# Patient Record
Sex: FEMALE | Race: WHITE | NOT HISPANIC OR LATINO | Employment: OTHER | URBAN - METROPOLITAN AREA
[De-identification: names, ages, dates, MRNs, and addresses within clinical notes are randomized per-mention and may not be internally consistent; named-entity substitution may affect disease eponyms.]

---

## 2017-05-22 ENCOUNTER — TRANSCRIBE ORDERS (OUTPATIENT)
Dept: CARDIOLOGY | Facility: HOSPITAL | Age: 57
End: 2017-05-22

## 2017-05-22 DIAGNOSIS — I70.222 ATHEROSCLEROSIS OF NATIVE ARTERY OF LEFT LOWER EXTREMITY WITH REST PAIN (HCC): Primary | ICD-10-CM

## 2017-05-27 ENCOUNTER — TRANSCRIBE ORDERS (OUTPATIENT)
Dept: LAB | Facility: HOSPITAL | Age: 57
End: 2017-05-27

## 2017-05-27 ENCOUNTER — LAB (OUTPATIENT)
Dept: LAB | Facility: HOSPITAL | Age: 57
End: 2017-05-27

## 2017-05-27 DIAGNOSIS — I70.222 ATHEROSCLEROSIS OF NATIVE ARTERY OF LEFT LOWER EXTREMITY WITH REST PAIN (HCC): ICD-10-CM

## 2017-05-27 DIAGNOSIS — I70.222 ATHEROSCLEROSIS OF NATIVE ARTERY OF LEFT LOWER EXTREMITY WITH REST PAIN (HCC): Primary | ICD-10-CM

## 2017-05-27 LAB
ANION GAP SERPL CALCULATED.3IONS-SCNC: 3 MMOL/L (ref 3–11)
BASOPHILS # BLD AUTO: 0.1 10*3/MM3 (ref 0–0.2)
BASOPHILS NFR BLD AUTO: 1.1 % (ref 0–1)
BUN BLD-MCNC: 8 MG/DL (ref 9–23)
BUN/CREAT SERPL: 13.3 (ref 7–25)
CALCIUM SPEC-SCNC: 9.1 MG/DL (ref 8.7–10.4)
CHLORIDE SERPL-SCNC: 105 MMOL/L (ref 99–109)
CO2 SERPL-SCNC: 22 MMOL/L (ref 20–31)
CREAT BLD-MCNC: 0.6 MG/DL (ref 0.6–1.3)
DEPRECATED RDW RBC AUTO: 51.3 FL (ref 37–54)
EOSINOPHIL # BLD AUTO: 0.5 10*3/MM3 (ref 0.1–0.3)
EOSINOPHIL NFR BLD AUTO: 5.3 % (ref 0–3)
ERYTHROCYTE [DISTWIDTH] IN BLOOD BY AUTOMATED COUNT: 17.9 % (ref 11.3–14.5)
GFR SERPL CREATININE-BSD FRML MDRD: 104 ML/MIN/1.73
GLUCOSE BLD-MCNC: 104 MG/DL (ref 70–100)
HCT VFR BLD AUTO: 29.6 % (ref 34.5–44)
HGB BLD-MCNC: 9.4 G/DL (ref 11.5–15.5)
IMM GRANULOCYTES # BLD: 0.03 10*3/MM3 (ref 0–0.03)
IMM GRANULOCYTES NFR BLD: 0.3 % (ref 0–0.6)
LYMPHOCYTES # BLD AUTO: 3.82 10*3/MM3 (ref 0.6–4.8)
LYMPHOCYTES NFR BLD AUTO: 40.1 % (ref 24–44)
MCH RBC QN AUTO: 24.7 PG (ref 27–31)
MCHC RBC AUTO-ENTMCNC: 31.8 G/DL (ref 32–36)
MCV RBC AUTO: 77.9 FL (ref 80–99)
MONOCYTES # BLD AUTO: 0.73 10*3/MM3 (ref 0–1)
MONOCYTES NFR BLD AUTO: 7.7 % (ref 0–12)
NEUTROPHILS # BLD AUTO: 4.34 10*3/MM3 (ref 1.5–8.3)
NEUTROPHILS NFR BLD AUTO: 45.5 % (ref 41–71)
PLATELET # BLD AUTO: 561 10*3/MM3 (ref 150–450)
PMV BLD AUTO: 9.6 FL (ref 6–12)
POTASSIUM BLD-SCNC: 3.8 MMOL/L (ref 3.5–5.5)
RBC # BLD AUTO: 3.8 10*6/MM3 (ref 3.89–5.14)
SODIUM BLD-SCNC: 130 MMOL/L (ref 132–146)
WBC NRBC COR # BLD: 9.52 10*3/MM3 (ref 3.5–10.8)

## 2017-05-27 PROCEDURE — 80048 BASIC METABOLIC PNL TOTAL CA: CPT

## 2017-05-27 PROCEDURE — 36415 COLL VENOUS BLD VENIPUNCTURE: CPT

## 2017-05-27 PROCEDURE — 85025 COMPLETE CBC W/AUTO DIFF WBC: CPT

## 2017-05-30 ENCOUNTER — HOSPITAL ENCOUNTER (OUTPATIENT)
Facility: HOSPITAL | Age: 57
Setting detail: HOSPITAL OUTPATIENT SURGERY
Discharge: HOME | End: 2017-05-30
Attending: SURGERY | Admitting: SURGERY

## 2017-05-30 VITALS
DIASTOLIC BLOOD PRESSURE: 80 MMHG | TEMPERATURE: 98 F | HEART RATE: 104 BPM | BODY MASS INDEX: 18.37 KG/M2 | SYSTOLIC BLOOD PRESSURE: 135 MMHG | OXYGEN SATURATION: 97 % | RESPIRATION RATE: 18 BRPM | HEIGHT: 65 IN | WEIGHT: 110.23 LBS

## 2017-05-30 DIAGNOSIS — I70.222 ATHEROSCLEROSIS OF NATIVE ARTERY OF LEFT LOWER EXTREMITY WITH REST PAIN (HCC): ICD-10-CM

## 2017-05-30 LAB
GLUCOSE BLDC GLUCOMTR-MCNC: 136 MG/DL (ref 70–130)
GLUCOSE BLDC GLUCOMTR-MCNC: 144 MG/DL (ref 70–130)
GLUCOSE BLDC GLUCOMTR-MCNC: 144 MG/DL (ref 70–130)

## 2017-05-30 PROCEDURE — 82962 GLUCOSE BLOOD TEST: CPT

## 2017-05-30 PROCEDURE — C1894 INTRO/SHEATH, NON-LASER: HCPCS | Performed by: SURGERY

## 2017-05-30 PROCEDURE — C1887 CATHETER, GUIDING: HCPCS | Performed by: SURGERY

## 2017-05-30 PROCEDURE — 25010000002 FENTANYL CITRATE (PF) 100 MCG/2ML SOLUTION: Performed by: SURGERY

## 2017-05-30 PROCEDURE — 75716 ARTERY X-RAYS ARMS/LEGS: CPT | Performed by: SURGERY

## 2017-05-30 PROCEDURE — C1769 GUIDE WIRE: HCPCS | Performed by: SURGERY

## 2017-05-30 PROCEDURE — 75625 CONTRAST EXAM ABDOMINL AORTA: CPT | Performed by: SURGERY

## 2017-05-30 PROCEDURE — 0 IOPAMIDOL PER 1 ML: Performed by: SURGERY

## 2017-05-30 PROCEDURE — 25010000002 MIDAZOLAM PER 1 MG: Performed by: SURGERY

## 2017-05-30 RX ORDER — FENTANYL CITRATE 50 UG/ML
INJECTION, SOLUTION INTRAMUSCULAR; INTRAVENOUS AS NEEDED
Status: DISCONTINUED | OUTPATIENT
Start: 2017-05-30 | End: 2017-05-30 | Stop reason: HOSPADM

## 2017-05-30 RX ORDER — ASPIRIN 81 MG/1
81 TABLET ORAL EVERY MORNING
COMMUNITY
End: 2022-09-08

## 2017-05-30 RX ORDER — SODIUM CHLORIDE 9 MG/ML
250 INJECTION, SOLUTION INTRAVENOUS CONTINUOUS
Status: ACTIVE | OUTPATIENT
Start: 2017-05-30 | End: 2017-05-30

## 2017-05-30 RX ORDER — HYDROCODONE BITARTRATE AND ACETAMINOPHEN 7.5; 325 MG/1; MG/1
1 TABLET ORAL EVERY 4 HOURS PRN
Status: DISCONTINUED | OUTPATIENT
Start: 2017-05-30 | End: 2017-05-30 | Stop reason: HOSPADM

## 2017-05-30 RX ORDER — NALOXONE HCL 0.4 MG/ML
0.4 VIAL (ML) INJECTION
Status: DISCONTINUED | OUTPATIENT
Start: 2017-05-30 | End: 2017-05-30 | Stop reason: HOSPADM

## 2017-05-30 RX ORDER — LISINOPRIL AND HYDROCHLOROTHIAZIDE 12.5; 1 MG/1; MG/1
1 TABLET ORAL EVERY MORNING
COMMUNITY
End: 2022-09-08

## 2017-05-30 RX ORDER — MIDAZOLAM HYDROCHLORIDE 1 MG/ML
INJECTION INTRAMUSCULAR; INTRAVENOUS AS NEEDED
Status: DISCONTINUED | OUTPATIENT
Start: 2017-05-30 | End: 2017-05-30 | Stop reason: HOSPADM

## 2017-05-30 RX ORDER — SIMVASTATIN 20 MG
20 TABLET ORAL EVERY MORNING
COMMUNITY
End: 2020-03-13

## 2017-05-30 RX ORDER — OXYCODONE AND ACETAMINOPHEN 7.5; 325 MG/1; MG/1
2 TABLET ORAL EVERY 4 HOURS PRN
Status: DISCONTINUED | OUTPATIENT
Start: 2017-05-30 | End: 2017-05-30 | Stop reason: HOSPADM

## 2017-05-30 RX ORDER — CLOPIDOGREL BISULFATE 75 MG/1
75 TABLET ORAL EVERY MORNING
COMMUNITY

## 2017-05-30 RX ORDER — MORPHINE SULFATE 2 MG/ML
2 INJECTION, SOLUTION INTRAMUSCULAR; INTRAVENOUS EVERY 4 HOURS PRN
Status: DISCONTINUED | OUTPATIENT
Start: 2017-05-30 | End: 2017-05-30 | Stop reason: HOSPADM

## 2017-05-30 RX ORDER — LIDOCAINE HYDROCHLORIDE 10 MG/ML
INJECTION, SOLUTION INFILTRATION; PERINEURAL AS NEEDED
Status: DISCONTINUED | OUTPATIENT
Start: 2017-05-30 | End: 2017-05-30 | Stop reason: HOSPADM

## 2017-05-30 RX ORDER — ACETAMINOPHEN 325 MG/1
650 TABLET ORAL EVERY 4 HOURS PRN
Status: DISCONTINUED | OUTPATIENT
Start: 2017-05-30 | End: 2017-05-30 | Stop reason: HOSPADM

## 2017-05-30 RX ADMIN — SODIUM CHLORIDE 250 ML/HR: 9 INJECTION, SOLUTION INTRAVENOUS at 12:07

## 2017-06-01 PROBLEM — I10 HYPERTENSION: Status: ACTIVE | Noted: 2017-06-01

## 2017-06-01 PROBLEM — E11.9 DIABETES MELLITUS (HCC): Status: ACTIVE | Noted: 2017-06-01

## 2017-06-01 PROBLEM — M79.7 FIBROMYALGIA: Status: ACTIVE | Noted: 2017-06-01

## 2017-06-01 PROBLEM — Z72.0 TOBACCO ABUSE: Status: ACTIVE | Noted: 2017-06-01

## 2017-06-01 PROBLEM — E78.5 HYPERLIPIDEMIA: Status: ACTIVE | Noted: 2017-06-01

## 2017-06-01 PROBLEM — I73.9 PAD (PERIPHERAL ARTERY DISEASE) (HCC): Status: ACTIVE | Noted: 2017-06-01

## 2017-06-01 PROBLEM — J44.9 COPD (CHRONIC OBSTRUCTIVE PULMONARY DISEASE) (HCC): Status: ACTIVE | Noted: 2017-06-01

## 2017-06-04 ENCOUNTER — APPOINTMENT (OUTPATIENT)
Dept: PREADMISSION TESTING | Facility: HOSPITAL | Age: 57
End: 2017-06-04

## 2017-06-28 DIAGNOSIS — Z01.810 PREOPERATIVE CARDIOVASCULAR EXAMINATION: Primary | ICD-10-CM

## 2017-06-30 ENCOUNTER — CONSULT (OUTPATIENT)
Dept: CARDIOLOGY | Facility: CLINIC | Age: 57
End: 2017-06-30

## 2017-06-30 ENCOUNTER — HOSPITAL ENCOUNTER (OUTPATIENT)
Dept: GENERAL RADIOLOGY | Facility: HOSPITAL | Age: 57
Discharge: HOME | End: 2017-06-30
Admitting: PHYSICIAN ASSISTANT

## 2017-06-30 VITALS
BODY MASS INDEX: 17.68 KG/M2 | DIASTOLIC BLOOD PRESSURE: 44 MMHG | HEIGHT: 66 IN | HEART RATE: 73 BPM | SYSTOLIC BLOOD PRESSURE: 106 MMHG | WEIGHT: 110 LBS

## 2017-06-30 DIAGNOSIS — I10 ESSENTIAL HYPERTENSION: ICD-10-CM

## 2017-06-30 DIAGNOSIS — R94.31 ABNORMAL EKG: ICD-10-CM

## 2017-06-30 DIAGNOSIS — Z01.810 PREOPERATIVE CARDIOVASCULAR EXAMINATION: Primary | ICD-10-CM

## 2017-06-30 DIAGNOSIS — E11.59 TYPE 2 DIABETES MELLITUS WITH OTHER CIRCULATORY COMPLICATION, WITHOUT LONG-TERM CURRENT USE OF INSULIN (HCC): ICD-10-CM

## 2017-06-30 DIAGNOSIS — J44.9 CHRONIC OBSTRUCTIVE PULMONARY DISEASE, UNSPECIFIED COPD TYPE (HCC): ICD-10-CM

## 2017-06-30 DIAGNOSIS — Z01.810 PREOPERATIVE CARDIOVASCULAR EXAMINATION: ICD-10-CM

## 2017-06-30 PROCEDURE — 71020 HC CHEST PA AND LATERAL: CPT

## 2017-06-30 PROCEDURE — 93000 ELECTROCARDIOGRAM COMPLETE: CPT | Performed by: INTERNAL MEDICINE

## 2017-06-30 PROCEDURE — 99202 OFFICE O/P NEW SF 15 MIN: CPT | Performed by: INTERNAL MEDICINE

## 2017-06-30 RX ORDER — CEPHALEXIN 500 MG/1
500 CAPSULE ORAL 3 TIMES DAILY
COMMUNITY
Start: 2017-06-20 | End: 2019-12-18 | Stop reason: HOSPADM

## 2017-07-06 ENCOUNTER — APPOINTMENT (OUTPATIENT)
Dept: CARDIOLOGY | Facility: HOSPITAL | Age: 57
End: 2017-07-06
Attending: INTERNAL MEDICINE

## 2017-07-07 ENCOUNTER — HOSPITAL ENCOUNTER (OUTPATIENT)
Dept: CARDIOLOGY | Facility: HOSPITAL | Age: 57
Discharge: HOME | End: 2017-07-07
Attending: INTERNAL MEDICINE

## 2017-07-07 ENCOUNTER — HOSPITAL ENCOUNTER (OUTPATIENT)
Dept: CARDIOLOGY | Facility: HOSPITAL | Age: 57
Discharge: HOME | End: 2017-07-07
Attending: INTERNAL MEDICINE | Admitting: INTERNAL MEDICINE

## 2017-07-07 VITALS — HEART RATE: 67 BPM | DIASTOLIC BLOOD PRESSURE: 77 MMHG | SYSTOLIC BLOOD PRESSURE: 122 MMHG

## 2017-07-07 VITALS — HEIGHT: 66 IN | BODY MASS INDEX: 17.68 KG/M2 | WEIGHT: 110 LBS

## 2017-07-07 DIAGNOSIS — Z01.810 PREOPERATIVE CARDIOVASCULAR EXAMINATION: ICD-10-CM

## 2017-07-07 DIAGNOSIS — R94.31 ABNORMAL EKG: ICD-10-CM

## 2017-07-07 DIAGNOSIS — E11.59 TYPE 2 DIABETES MELLITUS WITH OTHER CIRCULATORY COMPLICATION, WITHOUT LONG-TERM CURRENT USE OF INSULIN (HCC): ICD-10-CM

## 2017-07-07 DIAGNOSIS — J44.9 CHRONIC OBSTRUCTIVE PULMONARY DISEASE, UNSPECIFIED COPD TYPE (HCC): ICD-10-CM

## 2017-07-07 DIAGNOSIS — I10 ESSENTIAL HYPERTENSION: ICD-10-CM

## 2017-07-07 LAB
BH CV ECHO MEAS - BSA(HAYCOCK): 1.5 M^2
BH CV ECHO MEAS - BSA: 1.5 M^2
BH CV ECHO MEAS - BZI_BMI: 18.3 KILOGRAMS/M^2
BH CV ECHO MEAS - BZI_METRIC_HEIGHT: 165.1 CM
BH CV ECHO MEAS - BZI_METRIC_WEIGHT: 49.9 KG
BH CV STRESS BP STAGE 1: NORMAL
BH CV STRESS BP STAGE 2: NORMAL
BH CV STRESS BP STAGE 3: NORMAL
BH CV STRESS BP STAGE 4: NORMAL
BH CV STRESS BP STAGE 5: NORMAL
BH CV STRESS DOSE DOBUTAMINE STAGE 1: 10
BH CV STRESS DOSE DOBUTAMINE STAGE 2: 20
BH CV STRESS DOSE DOBUTAMINE STAGE 3: 30
BH CV STRESS DOSE DOBUTAMINE STAGE 4: 40
BH CV STRESS DURATION MIN STAGE 1: 2
BH CV STRESS DURATION MIN STAGE 2: 2
BH CV STRESS DURATION MIN STAGE 3: 2
BH CV STRESS DURATION MIN STAGE 4: 2
BH CV STRESS DURATION MIN STAGE 5: 2
BH CV STRESS DURATION SEC STAGE 1: 0
BH CV STRESS DURATION SEC STAGE 2: 0
BH CV STRESS DURATION SEC STAGE 3: 0
BH CV STRESS DURATION SEC STAGE 4: 0
BH CV STRESS DURATION SEC STAGE 5: 41
BH CV STRESS HR STAGE 1: 69
BH CV STRESS HR STAGE 2: 93
BH CV STRESS HR STAGE 3: 114
BH CV STRESS HR STAGE 4: 134
BH CV STRESS HR STAGE 5: 141
BH CV STRESS PROTOCOL 1: NORMAL
BH CV STRESS RATE STAGE 1: 30
BH CV STRESS RATE STAGE 2: 60
BH CV STRESS RATE STAGE 3: 90
BH CV STRESS RATE STAGE 4: 120
BH CV STRESS RECOVERY BP: NORMAL MMHG
BH CV STRESS RECOVERY HR: 99 BPM
BH CV STRESS STAGE 1: 1
BH CV STRESS STAGE 2: 2
BH CV STRESS STAGE 3: 3
BH CV STRESS STAGE 4: 4
BH CV STRESS STAGE 5: 5
MAXIMAL PREDICTED HEART RATE: 165 BPM
PERCENT MAX PREDICTED HR: 95.15 %
STRESS BASELINE BP: NORMAL MMHG
STRESS BASELINE HR: 67 BPM
STRESS PERCENT HR: 112 %
STRESS POST EXERCISE DUR MIN: 10 MIN
STRESS POST EXERCISE DUR SEC: 41 SEC
STRESS POST PEAK BP: NORMAL MMHG
STRESS POST PEAK HR: 157 BPM
STRESS TARGET HR: 140 BPM

## 2017-07-07 PROCEDURE — 93352 ADMIN ECG CONTRAST AGENT: CPT | Performed by: INTERNAL MEDICINE

## 2017-07-07 PROCEDURE — 93306 TTE W/DOPPLER COMPLETE: CPT | Performed by: INTERNAL MEDICINE

## 2017-07-07 PROCEDURE — 93350 STRESS TTE ONLY: CPT | Performed by: INTERNAL MEDICINE

## 2017-07-07 PROCEDURE — 93325 DOPPLER ECHO COLOR FLOW MAPG: CPT | Performed by: INTERNAL MEDICINE

## 2017-07-07 PROCEDURE — 93018 CV STRESS TEST I&R ONLY: CPT | Performed by: INTERNAL MEDICINE

## 2017-07-07 RX ORDER — DOBUTAMINE HYDROCHLORIDE 100 MG/100ML
10 INJECTION INTRAVENOUS
Status: DISCONTINUED | OUTPATIENT
Start: 2017-07-07 | End: 2017-07-08 | Stop reason: HOSPADM

## 2017-07-07 RX ADMIN — Medication 1 DOSE: at 10:50

## 2017-07-07 RX ADMIN — DOBUTAMINE HYDROCHLORIDE 10 MCG/KG/MIN: 100 INJECTION INTRAVENOUS at 14:12

## 2017-07-07 RX ADMIN — SULFUR HEXAFLUORIDE 5 ML: KIT at 14:00

## 2017-07-07 NOTE — NURSING NOTE
Pt. Has arrived today for a Lexiscan Cardiolite stress test.  Patient placed under the camera for her first set of pictures, she was unable to tolerate laying flat for more than 5 minutes, due to severe left foot pain.  Patient taken off the bed, we spoke with her and she is willing to attempt the camera again, this time with repositioning she lasted 7 minutes.

## 2017-07-08 LAB
BH CV ECHO MEAS - AO ROOT AREA: 6.1 CM^2
BH CV ECHO MEAS - AO ROOT DIAM: 2.8 CM
BH CV ECHO MEAS - CONTRAST EF (2CH): 65.5 ML/M^2
BH CV ECHO MEAS - CONTRAST EF 4CH: 67.6 ML/M^2
BH CV ECHO MEAS - EDV(CUBED): 65.8 ML
BH CV ECHO MEAS - EDV(MOD-SP2): 55 ML
BH CV ECHO MEAS - EDV(MOD-SP4): 68 ML
BH CV ECHO MEAS - EDV(TEICH): 71.6 ML
BH CV ECHO MEAS - EF(CUBED): 68.3 %
BH CV ECHO MEAS - EF(MOD-SP2): 65.5 %
BH CV ECHO MEAS - EF(MOD-SP4): 67.6 %
BH CV ECHO MEAS - EF(TEICH): 60.4 %
BH CV ECHO MEAS - EPSS: 0.36 CM
BH CV ECHO MEAS - ESV(CUBED): 20.9 ML
BH CV ECHO MEAS - ESV(MOD-SP2): 19 ML
BH CV ECHO MEAS - ESV(MOD-SP4): 22 ML
BH CV ECHO MEAS - ESV(TEICH): 28.4 ML
BH CV ECHO MEAS - FS: 31.8 %
BH CV ECHO MEAS - IVS/LVPW: 0.81
BH CV ECHO MEAS - IVSD: 0.59 CM
BH CV ECHO MEAS - LA DIMENSION: 3 CM
BH CV ECHO MEAS - LA/AO: 1.1
BH CV ECHO MEAS - LAT PEAK E' VEL: 11.5 CM/SEC
BH CV ECHO MEAS - LV MASS(C)D: 73.6 GRAMS
BH CV ECHO MEAS - LVIDD: 4 CM
BH CV ECHO MEAS - LVIDS: 2.8 CM
BH CV ECHO MEAS - LVLD AP2: 6.9 CM
BH CV ECHO MEAS - LVLD AP4: 6.6 CM
BH CV ECHO MEAS - LVLS AP2: 5.4 CM
BH CV ECHO MEAS - LVLS AP4: 4.9 CM
BH CV ECHO MEAS - LVPWD: 0.73 CM
BH CV ECHO MEAS - MED PEAK E' VEL: 11.2 CM/SEC
BH CV ECHO MEAS - MV A MAX VEL: 81.4 CM/SEC
BH CV ECHO MEAS - MV DEC TIME: 0.24 SEC
BH CV ECHO MEAS - MV E MAX VEL: 112.9 CM/SEC
BH CV ECHO MEAS - MV E/A: 1.4
BH CV ECHO MEAS - PA ACC SLOPE: 1202 CM/SEC^2
BH CV ECHO MEAS - PA ACC TIME: 0.07 SEC
BH CV ECHO MEAS - PA PR(ACCEL): 45.7 MMHG
BH CV ECHO MEAS - PI END-D VEL: 40.7 CM/SEC
BH CV ECHO MEAS - RAP SYSTOLE: 3 MMHG
BH CV ECHO MEAS - RVDD: 2.5 CM
BH CV ECHO MEAS - RVSP: 28 MMHG
BH CV ECHO MEAS - SV(CUBED): 45 ML
BH CV ECHO MEAS - SV(MOD-SP2): 36 ML
BH CV ECHO MEAS - SV(MOD-SP4): 46 ML
BH CV ECHO MEAS - SV(TEICH): 43.2 ML
BH CV ECHO MEAS - TAPSE (>1.6): 2.4 CM2
BH CV ECHO MEAS - TR MAX VEL: 231.6 CM/SEC
BH CV VAS BP LEFT ARM: NORMAL MMHG
BH CV XLRA - RV BASE: 3.6 CM
BH CV XLRA - RV LENGTH: 5.8 CM
BH CV XLRA - RV MID: 2.5 CM
BH CV XLRA - TDI S': 12.8 CM/SEC
E/E' RATIO: 9.9
LEFT ATRIUM VOLUME: 27 CM3
MAXIMAL PREDICTED HEART RATE: 165 BPM
STRESS TARGET HR: 140 BPM

## 2017-07-10 ENCOUNTER — ANESTHESIA (OUTPATIENT)
Dept: PERIOP | Facility: HOSPITAL | Age: 57
End: 2017-07-10

## 2017-07-10 ENCOUNTER — ANESTHESIA EVENT (OUTPATIENT)
Dept: PERIOP | Facility: HOSPITAL | Age: 57
End: 2017-07-10

## 2017-07-10 ENCOUNTER — APPOINTMENT (OUTPATIENT)
Dept: GENERAL RADIOLOGY | Facility: HOSPITAL | Age: 57
End: 2017-07-10

## 2017-07-10 ENCOUNTER — HOSPITAL ENCOUNTER (INPATIENT)
Facility: HOSPITAL | Age: 57
LOS: 2 days | Discharge: HOME | End: 2017-07-12
Attending: SURGERY | Admitting: SURGERY

## 2017-07-10 DIAGNOSIS — I96 GANGRENE (HCC): ICD-10-CM

## 2017-07-10 PROBLEM — E78.5 HYPERLIPIDEMIA: Status: ACTIVE | Noted: 2017-07-10

## 2017-07-10 PROBLEM — J44.9 COPD (CHRONIC OBSTRUCTIVE PULMONARY DISEASE): Status: ACTIVE | Noted: 2017-07-10

## 2017-07-10 PROBLEM — Z95.828 S/P FEMOROPOPLITEAL BYPASS SURGERY: Status: ACTIVE | Noted: 2017-07-10

## 2017-07-10 PROBLEM — I10 HYPERTENSION: Status: ACTIVE | Noted: 2017-07-10

## 2017-07-10 PROBLEM — I73.9 PAD (PERIPHERAL ARTERY DISEASE): Status: ACTIVE | Noted: 2017-07-10

## 2017-07-10 PROBLEM — E11.9 DIABETES MELLITUS (HCC): Status: ACTIVE | Noted: 2017-07-10

## 2017-07-10 PROBLEM — D64.9 ANEMIA: Status: ACTIVE | Noted: 2017-07-10

## 2017-07-10 PROBLEM — Z72.0 TOBACCO ABUSE: Status: ACTIVE | Noted: 2017-07-10

## 2017-07-10 LAB
ABO GROUP BLD: NORMAL
ANION GAP SERPL CALCULATED.3IONS-SCNC: 8 MMOL/L (ref 3–11)
BILIRUB UR QL STRIP: NEGATIVE
BLD GP AB SCN SERPL QL: NEGATIVE
BUN BLD-MCNC: 8 MG/DL (ref 9–23)
BUN/CREAT SERPL: 16 (ref 7–25)
CALCIUM SPEC-SCNC: 9.9 MG/DL (ref 8.7–10.4)
CHLORIDE SERPL-SCNC: 109 MMOL/L (ref 99–109)
CLARITY UR: CLEAR
CO2 SERPL-SCNC: 24 MMOL/L (ref 20–31)
COLOR UR: YELLOW
CREAT BLD-MCNC: 0.5 MG/DL (ref 0.6–1.3)
DEPRECATED RDW RBC AUTO: 50.6 FL (ref 37–54)
ERYTHROCYTE [DISTWIDTH] IN BLOOD BY AUTOMATED COUNT: 17.6 % (ref 11.3–14.5)
GFR SERPL CREATININE-BSD FRML MDRD: 128 ML/MIN/1.73
GLUCOSE BLD-MCNC: 121 MG/DL (ref 70–100)
GLUCOSE BLDC GLUCOMTR-MCNC: 138 MG/DL (ref 70–130)
GLUCOSE BLDC GLUCOMTR-MCNC: 178 MG/DL (ref 70–130)
GLUCOSE BLDC GLUCOMTR-MCNC: 212 MG/DL (ref 70–130)
GLUCOSE UR STRIP-MCNC: NEGATIVE MG/DL
HBA1C MFR BLD: 6.5 % (ref 4.8–5.6)
HCG INTACT+B SERPL-ACNC: <5 MIU/ML
HCT VFR BLD AUTO: 29.7 % (ref 34.5–44)
HGB BLD-MCNC: 9.5 G/DL (ref 11.5–15.5)
HGB UR QL STRIP.AUTO: NEGATIVE
KETONES UR QL STRIP: NEGATIVE
LEUKOCYTE ESTERASE UR QL STRIP.AUTO: ABNORMAL
MCH RBC QN AUTO: 25.5 PG (ref 27–31)
MCHC RBC AUTO-ENTMCNC: 32 G/DL (ref 32–36)
MCV RBC AUTO: 79.6 FL (ref 80–99)
NITRITE UR QL STRIP: NEGATIVE
PH UR STRIP.AUTO: 7 [PH] (ref 5–8)
PLATELET # BLD AUTO: 788 10*3/MM3 (ref 150–450)
PMV BLD AUTO: 10.1 FL (ref 6–12)
POTASSIUM BLD-SCNC: 2.9 MMOL/L (ref 3.5–5.5)
PROT UR QL STRIP: NEGATIVE
RBC # BLD AUTO: 3.73 10*6/MM3 (ref 3.89–5.14)
RH BLD: NEGATIVE
SODIUM BLD-SCNC: 141 MMOL/L (ref 132–146)
SP GR UR STRIP: 1.01 (ref 1–1.03)
UROBILINOGEN UR QL STRIP: ABNORMAL
WBC NRBC COR # BLD: 18.64 10*3/MM3 (ref 3.5–10.8)

## 2017-07-10 PROCEDURE — 25010000002 HYDROMORPHONE PER 4 MG: Performed by: NURSE ANESTHETIST, CERTIFIED REGISTERED

## 2017-07-10 PROCEDURE — 0 IOPAMIDOL 61 % SOLUTION: Performed by: SURGERY

## 2017-07-10 PROCEDURE — 63710000001 INSULIN LISPRO (HUMAN) PER 5 UNITS: Performed by: NURSE PRACTITIONER

## 2017-07-10 PROCEDURE — 81003 URINALYSIS AUTO W/O SCOPE: CPT | Performed by: SURGERY

## 2017-07-10 PROCEDURE — 25010000002 MORPHINE SULFATE (PF) 2 MG/ML SOLUTION: Performed by: SURGERY

## 2017-07-10 PROCEDURE — P9041 ALBUMIN (HUMAN),5%, 50ML: HCPCS | Performed by: NURSE ANESTHETIST, CERTIFIED REGISTERED

## 2017-07-10 PROCEDURE — 25010000002 HEPARIN (PORCINE) PER 1000 UNITS: Performed by: SURGERY

## 2017-07-10 PROCEDURE — 25010000002 PHENYLEPHRINE PER 1 ML: Performed by: NURSE ANESTHETIST, CERTIFIED REGISTERED

## 2017-07-10 PROCEDURE — C1769 GUIDE WIRE: HCPCS | Performed by: SURGERY

## 2017-07-10 PROCEDURE — 85027 COMPLETE CBC AUTOMATED: CPT | Performed by: SURGERY

## 2017-07-10 PROCEDURE — 83036 HEMOGLOBIN GLYCOSYLATED A1C: CPT | Performed by: SURGERY

## 2017-07-10 PROCEDURE — C1894 INTRO/SHEATH, NON-LASER: HCPCS | Performed by: SURGERY

## 2017-07-10 PROCEDURE — 25010000002 FENTANYL CITRATE (PF) 100 MCG/2ML SOLUTION: Performed by: NURSE ANESTHETIST, CERTIFIED REGISTERED

## 2017-07-10 PROCEDURE — 76000 FLUOROSCOPY <1 HR PHYS/QHP: CPT

## 2017-07-10 PROCEDURE — 80048 BASIC METABOLIC PNL TOTAL CA: CPT | Performed by: SURGERY

## 2017-07-10 PROCEDURE — 041D0ZJ BYPASS LEFT COMMON ILIAC ARTERY TO LEFT FEMORAL ARTERY, OPEN APPROACH: ICD-10-PCS | Performed by: SURGERY

## 2017-07-10 PROCEDURE — 25010000002 DEXAMETHASONE PER 1 MG: Performed by: NURSE ANESTHETIST, CERTIFIED REGISTERED

## 2017-07-10 PROCEDURE — 0Y6N0ZF DETACHMENT AT LEFT FOOT, PARTIAL 5TH RAY, OPEN APPROACH: ICD-10-PCS | Performed by: SURGERY

## 2017-07-10 PROCEDURE — 99233 SBSQ HOSP IP/OBS HIGH 50: CPT | Performed by: INTERNAL MEDICINE

## 2017-07-10 PROCEDURE — 87086 URINE CULTURE/COLONY COUNT: CPT | Performed by: SURGERY

## 2017-07-10 PROCEDURE — 88305 TISSUE EXAM BY PATHOLOGIST: CPT | Performed by: SURGERY

## 2017-07-10 PROCEDURE — 86900 BLOOD TYPING SEROLOGIC ABO: CPT | Performed by: SURGERY

## 2017-07-10 PROCEDURE — 041L0ZL BYPASS LEFT FEMORAL ARTERY TO POPLITEAL ARTERY, OPEN APPROACH: ICD-10-PCS | Performed by: SURGERY

## 2017-07-10 PROCEDURE — 25010000002 ALBUMIN HUMAN 5% PER 50 ML: Performed by: NURSE ANESTHETIST, CERTIFIED REGISTERED

## 2017-07-10 PROCEDURE — 25010000002 MIDAZOLAM PER 1 MG: Performed by: NURSE ANESTHETIST, CERTIFIED REGISTERED

## 2017-07-10 PROCEDURE — 84702 CHORIONIC GONADOTROPIN TEST: CPT | Performed by: ANESTHESIOLOGY

## 2017-07-10 PROCEDURE — 86901 BLOOD TYPING SEROLOGIC RH(D): CPT | Performed by: SURGERY

## 2017-07-10 PROCEDURE — 82962 GLUCOSE BLOOD TEST: CPT

## 2017-07-10 PROCEDURE — 25010000003 CEFAZOLIN IN DEXTROSE 2-4 GM/100ML-% SOLUTION: Performed by: SURGERY

## 2017-07-10 PROCEDURE — 25010000002 PROPOFOL 1000 MG/ML EMULSION: Performed by: NURSE ANESTHETIST, CERTIFIED REGISTERED

## 2017-07-10 PROCEDURE — 76001 HC FLUORO GREATER THAN 1 HOUR: CPT

## 2017-07-10 PROCEDURE — 86850 RBC ANTIBODY SCREEN: CPT | Performed by: SURGERY

## 2017-07-10 PROCEDURE — C1768 GRAFT, VASCULAR: HCPCS | Performed by: SURGERY

## 2017-07-10 PROCEDURE — C1757 CATH, THROMBECTOMY/EMBOLECT: HCPCS | Performed by: SURGERY

## 2017-07-10 PROCEDURE — 25010000002 PROPOFOL 10 MG/ML EMULSION: Performed by: NURSE ANESTHETIST, CERTIFIED REGISTERED

## 2017-07-10 PROCEDURE — 85014 HEMATOCRIT: CPT | Performed by: SURGERY

## 2017-07-10 PROCEDURE — 88311 DECALCIFY TISSUE: CPT | Performed by: SURGERY

## 2017-07-10 PROCEDURE — 25010000002 HEPARIN (PORCINE) PER 1000 UNITS: Performed by: NURSE ANESTHETIST, CERTIFIED REGISTERED

## 2017-07-10 PROCEDURE — 85018 HEMOGLOBIN: CPT | Performed by: SURGERY

## 2017-07-10 DEVICE — IMPLANTABLE DEVICE: Type: IMPLANTABLE DEVICE | Status: FUNCTIONAL

## 2017-07-10 RX ORDER — PROPOFOL 10 MG/ML
VIAL (ML) INTRAVENOUS AS NEEDED
Status: DISCONTINUED | OUTPATIENT
Start: 2017-07-10 | End: 2017-07-10 | Stop reason: SURG

## 2017-07-10 RX ORDER — CLOPIDOGREL BISULFATE 75 MG/1
75 TABLET ORAL EVERY MORNING
Status: DISCONTINUED | OUTPATIENT
Start: 2017-07-11 | End: 2017-07-12 | Stop reason: HOSPADM

## 2017-07-10 RX ORDER — DEXAMETHASONE SODIUM PHOSPHATE 10 MG/ML
INJECTION INTRAMUSCULAR; INTRAVENOUS AS NEEDED
Status: DISCONTINUED | OUTPATIENT
Start: 2017-07-10 | End: 2017-07-10 | Stop reason: SURG

## 2017-07-10 RX ORDER — HEPARIN SODIUM 1000 [USP'U]/ML
INJECTION, SOLUTION INTRAVENOUS; SUBCUTANEOUS AS NEEDED
Status: DISCONTINUED | OUTPATIENT
Start: 2017-07-10 | End: 2017-07-10 | Stop reason: HOSPADM

## 2017-07-10 RX ORDER — ATRACURIUM BESYLATE 10 MG/ML
INJECTION, SOLUTION INTRAVENOUS AS NEEDED
Status: DISCONTINUED | OUTPATIENT
Start: 2017-07-10 | End: 2017-07-10 | Stop reason: SURG

## 2017-07-10 RX ORDER — DEXTROSE MONOHYDRATE 25 G/50ML
25 INJECTION, SOLUTION INTRAVENOUS
Status: DISCONTINUED | OUTPATIENT
Start: 2017-07-10 | End: 2017-07-11

## 2017-07-10 RX ORDER — CEFAZOLIN SODIUM 2 G/100ML
2 INJECTION, SOLUTION INTRAVENOUS ONCE
Status: COMPLETED | OUTPATIENT
Start: 2017-07-10 | End: 2017-07-10

## 2017-07-10 RX ORDER — HYDROMORPHONE HYDROCHLORIDE 1 MG/ML
0.5 INJECTION, SOLUTION INTRAMUSCULAR; INTRAVENOUS; SUBCUTANEOUS
Status: DISCONTINUED | OUTPATIENT
Start: 2017-07-10 | End: 2017-07-10 | Stop reason: HOSPADM

## 2017-07-10 RX ORDER — NALOXONE HCL 0.4 MG/ML
0.4 VIAL (ML) INJECTION
Status: DISCONTINUED | OUTPATIENT
Start: 2017-07-10 | End: 2017-07-12 | Stop reason: HOSPADM

## 2017-07-10 RX ORDER — ONDANSETRON 2 MG/ML
4 INJECTION INTRAMUSCULAR; INTRAVENOUS ONCE AS NEEDED
Status: DISCONTINUED | OUTPATIENT
Start: 2017-07-10 | End: 2017-07-10 | Stop reason: HOSPADM

## 2017-07-10 RX ORDER — FAMOTIDINE 20 MG/1
20 TABLET, FILM COATED ORAL ONCE
Status: DISCONTINUED | OUTPATIENT
Start: 2017-07-10 | End: 2017-07-10

## 2017-07-10 RX ORDER — LIDOCAINE HYDROCHLORIDE 10 MG/ML
0.5 INJECTION, SOLUTION EPIDURAL; INFILTRATION; INTRACAUDAL; PERINEURAL ONCE AS NEEDED
Status: COMPLETED | OUTPATIENT
Start: 2017-07-10 | End: 2017-07-10

## 2017-07-10 RX ORDER — PAPAVERINE HYDROCHLORIDE 30 MG/ML
INJECTION INTRAMUSCULAR; INTRAVENOUS AS NEEDED
Status: DISCONTINUED | OUTPATIENT
Start: 2017-07-10 | End: 2017-07-10 | Stop reason: HOSPADM

## 2017-07-10 RX ORDER — CEFAZOLIN SODIUM 2 G/100ML
2 INJECTION, SOLUTION INTRAVENOUS EVERY 8 HOURS
Status: COMPLETED | OUTPATIENT
Start: 2017-07-10 | End: 2017-07-11

## 2017-07-10 RX ORDER — MIDAZOLAM HYDROCHLORIDE 1 MG/ML
INJECTION INTRAMUSCULAR; INTRAVENOUS AS NEEDED
Status: DISCONTINUED | OUTPATIENT
Start: 2017-07-10 | End: 2017-07-10 | Stop reason: SURG

## 2017-07-10 RX ORDER — FENTANYL CITRATE 50 UG/ML
50 INJECTION, SOLUTION INTRAMUSCULAR; INTRAVENOUS
Status: DISCONTINUED | OUTPATIENT
Start: 2017-07-10 | End: 2017-07-10 | Stop reason: HOSPADM

## 2017-07-10 RX ORDER — MAGNESIUM HYDROXIDE 1200 MG/15ML
LIQUID ORAL AS NEEDED
Status: DISCONTINUED | OUTPATIENT
Start: 2017-07-10 | End: 2017-07-10 | Stop reason: HOSPADM

## 2017-07-10 RX ORDER — LIDOCAINE HYDROCHLORIDE 20 MG/ML
INJECTION, SOLUTION INFILTRATION; PERINEURAL AS NEEDED
Status: DISCONTINUED | OUTPATIENT
Start: 2017-07-10 | End: 2017-07-10 | Stop reason: SURG

## 2017-07-10 RX ORDER — MORPHINE SULFATE 2 MG/ML
1 INJECTION, SOLUTION INTRAMUSCULAR; INTRAVENOUS EVERY 4 HOURS PRN
Status: DISCONTINUED | OUTPATIENT
Start: 2017-07-10 | End: 2017-07-12 | Stop reason: HOSPADM

## 2017-07-10 RX ORDER — SODIUM CHLORIDE 9 MG/ML
INJECTION, SOLUTION INTRAVENOUS AS NEEDED
Status: DISCONTINUED | OUTPATIENT
Start: 2017-07-10 | End: 2017-07-10 | Stop reason: HOSPADM

## 2017-07-10 RX ORDER — LIDOCAINE HYDROCHLORIDE 10 MG/ML
0.5 INJECTION, SOLUTION EPIDURAL; INFILTRATION; INTRACAUDAL; PERINEURAL ONCE AS NEEDED
Status: DISCONTINUED | OUTPATIENT
Start: 2017-07-10 | End: 2017-07-10

## 2017-07-10 RX ORDER — NICOTINE 21 MG/24HR
1 PATCH, TRANSDERMAL 24 HOURS TRANSDERMAL EVERY 24 HOURS
Status: DISCONTINUED | OUTPATIENT
Start: 2017-07-11 | End: 2017-07-12 | Stop reason: HOSPADM

## 2017-07-10 RX ORDER — SODIUM CHLORIDE, SODIUM LACTATE, POTASSIUM CHLORIDE, CALCIUM CHLORIDE 600; 310; 30; 20 MG/100ML; MG/100ML; MG/100ML; MG/100ML
9 INJECTION, SOLUTION INTRAVENOUS CONTINUOUS
Status: DISCONTINUED | OUTPATIENT
Start: 2017-07-10 | End: 2017-07-10 | Stop reason: SDUPTHER

## 2017-07-10 RX ORDER — MORPHINE SULFATE 2 MG/ML
2 INJECTION, SOLUTION INTRAMUSCULAR; INTRAVENOUS EVERY 4 HOURS PRN
Status: DISCONTINUED | OUTPATIENT
Start: 2017-07-10 | End: 2017-07-12 | Stop reason: HOSPADM

## 2017-07-10 RX ORDER — SODIUM CHLORIDE, SODIUM LACTATE, POTASSIUM CHLORIDE, CALCIUM CHLORIDE 600; 310; 30; 20 MG/100ML; MG/100ML; MG/100ML; MG/100ML
75 INJECTION, SOLUTION INTRAVENOUS CONTINUOUS
Status: DISCONTINUED | OUTPATIENT
Start: 2017-07-10 | End: 2017-07-11

## 2017-07-10 RX ORDER — NICOTINE POLACRILEX 4 MG
15 LOZENGE BUCCAL
Status: DISCONTINUED | OUTPATIENT
Start: 2017-07-10 | End: 2017-07-11

## 2017-07-10 RX ORDER — ALBUMIN, HUMAN INJ 5% 5 %
SOLUTION INTRAVENOUS CONTINUOUS PRN
Status: DISCONTINUED | OUTPATIENT
Start: 2017-07-10 | End: 2017-07-10 | Stop reason: SURG

## 2017-07-10 RX ORDER — OXYCODONE HYDROCHLORIDE AND ACETAMINOPHEN 5; 325 MG/1; MG/1
2 TABLET ORAL EVERY 4 HOURS PRN
Status: DISCONTINUED | OUTPATIENT
Start: 2017-07-10 | End: 2017-07-12 | Stop reason: HOSPADM

## 2017-07-10 RX ORDER — SODIUM CHLORIDE, SODIUM LACTATE, POTASSIUM CHLORIDE, CALCIUM CHLORIDE 600; 310; 30; 20 MG/100ML; MG/100ML; MG/100ML; MG/100ML
9 INJECTION, SOLUTION INTRAVENOUS CONTINUOUS PRN
Status: DISCONTINUED | OUTPATIENT
Start: 2017-07-10 | End: 2017-07-10 | Stop reason: HOSPADM

## 2017-07-10 RX ORDER — ROCURONIUM BROMIDE 10 MG/ML
INJECTION, SOLUTION INTRAVENOUS AS NEEDED
Status: DISCONTINUED | OUTPATIENT
Start: 2017-07-10 | End: 2017-07-10 | Stop reason: SURG

## 2017-07-10 RX ORDER — ACETAMINOPHEN 325 MG/1
650 TABLET ORAL EVERY 4 HOURS PRN
Status: DISCONTINUED | OUTPATIENT
Start: 2017-07-10 | End: 2017-07-12 | Stop reason: HOSPADM

## 2017-07-10 RX ORDER — HEPARIN SODIUM 1000 [USP'U]/ML
INJECTION, SOLUTION INTRAVENOUS; SUBCUTANEOUS AS NEEDED
Status: DISCONTINUED | OUTPATIENT
Start: 2017-07-10 | End: 2017-07-10 | Stop reason: SURG

## 2017-07-10 RX ORDER — SODIUM CHLORIDE 0.9 % (FLUSH) 0.9 %
1-10 SYRINGE (ML) INJECTION AS NEEDED
Status: DISCONTINUED | OUTPATIENT
Start: 2017-07-10 | End: 2017-07-10 | Stop reason: HOSPADM

## 2017-07-10 RX ORDER — FENTANYL CITRATE 50 UG/ML
INJECTION, SOLUTION INTRAMUSCULAR; INTRAVENOUS AS NEEDED
Status: DISCONTINUED | OUTPATIENT
Start: 2017-07-10 | End: 2017-07-10 | Stop reason: SURG

## 2017-07-10 RX ORDER — FAMOTIDINE 10 MG/ML
20 INJECTION, SOLUTION INTRAVENOUS ONCE
Status: DISCONTINUED | OUTPATIENT
Start: 2017-07-10 | End: 2017-07-10

## 2017-07-10 RX ORDER — FAMOTIDINE 20 MG/1
20 TABLET, FILM COATED ORAL
Status: DISCONTINUED | OUTPATIENT
Start: 2017-07-10 | End: 2017-07-10 | Stop reason: HOSPADM

## 2017-07-10 RX ADMIN — FENTANYL CITRATE 25 MCG: 50 INJECTION, SOLUTION INTRAMUSCULAR; INTRAVENOUS at 13:32

## 2017-07-10 RX ADMIN — EPHEDRINE SULFATE 12.5 MG: 50 INJECTION INTRAMUSCULAR; INTRAVENOUS; SUBCUTANEOUS at 09:04

## 2017-07-10 RX ADMIN — CEFAZOLIN SODIUM 2 G: 2 INJECTION, SOLUTION INTRAVENOUS at 14:00

## 2017-07-10 RX ADMIN — ROCURONIUM BROMIDE 25 MG: 10 INJECTION INTRAVENOUS at 08:13

## 2017-07-10 RX ADMIN — ATRACURIUM BESYLATE 10 MG: 10 INJECTION, SOLUTION INTRAVENOUS at 13:36

## 2017-07-10 RX ADMIN — SODIUM CHLORIDE, POTASSIUM CHLORIDE, SODIUM LACTATE AND CALCIUM CHLORIDE 9 ML/HR: 600; 310; 30; 20 INJECTION, SOLUTION INTRAVENOUS at 06:49

## 2017-07-10 RX ADMIN — ATRACURIUM BESYLATE 10 MG: 10 INJECTION, SOLUTION INTRAVENOUS at 14:45

## 2017-07-10 RX ADMIN — ATRACURIUM BESYLATE 10 MG: 10 INJECTION, SOLUTION INTRAVENOUS at 12:59

## 2017-07-10 RX ADMIN — ATRACURIUM BESYLATE 10 MG: 10 INJECTION, SOLUTION INTRAVENOUS at 10:42

## 2017-07-10 RX ADMIN — CEFAZOLIN SODIUM 2 G: 2 INJECTION, SOLUTION INTRAVENOUS at 08:08

## 2017-07-10 RX ADMIN — CEFAZOLIN SODIUM 2 G: 2 INJECTION, SOLUTION INTRAVENOUS at 11:08

## 2017-07-10 RX ADMIN — ROCURONIUM BROMIDE 10 MG: 10 INJECTION INTRAVENOUS at 08:34

## 2017-07-10 RX ADMIN — MORPHINE SULFATE 2 MG: 2 INJECTION, SOLUTION INTRAMUSCULAR; INTRAVENOUS at 20:47

## 2017-07-10 RX ADMIN — PROPOFOL 25 MCG/KG/MIN: 10 INJECTION, EMULSION INTRAVENOUS at 08:20

## 2017-07-10 RX ADMIN — PROPOFOL 25 MG: 10 INJECTION, EMULSION INTRAVENOUS at 12:38

## 2017-07-10 RX ADMIN — ROCURONIUM BROMIDE 10 MG: 10 INJECTION INTRAVENOUS at 09:23

## 2017-07-10 RX ADMIN — DEXAMETHASONE SODIUM PHOSPHATE 4 MG: 10 INJECTION INTRAMUSCULAR; INTRAVENOUS at 11:32

## 2017-07-10 RX ADMIN — PHENYLEPHRINE HYDROCHLORIDE 100 MCG: 10 INJECTION INTRAVENOUS at 12:52

## 2017-07-10 RX ADMIN — MIDAZOLAM HYDROCHLORIDE 1 MG: 1 INJECTION, SOLUTION INTRAMUSCULAR; INTRAVENOUS at 11:15

## 2017-07-10 RX ADMIN — SODIUM CHLORIDE, POTASSIUM CHLORIDE, SODIUM LACTATE AND CALCIUM CHLORIDE: 600; 310; 30; 20 INJECTION, SOLUTION INTRAVENOUS at 12:01

## 2017-07-10 RX ADMIN — HYDROMORPHONE HYDROCHLORIDE 0.5 MG: 1 INJECTION, SOLUTION INTRAMUSCULAR; INTRAVENOUS; SUBCUTANEOUS at 16:05

## 2017-07-10 RX ADMIN — HEPARIN SODIUM 3000 UNITS: 1000 INJECTION, SOLUTION INTRAVENOUS; SUBCUTANEOUS at 12:27

## 2017-07-10 RX ADMIN — LIDOCAINE HYDROCHLORIDE 30 MG: 20 INJECTION, SOLUTION INFILTRATION; PERINEURAL at 08:12

## 2017-07-10 RX ADMIN — FENTANYL CITRATE 75 MCG: 50 INJECTION, SOLUTION INTRAMUSCULAR; INTRAVENOUS at 13:44

## 2017-07-10 RX ADMIN — PROPOFOL 100 MG: 10 INJECTION, EMULSION INTRAVENOUS at 08:13

## 2017-07-10 RX ADMIN — ATRACURIUM BESYLATE 10 MG: 10 INJECTION, SOLUTION INTRAVENOUS at 11:41

## 2017-07-10 RX ADMIN — MIDAZOLAM HYDROCHLORIDE 1 MG: 1 INJECTION, SOLUTION INTRAMUSCULAR; INTRAVENOUS at 08:08

## 2017-07-10 RX ADMIN — CEFAZOLIN SODIUM 2 G: 2 INJECTION, SOLUTION INTRAVENOUS at 18:02

## 2017-07-10 RX ADMIN — LIDOCAINE HYDROCHLORIDE 0.5 ML: 10 INJECTION, SOLUTION EPIDURAL; INFILTRATION; INTRACAUDAL; PERINEURAL at 06:48

## 2017-07-10 RX ADMIN — HEPARIN SODIUM 5000 UNITS: 1000 INJECTION, SOLUTION INTRAVENOUS; SUBCUTANEOUS at 09:00

## 2017-07-10 RX ADMIN — HYDROMORPHONE HYDROCHLORIDE 0.5 MG: 1 INJECTION, SOLUTION INTRAMUSCULAR; INTRAVENOUS; SUBCUTANEOUS at 16:10

## 2017-07-10 RX ADMIN — ROCURONIUM BROMIDE 5 MG: 10 INJECTION INTRAVENOUS at 10:00

## 2017-07-10 RX ADMIN — FAMOTIDINE 20 MG: 20 TABLET ORAL at 06:48

## 2017-07-10 RX ADMIN — ATRACURIUM BESYLATE 10 MG: 10 INJECTION, SOLUTION INTRAVENOUS at 12:22

## 2017-07-10 RX ADMIN — HEPARIN SODIUM 1000 UNITS: 1000 INJECTION, SOLUTION INTRAVENOUS; SUBCUTANEOUS at 10:38

## 2017-07-10 RX ADMIN — SODIUM CHLORIDE, POTASSIUM CHLORIDE, SODIUM LACTATE AND CALCIUM CHLORIDE 75 ML/HR: 600; 310; 30; 20 INJECTION, SOLUTION INTRAVENOUS at 17:58

## 2017-07-10 RX ADMIN — INSULIN LISPRO 4 UNITS: 100 INJECTION, SOLUTION INTRAVENOUS; SUBCUTANEOUS at 20:47

## 2017-07-10 RX ADMIN — ALBUMIN HUMAN: 0.05 INJECTION, SOLUTION INTRAVENOUS at 09:55

## 2017-07-10 RX ADMIN — OXYCODONE AND ACETAMINOPHEN 1 TABLET: 5; 325 TABLET ORAL at 17:53

## 2017-07-10 NOTE — PROGRESS NOTES
"Intensivist Note     7/10/2017  Hospital Day: 0      Ms. Bobbi Du, 56 y.o. female is followed for:  Principal Problem:    PAD w/gangrene left fifth toe  Active Problems:    S/P left iliofemoral and left femoropopliteal bypass surgery 7/10/17    Diabetes mellitus with neuropathy    Active Tobacco abuse    COPD (chronic obstructive pulmonary disease)    Hyperlipidemia    Hypertension    Microcytic Anemia     SUBJECTIVE   Mrs. Du is a 55 y/o WF who was admitted today by Dr. Toro for PAD and gangrene of left 5th toe.  She underwent preoperative evaluation by Dr. Romero Ontiveros and underwent a dobutamine stress echocardiogram 7/7/17 that was normal and was subsequently cleared for surgery.  She has continued to smoke up to the day of surgery, despite being advised that this could lead to failure of revascularization.  She has diabetes and associated neuropathy and Charcot debbie,t  but her Hgb A1c was only 6.5 on metformin alone. She denies a great deal of dyspnea or wheezing but does take bronchodilator therapy. She is unsure of most of her home medications. She does have what sounds like an albuterol metered-dose inhaler but has not required oxygen or nebulizer treatments.    Subjective  Patient underwent a left ilio-femoral bypass, left fem-pop ISSV bypass and left 5th toe ray amputation by Dr. Toro today and has been transferred to the ICU post operatively. Was sleeping quietly upon my arrival. Easily awakens and other than some complaints of pain in her right foot (not the operative foot) she seems to have adequate pain control. Is on room air with adequate saturations and hemodynamics are good. Denies chest pain, cough, hemoptysis, pleurisy, dyspnea or wheezing.    The patient's relevant past medical, surgical and social history were reviewed and updated in Epic as appropriate.    OBJECTIVE     /80  Pulse 111  Temp 98.2 °F (36.8 °C) (Axillary)   Resp 16  Ht 65.5\" (166.4 cm)  Wt 110 lb (49.9 " "kg)  LMP Comment: YEARS AGO  SpO2 94%  Breastfeeding? No  BMI 18.03 kg/m2     Flow (L/min): 1    Flowsheet Rows         First Filed Value    Admission Height  65.5\" (166.4 cm) Documented at 07/10/2017 0620    Admission Weight  110 lb (49.9 kg) Documented at 07/10/2017 0620        Intake & Output (last day)       07/10 0701 - 07/11 0700    I.V. (mL/kg) 1059 (21.2)    IV Piggyback 317.8    Total Intake(mL/kg) 1376.8 (27.6)    Urine (mL/kg/hr) 1565 (2.3)    Blood 200 (0.3)    Total Output 1765    Net -388.2             Objective    Exam:  General Exam:  Elderly white female in NAD. Appears far older than stated age  HEENT: Pupils equal and reactive. Nose and throat clear.  Neck:                          Supple, no JVD, thyromegaly, or adenopathy  Lungs: Clear to auscultation and percussion anteriorly and posteriorly. No wheezes or rales  Cardiovascular: Regular rate and rhythm without murmurs or gallops.  Abdomen: Soft nontender without organomegaly or masses.   and rectal: Rueda catheter in place  Extremities: Right foot warm but diminished pulses. Left foot wrapped. The exposed left great toe has an ulcer at the tip and there are a few blisters at the tip of the third toe. Fifth toe has been amputated  Neurologic:                 Symmetric strength. No focal deficits.    Chest X-Ray 6/30/17: Hyperinflated, hyperlucent lung fields with flattened diaphragms. No pulmonary parenchymal infiltrates or masses.    EKG 6/30/17: Septal scar otherwise unremarkable    INFUSIONS    lactated ringers 75 mL/hr Last Rate: 75 mL/hr (07/10/17 1758)   niCARdipine 5-15 mg/hr Last Rate: Stopped (07/10/17 1758)       Results from last 7 days  Lab Units 07/10/17  1240 07/10/17  0635   WBC 10*3/mm3  --  18.64*   HEMOGLOBIN g/dL 7.8* 9.5*   HEMATOCRIT % 25.1* 29.7*   PLATELETS 10*3/mm3  --  788*       Results from last 7 days  Lab Units 07/10/17  0635   SODIUM mmol/L 141   POTASSIUM mmol/L 2.9*   CHLORIDE mmol/L 109   CO2 mmol/L 24.0 "   BUN mg/dL 8*   CREATININE mg/dL 0.50*   GLUCOSE mg/dL 121*   CALCIUM mg/dL 9.9     I reviewed the patient's results, images and medication.    Assessment/Plan   ASSESSMENT      Principal Problem:    PAD w/gangrene left fifth toe  Active Problems:    S/P left iliofemoral and left femoropopliteal bypass surgery 7/10/17    Diabetes mellitus with neuropathy    Active Tobacco abuse    COPD (chronic obstructive pulmonary disease)    Hyperlipidemia    Hypertension    Microcytic Anemia      DISCUSSION: Appears to be doing well    PLAN     Monitor in ICU  Pain control  Diabetic control  Smoking cessation  Evaluate microcytic anemia while here (stool for occult blood, serum iron and iron binding capacity as well as ferritin in a.m.)  Has never received flu vaccine, Pneumovax, Prevnar and should  Standard bronchodilator therapy prn    ZOE Robles, AGACNP-BC, FNP-BC  Pulmonary and Critical Care Service    Plan of care and goals reviewed with mulitdisciplinary team at daily rounds.    I discussed the patient's findings and my recommendations with patient and nursing staff    Time spent Critical care 25 min (It does not include procedure time).    Rudi Medel MD  Intensive Care Medicine

## 2017-07-10 NOTE — ANESTHESIA POSTPROCEDURE EVALUATION
Patient: Bobbi Du    Procedure Summary     Date Anesthesia Start Anesthesia Stop Room / Location    07/10/17 0808   [facility] / [facility]       Procedure Diagnosis Surgeon Provider    LEFT ILIAC STENT, FEMORAL ENDARECTOMY, LEFT FEMORAL POPLITEAL BYPASS, POSS ILIAC FEMORAL BYPASS (Left Thigh) No diagnosis on file. MD Paddy Martini MD          Anesthesia Type: general  Last vitals  BP      Temp      Pulse     Resp      SpO2        Post Anesthesia Care and Evaluation    Patient location during evaluation: PACU  Patient participation: complete - patient participated  Level of consciousness: awake and alert  Pain score: 0  Pain management: adequate  Airway patency: patent  Anesthetic complications: No anesthetic complications  PONV Status: none  Cardiovascular status: hemodynamically stable and acceptable  Respiratory status: nonlabored ventilation, acceptable and nasal cannula  Hydration status: acceptable

## 2017-07-10 NOTE — ANESTHESIA PREPROCEDURE EVALUATION
Anesthesia Evaluation     Patient summary reviewed and Nursing notes reviewed   NPO Solid Status: > 8 hours  NPO Liquid Status: > 8 hours     Airway   Mallampati: I  TM distance: >3 FB  Neck ROM: full  no difficulty expected  Dental    (+) edentulous    Pulmonary    (+) COPD (No Rx Smoker) mild,   Cardiovascular     ECG reviewed    (+) hypertension, PVD, hyperlipidemia    ROS comment: EKG NSR  Poor R wave progression   NORMAL Dobutamine stress echocardiogram.  ECHO EF 67%  There are myxomatous MV changes with mild mitral regurgitation.  The study is otherwise normal.    Neuro/Psych  GI/Hepatic/Renal/Endo    (+)  diabetes mellitus, hypothyroidism (Rx),     Musculoskeletal     (+) myalgias,   Abdominal    Substance History      OB/GYN          Other   (+) arthritis       Other Comment: Rheumatoid no specific Rx   ROS/Med Hx Other: K2.9  Hct 29  Albuterol palpitations   Plavix      Phys Exam Other: No neck tenderness                                Anesthesia Plan    ASA 3     general   (IN line stabilisation (Rheumatoid ))  intravenous induction   Anesthetic plan and risks discussed with patient.    Plan discussed with CRNA.

## 2017-07-10 NOTE — OP NOTE
VASCULAR SURGERY OPERATIVE NOTE     Bobbi Du  7/10/2017    Preop Diagnosis  Left PVD with 5th toe gangrene    Postop Diagnosis  Same    Procedure  1.  Left ilio-femoral bypass  2.  Left fem-pop ISSV bypass  3.  Left 5th toe ray amputation    Surgeon  Mayur Artis M.D.    Assistant  Adrian Hayward M.D.    Anesthesia  General      INDICATIONS:  This 56-year-old female presents with a recent history of increasingly painful left foot rest pain with fifth toe gangrene secondary to severe peripheral vascular occlusive disease.  Previous angiography demonstrated left external iliac occlusion, common femoral artery disease, and left superficial femoral artery occlusion.  Patient was admitted at this time for revascularization for limb salvage.    FINDINGS/INTERPRETATION:  1.  Completion angiography demonstrated a patent femoral-popliteal bypass graft with no fistulas with runoff to the foot primarily via the peroneal artery  2.  The common femoral and external iliac arteries had considerable atherosclerotic plaque.  Thrombus which appeared a relatively fresh was also extracted from the external iliac artery.  The popliteal artery exhibited thickened walls but without gross plaque below the knee.    PROCEDURE:  After satisfactory induction of general anesthesia and infusion of IV antibiotics, the patient's abdomen and left lower extremity were prepped and draped in sterile fashion.  A vertical incision was made over the common femoral artery and the common femoral artery was dissected from the ligament to beyond its bifurcation distally.  The patient was then given intravenous heparin.  Vessels were clamped and a longitudinal arteriotomy was made with a scalpel and extended with Estrada scissors.  An endarterectomy plane was then established and the distal plaque was transected proximal to the common femoral bifurcation.  An elevator was then used to extend the end artery plane proximally into the external iliac  artery.  A Vollmar ring was then used to dissect the plaque into the pelvis.  The plaque was then removed.  Angiography was performed with hand injection.  Using an 035 angled Glidewire and a glide catheter, an attempt was made to reenter the common iliac artery and/or the aorta.  Despite attempts with the Berenstein catheter and other wires, this was not successful.  Therefore an oblique incision was made in the left lower quadrant and a retroperitoneal approach was used to expose the common iliac and internal iliac and external iliac arteries.  A longitudinal arteriotomy was made in the common iliac artery after clamps were placed.  Additional plaque was removed from the common iliac artery.  An end-to-side anastomosis was then created using an 8 mm knitted Dacron graft and 4-0 polypropylene suture.  This was brought through a tunnel to the left groin after clamps were removed.  The proximal common femoral artery was then oversewn.  The distal common femoral artery was then spatulated into the profunda femoris artery.  A distal end-to-end anastomosis was then created after trimming the graft to the appropriate length.  This was performed with 5-0 polypropylene suture.  After release of all clamps, attention was directed to the right saphenous vein which was dissected from the groin to the proximal leg with an incision immediately overlying the vein.  The above-knee popliteal artery was then dissected to determine if it was appropriate for bypass.  A longitudinal arteriotomy was made in the artery and extended with Estrada scissors.  Angiography was then performed with direct injection.  This demonstrated some evidence of stenosis in the above-knee popliteal artery distal to the arteriotomy.  Therefore the below-knee popliteal artery was dissected as was the more distal saphenous vein.  The saphenofemoral junction was then transected and oversewn with 6-0 polypropylene suture.  After removal of thrombus from the  proximal stump of the superficial femoral artery, and end-to-end anastomosis was created between the SFA and the proximal great saphenous vein.   A longitudinal enterotomy was made in the below-knee popliteal artery.  The saphenous vein was then transected distally and ligated.  The vein was infused with heparin-papaverine-saline solution.  A LeMaitre valvulotome was then used to lyse all valves in the vein.  The vein was tunneled through the popliteal fossa and an end-to-side anastomosis created with running 6-0 polypropylene suture after trimming the vein to the appropriate length.  Prior to completion anastomosis, appropriate flushing maneuvers were performed.  After release of all clamps, a good pulse was felt the distal vein.  All vein branches were then ligated with silk ties.  Completion angiography was then performed using a butterfly needle in the proximal vein with findings as described above.  Hemostasis was then obtained and all operative fields.  The leg was closed in layers of Vicryl.  The abdominal incision was closed in layers of #1 PDS suture.  Staples were used to close the skin.  Covaderm dressings were then applied.  Attention was then directed to the foot where an oblique incision was made around the base of the fifth toe.  Using a scalpel, the metatarsal phalangeal joint was transected and the toe removed.  The metatarsal head was then removed with a rongeur.  The wound was then packed with antibiotic-soaked gauze and a gauze and Kerlix dressing was placed.  . Adrian Hayward was asked to assist with the operation and was present for the femoral anastomosis of the iliofemoral bypass as well as the entire femoral to popliteal bypass graft components of the procedure.  Estimated blood loss was 200 mL's.  The patient received 1400 mL's of crystalloid replacement and 500 mL's of albumin solution.  She made 1000 mL's of urine.  She tolerated the procedure well and was returned to recovery room in  satisfactory condition.    CC:   Alka Artis MD  07/10/17  4:00 PM

## 2017-07-10 NOTE — INTERVAL H&P NOTE
"Pre-Op H&P (See Recent Office Note Attached)    Chief complaint: Left foot pain    HPI:      Patient is a 56 y.o. female who presents with left foot pain/PVD and developed gangrene 5th toe following an injury as well as ulcers on 1st and 3rd toe.  She is here today to undergo possible ileo-femoral bypass, left iliac stent, left femoral endarterectomy, left fem-pop bypass    Review of Systems:  General ROS:  no fever, chills, rashes, No change since last office visit  Cardiovascular ROS: no chest pain or dyspnea on exertion.  Pt has been on Keflex for 5th toe gangrene; 2017 TTE with NL ef and mild mr.  2017  stress echo with NL findings  Respiratory ROS: no cough, +baseline shortness of breath, or wheezing    Immunization History:   Influenza:  no  Pneumococcal:  no  Tetanus:  unknown    Vital Signs:  /63  Pulse 88  Temp 98.5 °F (36.9 °C) (Temporal Artery )   Resp 20  Ht 65.5\" (166.4 cm)  Wt 110 lb (49.9 kg)  LMP Comment: YEARS AGO  SpO2 97%  Breastfeeding? No  BMI 18.03 kg/m2    Physical Exam:    CV:  S1S2 regular rate and rhythm, no murmur               Resp:  Clear to auscultation; respirations regular, even and unlabored    Results Review:    I reviewed the patient's new clinical results.    Cancer Staging (if applicable)  Cancer Patient: __ yes _x_no __unknown; If yes, clinical stage T:__ N:__M:__, stage group or __N/A    Assessment/Plan:  Left PVD/gangrene:  Possible ileo-femoral bypass, left iliac stent, left femoral endarterectomy, left fem-pop bypass    Jennifer Diaz, APRN  7/10/2017 6:41 AM      "

## 2017-07-10 NOTE — ANESTHESIA PROCEDURE NOTES
Airway  Urgency: elective    Airway not difficult    General Information and Staff    Patient location during procedure: OR    Indications and Patient Condition  Indications for airway management: airway protection    Preoxygenated: yes  Mask difficulty assessment: 1 - vent by mask    Final Airway Details  Final airway type: endotracheal airway      Successful airway: ETT  Cuffed: yes   Successful intubation technique: direct laryngoscopy  Facilitating devices/methods: intubating stylet  Endotracheal tube insertion site: oral  Blade: Humphrey  Blade size: #3  ETT size: 7.0 mm  Cormack-Lehane Classification: grade I - full view of glottis  Placement verified by: chest auscultation and capnometry   Measured from: lips  ETT to lips (cm): 20  Number of attempts at approach: 1

## 2017-07-10 NOTE — PLAN OF CARE
Problem: Patient Care Overview (Adult)  Goal: Plan of Care Review  Outcome: Ongoing (interventions implemented as appropriate)  Goal: Adult Individualization and Mutuality  Outcome: Ongoing (interventions implemented as appropriate)  Goal: Discharge Needs Assessment  Outcome: Ongoing (interventions implemented as appropriate)    Problem: Skin Integrity Impairment, Risk/Actual (Adult)  Goal: Identify Related Risk Factors and Signs and Symptoms  Outcome: Ongoing (interventions implemented as appropriate)  Goal: Skin Integrity/Wound Healing  Outcome: Ongoing (interventions implemented as appropriate)    Problem: Fall Risk (Adult)  Goal: Identify Related Risk Factors and Signs and Symptoms  Outcome: Ongoing (interventions implemented as appropriate)  Goal: Absence of Falls  Outcome: Ongoing (interventions implemented as appropriate)

## 2017-07-10 NOTE — BRIEF OP NOTE
BRIEF OPERATIVE NOTE     Bobbi Du  7/10/2017    Pre-op Diagnosis:   Left PVD with toe gangrene    Post-op Diagnosis:     same    Procedure:  1.  Left ilio-femoral bypass  2.  Left fem-popliteal ISSV bypass  3.  Left 5th toe ray amputation    Surgeon(s):  Mayur Artis MD    Assistant:  Mitch Hayward MD    Anesthesia: General    Staff:   Circulator: Ekaterina Stroud RN; Sherri L Haase, RN; Mariely De La Fuente RN  Radiology Technologist: Shannon Gray RT  Scrub Person: John Brambila; Kip Jackson; Sven Murcia  Nursing Assistant: Barbi Beach CNA; Prema Davila    Estimated Blood Loss: 200 mL    Fluids:  1400 crystalloid    500 ml albumin    Contrast:   60 ml    Findings:  Extensive atherosclerotic plaque     Patent bypass grafts    Complications:  none      Mayur Artis MD   7/10/2017  3:57 PM

## 2017-07-11 LAB
ANION GAP SERPL CALCULATED.3IONS-SCNC: 10 MMOL/L (ref 3–11)
BASOPHILS # BLD AUTO: 0.05 10*3/MM3 (ref 0–0.2)
BASOPHILS NFR BLD AUTO: 0.3 % (ref 0–1)
BUN BLD-MCNC: <5 MG/DL (ref 9–23)
BUN/CREAT SERPL: ABNORMAL (ref 7–25)
CALCIUM SPEC-SCNC: 8.9 MG/DL (ref 8.7–10.4)
CHLORIDE SERPL-SCNC: 108 MMOL/L (ref 99–109)
CO2 SERPL-SCNC: 22 MMOL/L (ref 20–31)
CREAT BLD-MCNC: 0.4 MG/DL (ref 0.6–1.3)
CRP SERPL-MCNC: 17.73 MG/DL (ref 0–1)
DEPRECATED RDW RBC AUTO: 50.7 FL (ref 37–54)
EOSINOPHIL # BLD AUTO: 0 10*3/MM3 (ref 0–0.3)
EOSINOPHIL NFR BLD AUTO: 0 % (ref 0–3)
ERYTHROCYTE [DISTWIDTH] IN BLOOD BY AUTOMATED COUNT: 17.6 % (ref 11.3–14.5)
FERRITIN SERPL-MCNC: 30 NG/ML (ref 10–291)
GFR SERPL CREATININE-BSD FRML MDRD: >150 ML/MIN/1.73
GLUCOSE BLD-MCNC: 141 MG/DL (ref 70–100)
GLUCOSE BLDC GLUCOMTR-MCNC: 122 MG/DL (ref 70–130)
GLUCOSE BLDC GLUCOMTR-MCNC: 128 MG/DL (ref 70–130)
GLUCOSE BLDC GLUCOMTR-MCNC: 149 MG/DL (ref 70–130)
GLUCOSE BLDC GLUCOMTR-MCNC: 158 MG/DL (ref 70–130)
HCT VFR BLD AUTO: 24.6 % (ref 34.5–44)
HGB BLD-MCNC: 7.7 G/DL (ref 11.5–15.5)
IMM GRANULOCYTES # BLD: 0.14 10*3/MM3 (ref 0–0.03)
IMM GRANULOCYTES NFR BLD: 0.7 % (ref 0–0.6)
IRON 24H UR-MRATE: 6 MCG/DL (ref 50–175)
IRON SATN MFR SERPL: 2 % (ref 15–50)
LYMPHOCYTES # BLD AUTO: 2.16 10*3/MM3 (ref 0.6–4.8)
LYMPHOCYTES NFR BLD AUTO: 11.1 % (ref 24–44)
MAGNESIUM SERPL-MCNC: 1.8 MG/DL (ref 1.3–2.7)
MCH RBC QN AUTO: 24.8 PG (ref 27–31)
MCHC RBC AUTO-ENTMCNC: 31.3 G/DL (ref 32–36)
MCV RBC AUTO: 79.4 FL (ref 80–99)
MONOCYTES # BLD AUTO: 1.38 10*3/MM3 (ref 0–1)
MONOCYTES NFR BLD AUTO: 7.1 % (ref 0–12)
NEUTROPHILS # BLD AUTO: 15.65 10*3/MM3 (ref 1.5–8.3)
NEUTROPHILS NFR BLD AUTO: 80.8 % (ref 41–71)
PLATELET # BLD AUTO: 594 10*3/MM3 (ref 150–450)
PMV BLD AUTO: 9.7 FL (ref 6–12)
POTASSIUM BLD-SCNC: 2.5 MMOL/L (ref 3.5–5.5)
POTASSIUM BLD-SCNC: 3.7 MMOL/L (ref 3.5–5.5)
PREALB SERPL-MCNC: 11 MG/DL (ref 10–40)
RBC # BLD AUTO: 3.1 10*6/MM3 (ref 3.89–5.14)
SODIUM BLD-SCNC: 140 MMOL/L (ref 132–146)
TIBC SERPL-MCNC: 303 MCG/DL (ref 250–450)
WBC NRBC COR # BLD: 19.38 10*3/MM3 (ref 3.5–10.8)

## 2017-07-11 PROCEDURE — 25010000003 CEFAZOLIN IN DEXTROSE 2-4 GM/100ML-% SOLUTION: Performed by: SURGERY

## 2017-07-11 PROCEDURE — 63710000001 INSULIN LISPRO (HUMAN) PER 5 UNITS: Performed by: NURSE PRACTITIONER

## 2017-07-11 PROCEDURE — 83550 IRON BINDING TEST: CPT | Performed by: INTERNAL MEDICINE

## 2017-07-11 PROCEDURE — 25010000002 MORPHINE SULFATE (PF) 2 MG/ML SOLUTION: Performed by: SURGERY

## 2017-07-11 PROCEDURE — 84134 ASSAY OF PREALBUMIN: CPT

## 2017-07-11 PROCEDURE — 83735 ASSAY OF MAGNESIUM: CPT | Performed by: INTERNAL MEDICINE

## 2017-07-11 PROCEDURE — 85025 COMPLETE CBC W/AUTO DIFF WBC: CPT | Performed by: SURGERY

## 2017-07-11 PROCEDURE — 82962 GLUCOSE BLOOD TEST: CPT

## 2017-07-11 PROCEDURE — 83540 ASSAY OF IRON: CPT | Performed by: INTERNAL MEDICINE

## 2017-07-11 PROCEDURE — 99233 SBSQ HOSP IP/OBS HIGH 50: CPT | Performed by: INTERNAL MEDICINE

## 2017-07-11 PROCEDURE — 94799 UNLISTED PULMONARY SVC/PX: CPT

## 2017-07-11 PROCEDURE — 86140 C-REACTIVE PROTEIN: CPT

## 2017-07-11 PROCEDURE — 84132 ASSAY OF SERUM POTASSIUM: CPT | Performed by: INTERNAL MEDICINE

## 2017-07-11 PROCEDURE — 82728 ASSAY OF FERRITIN: CPT | Performed by: INTERNAL MEDICINE

## 2017-07-11 PROCEDURE — 80048 BASIC METABOLIC PNL TOTAL CA: CPT | Performed by: SURGERY

## 2017-07-11 PROCEDURE — 25010000002 ONDANSETRON PER 1 MG: Performed by: INTERNAL MEDICINE

## 2017-07-11 RX ORDER — ATORVASTATIN CALCIUM 10 MG/1
10 TABLET, FILM COATED ORAL DAILY
Status: DISCONTINUED | OUTPATIENT
Start: 2017-07-11 | End: 2017-07-12 | Stop reason: HOSPADM

## 2017-07-11 RX ORDER — MAGNESIUM SULFATE HEPTAHYDRATE 40 MG/ML
4 INJECTION, SOLUTION INTRAVENOUS AS NEEDED
Status: DISCONTINUED | OUTPATIENT
Start: 2017-07-11 | End: 2017-07-12 | Stop reason: HOSPADM

## 2017-07-11 RX ORDER — POTASSIUM CHLORIDE 750 MG/1
40 CAPSULE, EXTENDED RELEASE ORAL AS NEEDED
Status: DISCONTINUED | OUTPATIENT
Start: 2017-07-11 | End: 2017-07-12 | Stop reason: HOSPADM

## 2017-07-11 RX ORDER — ONDANSETRON 2 MG/ML
4 INJECTION INTRAMUSCULAR; INTRAVENOUS EVERY 6 HOURS PRN
Status: DISCONTINUED | OUTPATIENT
Start: 2017-07-11 | End: 2017-07-12 | Stop reason: HOSPADM

## 2017-07-11 RX ORDER — HYDROCHLOROTHIAZIDE 12.5 MG/1
12.5 TABLET ORAL DAILY
Status: DISCONTINUED | OUTPATIENT
Start: 2017-07-11 | End: 2017-07-12 | Stop reason: HOSPADM

## 2017-07-11 RX ORDER — MAGNESIUM SULFATE HEPTAHYDRATE 40 MG/ML
4 INJECTION, SOLUTION INTRAVENOUS AS NEEDED
Status: DISCONTINUED | OUTPATIENT
Start: 2017-07-11 | End: 2017-07-11

## 2017-07-11 RX ORDER — MAGNESIUM SULFATE HEPTAHYDRATE 40 MG/ML
2 INJECTION, SOLUTION INTRAVENOUS AS NEEDED
Status: DISCONTINUED | OUTPATIENT
Start: 2017-07-11 | End: 2017-07-11

## 2017-07-11 RX ORDER — ASPIRIN 81 MG/1
81 TABLET ORAL EVERY MORNING
Status: DISCONTINUED | OUTPATIENT
Start: 2017-07-12 | End: 2017-07-12 | Stop reason: HOSPADM

## 2017-07-11 RX ORDER — LEVOTHYROXINE AND LIOTHYRONINE 19; 4.5 UG/1; UG/1
60 TABLET ORAL DAILY
Status: DISCONTINUED | OUTPATIENT
Start: 2017-07-11 | End: 2017-07-12 | Stop reason: HOSPADM

## 2017-07-11 RX ORDER — THYROID 60 MG
90 TABLET ORAL DAILY
Status: ON HOLD | COMMUNITY
End: 2020-05-15

## 2017-07-11 RX ORDER — LEVOTHYROXINE AND LIOTHYRONINE 19; 4.5 UG/1; UG/1
75 TABLET ORAL
Status: DISCONTINUED | OUTPATIENT
Start: 2017-07-11 | End: 2017-07-12 | Stop reason: HOSPADM

## 2017-07-11 RX ORDER — ASPIRIN 81 MG/1
81 TABLET, CHEWABLE ORAL DAILY
Status: COMPLETED | OUTPATIENT
Start: 2017-07-11 | End: 2017-07-11

## 2017-07-11 RX ORDER — SIMVASTATIN 20 MG
20 TABLET ORAL DAILY
Status: DISCONTINUED | OUTPATIENT
Start: 2017-07-11 | End: 2017-07-12 | Stop reason: ALTCHOICE

## 2017-07-11 RX ORDER — POTASSIUM CHLORIDE 1.5 G/1.77G
40 POWDER, FOR SOLUTION ORAL AS NEEDED
Status: DISCONTINUED | OUTPATIENT
Start: 2017-07-11 | End: 2017-07-12 | Stop reason: HOSPADM

## 2017-07-11 RX ORDER — LISINOPRIL 10 MG/1
10 TABLET ORAL
Status: DISCONTINUED | OUTPATIENT
Start: 2017-07-11 | End: 2017-07-12 | Stop reason: HOSPADM

## 2017-07-11 RX ADMIN — METFORMIN HYDROCHLORIDE 500 MG: 500 TABLET, FILM COATED ORAL at 20:54

## 2017-07-11 RX ADMIN — OXYCODONE AND ACETAMINOPHEN 2 TABLET: 5; 325 TABLET ORAL at 20:54

## 2017-07-11 RX ADMIN — MORPHINE SULFATE 2 MG: 2 INJECTION, SOLUTION INTRAMUSCULAR; INTRAVENOUS at 13:07

## 2017-07-11 RX ADMIN — SODIUM CHLORIDE, POTASSIUM CHLORIDE, SODIUM LACTATE AND CALCIUM CHLORIDE 75 ML/HR: 600; 310; 30; 20 INJECTION, SOLUTION INTRAVENOUS at 08:05

## 2017-07-11 RX ADMIN — ATORVASTATIN CALCIUM 10 MG: 10 TABLET, FILM COATED ORAL at 10:20

## 2017-07-11 RX ADMIN — ONDANSETRON 4 MG: 2 INJECTION INTRAMUSCULAR; INTRAVENOUS at 11:40

## 2017-07-11 RX ADMIN — LISINOPRIL 10 MG: 10 TABLET ORAL at 10:20

## 2017-07-11 RX ADMIN — NICOTINE 1 PATCH: 21 PATCH, EXTENDED RELEASE TRANSDERMAL at 00:10

## 2017-07-11 RX ADMIN — INSULIN LISPRO 2 UNITS: 100 INJECTION, SOLUTION INTRAVENOUS; SUBCUTANEOUS at 08:05

## 2017-07-11 RX ADMIN — POTASSIUM CHLORIDE 40 MEQ: 750 CAPSULE, EXTENDED RELEASE ORAL at 06:57

## 2017-07-11 RX ADMIN — CEFAZOLIN SODIUM 2 G: 2 INJECTION, SOLUTION INTRAVENOUS at 02:09

## 2017-07-11 RX ADMIN — ASPIRIN 81 MG 81 MG: 81 TABLET ORAL at 20:57

## 2017-07-11 RX ADMIN — POTASSIUM CHLORIDE 40 MEQ: 750 CAPSULE, EXTENDED RELEASE ORAL at 10:48

## 2017-07-11 RX ADMIN — POTASSIUM CHLORIDE 40 MEQ: 750 CAPSULE, EXTENDED RELEASE ORAL at 16:05

## 2017-07-11 RX ADMIN — OXYCODONE AND ACETAMINOPHEN 2 TABLET: 5; 325 TABLET ORAL at 05:23

## 2017-07-11 RX ADMIN — THYROID, PORCINE 75 MG: 30 TABLET ORAL at 10:20

## 2017-07-11 RX ADMIN — OXYCODONE AND ACETAMINOPHEN 2 TABLET: 5; 325 TABLET ORAL at 16:02

## 2017-07-11 RX ADMIN — CLOPIDOGREL BISULFATE 75 MG: 75 TABLET ORAL at 05:23

## 2017-07-11 RX ADMIN — Medication: at 18:04

## 2017-07-11 RX ADMIN — OXYCODONE AND ACETAMINOPHEN 2 TABLET: 5; 325 TABLET ORAL at 10:20

## 2017-07-11 RX ADMIN — MORPHINE SULFATE 2 MG: 2 INJECTION, SOLUTION INTRAMUSCULAR; INTRAVENOUS at 00:46

## 2017-07-11 RX ADMIN — MORPHINE SULFATE 2 MG: 2 INJECTION, SOLUTION INTRAMUSCULAR; INTRAVENOUS at 08:22

## 2017-07-11 RX ADMIN — HYDROCHLOROTHIAZIDE 12.5 MG: 12.5 TABLET ORAL at 10:20

## 2017-07-11 NOTE — PROGRESS NOTES
"INTENSIVIST   PROGRESS NOTE     Hospital:  LOS: 1 day     Ms. Bobbi Du, 56 y.o. female is followed for:      PAD w/gangrene left fifth toe    Diabetes mellitus with neuropathy     As an Intensivist, we provide an integrated approach to the ICU patient and family, medical management of comorbid conditions, lead interdisciplinary rounds and coordinate the care with all other services, including those from other specialists.     Subjective   S     Subjective    Interval History:  POD: 1 Day Post-Op   Doing well.  Some pain left leg as expected.  No hematoma.  Dressings: dry.     The patient's relevant past medical, surgical and social history were reviewed and updated in Epic as appropriate.     ROS:   No fevers.  No chest pain.  No dyspnea.  No nausea, vomiting or diarrhea.    Objective   O     Vitals:  Temp  Min: 98.1 °F (36.7 °C)  Max: 100.5 °F (38.1 °C)  BP  Min: 107/60  Max: 163/76  Pulse  Min: 92  Max: 119  Resp  Min: 12  Max: 16  SpO2  Min: 86 %  Max: 100 % Flow (L/min)  Min: 1  Max: 3       Objective:  Vital signs: (most recent): Blood pressure 139/69, pulse 99, temperature 98.7 °F (37.1 °C), temperature source Oral, resp. rate 16, height 65.5\" (166.4 cm), weight 110 lb (49.9 kg), SpO2 (!) 86 %, not currently breastfeeding.            Physical Examination    Telemetry: Sinus Rhythm: sinus tachycardia   Constitutional:  No acute distress.  Conversant.   HEENT: Normocephalic and atraumatic.   Cardiovascular: Normal rate, regular and rhythm. Normal heart sounds.  No murmurs, rub or gallop.  No peripheral edema.   Respiratory: Normal respiratory effort.  Normal breath sounds  Clear to ascultation.  Clear to percussion.    Abdominal:  Soft. No masses.   Non-tender. No distension.   No hepatosplenomegaly.   Extremities: No digital cyanosis or clubbing.  Left foot with dressing.   Neurological:   Alert and Oriented to person, place, and time.   Moves all extremities.   Psychiatric:  Normal affect.   Normal " judgment.   Lines/Drains/Airways: Mohit         Results from last 7 days  Lab Units 07/11/17  0433 07/10/17  1240 07/10/17  0635   WBC 10*3/mm3 19.38*  --  18.64*   HEMOGLOBIN g/dL 7.7* 7.8* 9.5*   MCV fL 79.4*  --  79.6*   PLATELETS 10*3/mm3 594*  --  788*       Results from last 7 days  Lab Units 07/11/17  0433 07/10/17  0635   SODIUM mmol/L 140 141   POTASSIUM mmol/L 2.5* 2.9*   CO2 mmol/L 22.0 24.0   CREATININE mg/dL 0.40* 0.50*   MAGNESIUM mg/dL 1.8  --      Estimated Creatinine Clearance: 125.2 mL/min (by C-G formula based on Cr of 0.4).      I reviewed the patient's results, images and medications.    Assessment/Plan   A / P     56 y.o.female, admitted on 7/10/2017 with Gangrene [I96]:     1. PAD with gangrene left fifth toe  Procedure(s) (LRB):  LEFT ILIAC STENT, FEMORAL ENDARECTOMY, LEFT FEMORAL POPLITEAL BYPASS, POSS ILIAC FEMORAL BYPASS (Left)  LEFT FIFTH TOE AMPUTATION  7/10/17  2. T2DM with Neuropathy and Charcot's joint.    Results from last 7 days  Lab Units 07/11/17  0734 07/10/17  2029 07/10/17  1614 07/10/17  0638   GLUCOSE mg/dL 158* 212* 178* 138*       0  Lab Value Date/Time   HGBA1C 6.50 (H) 07/10/2017 0635      3. HTN  4. Dyslipidemia  5. COPD  1. Tobacco use  6. Anemia    Nutrition:   Diet Regular; Cardiac, Consistent Carbohydrate  Advance Directives: Full Code    Assessment / Plan:    1. Replace K  2. Check Magnesium  3. Glucose control - Based on requirements, may start basal insulin tomorrow.  4. Resume thyroid, statins and antihypertensives.  5. Quit smoking.  6. Disposition per Dr. Mayur Artis (Vascular Surgery)     Plan of care and goals reviewed during interdisciplinary rounds.  I discussed the patient's findings and my recommendations with patient and nursing staff    Bill Wilson MD, FACP, FCCP, [facility]    Intensive Care Medicine and Pulmonary Medicine

## 2017-07-11 NOTE — PROGRESS NOTES
"Adult Nutrition  Assessment/PES    Patient Name:  Bobbi Du  YOB: 1960  MRN: [id number]  Admit Date:  7/10/2017    Assessment Date:  7/11/2017  Comments:  Malnutrition severity assessment in progress; nutrition focused physical exam deferred d/t pt discomfort/pain at this time. Pt will qualify to some degree w/ wt loss and decreased intake. Pt agreeable to drink Boost Glucose Control  supplement. Pt does has limited food acceptance. Will complete assessment and monitor adequacy of po intake.        Reason for Assessment       07/11/17 1451    Reason for Assessment    Reason For Assessment/Visit multidisciplinary rounds;identified at risk by screening criteria;nurse/nurse practitioner consult    Identified At Risk By Screening Criteria MST SCORE 2+;unintentional loss of 10 lbs or more in the past 2 mos    Time Spent (min) 30    Diagnosis Diagnosis    Cardiac PVD;HTN;Dyslipidemia   PAD s/p L liliofemoral and L femoralpopliteal bypass w/ amputation of 5 th toe    Endocrine DM Type 2;Hypothyroid   w/ neuropathy    Pulmonary/Critical Care COPD    Skin Other (comment);Non healing wound   gangrene 5 th toe    Substance Use Tobacco   Principal Problem:    PAD w/gangrene left fifth toe  Active Problems:    Diabetes mellitus with neuropathy    Hyperlipidemia    Hypertension    Active Tobacco abuse    COPD (chronic obstructive pulmonary disease)    S/P left iliofemoral and left femoropopliteal bypass surgery 7/10/17    Microcytic Anemia               Nutrition/Diet History       07/11/17 8488    Nutrition/Diet History    Typical Food/Fluid Intake bologna and ham sandwiches    Reported/Observed By Family;Patient    Appetite Poor    Other Has not been eating much since April about the time pt had to use wheelchair. Pt reports little to no appetite, willing to try todd flavor supplements. Pt and sister also  report a weight loss of 20-25 lbs during this time period.\" I'm down to a size 4\" .            "   Labs/Tests/Procedures/Meds       07/11/17 1502    Labs/Tests/Procedures/Meds    Labs/Tests Review Reviewed;Mg++     Results from last 7 days  Lab Units 07/11/17  0433   SODIUM mmol/L 140   POTASSIUM mmol/L 2.5*   CHLORIDE mmol/L 108   CO2 mmol/L 22.0   BUN mg/dL <5*   CREATININE mg/dL 0.40*   CALCIUM mg/dL 8.9   GLUCOSE mg/dL 141*                Physical Findings       07/11/17 1502    Physical Findings/Assessment    Additional Documentation Physical Appearance (Group)    Physical Appearance    Overall Physical Appearance underweight    Oral/Mouth Cavity other (see comments)   pt edentulous ; she reports she eats regular foods w/o difficulty    Skin surgical wound              Nutrition Prescription Ordered       07/11/17 1503    Nutrition Prescription PO    Current PO Diet Regular    Common Modifiers Cardiac;Consistent Carbohydrate                Problem/Interventions:        Problem 1       07/11/17 1503    Nutrition Diagnoses Problem 1    Problem 1 Unintended Weight Loss    Etiology (related to) Factors Affecting Nutrition    Appetite Poor    Signs/Symptoms (evidenced by) Report of Mnimal PO Intake;Unintended Weight Change    Unintended Weight Change Loss    Number of Pounds Lost 20-25 lbs    Weight loss time period since April 2017 approx 3 months                    Intervention Goal       07/11/17 1504    Intervention Goal    General Nutrition support treatment    PO Establish PO;Tolerate PO            Nutrition Intervention       07/11/17 1504    Nutrition Intervention    RD/Tech Action Advise alternate selection;Menu provided;Menu adjusted;Recommend/ordered;Follow Tx progress;Care plan reviewd    Recommended/Ordered Supplement            Nutrition Prescription       07/11/17 1506    Nutrition Prescription PO    PO Prescription Begin/change supplement    Supplement Boost Glucose Control    Supplement Frequency 3 times a day    New PO Prescription Ordered? Yes    Other Orders    Labs CRP;PreAlb    Labs  Ordered? Yes            Education/Evaluation       07/11/17 8826    Monitor/Evaluation    Monitor Per protocol;I&O;Pertinent labs;Weight;Skin status;PO intake          Electronically signed by:  Caitie House RD  07/11/17 3:08 PM

## 2017-07-11 NOTE — PROGRESS NOTES
"VASCULAR SURGERY PROGRESS NOTE     07/11/17    Bobbi Du  [id number]  1960    SUBJECTIVE  Foot is \"burning\"    OBJECTIVE    Vital Signs:  /65 (BP Location: Left arm, Patient Position: Lying)  Pulse 97  Temp 99.1 °F (37.3 °C) (Oral)   Resp 16  Ht 65.5\" (166.4 cm)  Wt 110 lb (49.9 kg)  LMP Comment: YEARS AGO  SpO2 95%  Breastfeeding? No  BMI 18.03 kg/m2    Medications:    atorvastatin 10 mg Oral Daily   clopidogrel 75 mg Oral QAM   hydrochlorothiazide 12.5 mg Oral Daily   insulin lispro 0-9 Units Subcutaneous 4x Daily AC & at Bedtime   lisinopril 10 mg Oral Q24H   nicotine 1 patch Transdermal Q24H   sodium hypochlorite  Topical BID   Thyroid 75 mg Oral QAM AC       Physical Exam:    General: Alert, cooperative, in no acute distress    Abdomen: Soft, nontender    Dressing intact    Extremities: No edema    Left foot warm, pink    Graft pulse palpable    Dressings intact    Musculoskeletal: Normal ROM in major joints, no obvious deformities    Neuro:  Alert and oriented x 3     no gross motor or sensory deficits    LABS:    Results from last 7 days  Lab Units 07/11/17  0433   WBC 10*3/mm3 19.38*   HEMOGLOBIN g/dL 7.7*   HEMATOCRIT % 24.6*   PLATELETS 10*3/mm3 594*       Results from last 7 days  Lab Units 07/11/17  0433   SODIUM mmol/L 140   POTASSIUM mmol/L 2.5*   CHLORIDE mmol/L 108   CO2 mmol/L 22.0   BUN mg/dL <5*   CREATININE mg/dL 0.40*   GLUCOSE mg/dL 141*   CALCIUM mg/dL 8.9     Estimated Creatinine Clearance: 125.2 mL/min (by C-G formula based on Cr of 0.4).          ASSESSMENT  1.  Patent revascularization    PLAN  1.  Increase activity  2.  D/C planning    Mayur Artis MD  07/11/17  1:01 PM  "

## 2017-07-11 NOTE — PLAN OF CARE
Problem: Respiratory Insufficiency (Adult)  Goal: Identify Related Risk Factors and Signs and Symptoms  Outcome: Ongoing (interventions implemented as appropriate)  Goal: Acid/Base Balance  Outcome: Ongoing (interventions implemented as appropriate)  Goal: Effective Ventilation  Outcome: Ongoing (interventions implemented as appropriate)

## 2017-07-12 VITALS
HEART RATE: 102 BPM | TEMPERATURE: 98.1 F | HEIGHT: 66 IN | SYSTOLIC BLOOD PRESSURE: 117 MMHG | RESPIRATION RATE: 16 BRPM | OXYGEN SATURATION: 95 % | DIASTOLIC BLOOD PRESSURE: 69 MMHG | WEIGHT: 110 LBS | BODY MASS INDEX: 17.68 KG/M2

## 2017-07-12 LAB
ANION GAP SERPL CALCULATED.3IONS-SCNC: 5 MMOL/L (ref 3–11)
BACTERIA SPEC AEROBE CULT: NORMAL
BASOPHILS # BLD AUTO: 0.06 10*3/MM3 (ref 0–0.2)
BASOPHILS NFR BLD AUTO: 0.3 % (ref 0–1)
BUN BLD-MCNC: 7 MG/DL (ref 9–23)
BUN/CREAT SERPL: 17.5 (ref 7–25)
CALCIUM SPEC-SCNC: 9.1 MG/DL (ref 8.7–10.4)
CHLORIDE SERPL-SCNC: 110 MMOL/L (ref 99–109)
CO2 SERPL-SCNC: 21 MMOL/L (ref 20–31)
CREAT BLD-MCNC: 0.4 MG/DL (ref 0.6–1.3)
CYTO UR: NORMAL
DEPRECATED RDW RBC AUTO: 51.6 FL (ref 37–54)
EOSINOPHIL # BLD AUTO: 0.3 10*3/MM3 (ref 0–0.3)
EOSINOPHIL NFR BLD AUTO: 1.4 % (ref 0–3)
ERYTHROCYTE [DISTWIDTH] IN BLOOD BY AUTOMATED COUNT: 17.7 % (ref 11.3–14.5)
GFR SERPL CREATININE-BSD FRML MDRD: >150 ML/MIN/1.73
GLUCOSE BLD-MCNC: 124 MG/DL (ref 70–100)
GLUCOSE BLDC GLUCOMTR-MCNC: 134 MG/DL (ref 70–130)
GLUCOSE BLDC GLUCOMTR-MCNC: 148 MG/DL (ref 70–130)
HCT VFR BLD AUTO: 23.7 % (ref 34.5–44)
HGB BLD-MCNC: 7.3 G/DL (ref 11.5–15.5)
IMM GRANULOCYTES # BLD: 0.11 10*3/MM3 (ref 0–0.03)
IMM GRANULOCYTES NFR BLD: 0.5 % (ref 0–0.6)
LAB AP CASE REPORT: NORMAL
LAB AP CLINICAL INFORMATION: NORMAL
LYMPHOCYTES # BLD AUTO: 2.86 10*3/MM3 (ref 0.6–4.8)
LYMPHOCYTES NFR BLD AUTO: 13.4 % (ref 24–44)
Lab: NORMAL
MAGNESIUM SERPL-MCNC: 2 MG/DL (ref 1.3–2.7)
MCH RBC QN AUTO: 24.7 PG (ref 27–31)
MCHC RBC AUTO-ENTMCNC: 30.8 G/DL (ref 32–36)
MCV RBC AUTO: 80.1 FL (ref 80–99)
MONOCYTES # BLD AUTO: 1.55 10*3/MM3 (ref 0–1)
MONOCYTES NFR BLD AUTO: 7.2 % (ref 0–12)
NEUTROPHILS # BLD AUTO: 16.52 10*3/MM3 (ref 1.5–8.3)
NEUTROPHILS NFR BLD AUTO: 77.2 % (ref 41–71)
PATH REPORT.FINAL DX SPEC: NORMAL
PATH REPORT.GROSS SPEC: NORMAL
PLATELET # BLD AUTO: 556 10*3/MM3 (ref 150–450)
PMV BLD AUTO: 9.6 FL (ref 6–12)
POTASSIUM BLD-SCNC: 3.8 MMOL/L (ref 3.5–5.5)
RBC # BLD AUTO: 2.96 10*6/MM3 (ref 3.89–5.14)
SODIUM BLD-SCNC: 136 MMOL/L (ref 132–146)
WBC NRBC COR # BLD: 21.4 10*3/MM3 (ref 3.5–10.8)

## 2017-07-12 PROCEDURE — 25010000002 MORPHINE SULFATE (PF) 2 MG/ML SOLUTION: Performed by: SURGERY

## 2017-07-12 PROCEDURE — 83735 ASSAY OF MAGNESIUM: CPT | Performed by: INTERNAL MEDICINE

## 2017-07-12 PROCEDURE — 25010000002 ONDANSETRON PER 1 MG: Performed by: INTERNAL MEDICINE

## 2017-07-12 PROCEDURE — 85025 COMPLETE CBC W/AUTO DIFF WBC: CPT | Performed by: INTERNAL MEDICINE

## 2017-07-12 PROCEDURE — 80048 BASIC METABOLIC PNL TOTAL CA: CPT | Performed by: INTERNAL MEDICINE

## 2017-07-12 PROCEDURE — 82962 GLUCOSE BLOOD TEST: CPT

## 2017-07-12 RX ORDER — SIMVASTATIN 20 MG
20 TABLET ORAL NIGHTLY
Status: DISCONTINUED | OUTPATIENT
Start: 2017-07-12 | End: 2017-07-12 | Stop reason: HOSPADM

## 2017-07-12 RX ADMIN — CLOPIDOGREL BISULFATE 75 MG: 75 TABLET ORAL at 05:51

## 2017-07-12 RX ADMIN — Medication: at 05:55

## 2017-07-12 RX ADMIN — Medication: at 11:49

## 2017-07-12 RX ADMIN — HYDROCHLOROTHIAZIDE 12.5 MG: 12.5 TABLET ORAL at 08:30

## 2017-07-12 RX ADMIN — NICOTINE 1 PATCH: 21 PATCH, EXTENDED RELEASE TRANSDERMAL at 00:58

## 2017-07-12 RX ADMIN — OXYCODONE AND ACETAMINOPHEN 2 TABLET: 5; 325 TABLET ORAL at 00:56

## 2017-07-12 RX ADMIN — METFORMIN HYDROCHLORIDE 500 MG: 500 TABLET, FILM COATED ORAL at 08:30

## 2017-07-12 RX ADMIN — ATORVASTATIN CALCIUM 10 MG: 10 TABLET, FILM COATED ORAL at 08:30

## 2017-07-12 RX ADMIN — LISINOPRIL 10 MG: 10 TABLET ORAL at 08:30

## 2017-07-12 RX ADMIN — OXYCODONE AND ACETAMINOPHEN 2 TABLET: 5; 325 TABLET ORAL at 05:51

## 2017-07-12 RX ADMIN — THYROID, PORCINE 60 MG: 30 TABLET ORAL at 11:49

## 2017-07-12 RX ADMIN — OXYCODONE AND ACETAMINOPHEN 2 TABLET: 5; 325 TABLET ORAL at 11:48

## 2017-07-12 RX ADMIN — ASPIRIN 81 MG: 81 TABLET, COATED ORAL at 05:51

## 2017-07-12 RX ADMIN — THYROID, PORCINE 75 MG: 30 TABLET ORAL at 08:30

## 2017-07-12 RX ADMIN — MORPHINE SULFATE 2 MG: 2 INJECTION, SOLUTION INTRAMUSCULAR; INTRAVENOUS at 08:22

## 2017-07-12 RX ADMIN — ONDANSETRON 4 MG: 2 INJECTION INTRAMUSCULAR; INTRAVENOUS at 10:47

## 2017-07-12 NOTE — PLAN OF CARE
Problem: Patient Care Overview (Adult)  Goal: Plan of Care Review  Outcome: Ongoing (interventions implemented as appropriate)    07/12/17 0520   Coping/Psychosocial Response Interventions   Plan Of Care Reviewed With patient   Patient Care Overview   Progress progress towards functional goals is fair

## 2017-07-12 NOTE — DISCHARGE SUMMARY
"  VASCULAR SURGERY DISCHARGE SUMMARY        Bobbihalina Ernster    Date of Admission: 7/10/2017  Date of Discharge:  7/12/2017    Discharge Diagnoses:  Principal Problem:    PAD w/gangrene left fifth toe  Active Problems:    Diabetes mellitus with neuropathy    Hyperlipidemia    Hypertension    Active Tobacco abuse    COPD (chronic obstructive pulmonary disease)    S/P left iliofemoral and left femoropopliteal bypass surgery 7/10/17    Microcytic Anemia      Presenting Problem/History of Present Illness  Gangrene [I96]  Patient is a 56 y.o. female presented with Left lower extremity PVD with fifth toe gangrene.  Previous angiography demonstrated left external iliac artery occlusion as well as SFA occlusion.      Procedures Performed  Procedure(s):  Left iliofemoral bypass graft  Left femoral to below-knee popliteal in situ saphenous vein bypass.    Hospital Course  The patient had a smooth and unremarkable recovery.  Her incisions were healing well at the time of discharge.  Hematocrit was 23 but she was asymptomatic.  She was tolerating a diet and passing flatus.  Her left foot was warm, pink, and well perfused.  Her toe amputation wound appeared to be viable.      Pertinent Test Results:   [unfilled]  [unfilled]    Physical Exam on Discharge:    /69  Pulse 102  Temp 98.1 °F (36.7 °C) (Oral)   Resp 16  Ht 65.5\" (166.4 cm)  Wt 110 lb (49.9 kg)  LMP Comment: YEARS AGO  SpO2 95%  Breastfeeding? No  BMI 18.03 kg/m2      Discharge Medications   Bobbi Du DARRIUS   Home Medication Instructions NICHOLAS:[id number]    Printed on:07/12/17 1313   Medication Information                      aspirin 81 MG EC tablet  Take 81 mg by mouth Every Morning.             cephalexin (KEFLEX) 500 MG capsule  Take 500 mg by mouth 3 (Three) Times a Day.             clopidogrel (PLAVIX) 75 MG tablet  Take 75 mg by mouth Every Morning.             lisinopril-hydrochlorothiazide (PRINZIDE,ZESTORETIC) 10-12.5 MG per tablet  Take 1 tablet " by mouth Every Morning.             metFORMIN (GLUCOPHAGE) 500 MG tablet  Take 500 mg by mouth 3 (Three) Times a Day.             simvastatin (ZOCOR) 20 MG tablet  Take 20 mg by mouth Every Morning.             Thyroid (ARMOUR THYROID) 60 MG PO tablet  Take 60 mg by mouth Daily.                   Discharge Instructions  Keep incisions clean and dry  Pack toe amputation wound with Dakin-soaked gauze twice a day  Paint incisions and gangrenous areas with Betadine twice a day    Follow-up Appointments  2 weeks and Dr. Artis office    CC to:   Kip Artis MD  07/12/17  1:13 PM

## 2017-07-12 NOTE — PROGRESS NOTES
"VASCULAR SURGERY PROGRESS NOTE     07/12/17    Bobbi Du  [id number]  1960    SUBJECTIVE  Foot burns  Tolerating diet  Passing flatus    OBJECTIVE    Vital Signs:  /69  Pulse 102  Temp 98.1 °F (36.7 °C) (Oral)   Resp 16  Ht 65.5\" (166.4 cm)  Wt 110 lb (49.9 kg)  LMP Comment: YEARS AGO  SpO2 95%  Breastfeeding? No  BMI 18.03 kg/m2    Medications:    aspirin 81 mg Oral QAM   atorvastatin 10 mg Oral Daily   clopidogrel 75 mg Oral QAM   hydrochlorothiazide 12.5 mg Oral Daily   insulin lispro 0-9 Units Subcutaneous 4x Daily AC & at Bedtime   lisinopril 10 mg Oral Q24H   metFORMIN 500 mg Oral TID   nicotine 1 patch Transdermal Q24H   simvastatin 20 mg Oral Nightly   sodium hypochlorite  Topical BID   Thyroid 60 mg Oral Daily   Thyroid 75 mg Oral QAM AC       Physical Exam:    General: Alert, cooperative, in no acute distress    Abdomen: Soft, nontender    Incision healing well    Extremities: No edema    Left foot warm, pink    Incision healing well    Palpable graft pulse.    Wound viable    Neuro:  Alert and oriented x 3     no gross motor or sensory deficits    LABS:    Results from last 7 days  Lab Units 07/12/17  0358   WBC 10*3/mm3 21.40*   HEMOGLOBIN g/dL 7.3*   HEMATOCRIT % 23.7*   PLATELETS 10*3/mm3 556*       Results from last 7 days  Lab Units 07/12/17  0358   SODIUM mmol/L 136   POTASSIUM mmol/L 3.8   CHLORIDE mmol/L 110*   CO2 mmol/L 21.0   BUN mg/dL 7*   CREATININE mg/dL 0.40*   GLUCOSE mg/dL 124*   CALCIUM mg/dL 9.1     Estimated Creatinine Clearance: 125.2 mL/min (by C-G formula based on Cr of 0.4).          ASSESSMENT  1.   Patent revascularization    PLAN  1.  D/C  2.  F/U 2 weeks    aMyur Artis MD  07/12/17  1:09 PM  "

## 2017-07-12 NOTE — PROGRESS NOTES
Discharge Planning Assessment  [facility]     Patient Name: Bobbi Du  MRN: [id number]  Today's Date: 7/12/2017    Admit Date: 7/10/2017          Discharge Needs Assessment       07/12/17 1035    Living Environment    Lives With alone    Living Arrangements house    Home Accessibility other (see comments)    Type of Financial/Environmental Concern none    Transportation Available family or friend will provide    Living Environment Comment Assess home needs/rehab post [facility] PT/OT evaluations    Living Environment    Provides Primary Care For no one    Quality Of Family Relationships supportive    Able to Return to Prior Living Arrangements other (see comments)    Living Arrangement Comments Evaluate for rehab needs post hospital PT/OT input    Discharge Needs Assessment    Concerns To Be Addressed discharge planning concerns    Readmission Within The Last 30 Days no previous admission in last 30 days    Anticipated Changes Related to Illness inability to care for self    Equipment Currently Used at Home glucometer;bath bench;crutches;raised toilet;wheelchair, motorized    Equipment Needed After Discharge other (see comments)    Discharge Facility/Level Of Care Needs other (see comments)    Current Discharge Risk physical impairment    Discharge Disposition other (see comments)    Discharge Planning Comments Daughter:  Jennifer Griffith, [phone]; Sister:  Nathalie Shelton, [phone].  Assess rehab needs post PT/OT input            Discharge Plan       07/12/17 1039    Case Management/Social Work Plan    Additional Comments S/P left ilio-femoral bypass, left fem-popliteal ISSV bypass and left 5th toe ray amputation on 7/10/17; transferred from unit to floor bed yesterday.  Will plan to assess rehab needs post PT/OT evaluations.        Discharge Placement     No information found        Expected Discharge Date and Time     Expected Discharge Date Expected Discharge Time    Jul 14, 2017               Demographic  Summary     None            Functional Status     None            Psychosocial     None            Abuse/Neglect     None            Legal     None            Substance Abuse     None            Patient Forms     None          ORIN Vasquez

## 2017-07-25 ENCOUNTER — APPOINTMENT (OUTPATIENT)
Dept: PHYSICAL THERAPY | Facility: HOSPITAL | Age: 57
End: 2017-07-25

## 2018-06-04 ENCOUNTER — HOSPITAL ENCOUNTER (EMERGENCY)
Facility: HOSPITAL | Age: 58
Discharge: HOME | End: 2018-06-04
Attending: EMERGENCY MEDICINE | Admitting: EMERGENCY MEDICINE

## 2018-06-04 ENCOUNTER — APPOINTMENT (OUTPATIENT)
Dept: GENERAL RADIOLOGY | Facility: HOSPITAL | Age: 58
End: 2018-06-04

## 2018-06-04 VITALS
HEIGHT: 65 IN | WEIGHT: 106 LBS | DIASTOLIC BLOOD PRESSURE: 107 MMHG | SYSTOLIC BLOOD PRESSURE: 178 MMHG | BODY MASS INDEX: 17.66 KG/M2 | TEMPERATURE: 98.1 F | OXYGEN SATURATION: 97 % | HEART RATE: 84 BPM | RESPIRATION RATE: 18 BRPM

## 2018-06-04 DIAGNOSIS — M79.672 FOOT PAIN, LEFT: ICD-10-CM

## 2018-06-04 DIAGNOSIS — M25.552 PAIN OF LEFT HIP JOINT: Primary | ICD-10-CM

## 2018-06-04 PROCEDURE — 73502 X-RAY EXAM HIP UNI 2-3 VIEWS: CPT

## 2018-06-04 PROCEDURE — 99283 EMERGENCY DEPT VISIT LOW MDM: CPT

## 2018-06-04 PROCEDURE — 73630 X-RAY EXAM OF FOOT: CPT

## 2018-06-04 RX ORDER — MELOXICAM 7.5 MG/1
7.5 TABLET ORAL DAILY
Status: ON HOLD | COMMUNITY
End: 2019-12-17 | Stop reason: SDDI

## 2018-06-04 RX ORDER — LEVOTHYROXINE AND LIOTHYRONINE 9.5; 2.25 UG/1; UG/1
15 TABLET ORAL DAILY
Status: ON HOLD | COMMUNITY
End: 2019-12-17 | Stop reason: SDDI

## 2018-06-04 RX ORDER — FERROUS SULFATE 325(65) MG
325 TABLET ORAL
COMMUNITY
End: 2020-03-13

## 2018-06-04 NOTE — ED PROVIDER NOTES
"Subjective   Ms. Bobbi Du is a 57 year old female who presents to the ED with c/o left hip pain and Lt foot pain.  She reports her hip/ foot have been in pain for 2-3 days and has been progressively worsening. She also complains of numbness in her feet bilaterally which is gradually extending up her leg which started in September 2017. She states she is unable to \"walk 1 block\" and notes she feels unsteady while walking, she advises that this is not new. She reports she had venous bypass in her left leg in July 2017 and had her left 5th toe removed at that time. An aneurysm was found near her left hip/ lt groin that they are watching.. She reports she is here with her nephew who is also being seen here and she \"thought she would be seen\" for this pain. She also states she was discharged from her pain management practice for missing an appointment in March.  Pt was on Norco four times a day. She reports a history of severe RA, DM, and PAD in her left leg. There are no other acute symptoms at this time.         History provided by:  Patient  Lower Extremity Issue   Location:  Hip  Time since incident:  11 months  Injury: no    Hip location:  L hip  Pain details:     Severity:  Severe    Onset quality:  Sudden    Duration:  3 days    Timing:  Constant    Progression:  Worsening  Chronicity:  New  Dislocation: no    Foreign body present:  No foreign bodies  Tetanus status:  Unknown  Prior injury to area:  No  Relieved by:  None tried  Worsened by:  Nothing  Ineffective treatments:  None tried  Risk factors comment:  Toe amputation, July 2017      Review of Systems   Musculoskeletal:        Left hip pain, left foot pain.   Neurological: Numbness: left foot.   All other systems reviewed and are negative.      Past Medical History:   Diagnosis Date   • Charcot foot due to diabetes mellitus     RIGHT   • Chronic pain    • COPD (chronic obstructive pulmonary disease)    • Diabetes mellitus    • Disease of thyroid gland  "   • Fibromyalgia    • Hyperlipidemia    • Hypertension    • PAD (peripheral artery disease)    • Restless leg    • Rheumatoid arthritis    • Tobacco abuse        Allergies   Allergen Reactions   • Bee Venom Anaphylaxis   • Gabapentin Shortness Of Breath   • Lyrica [Pregabalin] Other (See Comments)     Patient does not remember reaction.   • Proventil [Albuterol] Other (See Comments)     palpitations       Past Surgical History:   Procedure Laterality Date   • BACK SURGERY      X 2   • CARDIAC CATHETERIZATION N/A 5/30/2017    Procedure: Angioplasty-peripheral;  Surgeon: Mayur Artis MD;  Location: [facility] CATH INVASIVE LOCATION;  Service:    • CARPAL TUNNEL RELEASE      X 4   • FOOT SURGERY Bilateral     X5   • INTERVENTIONAL RADIOLOGY PROCEDURE N/A 5/30/2017    Procedure: Abdominal Aortagram with Runoff;  Surgeon: Mayur Artis MD;  Location: [facility] CATH INVASIVE LOCATION;  Service:    • KNEE SURGERY Left    • DC RT/LT HEART CATHETERS N/A 5/30/2017    Procedure: Percutaneous Coronary Intervention;  Surgeon: Mayur Artis MD;  Location:  [email or web address] CATH INVASIVE LOCATION;  Service: Cardiovascular   • DC VEIN IN SITU BYPASS GRAFT,FEM-POP Left 7/10/2017    Procedure: LEFT ILIAC STENT, FEMORAL ENDARECTOMY, LEFT FEMORAL POPLITEAL BYPASS, POSS ILIAC FEMORAL BYPASS;  Surgeon: Mayur Artis MD;  Location:  [facility] OR;  Service: Vascular   • THYROIDECTOMY     • TUBAL ABDOMINAL LIGATION         Family History   Problem Relation Age of Onset   • COPD Mother    • Alzheimer's disease Father    • Brain cancer Brother    • Valvular heart disease Maternal Uncle        Social History     Social History   • Marital status:      Social History Main Topics   • Smoking status: Current Every Day Smoker     Packs/day: 0.50     Types: Cigarettes   • Smokeless tobacco: Never Used      Comment: STATES STARTED SMOKING AT AGE 16   • Alcohol use No   • Drug use: No   • Sexual activity: Defer     Other Topics Concern   • Not  on file         Objective   Physical Exam   Constitutional: She is oriented to person, place, and time. She appears well-developed and well-nourished.   Anxious   HENT:   Head: Normocephalic and atraumatic.   Eyes: Conjunctivae and EOM are normal.   Neck: Normal range of motion.   Cardiovascular: Normal rate, regular rhythm and intact distal pulses.    Pulmonary/Chest: Effort normal and breath sounds normal. No respiratory distress.   Abdominal: Soft. Bowel sounds are normal. She exhibits no distension. There is no tenderness.   Musculoskeletal:        Left hip: She exhibits tenderness. She exhibits normal range of motion, normal strength, no bony tenderness, no swelling and no deformity.        Neurological: She is alert and oriented to person, place, and time.   Skin: Skin is warm and dry.   Psychiatric: She has a normal mood and affect.   Nursing note and vitals reviewed.      Procedures         ED Course  ED Course as of Jun 04 0344   Mon Jun 04, 2018   0343 She does advise her results at this time.  Patient to follow-up with primary care physician.  He sent to take Tylenol, Motrin for pain.  Patient agrees and verbalizes understanding.  [KG]      ED Course User Index  [KG] Marcy NAYA Dawn, APRN       No results found for this or any previous visit (from the past 24 hour(s)).  Note: In addition to lab results from this visit, the labs listed above may include labs taken at another facility or during a different encounter within the last 24 hours. Please correlate lab times with ED admission and discharge times for further clarification of the services performed during this visit.    XR Foot 3+ View Left   Final Result     No acute findings.      THIS DOCUMENT HAS BEEN ELECTRONICALLY SIGNED BY ZULY PEREIRA MD      XR Hip With or Without Pelvis 2 - 3 View Left   Final Result     No acute findings.      THIS DOCUMENT HAS BEEN ELECTRONICALLY SIGNED BY ZULY PEREIRA MD        Vitals:    06/04/18 0007 06/04/18 0218  "  BP: 165/83 (!) 178/107   BP Location: Left arm    Patient Position: Sitting    Pulse: 88 84   Resp: 18 18   Temp: 98.1 °F (36.7 °C)    TempSrc: Oral    SpO2: 97% 97%   Weight: 48.1 kg (106 lb)    Height: 165.1 cm (65\")      Medications - No data to display  ECG/EMG Results (last 24 hours)     ** No results found for the last 24 hours. **                      [facility]    Final diagnoses:   Pain of left hip joint   Foot pain, left       Documentation assistance provided by mitul Granados.  Information recorded by the scribmadan was done at my direction and has been verified and validated by me.     Ken Granados  06/04/18 0029       Ken Granados  06/04/18 0035       Ken Granados  06/04/18 0044       Marcy Dawn, ZOE  06/04/18 0344    "

## 2019-10-15 ENCOUNTER — HOSPITAL ENCOUNTER (OUTPATIENT)
Facility: HOSPITAL | Age: 59
Setting detail: OBSERVATION
Discharge: LEFT BEFORE DISCHARGE | End: 2019-10-16
Attending: EMERGENCY MEDICINE | Admitting: HOSPITALIST

## 2019-10-15 ENCOUNTER — APPOINTMENT (OUTPATIENT)
Dept: GENERAL RADIOLOGY | Facility: HOSPITAL | Age: 59
End: 2019-10-15

## 2019-10-15 ENCOUNTER — APPOINTMENT (OUTPATIENT)
Dept: MRI IMAGING | Facility: HOSPITAL | Age: 59
End: 2019-10-15

## 2019-10-15 ENCOUNTER — APPOINTMENT (OUTPATIENT)
Dept: CT IMAGING | Facility: HOSPITAL | Age: 59
End: 2019-10-15

## 2019-10-15 DIAGNOSIS — R41.0 CONFUSION: Primary | ICD-10-CM

## 2019-10-15 DIAGNOSIS — Z98.890 POST-OPERATIVE STATE: ICD-10-CM

## 2019-10-15 DIAGNOSIS — Z74.09 IMPAIRED MOBILITY AND ADLS: ICD-10-CM

## 2019-10-15 DIAGNOSIS — Z78.9 IMPAIRED MOBILITY AND ADLS: ICD-10-CM

## 2019-10-15 DIAGNOSIS — G93.40 ENCEPHALOPATHY: ICD-10-CM

## 2019-10-15 PROBLEM — E03.9 HYPOTHYROIDISM: Status: ACTIVE | Noted: 2019-10-15

## 2019-10-15 PROBLEM — M06.9 RA (RHEUMATOID ARTHRITIS): Status: ACTIVE | Noted: 2019-10-15

## 2019-10-15 LAB
ALBUMIN SERPL-MCNC: 3.6 G/DL (ref 3.5–5.2)
ALBUMIN/GLOB SERPL: 1.1 G/DL
ALP SERPL-CCNC: 95 U/L (ref 39–117)
ALT SERPL W P-5'-P-CCNC: 10 U/L (ref 1–33)
ALT SERPL W P-5'-P-CCNC: 10 U/L (ref 1–33)
AMPHET+METHAMPHET UR QL: NEGATIVE
AMPHETAMINES UR QL: NEGATIVE
ANION GAP SERPL CALCULATED.3IONS-SCNC: 12 MMOL/L (ref 5–15)
APTT PPP: 28.8 SECONDS (ref 24–37)
ARTERIAL PATENCY WRIST A: ABNORMAL
AST SERPL-CCNC: 15 U/L (ref 1–32)
AST SERPL-CCNC: 15 U/L (ref 1–32)
ATMOSPHERIC PRESS: ABNORMAL MM[HG]
BACTERIA UR QL AUTO: NORMAL /HPF
BARBITURATES UR QL SCN: NEGATIVE
BASE EXCESS BLDA CALC-SCNC: 0.5 MMOL/L (ref 0–2)
BASOPHILS # BLD AUTO: 0.07 10*3/MM3 (ref 0–0.2)
BASOPHILS NFR BLD AUTO: 1 % (ref 0–1.5)
BDY SITE: ABNORMAL
BENZODIAZ UR QL SCN: NEGATIVE
BILIRUB SERPL-MCNC: 0.2 MG/DL (ref 0.2–1.2)
BILIRUB UR QL STRIP: NEGATIVE
BODY TEMPERATURE: 37 C
BUN BLD-MCNC: 7 MG/DL (ref 6–20)
BUN BLDA-MCNC: 5 MG/DL (ref 8–26)
BUN/CREAT SERPL: 12.5 (ref 7–25)
BUPRENORPHINE SERPL-MCNC: NEGATIVE NG/ML
CA-I BLDA-SCNC: 1.13 MMOL/L (ref 1.2–1.32)
CALCIUM SPEC-SCNC: 9.2 MG/DL (ref 8.6–10.5)
CANNABINOIDS SERPL QL: NEGATIVE
CHLORIDE BLDA-SCNC: 106 MMOL/L (ref 98–109)
CHLORIDE SERPL-SCNC: 105 MMOL/L (ref 98–107)
CLARITY UR: CLEAR
CO2 BLDA-SCNC: 23 MMOL/L (ref 24–29)
CO2 SERPL-SCNC: 23 MMOL/L (ref 22–29)
COCAINE UR QL: NEGATIVE
COHGB MFR BLD: 2.3 % (ref 0–2)
COLOR UR: YELLOW
CREAT BLD-MCNC: 0.56 MG/DL (ref 0.57–1)
CREAT BLDA-MCNC: 0.6 MG/DL (ref 0.6–1.3)
CRP SERPL-MCNC: 25.61 MG/DL (ref 0–0.5)
D-LACTATE SERPL-SCNC: 1.4 MMOL/L (ref 0.5–2)
DEPRECATED RDW RBC AUTO: 73.3 FL (ref 37–54)
EOSINOPHIL # BLD AUTO: 0.24 10*3/MM3 (ref 0–0.4)
EOSINOPHIL NFR BLD AUTO: 3.5 % (ref 0.3–6.2)
ERYTHROCYTE [DISTWIDTH] IN BLOOD BY AUTOMATED COUNT: 26.3 % (ref 12.3–15.4)
ETHANOL BLD-MCNC: <10 MG/DL (ref 0–10)
GFR SERPL CREATININE-BSD FRML MDRD: 112 ML/MIN/1.73
GLOBULIN UR ELPH-MCNC: 3.2 GM/DL
GLUCOSE BLD-MCNC: 115 MG/DL (ref 65–99)
GLUCOSE BLDC GLUCOMTR-MCNC: 113 MG/DL (ref 70–130)
GLUCOSE UR STRIP-MCNC: NEGATIVE MG/DL
HCO3 BLDA-SCNC: 23.3 MMOL/L (ref 20–26)
HCT VFR BLD AUTO: 29.4 % (ref 34–46.6)
HCT VFR BLD CALC: 26.4 %
HCT VFR BLDA CALC: 27 % (ref 38–51)
HGB BLD-MCNC: 8.7 G/DL (ref 12–15.9)
HGB BLDA-MCNC: 8.6 G/DL (ref 14–18)
HGB BLDA-MCNC: 9.2 G/DL (ref 12–17)
HGB UR QL STRIP.AUTO: NEGATIVE
HOLD SPECIMEN: NORMAL
HOLD SPECIMEN: NORMAL
HOROWITZ INDEX BLD+IHG-RTO: 21 %
IMM GRANULOCYTES # BLD AUTO: 0.02 10*3/MM3 (ref 0–0.05)
IMM GRANULOCYTES NFR BLD AUTO: 0.3 % (ref 0–0.5)
INR PPP: 0.96 (ref 0.85–1.16)
KETONES UR QL STRIP: NEGATIVE
LEUKOCYTE ESTERASE UR QL STRIP.AUTO: ABNORMAL
LYMPHOCYTES # BLD AUTO: 1.98 10*3/MM3 (ref 0.7–3.1)
LYMPHOCYTES NFR BLD AUTO: 28.7 % (ref 19.6–45.3)
MAGNESIUM SERPL-MCNC: 2 MG/DL (ref 1.6–2.6)
MCH RBC QN AUTO: 23.6 PG (ref 26.6–33)
MCHC RBC AUTO-ENTMCNC: 29.6 G/DL (ref 31.5–35.7)
MCV RBC AUTO: 79.9 FL (ref 79–97)
METHADONE UR QL SCN: NEGATIVE
METHGB BLD QL: 0.5 % (ref 0–1.5)
MODALITY: ABNORMAL
MONOCYTES # BLD AUTO: 0.46 10*3/MM3 (ref 0.1–0.9)
MONOCYTES NFR BLD AUTO: 6.7 % (ref 5–12)
NEUTROPHILS # BLD AUTO: 4.12 10*3/MM3 (ref 1.7–7)
NEUTROPHILS NFR BLD AUTO: 59.8 % (ref 42.7–76)
NITRITE UR QL STRIP: NEGATIVE
NOTE: ABNORMAL
NRBC BLD AUTO-RTO: 0 /100 WBC (ref 0–0.2)
NT-PROBNP SERPL-MCNC: 184.1 PG/ML (ref 5–900)
OPIATES UR QL: NEGATIVE
OXYCODONE UR QL SCN: POSITIVE
OXYHGB MFR BLDV: 89 % (ref 94–99)
PCO2 BLDA: 29.6 MM HG (ref 35–45)
PCO2 TEMP ADJ BLD: 29.6 MM HG (ref 35–45)
PCP UR QL SCN: NEGATIVE
PH BLDA: 7.5 PH UNITS (ref 7.35–7.45)
PH UR STRIP.AUTO: 8 [PH] (ref 5–8)
PH, TEMP CORRECTED: 7.5 PH UNITS
PLAT MORPH BLD: NORMAL
PLATELET # BLD AUTO: 366 10*3/MM3 (ref 140–450)
PMV BLD AUTO: 10.1 FL (ref 6–12)
PO2 BLDA: 58.1 MM HG (ref 83–108)
PO2 TEMP ADJ BLD: 58.1 MM HG (ref 83–108)
POTASSIUM BLD-SCNC: 3.6 MMOL/L (ref 3.5–5.2)
POTASSIUM BLDA-SCNC: 3.4 MMOL/L (ref 3.5–4.9)
PROPOXYPH UR QL: NEGATIVE
PROT SERPL-MCNC: 6.8 G/DL (ref 6–8.5)
PROT UR QL STRIP: NEGATIVE
PROTHROMBIN TIME: 12.3 SECONDS (ref 11.2–14.3)
RBC # BLD AUTO: 3.68 10*6/MM3 (ref 3.77–5.28)
RBC # UR: NORMAL /HPF
REF LAB TEST METHOD: NORMAL
ROULEAUX BLD QL SMEAR: NORMAL
SODIUM BLD-SCNC: 140 MMOL/L (ref 136–145)
SODIUM BLDA-SCNC: 138 MMOL/L (ref 138–146)
SP GR UR STRIP: 1.01 (ref 1–1.03)
SQUAMOUS #/AREA URNS HPF: NORMAL /[HPF]
T4 FREE SERPL-MCNC: 0.45 NG/DL (ref 0.93–1.7)
TRICYCLICS UR QL SCN: POSITIVE
TROPONIN T SERPL-MCNC: <0.01 NG/ML (ref 0–0.03)
TSH SERPL DL<=0.05 MIU/L-ACNC: 38.81 UIU/ML (ref 0.27–4.2)
UROBILINOGEN UR QL STRIP: ABNORMAL
VENTILATOR MODE: ABNORMAL
WBC MORPH BLD: NORMAL
WBC NRBC COR # BLD: 6.89 10*3/MM3 (ref 3.4–10.8)
WBC UR QL AUTO: NORMAL /HPF
WHOLE BLOOD HOLD SPECIMEN: NORMAL
WHOLE BLOOD HOLD SPECIMEN: NORMAL

## 2019-10-15 PROCEDURE — 99285 EMERGENCY DEPT VISIT HI MDM: CPT

## 2019-10-15 PROCEDURE — 85610 PROTHROMBIN TIME: CPT | Performed by: EMERGENCY MEDICINE

## 2019-10-15 PROCEDURE — G0378 HOSPITAL OBSERVATION PER HR: HCPCS

## 2019-10-15 PROCEDURE — 70450 CT HEAD/BRAIN W/O DYE: CPT

## 2019-10-15 PROCEDURE — 70551 MRI BRAIN STEM W/O DYE: CPT

## 2019-10-15 PROCEDURE — 72148 MRI LUMBAR SPINE W/O DYE: CPT

## 2019-10-15 PROCEDURE — 80307 DRUG TEST PRSMV CHEM ANLYZR: CPT | Performed by: EMERGENCY MEDICINE

## 2019-10-15 PROCEDURE — 84460 ALANINE AMINO (ALT) (SGPT): CPT | Performed by: EMERGENCY MEDICINE

## 2019-10-15 PROCEDURE — 71045 X-RAY EXAM CHEST 1 VIEW: CPT

## 2019-10-15 PROCEDURE — 84443 ASSAY THYROID STIM HORMONE: CPT | Performed by: EMERGENCY MEDICINE

## 2019-10-15 PROCEDURE — 36600 WITHDRAWAL OF ARTERIAL BLOOD: CPT

## 2019-10-15 PROCEDURE — 83735 ASSAY OF MAGNESIUM: CPT | Performed by: EMERGENCY MEDICINE

## 2019-10-15 PROCEDURE — 82728 ASSAY OF FERRITIN: CPT | Performed by: PHYSICIAN ASSISTANT

## 2019-10-15 PROCEDURE — 84466 ASSAY OF TRANSFERRIN: CPT | Performed by: PHYSICIAN ASSISTANT

## 2019-10-15 PROCEDURE — 85014 HEMATOCRIT: CPT

## 2019-10-15 PROCEDURE — 84439 ASSAY OF FREE THYROXINE: CPT | Performed by: EMERGENCY MEDICINE

## 2019-10-15 PROCEDURE — 84481 FREE ASSAY (FT-3): CPT | Performed by: PHYSICIAN ASSISTANT

## 2019-10-15 PROCEDURE — 80047 BASIC METABLC PNL IONIZED CA: CPT

## 2019-10-15 PROCEDURE — 85007 BL SMEAR W/DIFF WBC COUNT: CPT | Performed by: EMERGENCY MEDICINE

## 2019-10-15 PROCEDURE — 81001 URINALYSIS AUTO W/SCOPE: CPT | Performed by: EMERGENCY MEDICINE

## 2019-10-15 PROCEDURE — 99220 PR INITIAL OBSERVATION CARE/DAY 70 MINUTES: CPT | Performed by: INTERNAL MEDICINE

## 2019-10-15 PROCEDURE — 84484 ASSAY OF TROPONIN QUANT: CPT | Performed by: EMERGENCY MEDICINE

## 2019-10-15 PROCEDURE — 85730 THROMBOPLASTIN TIME PARTIAL: CPT | Performed by: EMERGENCY MEDICINE

## 2019-10-15 PROCEDURE — 83880 ASSAY OF NATRIURETIC PEPTIDE: CPT | Performed by: EMERGENCY MEDICINE

## 2019-10-15 PROCEDURE — 84450 TRANSFERASE (AST) (SGOT): CPT | Performed by: EMERGENCY MEDICINE

## 2019-10-15 PROCEDURE — 82607 VITAMIN B-12: CPT | Performed by: PHYSICIAN ASSISTANT

## 2019-10-15 PROCEDURE — 86140 C-REACTIVE PROTEIN: CPT | Performed by: EMERGENCY MEDICINE

## 2019-10-15 PROCEDURE — 87040 BLOOD CULTURE FOR BACTERIA: CPT | Performed by: EMERGENCY MEDICINE

## 2019-10-15 PROCEDURE — 85025 COMPLETE CBC W/AUTO DIFF WBC: CPT | Performed by: EMERGENCY MEDICINE

## 2019-10-15 PROCEDURE — 82805 BLOOD GASES W/O2 SATURATION: CPT

## 2019-10-15 PROCEDURE — 80053 COMPREHEN METABOLIC PANEL: CPT | Performed by: EMERGENCY MEDICINE

## 2019-10-15 PROCEDURE — 83605 ASSAY OF LACTIC ACID: CPT | Performed by: EMERGENCY MEDICINE

## 2019-10-15 PROCEDURE — 82746 ASSAY OF FOLIC ACID SERUM: CPT | Performed by: PHYSICIAN ASSISTANT

## 2019-10-15 PROCEDURE — 83540 ASSAY OF IRON: CPT | Performed by: PHYSICIAN ASSISTANT

## 2019-10-15 PROCEDURE — 84145 PROCALCITONIN (PCT): CPT | Performed by: INTERNAL MEDICINE

## 2019-10-15 PROCEDURE — 93005 ELECTROCARDIOGRAM TRACING: CPT | Performed by: EMERGENCY MEDICINE

## 2019-10-15 RX ORDER — SODIUM CHLORIDE 0.9 % (FLUSH) 0.9 %
10 SYRINGE (ML) INJECTION AS NEEDED
Status: DISCONTINUED | OUTPATIENT
Start: 2019-10-15 | End: 2019-10-16 | Stop reason: HOSPADM

## 2019-10-15 RX ORDER — SODIUM CHLORIDE 9 MG/ML
125 INJECTION, SOLUTION INTRAVENOUS CONTINUOUS
Status: DISCONTINUED | OUTPATIENT
Start: 2019-10-15 | End: 2019-10-16 | Stop reason: HOSPADM

## 2019-10-15 RX ADMIN — SODIUM CHLORIDE 125 ML/HR: 9 INJECTION, SOLUTION INTRAVENOUS at 21:01

## 2019-10-15 RX ADMIN — SODIUM CHLORIDE 500 ML: 9 INJECTION, SOLUTION INTRAVENOUS at 21:02

## 2019-10-15 RX ADMIN — SODIUM CHLORIDE 500 ML: 9 INJECTION, SOLUTION INTRAVENOUS at 19:58

## 2019-10-15 NOTE — ED PROVIDER NOTES
Subjective   Bobbi Du is a 58 y.o female who presents to the ED with complaints of altered mental status. EMS personnel reports the patient awoke this morning acting like her usual self. Her last known well was 1115. Her family members left the house around this time to run errands. When they returned at approximately 1530, the patient was experiencing an abnormal gait and confusion. Patient denies nausea, vomiting, diarrhea, dysuria, frequency, and urgency. The patient has experienced a series of falls over the last couple of days per EMS. She has a past medical history of COPD, diabetes mellitus, disease of thyroid gland, fibromyalgia, hyperlipidemia, hypertension, peripheral artery disease, and rheumatoid arthritis. There are no other acute symptoms at this time.        History provided by:  Patient, EMS personnel and relative  Altered Mental Status   Presenting symptoms: confusion    Severity:  Severe  Most recent episode:  Today  Episode history:  Continuous  Timing:  Constant  Progression:  Unchanged  Associated symptoms: no nausea and no vomiting        Review of Systems   Gastrointestinal: Negative for diarrhea, nausea and vomiting.   Genitourinary: Negative for dysuria, frequency and urgency.   Musculoskeletal: Positive for gait problem.   Psychiatric/Behavioral: Positive for confusion. The patient is hyperactive.    All other systems reviewed and are negative.      Past Medical History:   Diagnosis Date   • Charcot foot due to diabetes mellitus (CMS/HCC)     RIGHT   • Chronic pain    • COPD (chronic obstructive pulmonary disease) (CMS/HCC)    • Diabetes mellitus (CMS/HCC)    • Disease of thyroid gland    • Fibromyalgia    • Hyperlipidemia    • Hypertension    • PAD (peripheral artery disease) (CMS/HCC)    • Restless leg    • Rheumatoid arthritis (CMS/HCC)    • Tobacco abuse        Allergies   Allergen Reactions   • Bee Venom Anaphylaxis   • Gabapentin Shortness Of Breath   • Lyrica [Pregabalin] Other  (See Comments)     Patient does not remember reaction.   • Proventil [Albuterol] Other (See Comments)     palpitations       Past Surgical History:   Procedure Laterality Date   • BACK SURGERY      X 2   • BACK SURGERY  10/2019    st chad   • CARDIAC CATHETERIZATION N/A 5/30/2017    Procedure: Angioplasty-peripheral;  Surgeon: Mayur Artis MD;  Location: [facility] CATH INVASIVE LOCATION;  Service:    • CARPAL TUNNEL RELEASE      X 4   • FOOT SURGERY Bilateral     X5   • INTERVENTIONAL RADIOLOGY PROCEDURE N/A 5/30/2017    Procedure: Abdominal Aortagram with Runoff;  Surgeon: Mayur Artis MD;  Location: [facility] CATH INVASIVE LOCATION;  Service:    • KNEE SURGERY Left    • AR RT/LT HEART CATHETERS N/A 5/30/2017    Procedure: Percutaneous Coronary Intervention;  Surgeon: Mayur Artis MD;  Location: [facility] CATH INVASIVE LOCATION;  Service: Cardiovascular   • AR VEIN IN SITU BYPASS GRAFT,FEM-POP Left 7/10/2017    Procedure: LEFT ILIAC STENT, FEMORAL ENDARECTOMY, LEFT FEMORAL POPLITEAL BYPASS, POSS ILIAC FEMORAL BYPASS;  Surgeon: Mayur Artis MD;  Location: [facility] OR;  Service: Vascular   • THYROIDECTOMY     • TUBAL ABDOMINAL LIGATION         Family History   Problem Relation Age of Onset   • COPD Mother    • Alzheimer's disease Father    • Brain cancer Brother    • Valvular heart disease Maternal Uncle        Social History     Socioeconomic History   • Marital status:      Spouse name: Not on file   • Number of children: Not on file   • Years of education: Not on file   • Highest education level: Not on file   Tobacco Use   • Smoking status: Current Every Day Smoker     Packs/day: 0.50     Types: Cigarettes   • Smokeless tobacco: Never Used   • Tobacco comment: STATES STARTED SMOKING AT AGE 16   Substance and Sexual Activity   • Alcohol use: No   • Drug use: No   • Sexual activity: Defer         Objective   Physical Exam   Constitutional: She appears well-developed and well-nourished. No  distress.   HENT:   Head: Normocephalic and atraumatic.   Nose: Nose normal.   Mouth/Throat: Oropharynx is clear and moist.   Eyes: Conjunctivae and EOM are normal. Pupils are equal, round, and reactive to light.   Neck: Normal range of motion. Neck supple. No JVD present. No thyroid mass present.   Cardiovascular: Normal rate, regular rhythm and normal heart sounds. Exam reveals no gallop and no friction rub.   No murmur heard.  Pulmonary/Chest: Effort normal and breath sounds normal. No respiratory distress.   Abdominal: Soft. Bowel sounds are normal. There is no tenderness.   Musculoskeletal: Normal range of motion. She exhibits no edema.   No stigmata of DVT.   Lymphadenopathy:     She has no cervical adenopathy.   Neurological: She is alert.   Non-focal neuro exam. Good strength and sensation in lower extremities bilaterally. Cranial nerves 2-12 intact. SANTOS intact. Confused and oriented x1. No dysarthria or aphasia.   Skin: Skin is warm and dry. No rash noted.   Psychiatric: She has a normal mood and affect. Her behavior is normal.   Nursing note and vitals reviewed.      Critical Care  Performed by: Dave Blair MD  Authorized by: Dave Blair MD     Critical care provider statement:     Critical care time (minutes):  35    Critical care time was exclusive of:  Separately billable procedures and treating other patients    Critical care was necessary to treat or prevent imminent or life-threatening deterioration of the following conditions:  CNS failure or compromise    Critical care was time spent personally by me on the following activities:  Development of treatment plan with patient or surrogate, evaluation of patient's response to treatment, examination of patient, obtaining history from patient or surrogate, ordering and review of laboratory studies, ordering and review of radiographic studies, pulse oximetry, re-evaluation of patient's condition and review of old charts             ED Course  ED  Course as of Oct 16 0037   Tue Oct 15, 2019   1930 She is seen immediately for a stroke alert in the CT scanner.  No recent old records with recent hospitalization and surgery at [facility].  She is confused on presentation but has a nonfocal neurologic examination with good strength sensation and SANTOS, but cannot tell me where she is, or what year it is currently.  CT scan of the head is negative per radiology on a code 19 CT scan.  Laboratory evaluation is pending.  Will obtain an MRI of the brain as well.  []   2038 Patient is serially reevaluated throughout ED course with last reevaluation now.Daughter arrived and reports that she arrived home this afternoon about 330 with acute mental status changes.  She reports that her mom is not at her baseline now.  She has not had similar symptoms to this in the past.I discussed findings to date.  I reexamined her lumbar wound again without signs of acute infection cellulitis and without drainage.  I requested records from [facility].  Her TSH is markedly elevated and out of added a T4.  In view of her recent surgery we will obtain a lactate and cultures however I see no obvious sign of infection currently.  Patient remains afebrile with a normal white count.  She is not hypoxic.  Evaluation continues  []   2120 Patient remains confused.  No clear etiology for her symptoms.  TSH is elevated with a low T4.  Vitals remained stable.  Pulse ox remains in the mid 90sMRI is ordered though there is no clear focal deficit.  Family reports no similar episodes to this in the past.I discussed case with our hospitalist who will admit for definitive inpatient care.  Patient is seen and evaluated by the hospitalist in the ED  [HH]      ED Course User Index  [] Dave Blair MD     Recent Results (from the past 24 hour(s))   POC CHEM 8    Collection Time: 10/15/19  7:27 PM   Result Value Ref Range    Glucose 113 70 - 130 mg/dL    BUN 5 (L) 8 - 26 mg/dL    Creatinine  0.60 0.60 - 1.30 mg/dL    Sodium 138 138 - 146 mmol/L    Potassium 3.4 (L) 3.5 - 4.9 mmol/L    Chloride 106 98 - 109 mmol/L    Total CO2 23 (L) 24 - 29 mmol/L    Hemoglobin 9.2 (L) 12.0 - 17.0 g/dL    Hematocrit 27 (L) 38 - 51 %    Ionized Calcium 1.13 (L) 1.20 - 1.32 mmol/L   Lavender Top    Collection Time: 10/15/19  7:30 PM   Result Value Ref Range    Extra Tube hold for add-on    CBC Auto Differential    Collection Time: 10/15/19  7:30 PM   Result Value Ref Range    WBC 6.89 3.40 - 10.80 10*3/mm3    RBC 3.68 (L) 3.77 - 5.28 10*6/mm3    Hemoglobin 8.7 (L) 12.0 - 15.9 g/dL    Hematocrit 29.4 (L) 34.0 - 46.6 %    MCV 79.9 79.0 - 97.0 fL    MCH 23.6 (L) 26.6 - 33.0 pg    MCHC 29.6 (L) 31.5 - 35.7 g/dL    RDW 26.3 (H) 12.3 - 15.4 %    RDW-SD 73.3 (H) 37.0 - 54.0 fl    MPV 10.1 6.0 - 12.0 fL    Platelets 366 140 - 450 10*3/mm3    Neutrophil % 59.8 42.7 - 76.0 %    Lymphocyte % 28.7 19.6 - 45.3 %    Monocyte % 6.7 5.0 - 12.0 %    Eosinophil % 3.5 0.3 - 6.2 %    Basophil % 1.0 0.0 - 1.5 %    Immature Grans % 0.3 0.0 - 0.5 %    Neutrophils, Absolute 4.12 1.70 - 7.00 10*3/mm3    Lymphocytes, Absolute 1.98 0.70 - 3.10 10*3/mm3    Monocytes, Absolute 0.46 0.10 - 0.90 10*3/mm3    Eosinophils, Absolute 0.24 0.00 - 0.40 10*3/mm3    Basophils, Absolute 0.07 0.00 - 0.20 10*3/mm3    Immature Grans, Absolute 0.02 0.00 - 0.05 10*3/mm3    nRBC 0.0 0.0 - 0.2 /100 WBC   Scan Slide    Collection Time: 10/15/19  7:30 PM   Result Value Ref Range    Rouleaux Slight/1+ None Seen    WBC Morphology Normal Normal    Platelet Morphology Normal Normal   Troponin    Collection Time: 10/15/19  7:31 PM   Result Value Ref Range    Troponin T <0.010 0.000 - 0.030 ng/mL   aPTT    Collection Time: 10/15/19  7:31 PM   Result Value Ref Range    PTT 28.8 24.0 - 37.0 seconds   AST    Collection Time: 10/15/19  7:31 PM   Result Value Ref Range    AST (SGOT) 15 1 - 32 U/L   ALT    Collection Time: 10/15/19  7:31 PM   Result Value Ref Range    ALT (SGPT) 10  1 - 33 U/L   Light Blue Top    Collection Time: 10/15/19  7:31 PM   Result Value Ref Range    Extra Tube hold for add-on    Green Top (Gel)    Collection Time: 10/15/19  7:31 PM   Result Value Ref Range    Extra Tube Hold for add-ons.    Gold Top - SST    Collection Time: 10/15/19  7:31 PM   Result Value Ref Range    Extra Tube Hold for add-ons.    Comprehensive Metabolic Panel    Collection Time: 10/15/19  7:31 PM   Result Value Ref Range    Glucose 115 (H) 65 - 99 mg/dL    BUN 7 6 - 20 mg/dL    Creatinine 0.56 (L) 0.57 - 1.00 mg/dL    Sodium 140 136 - 145 mmol/L    Potassium 3.6 3.5 - 5.2 mmol/L    Chloride 105 98 - 107 mmol/L    CO2 23.0 22.0 - 29.0 mmol/L    Calcium 9.2 8.6 - 10.5 mg/dL    Total Protein 6.8 6.0 - 8.5 g/dL    Albumin 3.60 3.50 - 5.20 g/dL    ALT (SGPT) 10 1 - 33 U/L    AST (SGOT) 15 1 - 32 U/L    Alkaline Phosphatase 95 39 - 117 U/L    Total Bilirubin 0.2 0.2 - 1.2 mg/dL    eGFR Non African Amer 112 >60 mL/min/1.73    Globulin 3.2 gm/dL    A/G Ratio 1.1 g/dL    BUN/Creatinine Ratio 12.5 7.0 - 25.0    Anion Gap 12.0 5.0 - 15.0 mmol/L   Ethanol    Collection Time: 10/15/19  7:31 PM   Result Value Ref Range    Ethanol <10 0 - 10 mg/dL   Magnesium    Collection Time: 10/15/19  7:31 PM   Result Value Ref Range    Magnesium 2.0 1.6 - 2.6 mg/dL   TSH    Collection Time: 10/15/19  7:31 PM   Result Value Ref Range    TSH 38.810 (H) 0.270 - 4.200 uIU/mL   C-reactive Protein    Collection Time: 10/15/19  7:31 PM   Result Value Ref Range    C-Reactive Protein 25.61 (H) 0.00 - 0.50 mg/dL   T4, Free    Collection Time: 10/15/19  7:31 PM   Result Value Ref Range    Free T4 0.45 (L) 0.93 - 1.70 ng/dL   BNP    Collection Time: 10/15/19  7:31 PM   Result Value Ref Range    proBNP 184.1 5.0 - 900.0 pg/mL   Protime-INR    Collection Time: 10/15/19  7:31 PM   Result Value Ref Range    Protime 12.3 11.2 - 14.3 Seconds    INR 0.96 0.85 - 1.16   Urinalysis With Microscopic If Indicated (No Culture) - Urine, Clean Catch     Collection Time: 10/15/19  9:15 PM   Result Value Ref Range    Color, UA Yellow Yellow, Straw    Appearance, UA Clear Clear    pH, UA 8.0 5.0 - 8.0    Specific Gravity, UA 1.009 1.001 - 1.030    Glucose, UA Negative Negative    Ketones, UA Negative Negative    Bilirubin, UA Negative Negative    Blood, UA Negative Negative    Protein, UA Negative Negative    Leuk Esterase, UA Trace (A) Negative    Nitrite, UA Negative Negative    Urobilinogen, UA 0.2 E.U./dL 0.2 - 1.0 E.U./dL   Urine Drug Screen - Urine, Clean Catch    Collection Time: 10/15/19  9:15 PM   Result Value Ref Range    THC, Screen, Urine Negative Negative    Phencyclidine (PCP), Urine Negative Negative    Cocaine Screen, Urine Negative Negative    Methamphetamine, Ur Negative Negative    Opiate Screen Negative Negative    Amphetamine Screen, Urine Negative Negative    Benzodiazepine Screen, Urine Negative Negative    Tricyclic Antidepressants Screen Positive (A) Negative    Methadone Screen, Urine Negative Negative    Barbiturates Screen, Urine Negative Negative    Oxycodone Screen, Urine Positive (A) Negative    Propoxyphene Screen Negative Negative    Buprenorphine, Screen, Urine Negative Negative   Urinalysis, Microscopic Only - Urine, Clean Catch    Collection Time: 10/15/19  9:15 PM   Result Value Ref Range    RBC, UA 0-2 None Seen, 0-2 /HPF    WBC, UA 0-2 None Seen, 0-2 /HPF    Bacteria, UA Trace None Seen, Trace /HPF    Squamous Epithelial Cells, UA      Methodology Manual Light Microscopy    Blood Gas, Arterial With Co-Ox    Collection Time: 10/15/19  9:26 PM   Result Value Ref Range    Site Left Brachial     Arturo's Test N/A     pH, Arterial 7.504 (H) 7.350 - 7.450 pH units    pCO2, Arterial 29.6 (L) 35.0 - 45.0 mm Hg    pO2, Arterial 58.1 (L) 83.0 - 108.0 mm Hg    HCO3, Arterial 23.3 20.0 - 26.0 mmol/L    Base Excess, Arterial 0.5 0.0 - 2.0 mmol/L    Hemoglobin, Blood Gas 8.6 (L) 14 - 18 g/dL    Hematocrit, Blood Gas 26.4 %    Oxyhemoglobin  89.0 (L) 94 - 99 %    Methemoglobin 0.50 0.00 - 1.50 %    Carboxyhemoglobin 2.3 (H) 0 - 2 %    Temperature 37.0 C    Barometric Pressure for Blood Gas      Modality Room Air     FIO2 21 %    Ventilator Mode       Note      pH, Temp Corrected 7.504 pH Units    pCO2, Temperature Corrected 29.6 (L) 35 - 45 mm Hg    pO2, Temperature Corrected 58.1 (L) 83 - 108 mm Hg   Lactic Acid, Plasma    Collection Time: 10/15/19 10:21 PM   Result Value Ref Range    Lactate 1.4 0.5 - 2.0 mmol/L     Note: In addition to lab results from this visit, the labs listed above may include labs taken at another facility or during a different encounter within the last 24 hours. Please correlate lab times with ED admission and discharge times for further clarification of the services performed during this visit.    MRI Brain Without Contrast   Final Result   Impression:   1.  No acute intracranial findings.   2.  Mild white matter microvascular disease.   3.  Air-fluid level in the left maxillary sinus suggesting acute sinusitis.            Signer Name: Chai Cantu MD    Signed: 10/15/2019 11:45 PM    [email or web address]     Radiology [facility]      XR Chest 1 View   Final Result   Emphysema with interstitial fibrosis. No acute infiltrates.      Signer Name: Sven Ashby MD    Signed: 10/15/2019 8:02 PM    [id number]      CT Head Without Contrast Stroke Protocol   Final Result   Normal, negative unenhanced head CT.      The examination was performed at 7:17 PM, and became available online at 7:26 PM, and results were called by telephone at 10/15/2019 7:28 PM the CT technologist Krissy, though verbally acknowledged these results.         Signer Name: Mayur Garcia MD    Signed: 10/15/2019 7:29 PM    [facility]: [email or web address]     Radiology [facility]      MRI Lumbar Spine Without Contrast    (Results Pending)     Vitals:    10/15/19  2130 10/15/19 2200 10/15/19 2230 10/16/19 0021   BP: 160/86 134/72 138/74 112/60   BP Location:    Right arm   Patient Position:    Lying   Pulse: 103 76 78 93   Resp:       Temp:       SpO2: 92% 93% 92% 92%   Weight:       Height:         Medications   sodium chloride 0.9 % flush 10 mL (not administered)   sodium chloride 0.9 % infusion (125 mL/hr Intravenous Currently Infusing 10/16/19 0020)   amoxicillin-clavulanate (AUGMENTIN) 875-125 MG per tablet 1 tablet (not administered)   sodium chloride 0.9 % bolus 500 mL (0 mL Intravenous Stopped 10/15/19 2030)   sodium chloride 0.9 % bolus 500 mL (0 mL Intravenous Stopped 10/15/19 2200)     ECG/EMG Results (last 24 hours)     ** No results found for the last 24 hours. **        ECG 12 Lead   Final Result   Test Reason : Acute Stroke Protocol (onset < 12 hrs)   Blood Pressure : **/** mmHG   Vent. Rate : 112 BPM     Atrial Rate : 112 BPM      P-R Int : 156 ms          QRS Dur : 078 ms       QT Int : 318 ms       P-R-T Axes : 073 057 056 degrees      QTc Int : 434 ms      Sinus tachycardia   Septal infarct , age undetermined   Abnormal ECG   No previous ECGs available   Confirmed by DAVE BLAIR MD ([id number]) on 10/15/2019 8:26:17 PM      Referred By:  EDMD           Confirmed By:DAVE BLAIR MD                        [facility]    Final diagnoses:   Confusion   Encephalopathy   Post-operative state       Documentation assistance provided by scribe Kurt Obrien.  Information recorded by the scribe was done at my direction and has been verified and validated by me.     Kurt Obrien  10/15/19 1946       Dave Blair MD  10/16/19 0037

## 2019-10-16 ENCOUNTER — APPOINTMENT (OUTPATIENT)
Dept: MRI IMAGING | Facility: HOSPITAL | Age: 59
End: 2019-10-16

## 2019-10-16 VITALS
HEART RATE: 96 BPM | BODY MASS INDEX: 20.08 KG/M2 | TEMPERATURE: 97.9 F | RESPIRATION RATE: 21 BRPM | WEIGHT: 120.5 LBS | DIASTOLIC BLOOD PRESSURE: 92 MMHG | HEIGHT: 65 IN | SYSTOLIC BLOOD PRESSURE: 158 MMHG | OXYGEN SATURATION: 95 %

## 2019-10-16 PROBLEM — J32.9 SINUSITIS: Status: ACTIVE | Noted: 2019-10-16

## 2019-10-16 PROBLEM — R93.7 ABNORMAL MRI, LUMBAR SPINE: Status: ACTIVE | Noted: 2019-10-16

## 2019-10-16 PROBLEM — E87.3 ACUTE RESPIRATORY ALKALOSIS: Status: ACTIVE | Noted: 2019-10-16

## 2019-10-16 LAB
ANION GAP SERPL CALCULATED.3IONS-SCNC: 11 MMOL/L (ref 5–15)
BUN BLD-MCNC: 5 MG/DL (ref 6–20)
BUN/CREAT SERPL: 9.8 (ref 7–25)
CALCIUM SPEC-SCNC: 8.5 MG/DL (ref 8.6–10.5)
CHLORIDE SERPL-SCNC: 109 MMOL/L (ref 98–107)
CO2 SERPL-SCNC: 21 MMOL/L (ref 22–29)
CREAT BLD-MCNC: 0.51 MG/DL (ref 0.57–1)
DEPRECATED RDW RBC AUTO: 76.2 FL (ref 37–54)
ERYTHROCYTE [DISTWIDTH] IN BLOOD BY AUTOMATED COUNT: 26.5 % (ref 12.3–15.4)
FERRITIN SERPL-MCNC: 79.49 NG/ML (ref 13–150)
FOLATE SERPL-MCNC: 5.59 NG/ML (ref 4.78–24.2)
GFR SERPL CREATININE-BSD FRML MDRD: 124 ML/MIN/1.73
GLUCOSE BLD-MCNC: 104 MG/DL (ref 65–99)
HBA1C MFR BLD: 5.4 % (ref 4.8–5.6)
HCT VFR BLD AUTO: 29.7 % (ref 34–46.6)
HGB BLD-MCNC: 8.7 G/DL (ref 12–15.9)
IRON 24H UR-MRATE: 14 MCG/DL (ref 37–145)
IRON SATN MFR SERPL: 4 % (ref 20–50)
MCH RBC QN AUTO: 23.8 PG (ref 26.6–33)
MCHC RBC AUTO-ENTMCNC: 29.3 G/DL (ref 31.5–35.7)
MCV RBC AUTO: 81.1 FL (ref 79–97)
PLATELET # BLD AUTO: 365 10*3/MM3 (ref 140–450)
PMV BLD AUTO: 10.3 FL (ref 6–12)
POTASSIUM BLD-SCNC: 3.8 MMOL/L (ref 3.5–5.2)
PROCALCITONIN SERPL-MCNC: 0.1 NG/ML (ref 0.1–0.25)
RBC # BLD AUTO: 3.66 10*6/MM3 (ref 3.77–5.28)
SODIUM BLD-SCNC: 141 MMOL/L (ref 136–145)
T3FREE SERPL-MCNC: 2.32 PG/ML (ref 2–4.4)
TIBC SERPL-MCNC: 311 MCG/DL (ref 298–536)
TRANSFERRIN SERPL-MCNC: 209 MG/DL (ref 200–360)
VIT B12 BLD-MCNC: 260 PG/ML (ref 211–946)
WBC NRBC COR # BLD: 6.2 10*3/MM3 (ref 3.4–10.8)

## 2019-10-16 PROCEDURE — G0378 HOSPITAL OBSERVATION PER HR: HCPCS

## 2019-10-16 PROCEDURE — 99205 OFFICE O/P NEW HI 60 MIN: CPT | Performed by: PSYCHIATRY & NEUROLOGY

## 2019-10-16 PROCEDURE — 83036 HEMOGLOBIN GLYCOSYLATED A1C: CPT | Performed by: PHYSICIAN ASSISTANT

## 2019-10-16 PROCEDURE — 25010000002 PIPERACILLIN SOD-TAZOBACTAM PER 1 G: Performed by: INTERNAL MEDICINE

## 2019-10-16 PROCEDURE — 97162 PT EVAL MOD COMPLEX 30 MIN: CPT

## 2019-10-16 PROCEDURE — 97165 OT EVAL LOW COMPLEX 30 MIN: CPT

## 2019-10-16 PROCEDURE — 63710000001 INSULIN LISPRO (HUMAN) PER 5 UNITS: Performed by: PHYSICIAN ASSISTANT

## 2019-10-16 PROCEDURE — 99217 PR OBSERVATION CARE DISCHARGE MANAGEMENT: CPT | Performed by: HOSPITALIST

## 2019-10-16 PROCEDURE — 80048 BASIC METABOLIC PNL TOTAL CA: CPT | Performed by: PHYSICIAN ASSISTANT

## 2019-10-16 PROCEDURE — 85027 COMPLETE CBC AUTOMATED: CPT | Performed by: PHYSICIAN ASSISTANT

## 2019-10-16 PROCEDURE — 25010000002 VANCOMYCIN 10 G RECONSTITUTED SOLUTION

## 2019-10-16 RX ORDER — ACETAMINOPHEN 650 MG/1
650 SUPPOSITORY RECTAL EVERY 4 HOURS PRN
Status: DISCONTINUED | OUTPATIENT
Start: 2019-10-16 | End: 2019-10-16 | Stop reason: HOSPADM

## 2019-10-16 RX ORDER — DEXTROSE MONOHYDRATE 25 G/50ML
25 INJECTION, SOLUTION INTRAVENOUS
Status: DISCONTINUED | OUTPATIENT
Start: 2019-10-16 | End: 2019-10-16 | Stop reason: HOSPADM

## 2019-10-16 RX ORDER — CYANOCOBALAMIN 1000 UG/ML
1000 INJECTION, SOLUTION INTRAMUSCULAR; SUBCUTANEOUS DAILY
Status: DISCONTINUED | OUTPATIENT
Start: 2019-10-16 | End: 2019-10-16 | Stop reason: HOSPADM

## 2019-10-16 RX ORDER — SODIUM CHLORIDE 0.9 % (FLUSH) 0.9 %
10 SYRINGE (ML) INJECTION EVERY 12 HOURS SCHEDULED
Status: DISCONTINUED | OUTPATIENT
Start: 2019-10-16 | End: 2019-10-16 | Stop reason: HOSPADM

## 2019-10-16 RX ORDER — NICOTINE 21 MG/24HR
1 PATCH, TRANSDERMAL 24 HOURS TRANSDERMAL EVERY 24 HOURS
Status: DISCONTINUED | OUTPATIENT
Start: 2019-10-16 | End: 2019-10-16 | Stop reason: HOSPADM

## 2019-10-16 RX ORDER — AMOXICILLIN AND CLAVULANATE POTASSIUM 875; 125 MG/1; MG/1
1 TABLET, FILM COATED ORAL EVERY 12 HOURS SCHEDULED
Status: DISCONTINUED | OUTPATIENT
Start: 2019-10-16 | End: 2019-10-16

## 2019-10-16 RX ORDER — LEVOTHYROXINE SODIUM 88 UG/1
88 TABLET ORAL
Status: DISCONTINUED | OUTPATIENT
Start: 2019-10-16 | End: 2019-10-16 | Stop reason: HOSPADM

## 2019-10-16 RX ORDER — LISINOPRIL 10 MG/1
10 TABLET ORAL
Status: DISCONTINUED | OUTPATIENT
Start: 2019-10-16 | End: 2019-10-16 | Stop reason: HOSPADM

## 2019-10-16 RX ORDER — SODIUM CHLORIDE 0.9 % (FLUSH) 0.9 %
10 SYRINGE (ML) INJECTION AS NEEDED
Status: DISCONTINUED | OUTPATIENT
Start: 2019-10-16 | End: 2019-10-16 | Stop reason: HOSPADM

## 2019-10-16 RX ORDER — HYDROCHLOROTHIAZIDE 25 MG/1
12.5 TABLET ORAL DAILY
Status: DISCONTINUED | OUTPATIENT
Start: 2019-10-16 | End: 2019-10-16 | Stop reason: HOSPADM

## 2019-10-16 RX ORDER — FERROUS SULFATE 325(65) MG
325 TABLET ORAL
Status: DISCONTINUED | OUTPATIENT
Start: 2019-10-16 | End: 2019-10-16 | Stop reason: HOSPADM

## 2019-10-16 RX ORDER — ASPIRIN 81 MG/1
81 TABLET ORAL EVERY MORNING
Status: DISCONTINUED | OUTPATIENT
Start: 2019-10-16 | End: 2019-10-16 | Stop reason: HOSPADM

## 2019-10-16 RX ORDER — ACETAMINOPHEN 160 MG/5ML
650 SOLUTION ORAL EVERY 4 HOURS PRN
Status: DISCONTINUED | OUTPATIENT
Start: 2019-10-16 | End: 2019-10-16 | Stop reason: HOSPADM

## 2019-10-16 RX ORDER — ACETAMINOPHEN 325 MG/1
650 TABLET ORAL EVERY 4 HOURS PRN
Status: DISCONTINUED | OUTPATIENT
Start: 2019-10-16 | End: 2019-10-16 | Stop reason: HOSPADM

## 2019-10-16 RX ORDER — ATORVASTATIN CALCIUM 10 MG/1
10 TABLET, FILM COATED ORAL DAILY
Status: DISCONTINUED | OUTPATIENT
Start: 2019-10-16 | End: 2019-10-16 | Stop reason: HOSPADM

## 2019-10-16 RX ORDER — NICOTINE POLACRILEX 4 MG
15 LOZENGE BUCCAL
Status: DISCONTINUED | OUTPATIENT
Start: 2019-10-16 | End: 2019-10-16 | Stop reason: HOSPADM

## 2019-10-16 RX ORDER — CLOPIDOGREL BISULFATE 75 MG/1
75 TABLET ORAL EVERY MORNING
Status: DISCONTINUED | OUTPATIENT
Start: 2019-10-16 | End: 2019-10-16 | Stop reason: HOSPADM

## 2019-10-16 RX ADMIN — FERROUS SULFATE TAB 325 MG (65 MG ELEMENTAL FE) 325 MG: 325 (65 FE) TAB at 08:58

## 2019-10-16 RX ADMIN — LISINOPRIL 10 MG: 10 TABLET ORAL at 08:58

## 2019-10-16 RX ADMIN — SODIUM CHLORIDE, PRESERVATIVE FREE 10 ML: 5 INJECTION INTRAVENOUS at 09:00

## 2019-10-16 RX ADMIN — TAZOBACTAM SODIUM AND PIPERACILLIN SODIUM 3.38 G: 375; 3 INJECTION, SOLUTION INTRAVENOUS at 06:43

## 2019-10-16 RX ADMIN — Medication 10 ML: at 02:15

## 2019-10-16 RX ADMIN — HYDROCHLOROTHIAZIDE 12.5 MG: 25 TABLET ORAL at 08:58

## 2019-10-16 RX ADMIN — CLOPIDOGREL BISULFATE 75 MG: 75 TABLET ORAL at 08:59

## 2019-10-16 RX ADMIN — ASPIRIN 81 MG: 81 TABLET, COATED ORAL at 08:58

## 2019-10-16 RX ADMIN — VANCOMYCIN HYDROCHLORIDE 1250 MG: 10 INJECTION, POWDER, LYOPHILIZED, FOR SOLUTION INTRAVENOUS at 06:43

## 2019-10-16 NOTE — PLAN OF CARE
Problem: Patient Care Overview  Goal: Plan of Care Review  Outcome: Ongoing (interventions implemented as appropriate)   10/16/19 0400   Coping/Psychosocial   Plan of Care Reviewed With patient;daughter   Plan of Care Review   Progress no change   OTHER   Outcome Summary PATIENT ADMITTED THIS SHIFT. STILL CONTINUES TO BE CONFUSED. VSS, ON RA. S/P SPINAL FUSION AT [facility]. INCISION WITH STAPLES C/D/I, BORDER DRESSING APPLIED. PATIENT BLADDER SCANNED THIS AM WITH 767ML, REFUSED TO BE IN AND OUT CATHED UNTIL SPEAKING TO MD. UP WITH ASSIST X2 TO THE BSC WITH 700 OUTPUT. BEDALARM IN PLACE. CALL LIGHT WITHIN REACH. WILL CONTINUE TO MONITOR.,

## 2019-10-16 NOTE — NURSING NOTE
Patient has been restless, agitated most of the morning. She now has decided that she is going home. Insisted that her daughter be notified to transport her home. Daughter spoke with patient and was unable to dissuade her from signing out of hospital AMA and said she would be here to pick her mother up in 30 minutes. Charge nurse and physician notified. MD came to bedside to talk to patient but she is still insistent on leaving.

## 2019-10-16 NOTE — PROGRESS NOTES
"Pharmacokinetic Consult - Vancomycin Dosing  Bobbi Du is a 58 y.o. female who is receiving vancomycin for complicated skin and soft tissue infection     Ordering Provider: Fior Saab DO (hospitalist)  Infectious Disease Consult: No  Goal trough: 10-15 mcg/mL    Ht - 165.1 cm (65\")  Wt - 54.7 kg (120 lb 8 oz)    Current Antimicrobial Therapy  Zosyn 3.375 gm IV q8h (extended infusion)   Vancomycin 750 mg IV q12h    Allergies  Bee venom; Gabapentin; Lyrica [pregabalin]; and Proventil [albuterol]    Microbiology and Radiology  Microbiology Results (last 10 days)       ** No results found for the last 240 hours. **          Relevant clinical data and objective history reviewed:  Results from last 7 days   Lab Units 10/15/19  1931 10/15/19  1927   BUN mg/dL 7  --    CREATININE mg/dL 0.56* 0.60    Estimated Creatinine Clearance: 95.7 mL/min (A) (by C-G formula based on SCr of 0.56 mg/dL (L)).   Intake & Output (last 3 days)         10/13 0701 - 10/14 0700 10/14 0701 - 10/15 0700 10/15 0701 - 10/16 0700    IV Piggyback   1000    Total Intake(mL/kg)   1000 (18.3)    Urine (mL/kg/hr)   1100    Total Output   1100    Net   -100                 Asessment/Plan  1. Will give vancomycin 1250 mg IV once                                                followed by      Vancomycin 750 mg IV q 12 hours    2. Vancomycin trough is scheduled 10/18 at 0530 prior to the 5th dose    Eric Dempsey, PharmD, BCPS  10/16/2019  5:01 AM   "

## 2019-10-16 NOTE — THERAPY EVALUATION
Acute Care - Occupational Therapy Initial Evaluation   [place]     Patient Name: Bobbi Du  : 1960  MRN: [id number]  Today's Date: 10/16/2019  Onset of Illness/Injury or Date of Surgery: 10/15/19  Date of Referral to OT: 10/16/19       Admit Date: 10/15/2019       ICD-10-CM ICD-9-CM   1. Confusion R41.0 298.9   2. Encephalopathy G93.40 348.30   3. Post-operative state Z98.890 V45.89   4. Impaired mobility and ADLs Z74.09 799.89     Patient Active Problem List   Diagnosis   • PAD (peripheral artery disease) (CMS/HCC)   • Hyperlipidemia   • Hypertension   • COPD (chronic obstructive pulmonary disease) (CMS/HCC)   • Diabetes mellitus (CMS/HCC)   • Fibromyalgia   • Tobacco abuse   • Gangrene (CMS/HCC)   • Diabetes mellitus with neuropathy   • Hyperlipidemia   • Hypertension   • PAD w/gangrene left fifth toe   • Active Tobacco abuse   • COPD (chronic obstructive pulmonary disease) (CMS/HCC)   • S/P left iliofemoral and left femoropopliteal bypass surgery 7/10/17   • Microcytic Anemia   • Hypothyroidism   • Encephalopathy   • RA (rheumatoid arthritis) (CMS/[facility])   • Confusion   • Sinusitis     Past Medical History:   Diagnosis Date   • Charcot foot due to diabetes mellitus (CMS/HCC)     RIGHT   • Chronic pain    • COPD (chronic obstructive pulmonary disease) (CMS/HCC)    • Diabetes mellitus (CMS/HCC)    • Disease of thyroid gland    • Fibromyalgia    • Hyperlipidemia    • Hypertension    • PAD (peripheral artery disease) (CMS/[facility])    • Restless leg    • Rheumatoid arthritis (CMS/HCC)    • Tobacco abuse      Past Surgical History:   Procedure Laterality Date   • BACK SURGERY      X 2   • BACK SURGERY  10/2019    st chad   • CARDIAC CATHETERIZATION N/A 2017    Procedure: Angioplasty-peripheral;  Surgeon: Mayur Artis MD;  Location: [facility] INVASIVE LOCATION;  Service:    • CARPAL TUNNEL RELEASE      X 4   • FOOT SURGERY Bilateral     X5   • INTERVENTIONAL RADIOLOGY PROCEDURE N/A 2017     Procedure: Abdominal Aortagram with Runoff;  Surgeon: Mayur Artis MD;  Location:  [facility] C[facility] INVASIVE LOCATION;  Service:    • KNEE SURGERY Left    • TN RT/LT HEART CATHETERS N/A 5/30/2017    Procedure: Percutaneous Coronary Intervention;  Surgeon: Mayur Artis MD;  Location:  [facility] INVASIVE LOCATION;  Service: Cardiovascular   • TN VEIN IN SITU BYPASS GRAFT,FEM-POP Left 7/10/2017    Procedure: LEFT ILIAC STENT, FEMORAL ENDARECTOMY, LEFT FEMORAL POPLITEAL BYPASS, POSS ILIAC FEMORAL BYPASS;  Surgeon: Mayur Artis MD;  Location:  [facility] OR;  Service: Vascular   • THYROIDECTOMY     • TUBAL ABDOMINAL LIGATION            OT ASSESSMENT FLOWSHEET (last 12 hours)      Occupational Therapy Evaluation     Row Name 10/16/19 1000                   OT Evaluation Time/Intention    Subjective Information  complains of;pain  -KA        Document Type  evaluation  -KA        Mode of Treatment  co-treatment;occupational therapy pt attempting to get up unassisted with poor safety awarenes  -KA        Patient Effort  fair  -KA        Symptoms Noted During/After Treatment  significant change in vital signs  -KA           General Information    Patient Profile Reviewed?  yes  -KA        Onset of Illness/Injury or Date of Surgery  10/15/19  -KA        Prior Level of Function  independent:;ADL's  -KA        Equipment Currently Used at Home  bath bench;walker, rolling  -KA        Existing Precautions/Restrictions  fall;other (see comments);spinal  (Significant)  impulsive; recent spinal fusion 10/10/19  -KA        Risks Reviewed  patient:;LOB;nausea/vomiting;dizziness;increased discomfort;change in vital signs;increased drainage;lines disloged  -KA        Benefits Reviewed  patient:;improve function;increase independence;increase strength;decrease pain;increase balance;decrease risk of DVT;increase knowledge;improve skin integrity  -KA           Relationship/Environment    Lives With  alone  -KA            Resource/Environmental Concerns    Current Living Arrangements  home/apartment/condo  -           Cognitive Assessment/Interventions    Additional Documentation  Cognitive Assessment/Intervention (Group)  -           Cognitive Assessment/Intervention- PT/OT    Orientation Status (Cognition)  oriented x 4;verbal cues/prompts needed for orientation  -        Follows Commands (Cognition)  follows one step commands;25-49% accuracy;repetition of directions required  -        Safety Deficit (Cognitive)  severe deficit;ability to follow commands;at risk behavior observed;awareness of need for assistance;impulsivity;insight into deficits/self awareness;problem solving;judgment;safety precautions awareness;safety precautions follow-through/compliance  -           Transfer Assessment/Treatment    Transfer Assessment/Treatment  toilet transfer  -           Toilet Transfer    Type (Toilet Transfer)  sit-stand;stand-sit  -        [place] Level (Toilet Transfer)  minimum assist (75% patient effort);verbal cues  -        Assistive Device (Toilet Transfer)  commode, 3-in-1  -KA           ADL Assessment/Intervention    BADL Assessment/Intervention  toileting  -           Toileting Assessment/Training    [place] Level (Toileting)  adjust/manage clothing;perform perineal hygiene;minimum assist (75% patient effort);two person assist required  -        Assistive Devices (Toileting)  commode, bedside without drop arms  -        Toileting Position  supported sitting pt bracing B UE 2/2 back pain  -KA           BADL Safety/Performance    Impairments, BADL Safety/Performance  balance;cognition;endurance/activity tolerance;coordination;pain;shortness of breath;strength  -KA        Cognitive Impairments, BADL Safety/Performance  insight into deficits/self awareness;impulsivity;awareness, need for assistance;attention;judgment;problem solving/reasoning;safety precaution awareness;safety precaution  follow-through;sequencing abilities  -KA           General ROM    GENERAL ROM COMMENTS  B UE appear to be WFL  -KA           MMT (Manual Muscle Testing)    General MMT Comments  Strength appears to be WFL; limited assessment 2/2 pt agitated  -KA           Sensory Assessment/Intervention    Sensory General Assessment  -- unable to assess 2/2 pt agitated  -KA           Positioning and Restraints    Pre-Treatment Position  in bed  -KA        Post Treatment Position  bed  -KA        In Bed  side lying right;call light within reach;encouraged to call for assist;exit alarm on  -KA           Pain Assessment    Additional Documentation  Pain Scale: Numbers Pre/Post-Treatment (Group)  -KA           Pain Scale: Numbers Pre/Post-Treatment    Pain Scale: Numbers, Pretreatment  7/10  -KA        Pain Scale: Numbers, Post-Treatment  10/10  -KA        Pain Location - Orientation  lower  -KA        Pain Location  back  -KA        Pre/Post Treatment Pain Comment  nsg aware  -KA        Pain Intervention(s)  Repositioned  -KA           Wound 10/16/19 0130 medial back Incision    Wound - Properties Group Date first assessed: 10/16/19  - Time first assessed: 0130  -LD Present on Hospital Admission: Y  -LD Orientation: medial  -LD Location: back  -LD Primary Wound Type: Incision  -LD       Coping    Observed Emotional State  frustrated;agitated  -KA        Verbalized Emotional State  frustration  -           Plan of Care Review    Plan of Care Reviewed With  patient  -KA           Clinical Impression (OT)    Date of Referral to OT  10/16/19  -        OT Diagnosis  decreased ADL/IADL independence  -        Patient/Family Goals Statement (OT Eval)  go home  -        Criteria for Skilled Therapeutic Interventions Met (OT Eval)  yes;treatment indicated  -        Rehab Potential (OT Eval)  fair, will monitor progress closely  -        Therapy Frequency (OT Eval)  daily  -KA        Care Plan Review (OT)  evaluation/treatment  results reviewed;care plan/treatment goals reviewed;risks/benefits reviewed;patient/other agree to care plan;current/potential barriers reviewed  -KA        Anticipated Equipment Needs at Discharge (OT)  -- unable to determine  -KA        Anticipated Discharge Disposition (OT)  inpatient rehabilitation facility  -KA           Vital Signs    Pre Systolic BP Rehab  148  -KA        Pre Treatment Diastolic BP  77  -KA        Post Systolic BP Rehab  150  -KA        Post Treatment Diastolic BP  73  -KA        Pretreatment Heart Rate (beats/min)  103  -KA        Intratreatment Heart Rate (beats/min)  143  -KA        Posttreatment Heart Rate (beats/min)  102  -KA        Pre SpO2 (%)  98  -KA        Post SpO2 (%)  98  -KA           OT Goals    Transfer Goal Selection (OT)  transfer, OT goal 1  -KA        Dressing Goal Selection (OT)  dressing, OT goal 1  -KA        Toileting Goal Selection (OT)  toileting, OT goal 1  -KA           Transfer Goal 1 (OT)    Activity/Assistive Device (Transfer Goal 1, OT)  toilet;walker, rolling;commode, bedside without drop arms  -KA        [place] Level/Cues Needed (Transfer Goal 1, OT)  supervision required  -KA        Time Frame (Transfer Goal 1, OT)  long term goal (LTG);by discharge  -KA        Progress/Outcome (Transfer Goal 1, OT)  goal ongoing  -KA           Dressing Goal 1 (OT)    Activity/Assistive Device (Dressing Goal 1, OT)  upper body dressing;lower body dressing gown, pants, socks  -KA        [place]/Cues Needed (Dressing Goal 1, OT)  supervision required  -KA        Time Frame (Dressing Goal 1, OT)  long term goal (LTG);by discharge  -KA        Progress/Outcome (Dressing Goal 1, OT)  goal ongoing  -KA           Toileting Goal 1 (OT)    Activity/Device (Toileting Goal 1, OT)  toileting skills, all  -KA        [place] Level/Cues Needed (Toileting Goal 1, OT)  supervision required  -KA        Time Frame (Toileting Goal 1, OT)  long term goal (LTG);by discharge  -KA         Progress/Outcome (Toileting Goal 1, OT)  goal ongoing  -           Living Environment    Home Accessibility  tub/shower is not walk in  -          User Key  (r) = Recorded By, (t) = Taken By, (c) = Cosigned By    Initials Name Effective Dates    Batool Huerta RN 07/24/19 -     Shy Keith OT 07/17/19 -          Occupational Therapy Education     Title: PT OT SLP Therapies (In Progress)     Topic: Occupational Therapy (In Progress)     Point: ADL training (Done)     Description: Instruct learner(s) on proper safety adaptation and remediation techniques during self care or transfers.   Instruct in proper use of assistive devices.    Learning Progress Summary           Patient Acceptance, E, VU,NR by CHUY at 10/16/2019  9:45 AM    Comment:  Pt educated on safety, fall prevention, ADLs, POC, and recommended D/C.                   Point: Precautions (Done)     Description: Instruct learner(s) on prescribed precautions during self-care and functional transfers.    Learning Progress Summary           Patient Acceptance, E, VU,NR by CHUY at 10/16/2019  9:45 AM    Comment:  Pt educated on safety, fall prevention, ADLs, POC, and recommended D/C.                   Point: Body mechanics (Done)     Description: Instruct learner(s) on proper positioning and spine alignment during self-care, functional mobility activities and/or exercises.    Learning Progress Summary           Patient Acceptance, E, VU,NR by CHUY at 10/16/2019  9:45 AM    Comment:  Pt educated on safety, fall prevention, ADLs, POC, and recommended D/C.                               User Key     Initials Effective Dates Name Provider Type Discipline    CHUY 07/17/19 -  Shy English OT Occupational Therapist OT                  OT Recommendation and Plan  Outcome Summary/Treatment Plan (OT)  Anticipated Equipment Needs at Discharge (OT): (unable to determine)  Anticipated Discharge Disposition (OT): inpatient rehabilitation facility  Therapy  Frequency (OT Eval): daily  Plan of Care Review  Plan of Care Reviewed With: patient  Plan of Care Reviewed With: patient  Outcome Summary: OT completed brief chart review. Limited eval 2/2 pt agitated stating she wants to go home repeatedly.  ADLs: modA.  Functional Transfer (stand-step): [place].  Limiting Factors: insight, safety awareness, pain, self limiting behavior.  Recommended D/C: IRF.  Will cont to observe and address ADL/IADL deficits as needed.     Outcome Measures     Row Name 10/16/19 0945             How much help from another is currently needed...    Putting on and taking off regular lower body clothing?  2  -KA      Bathing (including washing, rinsing, and drying)  2  -KA      Toileting (which includes using toilet bed pan or urinal)  2  -KA      Putting on and taking off regular upper body clothing  3  -KA      Taking care of personal grooming (such as brushing teeth)  3  -KA      Eating meals  3  -KA      AM-PAC 6 Clicks Score (OT)  15  -KA         Functional Assessment    Outcome Measure Options  AM-PAC 6 Clicks Daily Activity (OT)  -KA        User Key  (r) = Recorded By, (t) = Taken By, (c) = Cosigned By    Initials Name Provider Type    Shy Keith OT Occupational Therapist          Time Calculation:   Time Calculation- OT     Row Name 10/16/19 0945             Time Calculation- OT    OT Start Time  0945  -KA      OT Received On  10/16/19  -KA      OT Goal Re-Cert Due Date  10/26/19  -KA        User Key  (r) = Recorded By, (t) = Taken By, (c) = Cosigned By    Initials Name Provider Type    Shy Keith OT Occupational Therapist        Therapy Charges for Today     Code Description Service Date Service Provider Modifiers Qty    [id number]  OT EVAL LOW COMPLEXITY 4 10/16/2019 Shy English OT GO 1               Shy English OT  10/16/2019

## 2019-10-16 NOTE — H&P
[facility]  HISTORY AND PHYSICAL    Patient Name: Bobbi Du  : 1960  MRN: [id number]  Primary Care Physician: Diamante Arias, ZOE  Date of admission: 10/15/2019      Subjective   Subjective     Chief Complaint:  AMS    HPI:  Bobbi Du is a 58 y.o. female with a medical history significant for PAD, HTN, HLD, hypothyroidism, T2DM COPD w/ tobacco use, fibromyalgia, RA, and hx of substance abuse was brought to [facility] via EMS for altered mental status today.  History is obtained primarily from the patient's daughter at the bedside.  She reports the patient's last known normal was around 1045 this morning, when she talked on the phone with her.  The patient had spent time with a family member during the day.  Daughter received a phone call from the family member stating that her mother was not acting normal.  The daughter went to her mother's house she noticed the patient was confused, speech did not make sense and was slurred. The patient was lethargic, had an abnormal gait and had fallen several times. She was also agitated and was cussing at family members and refusing to come to the hospital with EMS. Daughter reports history of substance abuse requiring rehab but is unsure of exactly what she used in the past.  Daughter states she is unsure if the patient had taken anything or was given anything.  Ms. Du underwent a spinal fusion at [facility] on Thursday, 10/10/2019, and was prescribed narcotics.  The family was unable to find the prescribed narcotics in the patient's house. The patient has only complained of back pain. No other complaints at this time. Family denies known alcohol use. The patient lives alone.  While in the ED,    While in the ED, patient is tachycardic and hypertensive.  Labs revealed hgb 8.7, hct 29.4, TSH 38.810, free T4 0.45, CRP 25.61. Blood alcohol normal. ABG yielded pH 7.04, PCO2 29.6, PO2 50.1, HCO3 23.3.  CXR shows  emphysema and interstitial fibrosis.  CT head negative.  Patient received 1L bolus in the ED.  She will be admitted to the hospitalist service for further medical management.    Review of Systems   Unable to perform ROS: Mental status change   Musculoskeletal: Positive for back pain.      All other systems reviewed and are negative.     Personal History     Past Medical History:   Diagnosis Date   • Charcot foot due to diabetes mellitus (CMS/HCC)     RIGHT   • Chronic pain    • COPD (chronic obstructive pulmonary disease) (CMS/HCC)    • Diabetes mellitus (CMS/HCC)    • Disease of thyroid gland    • Fibromyalgia    • Hyperlipidemia    • Hypertension    • PAD (peripheral artery disease) (CMS/HCC)    • Restless leg    • Rheumatoid arthritis (CMS/HCC)    • Tobacco abuse        Past Surgical History:   Procedure Laterality Date   • BACK SURGERY      X 2   • CARDIAC CATHETERIZATION N/A 5/30/2017    Procedure: Angioplasty-peripheral;  Surgeon: Mayur Artis MD;  Location: [facility] CATH INVASIVE LOCATION;  Service:    • CARPAL TUNNEL RELEASE      X 4   • FOOT SURGERY Bilateral     X5   • INTERVENTIONAL RADIOLOGY PROCEDURE N/A 5/30/2017    Procedure: Abdominal Aortagram with Runoff;  Surgeon: Mayur Artis MD;  Location: [facility] CATH INVASIVE LOCATION;  Service:    • KNEE SURGERY Left    • UT RT/LT HEART CATHETERS N/A 5/30/2017    Procedure: Percutaneous Coronary Intervention;  Surgeon: Mayur Artis MD;  Location: [facility] CATH INVASIVE LOCATION;  Service: Cardiovascular   • UT VEIN IN SITU BYPASS GRAFT,FEM-POP Left 7/10/2017    Procedure: LEFT ILIAC STENT, FEMORAL ENDARECTOMY, LEFT FEMORAL POPLITEAL BYPASS, POSS ILIAC FEMORAL BYPASS;  Surgeon: Mayur Artis MD;  Location: [facility] OR;  Service: Vascular   • THYROIDECTOMY     • TUBAL ABDOMINAL LIGATION         Family History: family history includes Alzheimer's disease in her father; Brain cancer in her brother; COPD in her mother; Valvular heart disease in her  maternal uncle. Otherwise pertinent FHx was reviewed and unremarkable.     Social History:  reports that she has been smoking cigarettes.  She has been smoking about 0.50 packs per day. She has never used smokeless tobacco. She reports that she does not drink alcohol or use drugs.  Social History     Social History Narrative   • Not on file       Medications:    Available home medication information reviewed.    (Not in a hospital admission)    Allergies   Allergen Reactions   • Bee Venom Anaphylaxis   • Gabapentin Shortness Of Breath   • Lyrica [Pregabalin] Other (See Comments)     Patient does not remember reaction.   • Proventil [Albuterol] Other (See Comments)     palpitations       Objective   Objective     Vital Signs:   Temp:  [98 °F (36.7 °C)] 98 °F (36.7 °C)  Heart Rate:  [102-112] 112  Resp:  [20] 20  BP: (140-172)/() 166/113   Total (NIH Stroke Scale): 1    Physical Exam   Constitutional: Lethargic, arousable by name, lying in bed   Eyes: PERRLA, sclerae anicteric, no conjunctival injection  HENT: NCAT, mucous membranes dry  Neck: Supple, no thyromegaly, no lymphadenopathy, trachea midline  Respiratory: Decreased breath sounds throughout, no wheezes, nonlabored respirations   Cardiovascular: RRR, no murmurs appreciated, palpable pedal pulses bilaterally  Gastrointestinal: Positive bowel sounds, soft, nontender, no distention or guarding   Musculoskeletal: No bilateral ankle edema, no clubbing or cyanosis to extremities  Psychiatric: Appropriate affect, cooperative  Neurologic: Oriented to self only, strength symmetric in all extremities, Cranial Nerves grossly intact to confrontation, speech dysarthric   Skin: No rashes or wounds    Results Reviewed:  I have personally reviewed current lab and radiology data.    Results from last 7 days   Lab Units 10/15/19  1931 10/15/19  1930   WBC 10*3/mm3  --  6.89   HEMOGLOBIN g/dL  --  8.7*   HEMATOCRIT %  --  29.4*   PLATELETS 10*3/mm3  --  366   INR  0.96   --      Results from last 7 days   Lab Units 10/15/19  1931   SODIUM mmol/L 140   POTASSIUM mmol/L 3.6   CHLORIDE mmol/L 105   CO2 mmol/L 23.0   BUN mg/dL 7   CREATININE mg/dL 0.56*   GLUCOSE mg/dL 115*   CALCIUM mg/dL 9.2   ALT (SGPT) U/L 10  10   AST (SGOT) U/L 15  15   TROPONIN T ng/mL <0.010   PROBNP pg/mL 184.1     Estimated Creatinine Clearance: 90.5 mL/min (A) (by C-G formula based on SCr of 0.56 mg/dL (L)).  Brief Urine Lab Results     None        Imaging Results (last 24 hours)     Procedure Component Value Units Date/Time    XR Chest 1 View [[id number]] Collected:  10/15/19 2002     Updated:  10/15/19 2005    Narrative:       CR Chest 1 Vw    INDICATION:   Chronic emphysema. Slurred speech and confusion earlier in the day.     COMPARISON:    6/30/2017    FINDINGS:  Single portable AP view(s) of the chest.  Heart size is normal and unchanged. The lungs are emphysematous with prominent interstitial markings. No new infiltrates are seen. Vascular markings are normal. No effusions are seen.      Impression:       Emphysema with interstitial fibrosis. No acute infiltrates.    Signer Name: Sven Ashby MD   Signed: 10/15/2019 8:02 PM   Workstation Name: [id number]    [facility]    CT Head Without Contrast Stroke Protocol [[id number]] Collected:  10/15/19 1929     Updated:  10/15/19 1932    Narrative:       CT Head WO Code Stroke: 10/15/2019 7:17 PM    HISTORY:   Confusion following surgery 7 days ago.    TECHNIQUE:   Axial unenhanced head CT. Radiation dose reduction techniques included automated exposure control or exposure modulation based on body size. Radiation audit for number of CT and nuclear cardiology exams performed in the last year: 0.      COMPARISON:   None.    FINDINGS:   No intracranial hemorrhage, mass, or infarct. No hydrocephalus or extra-axial fluid collection. Brain parenchymal density is normal.     The skull base, calvarium, and extracranial soft tissues are  normal.      Impression:       Normal, negative unenhanced head CT.    The examination was performed at 7:17 PM, and became available online at 7:26 PM, and results were called by telephone at 10/15/2019 7:28 PM the CT technologist Krissy, though verbally acknowledged these results.      Signer Name: Mayur Garcia MD   Signed: 10/15/2019 7:29 PM   Workstation Name: [facility]    [facility]        Results for orders placed during the hospital encounter of 07/07/17   Adult Stress Echo Only    Narrative · Pt. denies chest pain with the Dobutamine, did become nauseated and   vomited post infusion.  · There is <1 mm asymptomatic ST depression with dobutamine infusion.  · At rest, global and segmental wall motion is normal.  · With dobutamine, there is normal global and segmental augmentation of   wall motion.  · This is a NORMAL Dobutamine stress echocardiogram.          Assessment/Plan   Assessment / Plan     Active Hospital Problems    Diagnosis POA   • **Encephalopathy [G93.40] Yes   • Hypothyroidism [E03.9] Yes   • RA (rheumatoid arthritis) (CMS/[facility]) [M06.9] Yes   • Microcytic Anemia [D64.9] Yes   • PAD (peripheral artery disease) (CMS/[facility]) [I73.9] Yes     AA with Run Off 5-30-17:  Right: The right common iliac and external iliac arteries are patent. The internal iliac appears to be patent, but the origin is not well visualized. The common femoral artery is patent. A large profundus femoris artery with extensive collateralization is patent. The superficial femoral artery appears to be occluded from its origin to the adductor canal. The popliteal artery is patent with a patent trifurcation, but visualization is impaired due to poor flow.       Left: The left common iliac and internal iliac arteries are patent. The external iliac artery appears to be occluded just distal to its origin. The common femoral artery is reconstituted at the inguinal ligament and is patent. The profunda femoris  artery is very large with extensive collateral development. The superficial femoral artery is occluded from its origin to the adductor canal. The popliteal artery is reconstituted at the adductor canal via extensive collateral vessels. There is 2-vessel runoff below the knee with poor visualization of distal flow.      • Hyperlipidemia [E78.5] Yes   • Hypertension [I10] Yes   • COPD (chronic obstructive pulmonary disease) (CMS/HCC) [J44.9] Yes   • Diabetes mellitus (CMS/[facility]) [E11.9] Yes   • Fibromyalgia [M79.7] Yes   • Tobacco abuse [Z72.0] Yes     Bobbi Du is a 58 y.o. female with a medical history significant for PAD, HTN, HLD, hypothyroidism, T2DM COPD w/ tobacco use, fibromyalgia, RA, and hx of substance abuse was brought to [facility] via EMS for altered mental status today.     ENCEPHALOPATHY  HX OF SUBSTANCE ABUSE  --??narcotics and/or possible drug use. Ethanol negative. UDS pending.   --CXR negative for acute dz. Urinalysis pending. BC x2.  --ABG pH 7.504, pCO2 29.6, pO2 58.1, HCO3 23.3  --blood cultures ordered  --CT head negative   --Obtain MRI of brain and carotid duplex   --Consult neurology, TIA work-up initiated in the ED   --MRI of lumbar spine pending d/t recent surgical procedure to r/o infectious process  --IVF  --Hold sedating medications   --Neuro checks  --CM/SLP  --Fall precautions  -Afebrile, no leukocytosis.  Will hold off on initiating antibiotics for now.  Low threshold to start broad-spectrum antibiotic therapy    NORMOCYTIC ANEMIA  --Possible blood loss anemia from recent spinal surgery 10/10/19  --Hgb 8.7, Hct 29.4  --Stool occult  --Iron studies   --H&H q8h    HYPOTHYROIDISM  --TSH 38.810, Free T4 0.45  --Check T3  --Increase [place] from 75 mg to 90 mg    T2DM  --Hold metformin  --SSI with finger sticks ACHS  --Check A1c     HTN  --BP currently 166/113  --Continue Lisinopril-HCTZ    PAD, HLD  --Bilateral carotid duplex   --Aspirin, Plavix, atorvastatin  --Check FLP    S/P SPINAL FUSION    --@ [facility] 10/10/19    COPD  TOBACCO USE  --Incentive spirometry    --Nicotine replacement   --Smoking cessation     DVT prophylaxis:  SCDs    CODE STATUS:    Code Status and Medical Interventions:   Ordered at: 10/15/19 2224     Level Of Support Discussed With:    Next of Kin (If No Surrogate)     Code Status:    CPR     Medical Interventions (Level of Support Prior to Arrest):    Full     Admission Status:  I believe this patient meets OBSERVATION status, however if further evaluation or treatment plans warrant, status may change.  Based upon current information, I predict patient's care encounter to be less than or equal to 2 midnights.    Electronically signed by Debbi Elder PA-C, 10/15/19, 9:46 PM.      Brief Attending Admission Attestation     I have seen and examined the patient, performing an independent face-to-face diagnostic evaluation with plan of care reviewed and developed with the advanced practice clinician (APC).      Brief Summary Statement:   Bobbi Du is a 58 y.o. female with a PMH significant for PVD, diabetes mellitus, recent neurosurgical intervention lumbar spine on 10/10/2019 who presents to the ED with confusion.  Patient is noncontributory to HPI due to significant confusion.  Daughter at bedside provides history of present illness.  Daughter states that patient has done well since her surgery this past Thursday and discharged from the hospital this past Sunday.  Daughter spoke with the patient around 1045 this morning patient was in her normal state of mind.  Daughter received a call around 2 PM with family members stating patient was confused.  She went to see the patient and found her confused, with slurred speech, having fallen multiple times, cursing and acting out of her normal behavior.  She was brought to the ED for further evaluation.  There are no reports of fever.        Remainder of detailed HPI is as noted above and has been reviewed and/or edited by me for  completeness.      Attending Physical Exam:  Constitutional: Awake, alert  Eyes: PERRLA, sclerae anicteric, no conjunctival injection  HENT: NCAT, mucous membranes moist  Neck: Supple, no thyromegaly, no lymphadenopathy, trachea midline  Respiratory: Clear to auscultation bilaterally, nonlabored respirations   Cardiovascular: RRR, no murmurs, rubs, or gallops, palpable pedal pulses bilaterally  Gastrointestinal: Positive bowel sounds, soft, nontender, nondistended  Musculoskeletal: No bilateral ankle edema, no clubbing or cyanosis to extremities  Psychiatric: Appropriate affect, cooperative  Neurologic: Oriented to person only, strength symmetric in all extremities, Cranial Nerves grossly intact to confrontation, speech clear  Skin: Lumbar surgical incision with staples in place, mild erythema and induration superior border of incision.  No active drainage        Brief Assessment/Plan :  See above for further detailed assessment and plan developed with APC which I have reviewed and/or edited for completeness.      Electronically signed by Fior Saab DO, 10/15/19, 11:27 PM.         Imaging obtained after initial documentation shows questionable abscess versus normal postop changes.  Due to degree of encephalopathy, vancomycin and Zosyn added, neurosurgery consult added.

## 2019-10-16 NOTE — CONSULTS
Referring Provider: Dr. Ramos  Reason for Consultation: Encephalopathy    Patient Care Team:  Diamante Arias APRN as PCP - General (Family Medicine)  Mayur Artis MD as Consulting Physician (Vascular Surgery)    Chief complaint encephalopathy    Subjective .     History of present illness: 58-yo woman with PMH notable for PAD, HTN, HLD, DM 2, COPD, RA and history of substance abuse, brought to [facility] ED via EMS yesterday for altered mental status.  Daughter reported patient's last known well was around 1045 yesterday morning although she also spent time with family member during the day.  The family member phoned her to state her mother was not acting normally, and daughter found she was confused with slurred and abnormal speech.  She was lethargic and had difficulty walking and had fallen several times.  She was agitated and refusing to go to the hospital.  She has a history of substance abuse requiring rehab, substance is unknown.  History notable for spinal fusion at [facility] on 10/10/2019 for which she was prescribed narcotics.  Family was unable to find these in her house.  Patient had complained of back pain but no other problems recently.  No known alcohol use.  Initial findings in the ED notable for H&H of 8.7 and 29, TSH of 38, free T4 0.45.  CRP was 25, normal blood alcohol, ABG 7.50/29/50/23.  Chest x-ray shows emphysema and interstitial fibrosis.  Patient currently reports back pain under control.  Denies headache or other pain currently.  Denies nausea or vision trouble.  She describes details of her surgery, notes she was off Plavix before the surgery but restarted.  Notes chronic numbness in her feet but denies new weakness or numbness.  PT was able to have her stand at the bedside but PT eval limited by patient's reported pain.  Limited historian.  Apparently not on immune suppressant medication for her RA which she confirms.    Review of Systems  Pertinent items are noted in HPI, all  other systems reviewed and negative to the extent that history is reliable    History  Past Medical History:   Diagnosis Date   • Charcot foot due to diabetes mellitus (CMS/HCC)     RIGHT   • Chronic pain    • COPD (chronic obstructive pulmonary disease) (CMS/HCC)    • Diabetes mellitus (CMS/HCC)    • Disease of thyroid gland    • Fibromyalgia    • Hyperlipidemia    • Hypertension    • PAD (peripheral artery disease) (CMS/HCC)    • Restless leg    • Rheumatoid arthritis (CMS/HCC)    • Tobacco abuse    ,   Past Surgical History:   Procedure Laterality Date   • BACK SURGERY      X 2   • BACK SURGERY  10/2019    [facility]   • CARDIAC CATHETERIZATION N/A 5/30/2017    Procedure: Angioplasty-peripheral;  Surgeon: Mayur Artis MD;  Location:  [facility] CATH INVASIVE LOCATION;  Service:    • CARPAL TUNNEL RELEASE      X 4   • FOOT SURGERY Bilateral     X5   • INTERVENTIONAL RADIOLOGY PROCEDURE N/A 5/30/2017    Procedure: Abdominal Aortagram with Runoff;  Surgeon: Mayur Artis MD;  Location:  [facility] CATH INVASIVE LOCATION;  Service:    • KNEE SURGERY Left    • FL RT/LT HEART CATHETERS N/A 5/30/2017    Procedure: Percutaneous Coronary Intervention;  Surgeon: Mayur Artis MD;  Location:  [facility] CATH INVASIVE LOCATION;  Service: Cardiovascular   • FL VEIN IN SITU BYPASS GRAFT,FEM-POP Left 7/10/2017    Procedure: LEFT ILIAC STENT, FEMORAL ENDARECTOMY, LEFT FEMORAL POPLITEAL BYPASS, POSS ILIAC FEMORAL BYPASS;  Surgeon: Mayur Artis MD;  Location:  [facility] OR;  Service: Vascular   • THYROIDECTOMY     • TUBAL ABDOMINAL LIGATION     ,   Family History   Problem Relation Age of Onset   • COPD Mother    • Alzheimer's disease Father    • Brain cancer Brother    • Valvular heart disease Maternal Uncle    ,   Social History     Tobacco Use   • Smoking status: Current Every Day Smoker     Packs/day: 1.50     Types: Cigarettes   • Smokeless tobacco: Never Used   • Tobacco comment: STATES STARTED SMOKING AT AGE 16   Substance  Use Topics   • Alcohol use: No   • Drug use: No   ,   Medications Prior to Admission   Medication Sig Dispense Refill Last Dose   • clopidogrel (PLAVIX) 75 MG tablet Take 75 mg by mouth Every Morning.   10/15/2019 at Unknown time   • ferrous sulfate 325 (65 FE) MG tablet Take 325 mg by mouth 3 (Three) Times a Day With Meals.   10/15/2019 at Unknown time   • metFORMIN (GLUCOPHAGE) 500 MG tablet Take 500 mg by mouth Daily With Breakfast.   10/15/2019 at Unknown time   • Thyroid (ARMOUR THYROID) 60 MG PO tablet Take 90 mg by mouth Daily.   10/15/2019 at Unknown time   • aspirin 81 MG EC tablet Take 81 mg by mouth Every Morning.   7/10/2017 at 0500   • cephalexin (KEFLEX) 500 MG capsule Take 500 mg by mouth 3 (Three) Times a Day.   7/10/2017 at 0500   • DULoxetine HCl (CYMBALTA PO) Take 75 mg by mouth Daily.      • lisinopril-hydrochlorothiazide (PRINZIDE,ZESTORETIC) 10-12.5 MG per tablet Take 1 tablet by mouth Every Morning.   7/10/2017 at 0500   • meloxicam (MOBIC) 7.5 MG tablet Take 7.5 mg by mouth Daily.      • simvastatin (ZOCOR) 20 MG tablet Take 20 mg by mouth Every Morning.   7/9/2017   • thyroid (ARMOUR) 15 MG tablet Take 15 mg by mouth Daily. TAKE WITH 60 MG DOSE      , Scheduled Meds:    aspirin 81 mg Oral QAM   atorvastatin 10 mg Oral Daily   clopidogrel 75 mg Oral QAM   ferrous sulfate 325 mg Oral TID With Meals   lisinopril 10 mg Oral Q24H   And      Hydrochlorothiazide Oral 12.5 mg Oral Daily   insulin lispro 0-7 Units Subcutaneous 4x Daily With Meals & Nightly   levothyroxine 88 mcg Oral Q AM   nicotine 1 patch Transdermal Q24H   sodium chloride 10 mL Intravenous Q12H   , Continuous Infusions:    sodium chloride 125 mL/hr Last Rate: 125 mL/hr (10/16/19 0808)   , PRN Meds:  •  acetaminophen **OR** acetaminophen **OR** acetaminophen  •  dextrose  •  dextrose  •  glucagon (human recombinant)  •  influenza vaccine  •  sodium chloride  •  sodium chloride and Allergies:  Bee venom; Gabapentin; Lyrica  "[pregabalin]; and Proventil [albuterol]    Objective     Vital Signs   Blood pressure 148/77, pulse 93, temperature 97.8 °F (36.6 °C), temperature source Axillary, resp. rate 20, height 165.1 cm (65\"), weight 54.7 kg (120 lb 8 oz), SpO2 97 %, not currently breastfeeding.    Physical Exam:   Thin middle-aged white woman lying in the hospital bed sleeping, wakes to voice and is able to state exact location and month but loses track of the question when asked the year.  Appears drowsy. She is moderately inattentive and distractible, describes her surgery quite well, she medication changes recently and details of the hospitalization, tells me about her dachshunds, but does not clearly answer questions accurately.  She is edentulous and has dysarthria associated with this making speech partially unintelligible.  Speech is fluent and comprehension is relatively intact.  Memory impaired for some events of yesterday; fund of knowledge intact.    Pupils 2.5 mm and reactive, visual fields full to confrontation as far as can be assessed, EOMI, normal facial sensation and movement, hearing intact to voice, palate and shoulders elevate symmetrically and tongue protrudes midline  Motor: No definite weakness of upper extremities on manual muscle testing, at least 4/5, unable to cooperate with testing for pronator drift.  Limited exam of lower extremities but strong dorsiflexors and knee flexors, at least 4/5.  Reflexes 2+ in the upper extremities, difficult exam but no asymmetry appreciated lower extremities no hyperreflexia noted.  No response to plantar stim  Stocking decrease in sensation which she reports is baseline  Coordination: FNF intact, unable to cooperate with heel-knee-shin  Mild to moderate lumbar spine tenderness over area of incision  Neck is supple, no carotid bruit appreciated  Heart RRR no murmur appreciated  Lungs with decreased breath sounds anteriorly with normal respiratory effort  Abdomen soft, NT, ND with " positive bowel sounds  Extremities with no cyanosis or edema      Results Review:   I reviewed the patient's new clinical results.  I reviewed the patient's new imaging results and agree with the interpretation.  I reviewed the patient's other test results and agree with the interpretation  Labs reviewed, CBC with white count 6.2 H&H 8.7 and 29.7, platelets 365  BMP with glucose 104 BUN 5 creatinine 0.51 chloride 109 CO2 21 calcium 8.5   UDS positive for tricyclics and oxycodone  TSH 38.8, free T4 0.45  proBNP 184  MRI lumbar spine without contrast images reviewed; shows postsurgical changes, inflammatory change possible fluid collections, abscess cannot be excluded  Brain MRI which is reviewed, agree no acute abnormality.      Assessment/Plan       Encephalopathy    PAD (peripheral artery disease) (CMS/formerly [facility])    Hyperlipidemia    Hypertension    COPD (chronic obstructive pulmonary disease) (CMS/formerly [facility])    Diabetes mellitus (CMS/HCC)    Fibromyalgia    Tobacco abuse    Microcytic Anemia    Hypothyroidism    RA (rheumatoid arthritis) (CMS/formerly [facility])    Confusion    Sinusitis      1  Encephalopathy-by history this is significantly improved today, now more distractible than confused.  In the ED she was noted to be hypoxemic, hypotensive, with marked hypothyroidism.  Unclear if taking pain medications appropriately.  She reports some other recent changes in her medications.  Suspect medication effect on background of systemic illness and recent surgery as etiology.  Now off antibiotics.  Doubt seizure but will check EEG.  B12 low normal, will check a methylmalonic acid and replete.    May need CSF exam unless continued brisk improvement, and pending neurosurgery eval.  2  Recent lumbar spine surgery -- MRI without contrast of Lspine with postsurgical changes. Will get MRI with contrast. Tender but not exquisitely tender in lumbar region. Neurosurgery consult pending.        Patricia Thurston MD  10/16/19  9:29  AM

## 2019-10-16 NOTE — PROGRESS NOTES
Discharge Planning Assessment  [facility]     Patient Name: Bobbi Du  MRN: [id number]  Today's Date: 10/16/2019    Admit Date: 10/15/2019    Discharge Needs Assessment     Row Name 10/16/19 0917       Living Environment    Lives With  alone    Current Living Arrangements  home/apartment/condo    Primary Care Provided by  self    Provides Primary Care For  no one    Family Caregiver if Needed  child(jayden), adult    Family Caregiver Names  Jennifer Griffith (daughter) [phone]    Quality of Family Relationships  helpful;involved;supportive    Able to Return to Prior Arrangements  yes       Resource/Environmental Concerns    Resource/Environmental Concerns  none    Transportation Concerns  car, none       Transition Planning    Patient/Family Anticipates Transition to  home    Patient/Family Anticipated Services at Transition  none    Transportation Anticipated  family or friend will provide       Discharge Needs Assessment    Readmission Within the Last 30 Days  no previous admission in last 30 days    Concerns to be Addressed  denies needs/concerns at this time    Equipment Currently Used at Home  bath bench;walker, rolling;commode    Anticipated Changes Related to Illness  none    Equipment Needed After Discharge  none    Offered/Gave Vendor List  yes    Patient's Choice of Community Agency(s)  Patient would like [facility]        Discharge Plan     Row Name 10/16/19 0918       Plan    Plan  Home    Patient/Family in Agreement with Plan  yes    Plan Comments  Spoke to patient at bedside. Lives alone in [place]. Caregiver is Jennifer Griffith (daughter) [phone]. Is independent with ADL's. No problems with insurance or medications. Has a bath bench, rolling walker and commode at home. Patient would like [facility] health, if needed. Denies any needs at this time. CM will continue to follow.    Final Discharge Disposition Code  01 - home or self-care        Destination      No service  coordination in this encounter.      Durable Medical Equipment      No service coordination in this encounter.      Dialysis/Infusion      No service coordination in this encounter.      Home Medical Care      No service coordination in this encounter.      Therapy      No service coordination in this encounter.      Community Resources      No service coordination in this encounter.          Demographic Summary     Row Name 10/16/19 0916       General Information    Admission Type  observation    Arrived From  emergency department    Referral Source  admission list    Reason for Consult  discharge planning    Preferred Language  English     Used During This Interaction  no       Contact Information    Permission Granted to Share Info With      Contact Information Obtained for      Contact Information Comments  PCP is [facility]        Functional Status     Row Name 10/16/19 0916       Functional Status    Usual Activity Tolerance  good    Current Activity Tolerance  good       Functional Status, IADL    Medications  independent    Meal Preparation  independent    Housekeeping  independent    Laundry  independent    Shopping  independent       Mental Status    General Appearance WDL  WDL       Mental Status Summary    Recent Changes in Mental Status/Cognitive Functioning  no changes       Employment/    Employment Status  disabled        Psychosocial    No documentation.       Abuse/Neglect    No documentation.       Legal    No documentation.       Substance Abuse    No documentation.       Patient Forms    No documentation.           Mitch Leavitt RN

## 2019-10-16 NOTE — THERAPY EVALUATION
Acute Care - Occupational Therapy Initial Evaluation   [facility]     Patient Name: Bobbi Du  : 1960  MRN: [id number]  Today's Date: 10/16/2019  Onset of Illness/Injury or Date of Surgery: 10/15/19  Date of Referral to OT: 10/16/19       Admit Date: 10/15/2019       ICD-10-CM ICD-9-CM   1. Confusion R41.0 298.9   2. Encephalopathy G93.40 348.30   3. Post-operative state Z98.890 V45.89   4. Impaired mobility and ADLs Z74.09 799.89     Patient Active Problem List   Diagnosis   • PAD (peripheral artery disease) (CMS/HCC)   • Hyperlipidemia   • Hypertension   • COPD (chronic obstructive pulmonary disease) (CMS/HCC)   • Diabetes mellitus (CMS/HCC)   • Fibromyalgia   • Tobacco abuse   • Gangrene (CMS/HCC)   • Diabetes mellitus with neuropathy   • Hyperlipidemia   • Hypertension   • PAD w/gangrene left fifth toe   • Active Tobacco abuse   • COPD (chronic obstructive pulmonary disease) (CMS/HCC)   • S/P left iliofemoral and left femoropopliteal bypass surgery 7/10/17   • Microcytic Anemia   • Hypothyroidism   • Encephalopathy   • RA (rheumatoid arthritis) (CMS/[facility])   • Confusion   • Sinusitis     Past Medical History:   Diagnosis Date   • Charcot foot due to diabetes mellitus (CMS/HCC)     RIGHT   • Chronic pain    • COPD (chronic obstructive pulmonary disease) (CMS/HCC)    • Diabetes mellitus (CMS/HCC)    • Disease of thyroid gland    • Fibromyalgia    • Hyperlipidemia    • Hypertension    • PAD (peripheral artery disease) (CMS/[facility])    • Restless leg    • Rheumatoid arthritis (CMS/HCC)    • Tobacco abuse      Past Surgical History:   Procedure Laterality Date   • BACK SURGERY      X 2   • BACK SURGERY  10/2019    st chad   • CARDIAC CATHETERIZATION N/A 2017    Procedure: Angioplasty-peripheral;  Surgeon: Mayur Artis MD;  Location: [facility] INVASIVE LOCATION;  Service:    • CARPAL TUNNEL RELEASE      X 4   • FOOT SURGERY Bilateral     X5   • INTERVENTIONAL RADIOLOGY PROCEDURE N/A 2017     Procedure: Abdominal Aortagram with Runoff;  Surgeon: Mayur Artis MD;  Location:  [facility] INVASIVE LOCATION;  Service:    • KNEE SURGERY Left    • TX RT/LT HEART CATHETERS N/A 5/30/2017    Procedure: Percutaneous Coronary Intervention;  Surgeon: Mayur Artis MD;  Location:  [facility] INVASIVE LOCATION;  Service: Cardiovascular   • TX VEIN IN SITU BYPASS GRAFT,FEM-POP Left 7/10/2017    Procedure: LEFT ILIAC STENT, FEMORAL ENDARECTOMY, LEFT FEMORAL POPLITEAL BYPASS, POSS ILIAC FEMORAL BYPASS;  Surgeon: Mayur Artis MD;  Location:  [facility] OR;  Service: Vascular   • THYROIDECTOMY     • TUBAL ABDOMINAL LIGATION            OT ASSESSMENT FLOWSHEET (last 12 hours)      Occupational Therapy Evaluation     Row Name 10/16/19 1000                   OT Evaluation Time/Intention    Subjective Information  complains of;pain  -KA        Document Type  evaluation  -KA        Mode of Treatment  co-treatment;occupational therapy pt attempting to get up unassisted with poor safety awarenes  -KA        Patient Effort  fair  -KA        Symptoms Noted During/After Treatment  significant change in vital signs  -KA           General Information    Patient Profile Reviewed?  yes  -KA        Onset of Illness/Injury or Date of Surgery  10/15/19  -KA        Prior Level of Function  independent:;ADL's  -KA        Equipment Currently Used at Home  bath bench;walker, rolling  -KA        Existing Precautions/Restrictions  fall;other (see comments)  (Significant)  impulsive  -KA        Risks Reviewed  patient:;LOB;nausea/vomiting;dizziness;increased discomfort;change in vital signs;increased drainage;lines disloged  -KA        Benefits Reviewed  patient:;improve function;increase independence;increase strength;decrease pain;increase balance;decrease risk of DVT;increase knowledge;improve skin integrity  -KA           Relationship/Environment    Lives With  alone  -KA           Resource/Environmental Concerns    Current Living  Arrangements  home/apartment/condo  -           Cognitive Assessment/Interventions    Additional Documentation  Cognitive Assessment/Intervention (Group)  -           Cognitive Assessment/Intervention- PT/OT    Orientation Status (Cognition)  oriented x 4;verbal cues/prompts needed for orientation  -        Follows Commands (Cognition)  follows one step commands;25-49% accuracy;repetition of directions required  -        Safety Deficit (Cognitive)  severe deficit;ability to follow commands;at risk behavior observed;awareness of need for assistance;impulsivity;insight into deficits/self awareness;problem solving;judgment;safety precautions awareness;safety precautions follow-through/compliance  -           Transfer Assessment/Treatment    Transfer Assessment/Treatment  toilet transfer  -           Toilet Transfer    Type (Toilet Transfer)  sit-stand;stand-sit  -        [place] Level (Toilet Transfer)  minimum assist (75% patient effort);verbal cues  -        Assistive Device (Toilet Transfer)  commode, 3-in-1  -           ADL Assessment/Intervention    BADL Assessment/Intervention  toileting  -           Toileting Assessment/Training    [place] Level (Toileting)  adjust/manage clothing;perform perineal hygiene;minimum assist (75% patient effort);two person assist required  -        Assistive Devices (Toileting)  commode, bedside without drop arms  -        Toileting Position  supported sitting pt bracing B UE 2/2 back pain  -           BADL Safety/Performance    Impairments, BADL Safety/Performance  balance;cognition;endurance/activity tolerance;coordination;pain;shortness of breath;strength  -        Cognitive Impairments, BADL Safety/Performance  insight into deficits/self awareness;impulsivity;awareness, need for assistance;attention;judgment;problem solving/reasoning;safety precaution awareness;safety precaution follow-through;sequencing abilities  -           General ROM     GENERAL ROM COMMENTS  B UE appear to be WFL  -KA           MMT (Manual Muscle Testing)    General MMT Comments  Strength appears to be WFL; limited assessment 2/2 pt agitated  -KA           Sensory Assessment/Intervention    Sensory General Assessment  — unable to assess 2/2 pt agitated  -KA           Positioning and Restraints    Pre-Treatment Position  in bed  -KA        Post Treatment Position  bed  -KA        In Bed  side lying right;call light within reach;encouraged to call for assist;exit alarm on  -KA           Pain Assessment    Additional Documentation  Pain Scale: Numbers Pre/Post-Treatment (Group)  -KA           Pain Scale: Numbers Pre/Post-Treatment    Pain Scale: Numbers, Pretreatment  7/10  -KA        Pain Scale: Numbers, Post-Treatment  10/10  -KA        Pain Location - Orientation  lower  -KA        Pain Location  back  -KA        Pre/Post Treatment Pain Comment  nsg aware  -KA        Pain Intervention(s)  Repositioned  -KA           Wound 10/16/19 0130 medial back Incision    Wound - Properties Group Date first assessed: 10/16/19  -LD Time first assessed: 0130  -LD Present on Hospital Admission: Y  -LD Orientation: medial  -LD Location: back  -LD Primary Wound Type: Incision  -LD       Coping    Observed Emotional State  frustrated;agitated  -KA        Verbalized Emotional State  frustration  -KA           Plan of Care Review    Plan of Care Reviewed With  patient  -KA           Clinical Impression (OT)    Date of Referral to OT  10/16/19  -KA        OT Diagnosis  decreased ADL/IADL independence  -KA        Patient/Family Goals Statement (OT Eval)  go home  -KA        Criteria for Skilled Therapeutic Interventions Met (OT Eval)  yes;treatment indicated  -KA        Rehab Potential (OT Eval)  fair, will monitor progress closely  -KA        Therapy Frequency (OT Eval)  daily  -KA        Care Plan Review (OT)  evaluation/treatment results reviewed;care plan/treatment goals reviewed;risks/benefits  reviewed;patient/other agree to care plan;current/potential barriers reviewed  -KA        Anticipated Equipment Needs at Discharge (OT)  — unable to determine  -KA        Anticipated Discharge Disposition (OT)  inpatient rehabilitation facility  -KA           Vital Signs    Pre Systolic BP Rehab  148  -KA        Pre Treatment Diastolic BP  77  -KA        Post Systolic BP Rehab  150  -KA        Post Treatment Diastolic BP  73  -KA        Pretreatment Heart Rate (beats/min)  103  -KA        Intratreatment Heart Rate (beats/min)  143  -KA        Posttreatment Heart Rate (beats/min)  102  -KA        Pre SpO2 (%)  98  -KA        Post SpO2 (%)  98  -KA           OT Goals    Transfer Goal Selection (OT)  transfer, OT goal 1  -KA        Dressing Goal Selection (OT)  dressing, OT goal 1  -KA        Toileting Goal Selection (OT)  toileting, OT goal 1  -KA           Transfer Goal 1 (OT)    Activity/Assistive Device (Transfer Goal 1, OT)  toilet;walker, rolling;commode, bedside without drop arms  -KA        [place] Level/Cues Needed (Transfer Goal 1, OT)  supervision required  -KA        Time Frame (Transfer Goal 1, OT)  long term goal (LTG);by discharge  -KA        Progress/Outcome (Transfer Goal 1, OT)  goal ongoing  -KA           Dressing Goal 1 (OT)    Activity/Assistive Device (Dressing Goal 1, OT)  upper body dressing;lower body dressing gown, pants, socks  -KA        [place]/Cues Needed (Dressing Goal 1, OT)  supervision required  -KA        Time Frame (Dressing Goal 1, OT)  long term goal (LTG);by discharge  -KA        Progress/Outcome (Dressing Goal 1, OT)  goal ongoing  -KA           Toileting Goal 1 (OT)    Activity/Device (Toileting Goal 1, OT)  toileting skills, all  -KA        [place] Level/Cues Needed (Toileting Goal 1, OT)  supervision required  -KA        Time Frame (Toileting Goal 1, OT)  long term goal (LTG);by discharge  -KA        Progress/Outcome (Toileting Goal 1, OT)  goal ongoing  -KA            Living Environment    Home Accessibility  tub/shower is not walk in  -          User Key  (r) = Recorded By, (t) = Taken By, (c) = Cosigned By    Initials Name Effective Dates    Batool Huerta RN 07/24/19 -     Shy Keith OT 07/17/19 -          Occupational Therapy Education     Title: PT OT SLP Therapies (In Progress)     Topic: Occupational Therapy (In Progress)     Point: ADL training (Done)     Description: Instruct learner(s) on proper safety adaptation and remediation techniques during self care or transfers.   Instruct in proper use of assistive devices.    Learning Progress Summary           Patient Acceptance, E, VU,NR by CHUY at 10/16/2019  9:45 AM    Comment:  Pt educated on safety, fall prevention, ADLs, POC, and recommended D/C.                   Point: Precautions (Done)     Description: Instruct learner(s) on prescribed precautions during self-care and functional transfers.    Learning Progress Summary           Patient Acceptance, E, VU,NR by CHUY at 10/16/2019  9:45 AM    Comment:  Pt educated on safety, fall prevention, ADLs, POC, and recommended D/C.                   Point: Body mechanics (Done)     Description: Instruct learner(s) on proper positioning and spine alignment during self-care, functional mobility activities and/or exercises.    Learning Progress Summary           Patient Acceptance, E, VU,NR by CHUY at 10/16/2019  9:45 AM    Comment:  Pt educated on safety, fall prevention, ADLs, POC, and recommended D/C.                               User Key     Initials Effective Dates Name Provider Type Discipline     07/17/19 -  Shy English OT Occupational Therapist OT                  OT Recommendation and Plan  Outcome Summary/Treatment Plan (OT)  Anticipated Equipment Needs at Discharge (OT): (unable to determine)  Anticipated Discharge Disposition (OT): inpatient rehabilitation facility  Therapy Frequency (OT Eval): daily  Plan of Care Review  Plan of Care Reviewed  With: patient  Plan of Care Reviewed With: patient  Outcome Summary: OT completed brief chart review. Limited eval 2/2 pt agitated stating she wants to go home repeatedly.  ADLs: modA.  Functional Transfer (stand-step): Umer.  Limiting Factors: insight, safety awareness, pain, self limiting behavior.  Recommended D/C: IRF.  Will cont to observe and address ADL/IADL deficits as needed.     Outcome Measures     Row Name 10/16/19 0945             How much help from another is currently needed...    Putting on and taking off regular lower body clothing?  2  -KA      Bathing (including washing, rinsing, and drying)  2  -KA      Toileting (which includes using toilet bed pan or urinal)  2  -KA      Putting on and taking off regular upper body clothing  3  -KA      Taking care of personal grooming (such as brushing teeth)  3  -KA      Eating meals  3  -KA      AM-PAC 6 Clicks Score (OT)  15  -KA         Functional Assessment    Outcome Measure Options  AM-PAC 6 Clicks Daily Activity (OT)  -KA        User Key  (r) = Recorded By, (t) = Taken By, (c) = Cosigned By    Initials Name Provider Type    Shy Keith OT Occupational Therapist          Time Calculation:   Time Calculation- OT     Row Name 10/16/19 0945             Time Calculation- OT    OT Start Time  0945  -KA      OT Received On  10/16/19  -KA      OT Goal Re-Cert Due Date  10/26/19  -KA        User Key  (r) = Recorded By, (t) = Taken By, (c) = Cosigned By    Initials Name Provider Type    Shy Keith OT Occupational Therapist        Therapy Charges for Today     Code Description Service Date Service Provider Modifiers Qty    [id number]  OT EVAL LOW COMPLEXITY 4 10/16/2019 Shy English OT GO 1               Shy English OT  10/16/2019

## 2019-10-16 NOTE — PROGRESS NOTES
Case Management Discharge Note    Final Note: Patient left AMA.    Destination      No service has been selected for the patient.      Durable Medical Equipment      No service has been selected for the patient.      Dialysis/Infusion      No service has been selected for the patient.      Home Medical Care      No service has been selected for the patient.      Therapy      No service has been selected for the patient.      Community Resources      No service has been selected for the patient.             Final Discharge Disposition Code: 07 - left AMA

## 2019-10-16 NOTE — THERAPY EVALUATION
Patient Name: Bobbi Du  : 1960    MRN: [id number]                              Today's Date: 10/16/2019       Admit Date: 10/15/2019    Visit Dx:     ICD-10-CM ICD-9-CM   1. Confusion R41.0 298.9   2. Encephalopathy G93.40 348.30   3. Post-operative state Z98.890 V45.89   4. Impaired mobility and ADLs Z74.09 799.89     Patient Active Problem List   Diagnosis   • PAD (peripheral artery disease) (CMS/[facility])   • Hyperlipidemia   • Hypertension   • COPD (chronic obstructive pulmonary disease) (CMS/[facility])   • Diabetes mellitus (CMS/[facility])   • Fibromyalgia   • Tobacco abuse   • Gangrene (CMS/[facility])   • Diabetes mellitus with neuropathy   • Hyperlipidemia   • Hypertension   • PAD w/gangrene left fifth toe   • Active Tobacco abuse   • COPD (chronic obstructive pulmonary disease) (CMS/[facility])   • S/P left iliofemoral and left femoropopliteal bypass surgery 7/10/17   • Microcytic Anemia   • Hypothyroidism   • Encephalopathy   • RA (rheumatoid arthritis) (CMS/[facility])   • Confusion   • Sinusitis     Past Medical History:   Diagnosis Date   • Charcot foot due to diabetes mellitus (CMS/[facility])     RIGHT   • Chronic pain    • COPD (chronic obstructive pulmonary disease) (CMS/[facility])    • Diabetes mellitus (CMS/[facility])    • Disease of thyroid gland    • Fibromyalgia    • Hyperlipidemia    • Hypertension    • PAD (peripheral artery disease) (CMS/[facility])    • Restless leg    • Rheumatoid arthritis (CMS/[facility])    • Tobacco abuse      Past Surgical History:   Procedure Laterality Date   • BACK SURGERY      X 2   • BACK SURGERY  10/2019    [facility]   • CARDIAC CATHETERIZATION N/A 2017    Procedure: Angioplasty-peripheral;  Surgeon: Mayur Artis MD;  Location: "[facility] INVASIVE LOCATION;  Service:    • CARPAL TUNNEL RELEASE      X 4   • FOOT SURGERY Bilateral     X5   • INTERVENTIONAL RADIOLOGY PROCEDURE N/A 2017    Procedure: Abdominal Aortagram with Runoff;  Surgeon: Mayur Artis MD;  Location: [facility];   Service:    • KNEE SURGERY Left    • AZ RT/LT HEART CATHETERS N/A 5/30/2017    Procedure: Percutaneous Coronary Intervention;  Surgeon: Mayur Artis MD;  Location:  [facility] INVASIVE LOCATION;  Service: Cardiovascular   • AZ VEIN IN SITU BYPASS GRAFT,FEM-POP Left 7/10/2017    Procedure: LEFT ILIAC STENT, FEMORAL ENDARECTOMY, LEFT FEMORAL POPLITEAL BYPASS, POSS ILIAC FEMORAL BYPASS;  Surgeon: Mayur Artis MD;  Location:  [facility] OR;  Service: Vascular   • THYROIDECTOMY     • TUBAL ABDOMINAL LIGATION       General Information     Row Name 10/16/19 1011          PT Evaluation Time/Intention    Document Type  evaluation  -CD     Mode of Treatment  physical therapy  -CD     Row Name 10/16/19 1011          General Information    Patient Profile Reviewed?  yes  -CD     Prior Level of Function  independent:;all household mobility;community mobility;ADL's RECENT BACK SURGERYN ON 10/10/10 BUT HAD RETURNED HOME ALONE.   -CD     Existing Precautions/Restrictions  -- SPINAL PRECAUTIONS DUE TO RECENT BACK SURGERY 10/10/19. PT IMPULSIVE AND WITH POOR SAFETY AWARENESS.   -CD     Barriers to Rehab  medically complex;previous functional deficit  -CD     Row Name 10/16/19 1011          Relationship/Environment    Lives With  alone  -CD     Row Name 10/16/19 1011          Resource/Environmental Concerns    Current Living Arrangements  home/apartment/condo  -CD     Row Name 10/16/19 1011          Home Main Entrance    Number of Stairs, Main Entrance  one  -CD     Row Name 10/16/19 1011          Stairs Within Home, Primary    Number of Stairs, Within Home, Primary  none  -CD     Row Name 10/16/19 1011          Cognitive Assessment/Intervention- PT/OT    Orientation Status (Cognition)  oriented to;person;place  -CD     Cognitive Assessment/Intervention Comment  SOME DIFFICULTY WITH TIME. PT VERY ANXIOUS AND IMPULSIVE. ADAMANT SHE IS GOING HOME.   -CD     Row Name 10/16/19 1011          Safety Issues, Functional Mobility     Safety Issues Affecting Function (Mobility)  awareness of need for assistance;insight into deficits/self awareness;impulsivity;safety precaution awareness;safety precautions follow-through/compliance;sequencing abilities;judgment  -CD     Impairments Affecting Function (Mobility)  balance;endurance/activity tolerance;pain;strength  -CD       User Key  (r) = Recorded By, (t) = Taken By, (c) = Cosigned By    Initials Name Provider Type    CD Pamela Barnett, PT Physical Therapist        Mobility     Row Name 10/16/19 1016          Bed Mobility Assessment/Treatment    Bed Mobility Assessment/Treatment  sit-supine  -CD     Sit-Supine [place] (Bed Mobility)  minimum assist (75% patient effort);2 person assist  -CD     Assistive Device (Bed Mobility)  head of bed elevated;draw sheet  -CD     Comment (Bed Mobility)  CUES FOR SEQUENCING TO MAINTAIN BACK PRECAUTIONS. PT SITTING EOB UPON ARRIVAL WITH EXIT ALARM SOUNDING DUE TO TRYING TO GET UP TO BATHROOM. IV ACROSS OTHER SIDE OF BED.   -CD     Row Name 10/16/19 1016          Transfer Assessment/Treatment    Comment (Transfers)  CUES FOR HAND PLACEMENT. STAND PIVOT BED TO C AND BACK TO BED. STS [place] [facility] [place] R WALKER.   -CD     Row Name 10/16/19 1016          Bed-Chair Transfer    Bed-Chair [place] (Transfers)  minimum assist (75% patient effort);verbal cues  -CD     Assistive Device (Bed-Chair Transfers)  -- VIA B UE SUPPORT X 1 AND WITH R WALKER X 1.   -CD     Row Name 10/16/19 1016          Sit-Stand Transfer    Sit-Stand [place] (Transfers)  verbal cues;minimum assist (75% patient effort)  -CD     Assistive Device (Sit-Stand Transfers)  walker, front-wheeled  -CD     Row Name 10/16/19 1016          Gait/Stairs Assessment/Training    Comment (Gait/Stairs)  PT INITIALLY AGREED TO AMBULATE THEN LAST MINUTE TURNED TO SIT ON EOB. MAX CUES TO AVOID TWISTING AT SPINE DUE TO RECENT BACK SURGERY.   -CD       User Key  (r) = Recorded By, (t) = Taken By, (c) =  Cosigned By    Initials Name Provider Type    CD Pamela Barnett PT Physical Therapist        Obj/Interventions     Row Name 10/16/19 1019          General ROM    GENERAL ROM COMMENTS  B LE GROSSLY WFL'S FOR SITTING EOB/ STANDING AT WALKER. UNABLE TO FORMALLY ASSESS DUE TO PAIN/ANXIETY/IMPULSIVITY.   -CD     Row Name 10/16/19 1019          MMT (Manual Muscle Testing)    General MMT Comments  WFL'S FOR STANDING AT WALKER, PT MOVES BLE'S INDEPENDENTLY. UNABLE TO FORMALLY ASSESS   -CD     Row Name 10/16/19 1019          Static Sitting Balance    Level of [place] (Unsupported Sitting, Static Balance)  supervision  -CD     Time Able to Maintain Position (Unsupported Sitting, Static Balance)  3 to 4 minutes  -CD     Comment (Unsupported Sitting, Static Balance)  SITTING ON BSC AND BRIEFLY AT EOB.   -CD     Row Name 10/16/19 1019          Static Standing Balance    Level of [place] (Supported Standing, Static Balance)  contact guard assist  -CD     Assistive Device Utilized (Supported Standing, Static Balance)  walker, rolling  -CD     Row Name 10/16/19 1019          Dynamic Standing Balance    Level of [place], Reaches Outside Midline (Standing, Dynamic Balance)  minimal assist, 75% patient effort;2 person assist  -CD     Assistive Device Utilized (Supported Standing, Dynamic Balance)  walker, rolling  -CD     Comment, Reaches Outside Midline (Standing, Dynamic Balance)  PT IMPULSIVE WITH POOR SAFETY AWARENESS.   -CD     Row Name 10/16/19 1019          Sensory Assessment/Intervention    Sensory General Assessment  -- REPORT R FOOT IS NUMB. DID NOT FORMALLY ASSESS DUE TO AGITATION.   -CD       User Key  (r) = Recorded By, (t) = Taken By, (c) = Cosigned By    Initials Name Provider Type    CD Pamela Barnett PT Physical Therapist        Goals/Plan     Row Name 10/16/19 1027          Bed Mobility Goal 1 (PT)    Activity/Assistive Device (Bed Mobility Goal 1, PT)  sit to supine/supine to sit  -CD      [place] Level/Cues Needed (Bed Mobility Goal 1, PT)  independent  -CD     Time Frame (Bed Mobility Goal 1, PT)  long term goal (LTG);2 weeks  -CD     Row Name 10/16/19 1027          Transfer Goal 1 (PT)    Activity/Assistive Device (Transfer Goal 1, PT)  sit-to-stand/stand-to-sit;bed-to-chair/chair-to-bed;walker, rolling  -CD     [place] Level/Cues Needed (Transfer Goal 1, PT)  independent  -CD     Time Frame (Transfer Goal 1, PT)  long term goal (LTG);2 weeks  -CD     Row Name 10/16/19 1027          Gait Training Goal 1 (PT)    Activity/Assistive Device (Gait Training Goal 1, PT)  gait (walking locomotion);walker, rolling  -CD     [place] Level (Gait Training Goal 1, PT)  independent  -CD     Distance (Gait Goal 1, PT)  600  -CD     Time Frame (Gait Training Goal 1, PT)  long term goal (LTG);2 weeks  -CD     Row Name 10/16/19 1027          Patient Education Goal (PT)    Activity (Patient Education Goal, PT)  HEP, BACK PRECAUTIONS, MOBILITY WITH R WALKER  -CD     [place]/Cues/Accuracy (Memory Goal 2, PT)  demonstrates adequately;verbalizes understanding  -CD     Time Frame (Patient Education Goal, PT)  long term goal (LTG);2 weeks  -CD       User Key  (r) = Recorded By, (t) = Taken By, (c) = Cosigned By    Initials Name Provider Type    CD Pamela Barnett, PT Physical Therapist        Clinical Impression     Row Name 10/16/19 1022          Pain Assessment    Additional Documentation  Pain Scale: Numbers Pre/Post-Treatment (Group) 10/10 PRE AND POST AT LOW BACK, RLE.   -CD     Row Name 10/16/19 1022          Pain Scale: Numbers Pre/Post-Treatment    Pre/Post Treatment Pain Comment  NSG AWARE.   -CD     Pain Intervention(s)  Repositioned;Ambulation/increased activity  -CD     Row Name 10/16/19 1022          Plan of Care Review    Plan of Care Reviewed With  patient  -CD     Row Name 10/16/19 1022          Physical Therapy Clinical Impression    Patient/Family Goals Statement (PT Clinical Impression)   TO GO HOME. DISCUSSED RATIONALE FOR IMPROVED FUNCTIONAL MOBILITY/SAFETY PRIOR TO HOME ALONE, ESPECIALLY GIVEN RECENT FALLS. PT AGREEABLE TO INPT P.T. AND HOME WITH HHPT BUT REFUSES IRF.   -CD     Criteria for Skilled Interventions Met (PT Clinical Impression)  yes  -CD     Rehab Potential (PT Clinical Summary)  good, to achieve stated therapy goals  -CD     Predicted Duration of Therapy (PT)  2 WEEKS   -CD     Row Name 10/16/19 1022          Vital Signs    Pre Systolic BP Rehab  148  -CD     Pre Treatment Diastolic BP  77  -CD     Post Systolic BP Rehab  150  -CD     Post Treatment Diastolic BP  73  -CD     Pretreatment Heart Rate (beats/min)  97  -CD     Intratreatment Heart Rate (beats/min)  146  -CD     Posttreatment Heart Rate (beats/min)  94  -CD     Pre SpO2 (%)  98  -CD     O2 Delivery Pre Treatment  room air  -CD     O2 Delivery Intra Treatment  room air  -CD     Post SpO2 (%)  98  -CD     O2 Delivery Post Treatment  room air  -CD     Pre Patient Position  Supine  -CD     Intra Patient Position  Standing  -CD     Post Patient Position  Supine  -CD     Row Name 10/16/19 1022          Positioning and Restraints    Pre-Treatment Position  in bed  -CD     Post Treatment Position  bed  -CD     In Bed  side lying right;exit alarm on;encouraged to call for assist;notified nsg;call light within reach  -CD       User Key  (r) = Recorded By, (t) = Taken By, (c) = Cosigned By    Initials Name Provider Type    CD Pamela Barnett, PT Physical Therapist        Outcome Measures     Row Name 10/16/19 1036          How much help from another person do you currently need...    Turning from your back to your side while in flat bed without using bedrails?  3  -CD     Moving from lying on back to sitting on the side of a flat bed without bedrails?  3  -CD     Moving to and from a bed to a chair (including a wheelchair)?  3  -CD     Standing up from a chair using your arms (e.g., wheelchair, bedside chair)?  3  -CD     Climbing  3-5 steps with a railing?  2  -CD     To walk in hospital room?  2  -CD     AM-PAC 6 Clicks Score (PT)  16  -CD     Row Name 10/16/19 1036          Functional Assessment    Outcome Measure Options  AM-PAC 6 Clicks Basic Mobility (PT)  -CD       User Key  (r) = Recorded By, (t) = Taken By, (c) = Cosigned By    Initials Name Provider Type    CD Pamela Barnett PT Physical Therapist        Physical Therapy Education     Title: PT OT SLP Therapies (In Progress)     Topic: Physical Therapy (Done)     Point: Mobility training (Done)     Learning Progress Summary           Patient Acceptance, E, VU,NR by CD at 10/16/2019 10:32 AM    Comment:  SAFETY WITH MOBILITY, PROGRESSION OF POC, D/C PLANNING, BENEFITS OF OOB ACTIVITY,                   Point: Home exercise program (Done)     Learning Progress Summary           Patient Acceptance, E, VU,NR by CD at 10/16/2019 10:32 AM    Comment:  SAFETY WITH MOBILITY, PROGRESSION OF POC, D/C PLANNING, BENEFITS OF OOB ACTIVITY,                   Point: Body mechanics (Done)     Learning Progress Summary           Patient Acceptance, E, VU,NR by CD at 10/16/2019 10:32 AM    Comment:  SAFETY WITH MOBILITY, PROGRESSION OF POC, D/C PLANNING, BENEFITS OF OOB ACTIVITY,                   Point: Precautions (Done)     Learning Progress Summary           Patient Acceptance, E, VU,NR by CD at 10/16/2019 10:32 AM    Comment:  SAFETY WITH MOBILITY, PROGRESSION OF POC, D/C PLANNING, BENEFITS OF OOB ACTIVITY,                               User Key     Initials Effective Dates Name Provider Type Discipline    CD 06/19/15 -  Pamela Barnett PT Physical Therapist PT              PT Recommendation and Plan  Planned Therapy Interventions (PT Eval): balance training, bed mobility training, gait training, home exercise program, patient/family education, transfer training, stair training, strengthening  Outcome Summary/Treatment Plan (PT)  Anticipated Discharge Disposition (PT): inpatient rehabilitation  facility  Plan of Care Reviewed With: patient  Outcome Summary: PT PRESENTS WITH EVOLVING SYMPTOMS TO INCLUDE IMPAIRED BALANCE, ANXIETY, PAIN, POOR SAFETY AWARENESS AND DECLINE IN FUNCTIONAL MOBILITY., PT IS S/P RECENT BACK SURGERY 10/10 AND IS NOW WITH AMS AND S/P MULTIPLE FALLS. EVAL LIMITED DUE TO AGITATION, ANXIETY, AND C/O PAIN. RECOMMEND IRF AT D/C GIVEN PT LIVES ALONE.      Time Calculation:   PT Charges     Row Name 10/16/19 1036             Time Calculation    Start Time  0945  -CD      PT Received On  10/16/19  -CD      PT Goal Re-Cert Due Date  10/26/19  -CD        User Key  (r) = Recorded By, (t) = Taken By, (c) = Cosigned By    Initials Name Provider Type    CD Pamela Barnett PT Physical Therapist        Therapy Charges for Today     Code Description Service Date Service Provider Modifiers Qty    [id number] HC PT EVAL MOD COMPLEXITY 4 10/16/2019 Pamela Barnett PT GP 1          PT G-Codes  Outcome Measure Options: AM-PAC 6 Clicks Basic Mobility (PT)  AM-PAC 6 Clicks Score (PT): 16  AM-PAC 6 Clicks Score (OT): 15    Pamela Barnett PT  10/16/2019

## 2019-10-16 NOTE — DISCHARGE SUMMARY
[facility] Medicine Services  ELOPEMENT AGAINST MEDICAL ADVICE    Patient Name: Bobbi Du  : 1960  MRN: [id number]    Date of Admission: 10/15/2019  Date of Elopement:  10/16/19    Primary Care Physician: Diamante Arias APRN    Consults     Date and Time Order Name Status Description    10/16/2019 0453 Inpatient Neurosurgery Consult      10/15/2019 1920 Inpatient Neurology Consult Stroke Completed         Hospital Course     Presenting Problem:   Confusion [R41.0]    Active Hospital Problems    Diagnosis  POA   • **Encephalopathy [G93.40]  Yes   • Sinusitis [J32.9]  Unknown   • Acute respiratory alkalosis [E87.3]  Yes   • Abnormal MRI, lumbar spine [R93.7]  Yes   • Hypothyroidism [E03.9]  Yes   • RA (rheumatoid arthritis) (CMS/Formerly [facility]) [M06.9]  Yes   • Confusion [R41.0]  Yes   • Microcytic Anemia [D64.9]  Yes   • PAD (peripheral artery disease) (CMS/Formerly [facility]) [I73.9]  Yes   • Hyperlipidemia [E78.5]  Yes   • Hypertension [I10]  Yes   • COPD (chronic obstructive pulmonary disease) (CMS/[facility]) [J44.9]  Yes   • Diabetes mellitus (CMS/[facility]ly [facility]) [E11.9]  Yes   • Fibromyalgia [M79.7]  Yes   • Tobacco abuse [Z72.0]  Yes      Resolved Hospital Problems   No resolved problems to display.          Hospital Course:  Mrs. Du is a 58-year-old female with a past medical history of PAD (complicated by left fifth toe gangrene, prior left iliofemoral and left femoral-popliteal bypass 7/10/2017), active tobacco abuse, hypertension, hyperlipidemia, type 2 diabetes mellitus complicated by right charcot foot and peripheral neuropathy, hypothyroidism, fibromyalgia, COPD, and rheumatoid arthritis who was admitted on 10/15 with confusion, slurred speech, lethargy, falls, and back pain. This is in the setting of a recent spinal fusion at [facility] on 10/10 and prescription of narcotics at discharge.  In the ER, pt was tachycardic, hypertensive, and hypoxemic with lab evaluation disclosing severe  "hypothyroidism, respiratory alkalosis, and hypoxemia (resolved) prompting admission. MRI of the L spine showed post-surgical changes with possible fluid collections. She had no overlying skin changes to her incision site, no drainage, and had no clear clinical evidence of an infection. I am unable to rule in a postoperative infection.     On the morning following admission, pt's encephalopathy had improved. Her MRI brain was negative. She reported appropriate use of her pain medications. She had significant ataxia during her PT/OT evaluation with plan for further contrasted imaging of the L Spine and possible neurosurgical evaluation.  Further work-up also disclosed evidence of severe hypothyroidism. Pt had just been increased from 75mg to 90mg of [place] Thyroid and previously saw Dr. Pena (although she no longer follows with them).     Plan of care was for complete evaluation, with neurology consult, of encephalopathy, ataxia, and abnormal MRI L spine as well as addition of levothyroxine with close outpatient Endocrinology follow up for her severe hypothyroidism.    Unfortunately, pt elected to sign out AMA on the afternoon of 10/16. I returned to her room in the afternoon and we discussed the results of her work-up, my concerns for negative effects on her health if she leaves the hospital, the rationale behind her current plan of care (including activity restrictions and fall precautions), and my recommendations for outpatient follow-up. She plans to follow up with her spinal surgeon and her PCP regarding her hypothyroidism. I made no provisions for follow up medications nor appointments given her refusal to follow my prescribed plan of care. Her reason for leaving AMA was not being allowed to have ad roberto carlos mobility in room (she was assist x 2 and clearly a high fall risk).        Day of Discharge     CC:  \"feeling not too bad\"     HPI:  Pt resting in bed this morning during my initial visit. She can recall ataxia " last night and family telling her that she wasn't acting right but doesn't recall anything from thereafter, including ambulance, ER, and admission. This morning, she reports frustration about not being able to get up but feels ok and back to baseline.  She still has some mild disorientation about what hospital that she is in. She reports that her back hurts less than pre-surgery baseline. No drainage or erythema to lower back incision.      I revisited her room early afternoon as summarized above. Exam is from this morning.       Vital Signs:   Temp:  [97.8 °F (36.6 °C)-98.4 °F (36.9 °C)] 97.9 °F (36.6 °C)  Heart Rate:  [] 96  Resp:  [20-21] 21  BP: (112-172)/() 158/92     Physical Exam (if applicable):  Constitutional: No acute distress, awake, alert  HENT: NCAT, mucous membranes moist  Respiratory: Clear to auscultation bilaterally, respiratory effort normal   Cardiovascular: RRR (NSR with HR 90s on tele), no murmurs, rubs, or gallops, palpable pedal pulses bilaterally  Gastrointestinal: Positive bowel sounds, soft, nontender, nondistended  Musculoskeletal: No bilateral ankle edema, L-spine incision c/d/i without erythema, induration, or drainage, nontender to palpation   Psychiatric: Appropriate affect, cooperative  Neurologic: Oriented x 2 but thinks in [place] (quickly re-orients)[facility] October 2019, strength symmetric in all extremities, Cranial Nerves grossly intact to confrontation, speech clear  Skin: No rashes    Pertinent  and/or Most Recent Results     Results from last 7 days   Lab Units 10/16/19  0728 10/15/19  1931 10/15/19  1930 10/15/19  1927   WBC 10*3/mm3 6.20  --  6.89  --    HEMOGLOBIN g/dL 8.7*  --  8.7*  --    HEMOGLOBIN, POC g/dL  --   --   --  9.2*   HEMATOCRIT % 29.7*  --  29.4*  --    HEMATOCRIT POC %  --   --   --  27*   PLATELETS 10*3/mm3 365  --  366  --    SODIUM mmol/L 141 140  --   --    POTASSIUM mmol/L 3.8 3.6  --   --    CHLORIDE mmol/L 109* 105  --   --    CO2  mmol/L 21.0* 23.0  --   --    BUN mg/dL 5* 7  --   --    CREATININE mg/dL 0.51* 0.56*  --  0.60   GLUCOSE mg/dL 104* 115*  --   --    CALCIUM mg/dL 8.5* 9.2  --   --      Results from last 7 days   Lab Units 10/15/19  1931   BILIRUBIN mg/dL 0.2   ALK PHOS U/L 95   ALT (SGPT) U/L 10  10   AST (SGOT) U/L 15  15   PROTIME Seconds 12.3   INR  0.96   APTT seconds 28.8           Invalid input(s): TG, LDLCALC, LDLREALC  Results from last 7 days   Lab Units 10/16/19  0728 10/15/19  1931   TSH uIU/mL  --  38.810*   HEMOGLOBIN A1C % 5.40  --      Brief Urine Lab Results  (Last result in the past 365 days)      Color   Clarity   Blood   Leuk Est   Nitrite   Protein   CREAT   Urine HCG        10/15/19 2115 Yellow Clear Negative Trace Negative Negative               Microbiology Results Abnormal     None          Imaging Results (all)     Procedure Component Value Units Date/Time    MRI Lumbar Spine Without Contrast [[id number]] Collected:  10/16/19 0038     Updated:  10/16/19 0040    Narrative:       MRI Spine Lumbar WO    INDICATION:    Lumbar surgery 5 days ago at an outside facility. Acute onset of altered mental status and confusion today. Evaluate for possible abscess.    TECHNIQUE:   MRI of the lumbar spine without IV contrast. The lack of contrast significantly lowers the sensitivity of this exam for the evaluation of abscess in this patient.    COMPARISON:    There are no prior studies available.    FINDINGS:  The sagittal alignment is satisfactory. Vertebral body heights are maintained. The marrow signal is within normal limits. Bilateral pedicle screws are demonstrated at L4, L5 and S1.    This report assumes 5 lumbar type vertebral bodies. The conus terminates at the T12-L1 level and is of normal contour and signal intensity. The nerve roots of the cauda equina are within normal limits.    At L5-S1, disc desiccation and mild loss of disc height. There is a focal left-sided disc herniation with effacement of the left  side of the thecal sac. There appears to be significant compromise of the left neural foramen. There is metallic artifact  from the pedicle screws. Moderate facet arthropathy.    The soft tissues posteriorly at the L5-S1 area demonstrate postsurgical changes. These may represent normal postsurgical changes with seroma but an abscess is not excluded.    At L4-L5, disc desiccation and mild loss of disc height. Focal annular fissure centrally. There is effacement of the ventral thecal sac. Prominent facet arthropathy and thickening of the ligamentum flavum. Moderate to severe spinal canal stenosis at this  level. There is moderate compromise of the neural foramina.    The soft tissues posterior to the L4-L5 level also demonstrate abnormal signal intensity which may represent expected postsurgical change. Unfortunately, abscess/infection is not excluded.    At L3-L4, disc desiccation and mild loss of disc height. Moderate facet arthropathy. Mild effacement of the ventral thecal sac. Mild to moderate spinal canal stenosis with mild narrowing of the neural foramina. Bilateral pedicle screws noted at L4. Again  there are postsurgical changes demonstrated which may represent normal postsurgical change though infection/inflammation is not excluded.    At L2-L3, no significant disc degeneration. Moderate facet arthropathy. No spinal canal or neural foraminal compromise.    At L1-L2, no significant disc degeneration. No spinal canal or neural foraminal compromise.        Impression:       Postsurgical changes demonstrated at L4, L5 and S1 with bilateral pedicle screws.    Multilevel lumbar spondylosis as described. Please see the above report for a description of the individual levels.    There are prominent postsurgical changes posteriorly secondary to the pedicle screws. There is inflammatory change identified and possible fluid collections. While these may represent normal postoperative changes and seroma, abscess cannot be  excluded on  the basis of this noncontrasted study.    Signer Name: Chai Cantu MD   Signed: 10/16/2019 12:38 AM   Workstation Name: [facility]    Radiology [facility]    MRI Brain Without Contrast [[id number]] Collected:  10/15/19 2345     Updated:  10/15/19 2347    Narrative:           MRI Brain WO     Clinical history: One-day history of altered mental status with slurred speech and confusion.    Comparison: No prior brain MR study    Technique: Sagittal, axial and coronal images of the head were obtained using the standard protocol.    Findings:  The diffusion sequence shows no evidence of restricted diffusion to indicate acute infarction. The gradient echo sequence demonstrates no abnormal susceptibility artifact to suggest residual blood products.    The FLAIR sequence shows the ventricles to be of normal size and configuration. There are occasional flare hyperintensities identified in the subcortical white matter both cerebral hemispheres likely representing microvascular disease in this patient.  The brainstem and cerebellum are of normal signal intensity.    The pituitary is within normal limits. The cervicomedullary junction is normal. 7th and 8th cranial nerve complexes are within normal limits.    Air fluid level in left maxillary sinus suggesting acute sinusitis. Mastoid air cells and the remaining paranasal sinuses are clear. No acute orbital findings.      Impression:       Impression:  1.  No acute intracranial findings.  2.  Mild white matter microvascular disease.  3.  Air-fluid level in the left maxillary sinus suggesting acute sinusitis.        Signer Name: Chai Cantu MD   Signed: 10/15/2019 11:45 PM   Workstation [facility]: [facility]    Radiology [facility]    XR Chest 1 View [[id number]] Collected:  10/15/19 2002     Updated:  10/15/19 2005    Narrative:       CR Chest 1 Vw    INDICATION:   Chronic emphysema. Slurred speech and confusion earlier in the day.      COMPARISON:    6/30/2017    FINDINGS:  Single portable AP view(s) of the chest.  Heart size is normal and unchanged. The lungs are emphysematous with prominent interstitial markings. No new infiltrates are seen. Vascular markings are normal. No effusions are seen.      Impression:       Emphysema with interstitial fibrosis. No acute infiltrates.    Signer Name: Sven Ashby MD   Signed: 10/15/2019 8:02 PM   Workstation Name: [email or web address]    [facility]    CT Head Without Contrast Stroke Protocol [[id number]] Collected:  10/15/19 1929     Updated:  10/15/19 1932    Narrative:       CT Head WO Code Stroke: 10/15/2019 7:17 PM    HISTORY:   Confusion following surgery 7 days ago.    TECHNIQUE:   Axial unenhanced head CT. Radiation dose reduction techniques included automated exposure control or exposure modulation based on body size. Radiation audit for number of CT and nuclear cardiology exams performed in the last year: 0.      COMPARISON:   None.    FINDINGS:   No intracranial hemorrhage, mass, or infarct. No hydrocephalus or extra-axial fluid collection. Brain parenchymal density is normal.     The skull base, calvarium, and extracranial soft tissues are normal.      Impression:       Normal, negative unenhanced head CT.    The examination was performed at 7:17 PM, and became available online at 7:26 PM, and results were called by telephone at 10/15/2019 7:28 PM the CT technologist Krissy, though verbally acknowledged these results.      Signer Name: Mayur Garcia MD   Signed: 10/15/2019 7:29 PM   Workstation Name: [email or web address]    Radiology [facility]                    Results for orders placed during the hospital encounter of 07/07/17   Adult Stress Echo Only    Narrative · Pt. denies chest pain with the Dobutamine, did become nauseated and   vomited post infusion.  · There is <1 mm asymptomatic ST depression with dobutamine infusion.  · At rest, global and segmental  wall motion is normal.  · With dobutamine, there is normal global and segmental augmentation of   wall motion.  · This is a NORMAL Dobutamine stress echocardiogram.           Order Current Status    Blood Culture - Blood, Hand, Left In process    Blood Culture - Blood, Hand, Left In process        Discharge Details     Discharge Disposition:  **Patient left AMA prior to completion of evaluation and management, therefore discharge planning remains incomplete including absence of any needed discharging medications, testing arrangements or follow up unless otherwise specified**      No future appointments.      A total of 50 minutes was spent on care on day of discharge.     Electronically signed by Wild Ramos MD, 10/16/19, 1:50 PM.

## 2019-10-16 NOTE — PLAN OF CARE
Problem: Patient Care Overview  Goal: Plan of Care Review  Outcome: Ongoing (interventions implemented as appropriate)   10/16/19 4376   Coping/Psychosocial   Plan of Care Reviewed With patient   OTHER   Outcome Summary OT completed brief chart review.  Limited evaluation 2/2 pt agitated stating she wants to go home repeatedly.  ADLs: modA. Functional Transfer: [place]. Limiting Factors: insight, safety awareness, pain, self limiting. Recommended D/C: IRF. Will cont to observe and address ADL/IADL deficits as needed.

## 2019-10-20 LAB
BACTERIA SPEC AEROBE CULT: NORMAL
BACTERIA SPEC AEROBE CULT: NORMAL

## 2019-12-16 ENCOUNTER — HOSPITAL ENCOUNTER (EMERGENCY)
Facility: HOSPITAL | Age: 59
Discharge: LEFT BEFORE DISCHARGE | End: 2019-12-17
Attending: EMERGENCY MEDICINE | Admitting: EMERGENCY MEDICINE

## 2019-12-16 ENCOUNTER — APPOINTMENT (OUTPATIENT)
Dept: GENERAL RADIOLOGY | Facility: HOSPITAL | Age: 59
End: 2019-12-16

## 2019-12-16 ENCOUNTER — APPOINTMENT (OUTPATIENT)
Dept: CT IMAGING | Facility: HOSPITAL | Age: 59
End: 2019-12-16

## 2019-12-16 DIAGNOSIS — R10.30 LOWER ABDOMINAL PAIN: ICD-10-CM

## 2019-12-16 DIAGNOSIS — K41.30 INCARCERATED FEMORAL HERNIA: Primary | ICD-10-CM

## 2019-12-16 LAB
ALBUMIN SERPL-MCNC: 4 G/DL (ref 3.5–5.2)
ALBUMIN/GLOB SERPL: 1.4 G/DL
ALP SERPL-CCNC: 83 U/L (ref 39–117)
ALT SERPL W P-5'-P-CCNC: 5 U/L (ref 1–33)
ANION GAP SERPL CALCULATED.3IONS-SCNC: 12 MMOL/L (ref 5–15)
AST SERPL-CCNC: 12 U/L (ref 1–32)
BACTERIA UR QL AUTO: NORMAL /HPF
BASOPHILS # BLD AUTO: 0.16 10*3/MM3 (ref 0–0.2)
BASOPHILS NFR BLD AUTO: 0.9 % (ref 0–1.5)
BILIRUB SERPL-MCNC: <0.2 MG/DL (ref 0.2–1.2)
BILIRUB UR QL STRIP: NEGATIVE
BUN BLD-MCNC: 8 MG/DL (ref 6–20)
BUN/CREAT SERPL: 12.1 (ref 7–25)
CALCIUM SPEC-SCNC: 9.1 MG/DL (ref 8.6–10.5)
CHLORIDE SERPL-SCNC: 99 MMOL/L (ref 98–107)
CLARITY UR: ABNORMAL
CO2 SERPL-SCNC: 25 MMOL/L (ref 22–29)
COLOR UR: YELLOW
CREAT BLD-MCNC: 0.66 MG/DL (ref 0.57–1)
D-LACTATE SERPL-SCNC: 1.5 MMOL/L (ref 0.5–2)
DEPRECATED RDW RBC AUTO: 54.8 FL (ref 37–54)
EOSINOPHIL # BLD AUTO: 0.16 10*3/MM3 (ref 0–0.4)
EOSINOPHIL NFR BLD AUTO: 0.9 % (ref 0.3–6.2)
ERYTHROCYTE [DISTWIDTH] IN BLOOD BY AUTOMATED COUNT: 17.8 % (ref 12.3–15.4)
GFR SERPL CREATININE-BSD FRML MDRD: 92 ML/MIN/1.73
GLOBULIN UR ELPH-MCNC: 2.9 GM/DL
GLUCOSE BLD-MCNC: 160 MG/DL (ref 65–99)
GLUCOSE UR STRIP-MCNC: NEGATIVE MG/DL
HCT VFR BLD AUTO: 35.7 % (ref 34–46.6)
HGB BLD-MCNC: 10.7 G/DL (ref 12–15.9)
HGB UR QL STRIP.AUTO: NEGATIVE
HOLD SPECIMEN: NORMAL
HOLD SPECIMEN: NORMAL
HYALINE CASTS UR QL AUTO: NORMAL /LPF
IMM GRANULOCYTES # BLD AUTO: 0.06 10*3/MM3 (ref 0–0.05)
IMM GRANULOCYTES NFR BLD AUTO: 0.3 % (ref 0–0.5)
KETONES UR QL STRIP: NEGATIVE
LEUKOCYTE ESTERASE UR QL STRIP.AUTO: NEGATIVE
LIPASE SERPL-CCNC: 25 U/L (ref 13–60)
LYMPHOCYTES # BLD AUTO: 2.82 10*3/MM3 (ref 0.7–3.1)
LYMPHOCYTES NFR BLD AUTO: 16.4 % (ref 19.6–45.3)
MCH RBC QN AUTO: 25.5 PG (ref 26.6–33)
MCHC RBC AUTO-ENTMCNC: 30 G/DL (ref 31.5–35.7)
MCV RBC AUTO: 85.2 FL (ref 79–97)
MONOCYTES # BLD AUTO: 0.96 10*3/MM3 (ref 0.1–0.9)
MONOCYTES NFR BLD AUTO: 5.6 % (ref 5–12)
NEUTROPHILS # BLD AUTO: 13.07 10*3/MM3 (ref 1.7–7)
NEUTROPHILS NFR BLD AUTO: 75.9 % (ref 42.7–76)
NITRITE UR QL STRIP: NEGATIVE
NRBC BLD AUTO-RTO: 0 /100 WBC (ref 0–0.2)
PH UR STRIP.AUTO: 7.5 [PH] (ref 5–8)
PLATELET # BLD AUTO: 441 10*3/MM3 (ref 140–450)
PMV BLD AUTO: 10.5 FL (ref 6–12)
POTASSIUM BLD-SCNC: 3.5 MMOL/L (ref 3.5–5.2)
PROT SERPL-MCNC: 6.9 G/DL (ref 6–8.5)
PROT UR QL STRIP: NEGATIVE
RBC # BLD AUTO: 4.19 10*6/MM3 (ref 3.77–5.28)
RBC # UR: NORMAL /HPF
REF LAB TEST METHOD: NORMAL
SODIUM BLD-SCNC: 136 MMOL/L (ref 136–145)
SP GR UR STRIP: 1.02 (ref 1–1.03)
SQUAMOUS #/AREA URNS HPF: NORMAL /HPF
TROPONIN T SERPL-MCNC: <0.01 NG/ML (ref 0–0.03)
UROBILINOGEN UR QL STRIP: ABNORMAL
WBC NRBC COR # BLD: 17.23 10*3/MM3 (ref 3.4–10.8)
WBC UR QL AUTO: NORMAL /HPF
WHOLE BLOOD HOLD SPECIMEN: NORMAL
WHOLE BLOOD HOLD SPECIMEN: NORMAL

## 2019-12-16 PROCEDURE — 83690 ASSAY OF LIPASE: CPT

## 2019-12-16 PROCEDURE — 71045 X-RAY EXAM CHEST 1 VIEW: CPT

## 2019-12-16 PROCEDURE — 81001 URINALYSIS AUTO W/SCOPE: CPT

## 2019-12-16 PROCEDURE — 25010000002 ONDANSETRON PER 1 MG: Performed by: EMERGENCY MEDICINE

## 2019-12-16 PROCEDURE — 0 IOPAMIDOL PER 1 ML: Performed by: EMERGENCY MEDICINE

## 2019-12-16 PROCEDURE — 96375 TX/PRO/DX INJ NEW DRUG ADDON: CPT

## 2019-12-16 PROCEDURE — 25010000002 MORPHINE PER 10 MG: Performed by: EMERGENCY MEDICINE

## 2019-12-16 PROCEDURE — 85025 COMPLETE CBC W/AUTO DIFF WBC: CPT

## 2019-12-16 PROCEDURE — 99284 EMERGENCY DEPT VISIT MOD MDM: CPT

## 2019-12-16 PROCEDURE — 74174 CTA ABD&PLVS W/CONTRAST: CPT

## 2019-12-16 PROCEDURE — 93005 ELECTROCARDIOGRAM TRACING: CPT | Performed by: EMERGENCY MEDICINE

## 2019-12-16 PROCEDURE — 96374 THER/PROPH/DIAG INJ IV PUSH: CPT

## 2019-12-16 PROCEDURE — 96376 TX/PRO/DX INJ SAME DRUG ADON: CPT

## 2019-12-16 PROCEDURE — 80053 COMPREHEN METABOLIC PANEL: CPT

## 2019-12-16 PROCEDURE — 83605 ASSAY OF LACTIC ACID: CPT | Performed by: EMERGENCY MEDICINE

## 2019-12-16 PROCEDURE — 84484 ASSAY OF TROPONIN QUANT: CPT

## 2019-12-16 RX ORDER — MORPHINE SULFATE 4 MG/ML
4 INJECTION, SOLUTION INTRAMUSCULAR; INTRAVENOUS ONCE
Status: COMPLETED | OUTPATIENT
Start: 2019-12-16 | End: 2019-12-16

## 2019-12-16 RX ORDER — SODIUM CHLORIDE 0.9 % (FLUSH) 0.9 %
10 SYRINGE (ML) INJECTION AS NEEDED
Status: DISCONTINUED | OUTPATIENT
Start: 2019-12-16 | End: 2019-12-17 | Stop reason: HOSPADM

## 2019-12-16 RX ORDER — ONDANSETRON 2 MG/ML
4 INJECTION INTRAMUSCULAR; INTRAVENOUS ONCE
Status: COMPLETED | OUTPATIENT
Start: 2019-12-16 | End: 2019-12-16

## 2019-12-16 RX ORDER — MORPHINE SULFATE 2 MG/ML
2 INJECTION, SOLUTION INTRAMUSCULAR; INTRAVENOUS
Status: DISCONTINUED | OUTPATIENT
Start: 2019-12-16 | End: 2019-12-17 | Stop reason: HOSPADM

## 2019-12-16 RX ADMIN — MORPHINE SULFATE 2 MG: 2 INJECTION, SOLUTION INTRAMUSCULAR; INTRAVENOUS at 23:50

## 2019-12-16 RX ADMIN — MORPHINE SULFATE 2 MG: 2 INJECTION, SOLUTION INTRAMUSCULAR; INTRAVENOUS at 22:58

## 2019-12-16 RX ADMIN — ONDANSETRON 4 MG: 2 INJECTION INTRAMUSCULAR; INTRAVENOUS at 21:56

## 2019-12-16 RX ADMIN — IOPAMIDOL 70 ML: 755 INJECTION, SOLUTION INTRAVENOUS at 23:17

## 2019-12-16 RX ADMIN — MORPHINE SULFATE 4 MG: 4 INJECTION, SOLUTION INTRAMUSCULAR; INTRAVENOUS at 21:56

## 2019-12-16 RX ADMIN — SODIUM CHLORIDE 1000 ML: 9 INJECTION, SOLUTION INTRAVENOUS at 22:59

## 2019-12-17 ENCOUNTER — ANESTHESIA EVENT (OUTPATIENT)
Dept: PERIOP | Facility: HOSPITAL | Age: 59
End: 2019-12-17

## 2019-12-17 ENCOUNTER — HOSPITAL ENCOUNTER (INPATIENT)
Facility: HOSPITAL | Age: 59
LOS: 1 days | Discharge: HOME | End: 2019-12-18
Attending: EMERGENCY MEDICINE | Admitting: INTERNAL MEDICINE

## 2019-12-17 ENCOUNTER — ANESTHESIA (OUTPATIENT)
Dept: PERIOP | Facility: HOSPITAL | Age: 59
End: 2019-12-17

## 2019-12-17 VITALS
OXYGEN SATURATION: 94 % | DIASTOLIC BLOOD PRESSURE: 84 MMHG | BODY MASS INDEX: 18.33 KG/M2 | TEMPERATURE: 98.2 F | RESPIRATION RATE: 18 BRPM | HEIGHT: 65 IN | SYSTOLIC BLOOD PRESSURE: 159 MMHG | HEART RATE: 67 BPM | WEIGHT: 110 LBS

## 2019-12-17 DIAGNOSIS — J42 CHRONIC BRONCHITIS, UNSPECIFIED CHRONIC BRONCHITIS TYPE (HCC): ICD-10-CM

## 2019-12-17 DIAGNOSIS — K40.90 HERNIA, INGUINAL, RIGHT: ICD-10-CM

## 2019-12-17 DIAGNOSIS — K40.30 INCARCERATED RIGHT INGUINAL HERNIA: Primary | ICD-10-CM

## 2019-12-17 PROBLEM — K41.30 IRREDUCIBLE RIGHT FEMORAL HERNIA: Status: ACTIVE | Noted: 2019-12-17

## 2019-12-17 LAB
ALBUMIN SERPL-MCNC: 4.1 G/DL (ref 3.5–5.2)
ALBUMIN/GLOB SERPL: 1.2 G/DL
ALP SERPL-CCNC: 90 U/L (ref 39–117)
ALT SERPL W P-5'-P-CCNC: 5 U/L (ref 1–33)
ANION GAP SERPL CALCULATED.3IONS-SCNC: 13 MMOL/L (ref 5–15)
AST SERPL-CCNC: 13 U/L (ref 1–32)
BASOPHILS # BLD AUTO: 0.12 10*3/MM3 (ref 0–0.2)
BASOPHILS NFR BLD AUTO: 0.8 % (ref 0–1.5)
BILIRUB SERPL-MCNC: 0.3 MG/DL (ref 0.2–1.2)
BUN BLD-MCNC: 8 MG/DL (ref 6–20)
BUN/CREAT SERPL: 12.7 (ref 7–25)
CALCIUM SPEC-SCNC: 9.9 MG/DL (ref 8.6–10.5)
CHLORIDE SERPL-SCNC: 100 MMOL/L (ref 98–107)
CO2 SERPL-SCNC: 24 MMOL/L (ref 22–29)
CREAT BLD-MCNC: 0.63 MG/DL (ref 0.57–1)
D-LACTATE SERPL-SCNC: 1.2 MMOL/L (ref 0.5–2)
DEPRECATED RDW RBC AUTO: 53.7 FL (ref 37–54)
EOSINOPHIL # BLD AUTO: 0.21 10*3/MM3 (ref 0–0.4)
EOSINOPHIL NFR BLD AUTO: 1.4 % (ref 0.3–6.2)
ERYTHROCYTE [DISTWIDTH] IN BLOOD BY AUTOMATED COUNT: 17.9 % (ref 12.3–15.4)
GFR SERPL CREATININE-BSD FRML MDRD: 97 ML/MIN/1.73
GLOBULIN UR ELPH-MCNC: 3.4 GM/DL
GLUCOSE BLD-MCNC: 128 MG/DL (ref 65–99)
GLUCOSE BLDC GLUCOMTR-MCNC: 232 MG/DL (ref 70–130)
HCT VFR BLD AUTO: 39.6 % (ref 34–46.6)
HGB BLD-MCNC: 12.2 G/DL (ref 12–15.9)
HOLD SPECIMEN: NORMAL
HOLD SPECIMEN: NORMAL
IMM GRANULOCYTES # BLD AUTO: 0.05 10*3/MM3 (ref 0–0.05)
IMM GRANULOCYTES NFR BLD AUTO: 0.3 % (ref 0–0.5)
LIPASE SERPL-CCNC: 34 U/L (ref 13–60)
LYMPHOCYTES # BLD AUTO: 3.19 10*3/MM3 (ref 0.7–3.1)
LYMPHOCYTES NFR BLD AUTO: 21.2 % (ref 19.6–45.3)
MCH RBC QN AUTO: 25.7 PG (ref 26.6–33)
MCHC RBC AUTO-ENTMCNC: 30.8 G/DL (ref 31.5–35.7)
MCV RBC AUTO: 83.4 FL (ref 79–97)
MONOCYTES # BLD AUTO: 0.79 10*3/MM3 (ref 0.1–0.9)
MONOCYTES NFR BLD AUTO: 5.3 % (ref 5–12)
NEUTROPHILS # BLD AUTO: 10.66 10*3/MM3 (ref 1.7–7)
NEUTROPHILS NFR BLD AUTO: 71 % (ref 42.7–76)
NRBC BLD AUTO-RTO: 0 /100 WBC (ref 0–0.2)
PLATELET # BLD AUTO: 435 10*3/MM3 (ref 140–450)
PMV BLD AUTO: 10.9 FL (ref 6–12)
POTASSIUM BLD-SCNC: 4 MMOL/L (ref 3.5–5.2)
PROT SERPL-MCNC: 7.5 G/DL (ref 6–8.5)
RBC # BLD AUTO: 4.75 10*6/MM3 (ref 3.77–5.28)
SODIUM BLD-SCNC: 137 MMOL/L (ref 136–145)
WBC NRBC COR # BLD: 15.02 10*3/MM3 (ref 3.4–10.8)
WHOLE BLOOD HOLD SPECIMEN: NORMAL
WHOLE BLOOD HOLD SPECIMEN: NORMAL

## 2019-12-17 PROCEDURE — 25010000002 PHENYLEPHRINE PER 1 ML: Performed by: NURSE ANESTHETIST, CERTIFIED REGISTERED

## 2019-12-17 PROCEDURE — 25010000002 HYDROMORPHONE PER 4 MG: Performed by: EMERGENCY MEDICINE

## 2019-12-17 PROCEDURE — 25010000002 FENTANYL CITRATE (PF) 100 MCG/2ML SOLUTION: Performed by: NURSE ANESTHETIST, CERTIFIED REGISTERED

## 2019-12-17 PROCEDURE — 82962 GLUCOSE BLOOD TEST: CPT

## 2019-12-17 PROCEDURE — 99222 1ST HOSP IP/OBS MODERATE 55: CPT | Performed by: PHYSICIAN ASSISTANT

## 2019-12-17 PROCEDURE — 88305 TISSUE EXAM BY PATHOLOGIST: CPT | Performed by: SURGERY

## 2019-12-17 PROCEDURE — 96376 TX/PRO/DX INJ SAME DRUG ADON: CPT

## 2019-12-17 PROCEDURE — 25010000002 ONDANSETRON PER 1 MG: Performed by: EMERGENCY MEDICINE

## 2019-12-17 PROCEDURE — 83690 ASSAY OF LIPASE: CPT | Performed by: EMERGENCY MEDICINE

## 2019-12-17 PROCEDURE — C1781 MESH (IMPLANTABLE): HCPCS | Performed by: SURGERY

## 2019-12-17 PROCEDURE — 25010000002 DEXAMETHASONE SODIUM PHOSPHATE 10 MG/ML SOLUTION: Performed by: ANESTHESIOLOGY

## 2019-12-17 PROCEDURE — 25010000002 FENTANYL CITRATE (PF) 100 MCG/2ML SOLUTION: Performed by: ANESTHESIOLOGY

## 2019-12-17 PROCEDURE — 25010000002 PROMETHAZINE PER 50 MG: Performed by: NURSE ANESTHETIST, CERTIFIED REGISTERED

## 2019-12-17 PROCEDURE — 25010000002 BUPRENORPHINE PER 0.1 MG: Performed by: ANESTHESIOLOGY

## 2019-12-17 PROCEDURE — 88302 TISSUE EXAM BY PATHOLOGIST: CPT | Performed by: SURGERY

## 2019-12-17 PROCEDURE — 83605 ASSAY OF LACTIC ACID: CPT | Performed by: EMERGENCY MEDICINE

## 2019-12-17 PROCEDURE — 85025 COMPLETE CBC W/AUTO DIFF WBC: CPT | Performed by: EMERGENCY MEDICINE

## 2019-12-17 PROCEDURE — 63710000001 INSULIN LISPRO (HUMAN) PER 5 UNITS: Performed by: SURGERY

## 2019-12-17 PROCEDURE — 80053 COMPREHEN METABOLIC PANEL: CPT | Performed by: EMERGENCY MEDICINE

## 2019-12-17 PROCEDURE — 99285 EMERGENCY DEPT VISIT HI MDM: CPT

## 2019-12-17 PROCEDURE — 25010000002 PROPOFOL 10 MG/ML EMULSION: Performed by: NURSE ANESTHETIST, CERTIFIED REGISTERED

## 2019-12-17 PROCEDURE — 25010000002 SUCCINYLCHOLINE PER 20 MG: Performed by: NURSE ANESTHETIST, CERTIFIED REGISTERED

## 2019-12-17 PROCEDURE — 96375 TX/PRO/DX INJ NEW DRUG ADDON: CPT

## 2019-12-17 PROCEDURE — 25010000003 CEFAZOLIN IN DEXTROSE 2-4 GM/100ML-% SOLUTION: Performed by: SURGERY

## 2019-12-17 PROCEDURE — 0YU50JZ SUPPLEMENT RIGHT INGUINAL REGION WITH SYNTHETIC SUBSTITUTE, OPEN APPROACH: ICD-10-PCS | Performed by: SURGERY

## 2019-12-17 PROCEDURE — 99406 BEHAV CHNG SMOKING 3-10 MIN: CPT | Performed by: PHYSICIAN ASSISTANT

## 2019-12-17 DEVICE — MESH FLUT SHT 3X6IN: Type: IMPLANTABLE DEVICE | Site: INGUINAL | Status: FUNCTIONAL

## 2019-12-17 RX ORDER — ACETAMINOPHEN 650 MG/1
650 SUPPOSITORY RECTAL EVERY 4 HOURS PRN
Status: DISCONTINUED | OUTPATIENT
Start: 2019-12-17 | End: 2019-12-18 | Stop reason: HOSPADM

## 2019-12-17 RX ORDER — FERROUS SULFATE 325(65) MG
325 TABLET ORAL
Status: DISCONTINUED | OUTPATIENT
Start: 2019-12-18 | End: 2019-12-18 | Stop reason: HOSPADM

## 2019-12-17 RX ORDER — BUPIVACAINE HYDROCHLORIDE 2.5 MG/ML
INJECTION, SOLUTION EPIDURAL; INFILTRATION; INTRACAUDAL
Status: COMPLETED | OUTPATIENT
Start: 2019-12-17 | End: 2019-12-17

## 2019-12-17 RX ORDER — MORPHINE SULFATE 2 MG/ML
2 INJECTION, SOLUTION INTRAMUSCULAR; INTRAVENOUS
Status: DISCONTINUED | OUTPATIENT
Start: 2019-12-17 | End: 2019-12-18 | Stop reason: HOSPADM

## 2019-12-17 RX ORDER — ATROPINE SULFATE 1 MG/ML
0.5 INJECTION, SOLUTION INTRAMUSCULAR; INTRAVENOUS; SUBCUTANEOUS ONCE AS NEEDED
Status: DISCONTINUED | OUTPATIENT
Start: 2019-12-17 | End: 2019-12-17 | Stop reason: HOSPADM

## 2019-12-17 RX ORDER — DEXAMETHASONE SODIUM PHOSPHATE 10 MG/ML
INJECTION, SOLUTION INTRAMUSCULAR; INTRAVENOUS
Status: COMPLETED | OUTPATIENT
Start: 2019-12-17 | End: 2019-12-17

## 2019-12-17 RX ORDER — DEXTROSE MONOHYDRATE 25 G/50ML
25 INJECTION, SOLUTION INTRAVENOUS
Status: DISCONTINUED | OUTPATIENT
Start: 2019-12-17 | End: 2019-12-18 | Stop reason: HOSPADM

## 2019-12-17 RX ORDER — PANTOPRAZOLE SODIUM 40 MG/1
40 TABLET, DELAYED RELEASE ORAL
Status: DISCONTINUED | OUTPATIENT
Start: 2019-12-18 | End: 2019-12-18 | Stop reason: HOSPADM

## 2019-12-17 RX ORDER — SUCCINYLCHOLINE CHLORIDE 20 MG/ML
INJECTION INTRAMUSCULAR; INTRAVENOUS AS NEEDED
Status: DISCONTINUED | OUTPATIENT
Start: 2019-12-17 | End: 2019-12-17 | Stop reason: SURG

## 2019-12-17 RX ORDER — SODIUM CHLORIDE 0.9 % (FLUSH) 0.9 %
10 SYRINGE (ML) INJECTION AS NEEDED
Status: DISCONTINUED | OUTPATIENT
Start: 2019-12-17 | End: 2019-12-18 | Stop reason: HOSPADM

## 2019-12-17 RX ORDER — ASPIRIN 81 MG/1
81 TABLET ORAL EVERY MORNING
Status: DISCONTINUED | OUTPATIENT
Start: 2019-12-18 | End: 2019-12-18 | Stop reason: HOSPADM

## 2019-12-17 RX ORDER — MAGNESIUM HYDROXIDE 1200 MG/15ML
LIQUID ORAL AS NEEDED
Status: DISCONTINUED | OUTPATIENT
Start: 2019-12-17 | End: 2019-12-17 | Stop reason: HOSPADM

## 2019-12-17 RX ORDER — ONDANSETRON 2 MG/ML
4 INJECTION INTRAMUSCULAR; INTRAVENOUS EVERY 6 HOURS PRN
Status: DISCONTINUED | OUTPATIENT
Start: 2019-12-17 | End: 2019-12-18 | Stop reason: HOSPADM

## 2019-12-17 RX ORDER — IPRATROPIUM BROMIDE AND ALBUTEROL SULFATE 2.5; .5 MG/3ML; MG/3ML
3 SOLUTION RESPIRATORY (INHALATION)
Status: DISCONTINUED | OUTPATIENT
Start: 2019-12-17 | End: 2019-12-18 | Stop reason: HOSPADM

## 2019-12-17 RX ORDER — FENTANYL CITRATE 50 UG/ML
50 INJECTION, SOLUTION INTRAMUSCULAR; INTRAVENOUS
Status: DISCONTINUED | OUTPATIENT
Start: 2019-12-17 | End: 2019-12-17 | Stop reason: HOSPADM

## 2019-12-17 RX ORDER — HEPARIN SODIUM 5000 [USP'U]/ML
5000 INJECTION, SOLUTION INTRAVENOUS; SUBCUTANEOUS EVERY 8 HOURS SCHEDULED
Status: DISCONTINUED | OUTPATIENT
Start: 2019-12-18 | End: 2019-12-18 | Stop reason: HOSPADM

## 2019-12-17 RX ORDER — FENTANYL CITRATE 50 UG/ML
100 INJECTION, SOLUTION INTRAMUSCULAR; INTRAVENOUS ONCE
Status: COMPLETED | OUTPATIENT
Start: 2019-12-17 | End: 2019-12-17

## 2019-12-17 RX ORDER — BISACODYL 5 MG/1
10 TABLET, DELAYED RELEASE ORAL DAILY
Status: DISCONTINUED | OUTPATIENT
Start: 2019-12-17 | End: 2019-12-18 | Stop reason: HOSPADM

## 2019-12-17 RX ORDER — PROMETHAZINE HYDROCHLORIDE 25 MG/ML
6.25 INJECTION, SOLUTION INTRAMUSCULAR; INTRAVENOUS ONCE AS NEEDED
Status: COMPLETED | OUTPATIENT
Start: 2019-12-17 | End: 2019-12-17

## 2019-12-17 RX ORDER — ONDANSETRON 2 MG/ML
4 INJECTION INTRAMUSCULAR; INTRAVENOUS ONCE
Status: COMPLETED | OUTPATIENT
Start: 2019-12-17 | End: 2019-12-17

## 2019-12-17 RX ORDER — SODIUM CHLORIDE 0.9 % (FLUSH) 0.9 %
10 SYRINGE (ML) INJECTION EVERY 12 HOURS SCHEDULED
Status: DISCONTINUED | OUTPATIENT
Start: 2019-12-17 | End: 2019-12-17 | Stop reason: HOSPADM

## 2019-12-17 RX ORDER — HYDROMORPHONE HYDROCHLORIDE 1 MG/ML
0.5 INJECTION, SOLUTION INTRAMUSCULAR; INTRAVENOUS; SUBCUTANEOUS
Status: DISCONTINUED | OUTPATIENT
Start: 2019-12-17 | End: 2019-12-18 | Stop reason: HOSPADM

## 2019-12-17 RX ORDER — CLOPIDOGREL BISULFATE 75 MG/1
75 TABLET ORAL EVERY MORNING
Status: DISCONTINUED | OUTPATIENT
Start: 2019-12-18 | End: 2019-12-18 | Stop reason: HOSPADM

## 2019-12-17 RX ORDER — FAMOTIDINE 10 MG/ML
20 INJECTION, SOLUTION INTRAVENOUS
Status: COMPLETED | OUTPATIENT
Start: 2019-12-17 | End: 2019-12-17

## 2019-12-17 RX ORDER — SODIUM CHLORIDE, SODIUM LACTATE, POTASSIUM CHLORIDE, CALCIUM CHLORIDE 600; 310; 30; 20 MG/100ML; MG/100ML; MG/100ML; MG/100ML
9 INJECTION, SOLUTION INTRAVENOUS CONTINUOUS PRN
Status: DISCONTINUED | OUTPATIENT
Start: 2019-12-17 | End: 2019-12-17 | Stop reason: HOSPADM

## 2019-12-17 RX ORDER — NICOTINE POLACRILEX 4 MG
15 LOZENGE BUCCAL
Status: DISCONTINUED | OUTPATIENT
Start: 2019-12-17 | End: 2019-12-18 | Stop reason: HOSPADM

## 2019-12-17 RX ORDER — PROPOFOL 10 MG/ML
VIAL (ML) INTRAVENOUS AS NEEDED
Status: DISCONTINUED | OUTPATIENT
Start: 2019-12-17 | End: 2019-12-17 | Stop reason: SURG

## 2019-12-17 RX ORDER — EPHEDRINE SULFATE 50 MG/ML
5 INJECTION, SOLUTION INTRAVENOUS ONCE AS NEEDED
Status: DISCONTINUED | OUTPATIENT
Start: 2019-12-17 | End: 2019-12-17 | Stop reason: HOSPADM

## 2019-12-17 RX ORDER — BUDESONIDE AND FORMOTEROL FUMARATE DIHYDRATE 160; 4.5 UG/1; UG/1
2 AEROSOL RESPIRATORY (INHALATION)
Status: DISCONTINUED | OUTPATIENT
Start: 2019-12-17 | End: 2019-12-18 | Stop reason: HOSPADM

## 2019-12-17 RX ORDER — NALOXONE HCL 0.4 MG/ML
0.4 VIAL (ML) INJECTION
Status: DISCONTINUED | OUTPATIENT
Start: 2019-12-17 | End: 2019-12-18 | Stop reason: HOSPADM

## 2019-12-17 RX ORDER — BUPRENORPHINE HYDROCHLORIDE 0.32 MG/ML
INJECTION INTRAMUSCULAR; INTRAVENOUS
Status: COMPLETED | OUTPATIENT
Start: 2019-12-17 | End: 2019-12-17

## 2019-12-17 RX ORDER — SODIUM CHLORIDE 0.9 % (FLUSH) 0.9 %
10 SYRINGE (ML) INJECTION AS NEEDED
Status: DISCONTINUED | OUTPATIENT
Start: 2019-12-17 | End: 2019-12-17 | Stop reason: HOSPADM

## 2019-12-17 RX ORDER — PROMETHAZINE HYDROCHLORIDE 25 MG/1
25 SUPPOSITORY RECTAL ONCE AS NEEDED
Status: COMPLETED | OUTPATIENT
Start: 2019-12-17 | End: 2019-12-17

## 2019-12-17 RX ORDER — PROMETHAZINE HYDROCHLORIDE 25 MG/1
25 TABLET ORAL ONCE AS NEEDED
Status: COMPLETED | OUTPATIENT
Start: 2019-12-17 | End: 2019-12-17

## 2019-12-17 RX ORDER — LEVOTHYROXINE AND LIOTHYRONINE 19; 4.5 UG/1; UG/1
90 TABLET ORAL DAILY
Status: DISCONTINUED | OUTPATIENT
Start: 2019-12-18 | End: 2019-12-18 | Stop reason: HOSPADM

## 2019-12-17 RX ORDER — ATORVASTATIN CALCIUM 10 MG/1
10 TABLET, FILM COATED ORAL NIGHTLY
Status: DISCONTINUED | OUTPATIENT
Start: 2019-12-17 | End: 2019-12-18 | Stop reason: HOSPADM

## 2019-12-17 RX ORDER — PROMETHAZINE HYDROCHLORIDE 25 MG/ML
25 INJECTION, SOLUTION INTRAMUSCULAR; INTRAVENOUS EVERY 6 HOURS PRN
Status: DISCONTINUED | OUTPATIENT
Start: 2019-12-17 | End: 2019-12-18 | Stop reason: HOSPADM

## 2019-12-17 RX ORDER — HYDROMORPHONE HYDROCHLORIDE 1 MG/ML
0.5 INJECTION, SOLUTION INTRAMUSCULAR; INTRAVENOUS; SUBCUTANEOUS
Status: COMPLETED | OUTPATIENT
Start: 2019-12-17 | End: 2019-12-17

## 2019-12-17 RX ORDER — ROCURONIUM BROMIDE 10 MG/ML
INJECTION, SOLUTION INTRAVENOUS AS NEEDED
Status: DISCONTINUED | OUTPATIENT
Start: 2019-12-17 | End: 2019-12-17 | Stop reason: SURG

## 2019-12-17 RX ORDER — SODIUM CHLORIDE, SODIUM LACTATE, POTASSIUM CHLORIDE, CALCIUM CHLORIDE 600; 310; 30; 20 MG/100ML; MG/100ML; MG/100ML; MG/100ML
100 INJECTION, SOLUTION INTRAVENOUS CONTINUOUS
Status: DISCONTINUED | OUTPATIENT
Start: 2019-12-17 | End: 2019-12-18 | Stop reason: HOSPADM

## 2019-12-17 RX ORDER — OXYCODONE HYDROCHLORIDE AND ACETAMINOPHEN 5; 325 MG/1; MG/1
2 TABLET ORAL EVERY 4 HOURS PRN
Status: DISCONTINUED | OUTPATIENT
Start: 2019-12-17 | End: 2019-12-18 | Stop reason: HOSPADM

## 2019-12-17 RX ORDER — ACETAMINOPHEN 325 MG/1
650 TABLET ORAL EVERY 4 HOURS PRN
Status: DISCONTINUED | OUTPATIENT
Start: 2019-12-17 | End: 2019-12-18 | Stop reason: HOSPADM

## 2019-12-17 RX ORDER — CEFAZOLIN SODIUM 2 G/100ML
2 INJECTION, SOLUTION INTRAVENOUS ONCE
Status: COMPLETED | OUTPATIENT
Start: 2019-12-17 | End: 2019-12-17

## 2019-12-17 RX ORDER — DOCUSATE SODIUM 100 MG/1
100 CAPSULE, LIQUID FILLED ORAL 2 TIMES DAILY
Status: DISCONTINUED | OUTPATIENT
Start: 2019-12-17 | End: 2019-12-18 | Stop reason: HOSPADM

## 2019-12-17 RX ADMIN — CEFAZOLIN SODIUM 2 G: 2 INJECTION, SOLUTION INTRAVENOUS at 17:00

## 2019-12-17 RX ADMIN — FENTANYL CITRATE 100 MCG: 0.05 INJECTION, SOLUTION INTRAMUSCULAR; INTRAVENOUS at 16:03

## 2019-12-17 RX ADMIN — BUPIVACAINE HYDROCHLORIDE 30 ML: 2.5 INJECTION, SOLUTION EPIDURAL; INFILTRATION; INTRACAUDAL; PERINEURAL at 17:00

## 2019-12-17 RX ADMIN — HYDROMORPHONE HYDROCHLORIDE 0.5 MG: 1 INJECTION, SOLUTION INTRAMUSCULAR; INTRAVENOUS; SUBCUTANEOUS at 14:16

## 2019-12-17 RX ADMIN — FENTANYL CITRATE 50 MCG: 0.05 INJECTION, SOLUTION INTRAMUSCULAR; INTRAVENOUS at 18:53

## 2019-12-17 RX ADMIN — DOCUSATE SODIUM 100 MG: 100 CAPSULE, LIQUID FILLED ORAL at 20:22

## 2019-12-17 RX ADMIN — FAMOTIDINE 20 MG: 10 INJECTION INTRAVENOUS at 15:37

## 2019-12-17 RX ADMIN — ROCURONIUM BROMIDE 5 MG: 10 SOLUTION INTRAVENOUS at 16:55

## 2019-12-17 RX ADMIN — INSULIN LISPRO 4 UNITS: 100 INJECTION, SOLUTION INTRAVENOUS; SUBCUTANEOUS at 22:38

## 2019-12-17 RX ADMIN — HYDROMORPHONE HYDROCHLORIDE 0.5 MG: 1 INJECTION, SOLUTION INTRAMUSCULAR; INTRAVENOUS; SUBCUTANEOUS at 00:47

## 2019-12-17 RX ADMIN — SODIUM CHLORIDE, POTASSIUM CHLORIDE, SODIUM LACTATE AND CALCIUM CHLORIDE 500 ML: 600; 310; 30; 20 INJECTION, SOLUTION INTRAVENOUS at 14:16

## 2019-12-17 RX ADMIN — HYDROMORPHONE HYDROCHLORIDE 0.5 MG: 1 INJECTION, SOLUTION INTRAMUSCULAR; INTRAVENOUS; SUBCUTANEOUS at 14:57

## 2019-12-17 RX ADMIN — BUPRENORPHINE HYDROCHLORIDE 0.3 MG: 0.32 INJECTION INTRAMUSCULAR; INTRAVENOUS at 17:00

## 2019-12-17 RX ADMIN — PHENYLEPHRINE HYDROCHLORIDE 100 MCG: 10 INJECTION, SOLUTION INTRAMUSCULAR; INTRAVENOUS; SUBCUTANEOUS at 17:13

## 2019-12-17 RX ADMIN — PROPOFOL 100 MG: 10 INJECTION, EMULSION INTRAVENOUS at 16:55

## 2019-12-17 RX ADMIN — EPHEDRINE SULFATE 10 MG: 50 INJECTION INTRAMUSCULAR; INTRAVENOUS; SUBCUTANEOUS at 17:12

## 2019-12-17 RX ADMIN — HYDROMORPHONE HYDROCHLORIDE 0.5 MG: 1 INJECTION, SOLUTION INTRAMUSCULAR; INTRAVENOUS; SUBCUTANEOUS at 01:34

## 2019-12-17 RX ADMIN — SUCCINYLCHOLINE CHLORIDE 100 MG: 20 INJECTION, SOLUTION INTRAMUSCULAR; INTRAVENOUS at 16:55

## 2019-12-17 RX ADMIN — PROMETHAZINE HYDROCHLORIDE 6.25 MG: 25 INJECTION INTRAMUSCULAR; INTRAVENOUS at 18:35

## 2019-12-17 RX ADMIN — EPHEDRINE SULFATE 10 MG: 50 INJECTION INTRAMUSCULAR; INTRAVENOUS; SUBCUTANEOUS at 17:09

## 2019-12-17 RX ADMIN — DEXAMETHASONE SODIUM PHOSPHATE 2 MG: 10 INJECTION, SOLUTION INTRAMUSCULAR; INTRAVENOUS at 17:00

## 2019-12-17 RX ADMIN — EPHEDRINE SULFATE 10 MG: 50 INJECTION INTRAMUSCULAR; INTRAVENOUS; SUBCUTANEOUS at 17:06

## 2019-12-17 RX ADMIN — EPHEDRINE SULFATE 10 MG: 50 INJECTION INTRAMUSCULAR; INTRAVENOUS; SUBCUTANEOUS at 17:03

## 2019-12-17 RX ADMIN — FENTANYL CITRATE 50 MCG: 0.05 INJECTION, SOLUTION INTRAMUSCULAR; INTRAVENOUS at 18:23

## 2019-12-17 RX ADMIN — BISACODYL 10 MG: 5 TABLET, COATED ORAL at 20:22

## 2019-12-17 RX ADMIN — ATORVASTATIN CALCIUM 10 MG: 10 TABLET, FILM COATED ORAL at 20:22

## 2019-12-17 RX ADMIN — SODIUM CHLORIDE, POTASSIUM CHLORIDE, SODIUM LACTATE AND CALCIUM CHLORIDE 100 ML/HR: 600; 310; 30; 20 INJECTION, SOLUTION INTRAVENOUS at 18:45

## 2019-12-17 RX ADMIN — FENTANYL CITRATE 50 MCG: 0.05 INJECTION, SOLUTION INTRAMUSCULAR; INTRAVENOUS at 18:45

## 2019-12-17 RX ADMIN — SODIUM CHLORIDE, POTASSIUM CHLORIDE, SODIUM LACTATE AND CALCIUM CHLORIDE 9 ML/HR: 600; 310; 30; 20 INJECTION, SOLUTION INTRAVENOUS at 15:37

## 2019-12-17 RX ADMIN — FENTANYL CITRATE 50 MCG: 0.05 INJECTION, SOLUTION INTRAMUSCULAR; INTRAVENOUS at 18:28

## 2019-12-17 RX ADMIN — ONDANSETRON 4 MG: 2 INJECTION INTRAMUSCULAR; INTRAVENOUS at 14:16

## 2019-12-17 NOTE — ANESTHESIA POSTPROCEDURE EVALUATION
Patient: Bobbi Du    Procedure Summary     Date:  12/17/19 Room / Location:   [facility]    Anesthesia Start:  1654 Anesthesia Stop:      Procedure:  INGUINAL HERNIA REPAIR (Right Groin) Diagnosis:      Surgeon:  Ramakrishna Al MD Provider:  Adrian Diallo MD    Anesthesia Type:  general ASA Status:  3          Anesthesia Type: general    Vitals  No vitals data found for the desired time range.          Post Anesthesia Care and Evaluation    Patient location during evaluation: PACU  Patient participation: complete - patient participated  Level of consciousness: awake and alert  Pain score: 0  Pain management: adequate  Airway patency: patent  Anesthetic complications: No anesthetic complications  PONV Status: none  Cardiovascular status: hemodynamically stable and acceptable  Respiratory status: nonlabored ventilation, acceptable and nasal cannula  Hydration status: acceptable

## 2019-12-17 NOTE — ED PROVIDER NOTES
Subjective   Bobbi Du is a 59 y.o. female who presents to the ED with c/o abdominal pain. The patient reports having severe epigastric pain beginning at approximately 2030 that radiated up to her ribs then to her lower back diffusely. She states that her pain now is all located in her lower abdomen and she describes it as pressure. The patient complains of nausea and difficulty urinating. She has no past history of abdominal pain episodes similar to the current one. She reports having a seroma in her lower abdomen. The patient has a past medical history of peripheral artery disease, diabetes mellitus, hyperlipidemia, COPD, fibromyalgia, rheumatoid arthritis, and hypertension. Her past surgical history includes cardiac catheterization, unspecified back surgery at [facility] in 10/2019, and tubal ligation. There are no other acute complaints at this time.      History provided by:  Patient  Abdominal Pain   Pain location:  Epigastric, LLQ and RLQ  Pain quality: pressure    Pain radiates to:  Back  Pain severity:  Severe  Onset quality:  Sudden  Duration:  1 hour  Timing:  Constant  Progression:  Unchanged  Chronicity:  New  Context: not suspicious food intake    Relieved by:  Nothing  Worsened by:  Nothing  Ineffective treatments:  Position changes and not moving  Associated symptoms: nausea    Associated symptoms: no constipation, no diarrhea, no fever, no hematemesis, no hematochezia, no hematuria, no vaginal bleeding, no vaginal discharge and no vomiting    Risk factors: no alcohol abuse, not elderly, not obese and not pregnant        Review of Systems   Constitutional: Negative for fever.   Gastrointestinal: Positive for abdominal pain and nausea. Negative for constipation, diarrhea, hematemesis, hematochezia and vomiting.   Genitourinary: Positive for difficulty urinating. Negative for hematuria, vaginal bleeding and vaginal discharge.   Musculoskeletal: Positive for back pain.        The patient  complains of pain radiating to her ribs.   All other systems reviewed and are negative.      Past Medical History:   Diagnosis Date   • Charcot foot due to diabetes mellitus (CMS/HCC)     RIGHT   • Chronic pain    • COPD (chronic obstructive pulmonary disease) (CMS/HCC)    • Diabetes mellitus (CMS/HCC)    • Disease of thyroid gland    • Fibromyalgia    • Hyperlipidemia    • Hypertension    • PAD (peripheral artery disease) (CMS/HCC)    • Restless leg    • Rheumatoid arthritis (CMS/HCC)    • Tobacco abuse        Allergies   Allergen Reactions   • Bee Venom Anaphylaxis   • Proventil [Albuterol] Other (See Comments)     palpitations       Past Surgical History:   Procedure Laterality Date   • BACK SURGERY      X 2   • BACK SURGERY  10/2019    st chad   • CARDIAC CATHETERIZATION N/A 5/30/2017    Procedure: Angioplasty-peripheral;  Surgeon: Mayur Artis MD;  Location: [facility] CATH INVASIVE LOCATION;  Service:    • CARPAL TUNNEL RELEASE      X 4   • FOOT SURGERY Bilateral     X5   • INTERVENTIONAL RADIOLOGY PROCEDURE N/A 5/30/2017    Procedure: Abdominal Aortagram with Runoff;  Surgeon: Mayur Artis MD;  Location: [facility] CATH INVASIVE LOCATION;  Service:    • KNEE SURGERY Left    • MS RT/LT HEART CATHETERS N/A 5/30/2017    Procedure: Percutaneous Coronary Intervention;  Surgeon: Mayur Artis MD;  Location: [facility] CATH INVASIVE LOCATION;  Service: Cardiovascular   • MS VEIN IN SITU BYPASS GRAFT,FEM-POP Left 7/10/2017    Procedure: LEFT ILIAC STENT, FEMORAL ENDARECTOMY, LEFT FEMORAL POPLITEAL BYPASS, POSS ILIAC FEMORAL BYPASS;  Surgeon: Mayur Artis MD;  Location: [facility] OR;  Service: Vascular   • THYROIDECTOMY     • TUBAL ABDOMINAL LIGATION         Family History   Problem Relation Age of Onset   • COPD Mother    • Alzheimer's disease Father    • Brain cancer Brother    • Valvular heart disease Maternal Uncle        Social History     Socioeconomic History   • Marital status:      Spouse name:  Not on file   • Number of children: Not on file   • Years of education: Not on file   • Highest education level: Not on file   Tobacco Use   • Smoking status: Current Every Day Smoker     Packs/day: 1.50     Types: Cigarettes   • Smokeless tobacco: Never Used   • Tobacco comment: STATES STARTED SMOKING AT AGE 16   Substance and Sexual Activity   • Alcohol use: No   • Drug use: No   • Sexual activity: Defer         Objective   Physical Exam   Constitutional: She is oriented to person, place, and time. She appears well-developed and well-nourished. No distress.   The patient appears uncomfortable but she is in no acute distress.   HENT:   Head: Normocephalic and atraumatic.   Eyes: Conjunctivae are normal. No scleral icterus.   Neck: Normal range of motion. Neck supple.   Cardiovascular: Normal rate, regular rhythm and normal heart sounds.   No murmur heard.  Pulmonary/Chest: Effort normal and breath sounds normal. No respiratory distress.   Abdominal: Soft. There is generalized tenderness and tenderness in the right lower quadrant and left lower quadrant. There is guarding.   Mild voluntary guarding throughout the abdomen.  Ttenderness located in the left lower quadrant and right lower quadrant.  Right inguinal mass also mildly TTP. Unable to reduce suspected hernia.   Musculoskeletal: Normal range of motion. She exhibits no edema.   Neurological: She is alert and oriented to person, place, and time.   Skin: Skin is warm and dry.   Psychiatric: She has a normal mood and affect. Her behavior is normal.   Nursing note and vitals reviewed.      Procedures         ED Course  ED Course as of Dec 17 2148   Tue Dec 17, 2019   0155 Dr. Dubon is bedside re-evaluating the patient and updating her on the results of the studies.    [BS]   1375 I reviewed all results with the patient, her daughter, and her grandson.  We discussed about the incarcerated hernia and the potential for bowel obstructions, bowel injury, open surgeries,  chronic disability, ostomy, and death.  Pt states that she has to go home to take care of her dogs and that she will return.  She fully understands the risks.      [CP]      ED Course User Index  [BS] Ramone Houston  [CP] Bradford Dubon DO       Recent Results (from the past 24 hour(s))   Lactic Acid, Plasma    Collection Time: 12/16/19 11:10 PM   Result Value Ref Range    Lactate 1.5 0.5 - 2.0 mmol/L   Lactic Acid, Plasma    Collection Time: 12/17/19 12:59 PM   Result Value Ref Range    Lactate 1.2 0.5 - 2.0 mmol/L   Light Blue Top    Collection Time: 12/17/19 12:59 PM   Result Value Ref Range    Extra Tube hold for add-on    Lavender Top    Collection Time: 12/17/19 12:59 PM   Result Value Ref Range    Extra Tube hold for add-on    Gold Top - SST    Collection Time: 12/17/19 12:59 PM   Result Value Ref Range    Extra Tube Hold for add-ons.    CBC Auto Differential    Collection Time: 12/17/19 12:59 PM   Result Value Ref Range    WBC 15.02 (H) 3.40 - 10.80 10*3/mm3    RBC 4.75 3.77 - 5.28 10*6/mm3    Hemoglobin 12.2 12.0 - 15.9 g/dL    Hematocrit 39.6 34.0 - 46.6 %    MCV 83.4 79.0 - 97.0 fL    MCH 25.7 (L) 26.6 - 33.0 pg    MCHC 30.8 (L) 31.5 - 35.7 g/dL    RDW 17.9 (H) 12.3 - 15.4 %    RDW-SD 53.7 37.0 - 54.0 fl    MPV 10.9 6.0 - 12.0 fL    Platelets 435 140 - 450 10*3/mm3    Neutrophil % 71.0 42.7 - 76.0 %    Lymphocyte % 21.2 19.6 - 45.3 %    Monocyte % 5.3 5.0 - 12.0 %    Eosinophil % 1.4 0.3 - 6.2 %    Basophil % 0.8 0.0 - 1.5 %    Immature Grans % 0.3 0.0 - 0.5 %    Neutrophils, Absolute 10.66 (H) 1.70 - 7.00 10*3/mm3    Lymphocytes, Absolute 3.19 (H) 0.70 - 3.10 10*3/mm3    Monocytes, Absolute 0.79 0.10 - 0.90 10*3/mm3    Eosinophils, Absolute 0.21 0.00 - 0.40 10*3/mm3    Basophils, Absolute 0.12 0.00 - 0.20 10*3/mm3    Immature Grans, Absolute 0.05 0.00 - 0.05 10*3/mm3    nRBC 0.0 0.0 - 0.2 /100 WBC   Comprehensive Metabolic Panel    Collection Time: 12/17/19  1:00 PM   Result Value Ref Range    Glucose 128 (H)  65 - 99 mg/dL    BUN 8 6 - 20 mg/dL    Creatinine 0.63 0.57 - 1.00 mg/dL    Sodium 137 136 - 145 mmol/L    Potassium 4.0 3.5 - 5.2 mmol/L    Chloride 100 98 - 107 mmol/L    CO2 24.0 22.0 - 29.0 mmol/L    Calcium 9.9 8.6 - 10.5 mg/dL    Total Protein 7.5 6.0 - 8.5 g/dL    Albumin 4.10 3.50 - 5.20 g/dL    ALT (SGPT) 5 1 - 33 U/L    AST (SGOT) 13 1 - 32 U/L    Alkaline Phosphatase 90 39 - 117 U/L    Total Bilirubin 0.3 0.2 - 1.2 mg/dL    eGFR Non African Amer 97 >60 mL/min/1.73    Globulin 3.4 gm/dL    A/G Ratio 1.2 g/dL    BUN/Creatinine Ratio 12.7 7.0 - 25.0    Anion Gap 13.0 5.0 - 15.0 mmol/L   Lipase    Collection Time: 12/17/19  1:00 PM   Result Value Ref Range    Lipase 34 13 - 60 U/L   Green Top (Gel)    Collection Time: 12/17/19  1:00 PM   Result Value Ref Range    Extra Tube Hold for add-ons.      Note: In addition to lab results from this visit, the labs listed above may include labs taken at another facility or during a different encounter within the last 24 hours. Please correlate lab times with ED admission and discharge times for further clarification of the services performed during this visit.    CT Angiogram Abdomen Pelvis   Final Result   1.  Large indirect right inguinal hernia containing an unobstructed distal small bowel segment. Mild proximal small bowel dilatation with distal decompression. Mild inflammatory soft tissue stranding within the hernia sac.   2.  Left common iliac artery occlusion, reconstituted at its bifurcation.      Signer Name: Mitch Doran MD    Signed: 12/17/2019 12:45 AM    Work[facility]     [facility]      XR Chest 1 View   Final Result   Stable pulmonary hyperinflation. No acute cardiopulmonary findings. No free air seen in the abdomen.      Signer Name: Domi Rocha MD    Signed: 12/16/2019 9:45 PM    Workstation Name: [facility]     [facility]        Vitals:    12/16/19 2230 12/17/19 0034  12/17/19 0042 12/17/19 0146   BP: 152/84 159/84     BP Location:       Patient Position:       Pulse:       Resp:       Temp:       TempSrc:       SpO2:   97% 94%   Weight:       Height:         Medications   Morphine sulfate (PF) injection 4 mg (4 mg Intravenous Given 12/16/19 2156)   ondansetron (ZOFRAN) injection 4 mg (4 mg Intravenous Given 12/16/19 2156)   sodium chloride 0.9 % bolus 1,000 mL (0 mL Intravenous Stopped 12/17/19 0041)   iopamidol (ISOVUE-370) 76 % injection 100 mL (70 mL Intravenous Given 12/16/19 2317)   HYDROmorphone (DILAUDID) injection 0.5 mg (0.5 mg Intravenous Given 12/17/19 0134)     ECG/EMG Results (last 24 hours)     Procedure Component Value Units Date/Time    ECG 12 Lead [[id number]] Collected:  12/16/19 2058     Updated:  12/16/19 2058        ECG 12 Lead                           MDM    Final diagnoses:   Incarcerated femoral hernia   Lower abdominal pain       Documentation assistance provided by mitul Houston.  Information recorded by the mitul was done at my direction and has been verified and validated by me.     Ramone Houston  12/16/19 2147       Bradford Dubon DO  12/17/19 2150

## 2019-12-17 NOTE — ANESTHESIA PROCEDURE NOTES
Airway  Urgency: elective    Date/Time: 12/17/2019 4:58 PM  Airway not difficult    General Information and Staff    Patient location during procedure: OR  CRNA: Rudi Rashid CRNA    Indications and Patient Condition  Indications for airway management: airway protection    Preoxygenated: yes  MILS not maintained throughout  Mask difficulty assessment: 1 - vent by mask    Final Airway Details  Final airway type: endotracheal airway      Successful airway: ETT  Cuffed: yes   Successful intubation technique: direct laryngoscopy  Endotracheal tube insertion site: oral  Blade: Humphrey  Blade size: 3  ETT size (mm): 7.0  Cormack-Lehane Classification: grade I - full view of glottis  Placement verified by: chest auscultation and capnometry   Cuff volume (mL): 8  Measured from: lips  ETT/EBT  to lips (cm): 20  Number of attempts at approach: 1  Assessment: lips, teeth, and gum same as pre-op and atraumatic intubation    Additional Comments  Negative epigastric sounds, Breath sound equal bilaterally with symmetric chest rise and fall

## 2019-12-17 NOTE — ANESTHESIA PREPROCEDURE EVALUATION
Anesthesia Evaluation     Patient summary reviewed and Nursing notes reviewed   no history of anesthetic complications:  NPO Solid Status: > 8 hours  NPO Liquid Status: > 8 hours           Airway   Mallampati: II  TM distance: >3 FB  Neck ROM: full  No difficulty expected  Dental      Pulmonary - normal exam   (+) COPD,   Cardiovascular - normal exam    (+) hypertension, PVD, hyperlipidemia,       Neuro/Psych  (+) numbness, psychiatric history,     GI/Hepatic/Renal/Endo    (+)   diabetes mellitus,     Musculoskeletal     Abdominal    Substance History      OB/GYN          Other   arthritis,                      Anesthesia Plan    ASA 3     general   (Tap)  intravenous induction     Anesthetic plan, all risks, benefits, and alternatives have been provided, discussed and informed consent has been obtained with: patient.    Plan discussed with CRNA.

## 2019-12-17 NOTE — ANESTHESIA PROCEDURE NOTES
Peripheral Block      Patient reassessed immediately prior to procedure    Patient location during procedure: OR  Reason for block: at surgeon's request and post-op pain management  Performed by  Anesthesiologist: Alex Up MD  Preanesthetic Checklist  Completed: patient identified, site marked, surgical consent, pre-op evaluation, timeout performed, IV checked, risks and benefits discussed and monitors and equipment checked  Prep:  Pt Position: supine  Sterile barriers:cap, gloves, sterile barriers and mask  Prep: ChloraPrep  Patient monitoring: blood pressure monitoring, continuous pulse oximetry and EKG  Procedure  Sedation:no  Performed under: general  Guidance:ultrasound guided  Images:still images obtained, printed/placed on chart    Laterality:right  Block Type:TAP  Injection Technique:single-shot  Needle Type:short-bevel and echogenic  Needle Gauge:20 G  Resistance on Injection: none    Medications Used: buprenorphine (BUPRENEX) injection, 0.3 mg  dexamethasone sodium phosphate injection, 2 mg  bupivacaine PF (MARCAINE) 0.25 % injection, 30 mL  Med admintered at 12/17/2019 5:00 PM      Medications  Preservative Free Saline:5ml    Post Assessment  Injection Assessment: negative aspiration for heme, incremental injection and no paresthesia on injection  Patient Tolerance:comfortable throughout block  Complications:no  Additional Notes      Under Ultrasound guidance, a BBraun 4inch 360 degree needle was advanced with Normal Saline hydro dissection of tissue.  The Internal Oblique and Transversus Abdominus muscles where visualized.  At or before the aponeurosis of Internal Oblique, local anesthetic spread was visualized in the Transversus Abdominus Plane. Injection was made incrementally with aspiration every 5 mls.  There was no  intravascular injection,  injection pressure was normal, there was no neural injection, and the procedure was completed without difficulty.  Thank You.

## 2019-12-18 VITALS
HEIGHT: 65 IN | WEIGHT: 110 LBS | DIASTOLIC BLOOD PRESSURE: 56 MMHG | RESPIRATION RATE: 18 BRPM | SYSTOLIC BLOOD PRESSURE: 106 MMHG | HEART RATE: 70 BPM | TEMPERATURE: 98.5 F | BODY MASS INDEX: 18.33 KG/M2 | OXYGEN SATURATION: 95 %

## 2019-12-18 LAB
ANION GAP SERPL CALCULATED.3IONS-SCNC: 12 MMOL/L (ref 5–15)
BASOPHILS # BLD AUTO: 0.03 10*3/MM3 (ref 0–0.2)
BASOPHILS NFR BLD AUTO: 0.3 % (ref 0–1.5)
BUN BLD-MCNC: 9 MG/DL (ref 6–20)
BUN/CREAT SERPL: 16.1 (ref 7–25)
CALCIUM SPEC-SCNC: 9.1 MG/DL (ref 8.6–10.5)
CHLORIDE SERPL-SCNC: 103 MMOL/L (ref 98–107)
CO2 SERPL-SCNC: 23 MMOL/L (ref 22–29)
CREAT BLD-MCNC: 0.56 MG/DL (ref 0.57–1)
DEPRECATED RDW RBC AUTO: 54.9 FL (ref 37–54)
EOSINOPHIL # BLD AUTO: 0.12 10*3/MM3 (ref 0–0.4)
EOSINOPHIL NFR BLD AUTO: 1 % (ref 0.3–6.2)
ERYTHROCYTE [DISTWIDTH] IN BLOOD BY AUTOMATED COUNT: 17.8 % (ref 12.3–15.4)
GFR SERPL CREATININE-BSD FRML MDRD: 111 ML/MIN/1.73
GLUCOSE BLD-MCNC: 159 MG/DL (ref 65–99)
GLUCOSE BLDC GLUCOMTR-MCNC: 202 MG/DL (ref 70–130)
HCT VFR BLD AUTO: 32.7 % (ref 34–46.6)
HGB BLD-MCNC: 9.7 G/DL (ref 12–15.9)
IMM GRANULOCYTES # BLD AUTO: 0.03 10*3/MM3 (ref 0–0.05)
IMM GRANULOCYTES NFR BLD AUTO: 0.3 % (ref 0–0.5)
LYMPHOCYTES # BLD AUTO: 1.23 10*3/MM3 (ref 0.7–3.1)
LYMPHOCYTES NFR BLD AUTO: 10.6 % (ref 19.6–45.3)
MCH RBC QN AUTO: 25.3 PG (ref 26.6–33)
MCHC RBC AUTO-ENTMCNC: 29.7 G/DL (ref 31.5–35.7)
MCV RBC AUTO: 85.4 FL (ref 79–97)
MONOCYTES # BLD AUTO: 0.69 10*3/MM3 (ref 0.1–0.9)
MONOCYTES NFR BLD AUTO: 6 % (ref 5–12)
NEUTROPHILS # BLD AUTO: 9.48 10*3/MM3 (ref 1.7–7)
NEUTROPHILS NFR BLD AUTO: 81.8 % (ref 42.7–76)
NRBC BLD AUTO-RTO: 0 /100 WBC (ref 0–0.2)
PLATELET # BLD AUTO: 413 10*3/MM3 (ref 140–450)
PMV BLD AUTO: 11.3 FL (ref 6–12)
POTASSIUM BLD-SCNC: 4.6 MMOL/L (ref 3.5–5.2)
RBC # BLD AUTO: 3.83 10*6/MM3 (ref 3.77–5.28)
SODIUM BLD-SCNC: 138 MMOL/L (ref 136–145)
WBC NRBC COR # BLD: 11.58 10*3/MM3 (ref 3.4–10.8)

## 2019-12-18 PROCEDURE — 80048 BASIC METABOLIC PNL TOTAL CA: CPT | Performed by: SURGERY

## 2019-12-18 PROCEDURE — 94640 AIRWAY INHALATION TREATMENT: CPT

## 2019-12-18 PROCEDURE — 99239 HOSP IP/OBS DSCHRG MGMT >30: CPT | Performed by: INTERNAL MEDICINE

## 2019-12-18 PROCEDURE — 85025 COMPLETE CBC W/AUTO DIFF WBC: CPT | Performed by: SURGERY

## 2019-12-18 PROCEDURE — 94799 UNLISTED PULMONARY SVC/PX: CPT

## 2019-12-18 PROCEDURE — 97161 PT EVAL LOW COMPLEX 20 MIN: CPT

## 2019-12-18 PROCEDURE — 82962 GLUCOSE BLOOD TEST: CPT

## 2019-12-18 PROCEDURE — 25010000002 HEPARIN (PORCINE) PER 1000 UNITS: Performed by: SURGERY

## 2019-12-18 RX ORDER — OXYCODONE HYDROCHLORIDE AND ACETAMINOPHEN 5; 325 MG/1; MG/1
2 TABLET ORAL EVERY 4 HOURS PRN
Qty: 17 TABLET | Refills: 0 | Status: SHIPPED | OUTPATIENT
Start: 2019-12-18 | End: 2020-03-13

## 2019-12-18 RX ORDER — DOCUSATE SODIUM 100 MG/1
100 CAPSULE, LIQUID FILLED ORAL 2 TIMES DAILY
Qty: 20 CAPSULE | Refills: 0 | Status: SHIPPED | OUTPATIENT
Start: 2019-12-18 | End: 2020-03-13

## 2019-12-18 RX ADMIN — INSULIN LISPRO 4 UNITS: 100 INJECTION, SOLUTION INTRAVENOUS; SUBCUTANEOUS at 08:04

## 2019-12-18 RX ADMIN — HYDROCHLOROTHIAZIDE: 12.5 TABLET ORAL at 08:04

## 2019-12-18 RX ADMIN — IPRATROPIUM BROMIDE AND ALBUTEROL SULFATE 3 ML: 2.5; .5 SOLUTION RESPIRATORY (INHALATION) at 06:28

## 2019-12-18 RX ADMIN — ASPIRIN 81 MG: 81 TABLET, COATED ORAL at 06:56

## 2019-12-18 RX ADMIN — CLOPIDOGREL BISULFATE 75 MG: 75 TABLET ORAL at 06:56

## 2019-12-18 RX ADMIN — FERROUS SULFATE TAB 325 MG (65 MG ELEMENTAL FE) 325 MG: 325 (65 FE) TAB at 08:04

## 2019-12-18 RX ADMIN — PANTOPRAZOLE SODIUM 40 MG: 40 TABLET, DELAYED RELEASE ORAL at 05:38

## 2019-12-18 RX ADMIN — THYROID, PORCINE 90 MG: 30 TABLET ORAL at 08:04

## 2019-12-18 RX ADMIN — OXYCODONE HYDROCHLORIDE AND ACETAMINOPHEN 2 TABLET: 5; 325 TABLET ORAL at 10:34

## 2019-12-18 RX ADMIN — BISACODYL 10 MG: 5 TABLET, COATED ORAL at 08:04

## 2019-12-18 RX ADMIN — HEPARIN SODIUM 5000 UNITS: 5000 INJECTION INTRAVENOUS; SUBCUTANEOUS at 05:37

## 2019-12-18 RX ADMIN — BUDESONIDE AND FORMOTEROL FUMARATE DIHYDRATE 2 PUFF: 160; 4.5 AEROSOL RESPIRATORY (INHALATION) at 06:28

## 2019-12-18 RX ADMIN — DOCUSATE SODIUM 100 MG: 100 CAPSULE, LIQUID FILLED ORAL at 08:04

## 2019-12-19 ENCOUNTER — READMISSION MANAGEMENT (OUTPATIENT)
Dept: CALL CENTER | Facility: HOSPITAL | Age: 59
End: 2019-12-19

## 2019-12-19 LAB
CYTO UR: NORMAL
LAB AP CASE REPORT: NORMAL
LAB AP CLINICAL INFORMATION: NORMAL
PATH REPORT.FINAL DX SPEC: NORMAL
PATH REPORT.GROSS SPEC: NORMAL

## 2019-12-20 ENCOUNTER — READMISSION MANAGEMENT (OUTPATIENT)
Dept: CALL CENTER | Facility: HOSPITAL | Age: 59
End: 2019-12-20

## 2019-12-20 NOTE — OUTREACH NOTE
Prep Survey      Responses   Facility patient discharged from?  [place]   Is patient eligible?  Yes   Discharge diagnosis  Incarcerated right inguinal hernia-repair this visit   Does the patient have one of the following disease processes/diagnoses(primary or secondary)?  General Surgery   Does the patient have Home health ordered?  No   Is there a DME ordered?  No   Prep survey completed?  Yes          Lubna Chavez RN

## 2019-12-20 NOTE — OUTREACH NOTE
"General Surgery Week 1 Survey      Responses   Facility patient discharged from?  [place]   Does the patient have one of the following disease processes/diagnoses(primary or secondary)?  General Surgery   Is there a successful TCM telephone encounter documented?  No   Week 1 attempt successful?  Yes   Call start time  1003   Call end time  1014   Discharge diagnosis  Incarcerated right inguinal hernia-repair this visit   Person spoke with today (if not patient) and relationship  Jennifer-daughter   Meds reviewed with patient/caregiver?  Yes   Is the patient having any side effects they believe may be caused by any medication additions or changes?  No   Does the patient have all medications related to this admission filled (includes all antibiotics, pain medications, etc.)  Yes   Is the patient taking all medications as directed (includes completed medication regime)?  Yes   Does the patient have a follow up appointment scheduled with their surgeon?  Yes   Has the patient kept scheduled appointments due by today?  N/A   Psychosocial issues?  No   Did the patient receive a copy of their discharge instructions?  Yes   Nursing interventions  Reviewed instructions with patient   What is the patient's perception of their health status since discharge?  Improving [Jennifer reports, \"she's pretty sore, but it's normal\"]   Nursing interventions  Nurse provided patient education   Is the patient /caregiver able to teach back basic post-op care?  Take showers only when approved by MD-sponge bathe until then, Keep incision areas clean,dry and protected, No tub bath, swimming, or hot tub until instructed by MD, Do not remove steri-strips, Drive as instructed by MD in discharge instructions, Continue use of incentive spirometry at least 1 week post discharge, Lifting as instructed by MD in discharge instructions   Is the patient/caregiver able to teach back signs and symptoms of incisional infection?  Increased drainage or " bleeding, Increased redness, swelling or pain at the incisonal site, Incisional warmth, Fever, Pus or odor from incision   Is the patient/caregiver able to teach back steps to recovery at home?  Set small, achievable goals for return to baseline health, Eat a well-balance diet, Make a list of questions for surgeon's appointment   If the patient is a current smoker, are they able to teach back resources for cessation?  Smoking cessation medications, [id number], Smoking cessation support groups [Pt is a smoker]   Is the patient/caregiver able to teach back the hierarchy of who to call/visit for symptoms/problems? PCP, Specialist, Home health nurse, Urgent Care, ED, 911  Yes   Week 1 call completed?  Yes          Elodia Arthur RN

## 2019-12-27 ENCOUNTER — READMISSION MANAGEMENT (OUTPATIENT)
Dept: CALL CENTER | Facility: HOSPITAL | Age: 59
End: 2019-12-27

## 2019-12-27 NOTE — OUTREACH NOTE
General Surgery Week 2 Survey      Responses   Facility patient discharged from?  [place]   Does the patient have one of the following disease processes/diagnoses(primary or secondary)?  General Surgery   Week 2 attempt successful?  Yes   Call start time  0951   Call end time  0954   Discharge diagnosis  Incarcerated right inguinal hernia-repair this visit   Person spoke with today (if not patient) and relationship  Jennifer-daughter   Meds reviewed with patient/caregiver?  Yes   Is the patient taking all medications as directed (includes completed medication regime)?  Yes   Has the patient kept scheduled appointments due by today?  N/A   Psychosocial issues?  No   What is the patient's perception of their health status since discharge?  Improving   Nursing interventions  Nurse provided patient education   Is the patient /caregiver able to teach back basic post-op care?  Keep incision areas clean,dry and protected, Lifting as instructed by MD in discharge instructions   Is the patient/caregiver able to teach back signs and symptoms of incisional infection?  Pus or odor from incision, Incisional warmth   Is the patient/caregiver able to teach back steps to recovery at home?  Eat a well-balance diet   Week 2 call completed?  Yes          Elodia Arthur RN

## 2020-01-06 ENCOUNTER — READMISSION MANAGEMENT (OUTPATIENT)
Dept: CALL CENTER | Facility: HOSPITAL | Age: 60
End: 2020-01-06

## 2020-01-06 NOTE — OUTREACH NOTE
General Surgery Week 3 Survey      Responses   Facility patient discharged from?  [place]   Does the patient have one of the following disease processes/diagnoses(primary or secondary)?  General Surgery   Week 3 attempt successful?  No   Unsuccessful attempts  Attempt 1          Kelle Pierce RN

## 2020-01-08 ENCOUNTER — READMISSION MANAGEMENT (OUTPATIENT)
Dept: CALL CENTER | Facility: HOSPITAL | Age: 60
End: 2020-01-08

## 2020-01-08 NOTE — OUTREACH NOTE
General Surgery Week 3 Survey      Responses   Facility patient discharged from?  [place]   Does the patient have one of the following disease processes/diagnoses(primary or secondary)?  General Surgery   Week 3 attempt successful?  Yes   Call start time  1739   Call end time  1742   Discharge diagnosis  Incarcerated right inguinal hernia-repair this visit   Meds reviewed with patient/caregiver?  Yes   Is the patient taking all medications as directed (includes completed medication regime)?  Yes   Has the patient kept scheduled appointments due by today?  No   What is preventing the patient from keeping their appointments?  Doesn't understand importance   Nursing Interventions  -- [Pt has not been able to keep appointments from her back surgery in Oct. because of this surgery.]   What is the patient's perception of their health status since discharge?  Improving   Nursing interventions  Nurse provided patient education   Is the patient /caregiver able to teach back basic post-op care?  Keep incision areas clean,dry and protected, Do not remove steri-strips, Drive as instructed by MD in discharge instructions, Lifting as instructed by MD in discharge instructions   Is the patient/caregiver able to teach back signs and symptoms of incisional infection?  Fever, Increased drainage or bleeding, Incisional warmth   Is the patient/caregiver able to teach back steps to recovery at home?  Eat a well-balance diet   Week 3 call completed?  Yes          Elodia Arthur RN

## 2020-01-17 ENCOUNTER — READMISSION MANAGEMENT (OUTPATIENT)
Dept: CALL CENTER | Facility: HOSPITAL | Age: 60
End: 2020-01-17

## 2020-01-17 NOTE — OUTREACH NOTE
General Surgery Week 4 Survey      Responses   Facility patient discharged from?  [place]   Does the patient have one of the following disease processes/diagnoses(primary or secondary)?  General Surgery   Week 4 attempt successful?  Yes   Call start time  8793   Revoke  Decline to participate [hung up]   Discharge diagnosis  Incarcerated right inguinal hernia-repair this visit          Pj Dobbins RN

## 2020-03-13 ENCOUNTER — TELEPHONE (OUTPATIENT)
Dept: NEUROSURGERY | Facility: CLINIC | Age: 60
End: 2020-03-13

## 2020-03-13 ENCOUNTER — OFFICE VISIT (OUTPATIENT)
Dept: NEUROSURGERY | Facility: CLINIC | Age: 60
End: 2020-03-13

## 2020-03-13 VITALS — RESPIRATION RATE: 15 BRPM | BODY MASS INDEX: 18.99 KG/M2 | HEIGHT: 65 IN | TEMPERATURE: 97.6 F | WEIGHT: 114 LBS

## 2020-03-13 DIAGNOSIS — M54.16 LUMBAR RADICULOPATHY: Primary | ICD-10-CM

## 2020-03-13 PROCEDURE — 99203 OFFICE O/P NEW LOW 30 MIN: CPT | Performed by: NEUROLOGICAL SURGERY

## 2020-03-13 NOTE — TELEPHONE ENCOUNTER
Please call Dr. Chai Gerardo's office and get records from pt's previous lumbar fusion.     Thanks

## 2020-03-13 NOTE — PROGRESS NOTES
Patient: Bobbi Du  : 1960    Primary Care Provider: Provider, No Known    Requesting Provider: As above        History    Chief Complaint: Low back, left hip, and left shin pain.    History of Present Illness: Ms. du is a 59-year-old woman who is disabled who formerly ran a cleaning service.  My colleague Dr. Galvan performed an L5-S1 fusion on her in the early . In September she began experiencing increased back pain that extended in the left hip.  She also had a burning pain in her left shin.  Also 1.5 to 2 years ago she underwent extensive vascular surgery for lower extremity occlusive disease.  Dr. Chai Gerardo at the [facility] performed a fusion extension to L4 on her in October.  She is not worse but has not garnered any relief.  She is here for another opinion.  Symptoms are present constantly but worse when she is on her feet.  She has no bowel or bladder dysfunction.  Intermittently her left leg will give out on her.  She continues to smoke half pack of tobacco daily.    Review of Systems   Constitutional: Negative for activity change, appetite change, chills, diaphoresis, fatigue, fever and unexpected weight change.   HENT: Negative for congestion, dental problem, drooling, ear discharge, ear pain, facial swelling, hearing loss, mouth sores, nosebleeds, postnasal drip, rhinorrhea, sinus pressure, sneezing, sore throat, tinnitus, trouble swallowing and voice change.    Eyes: Negative for photophobia, pain, discharge, redness, itching and visual disturbance.   Respiratory: Negative for apnea, cough, choking, chest tightness, shortness of breath, wheezing and stridor.    Cardiovascular: Positive for leg swelling. Negative for chest pain and palpitations.   Gastrointestinal: Negative for abdominal distention, abdominal pain, anal bleeding, blood in stool, constipation, diarrhea, nausea, rectal pain and vomiting.   Endocrine: Negative for cold intolerance, heat intolerance,  "polydipsia, polyphagia and polyuria.   Genitourinary: Negative for decreased urine volume, difficulty urinating, dysuria, enuresis, flank pain, frequency, genital sores, hematuria and urgency.   Musculoskeletal: Positive for arthralgias, back pain, joint swelling and myalgias. Negative for gait problem, neck pain and neck stiffness.   Skin: Negative for color change, pallor, rash and wound.   Allergic/Immunologic: Negative for environmental allergies, food allergies and immunocompromised state.   Neurological: Negative for dizziness, tremors, seizures, syncope, facial asymmetry, speech difficulty, weakness, light-headedness, numbness and headaches.   Hematological: Negative for adenopathy. Does not bruise/bleed easily.   Psychiatric/Behavioral: Negative for agitation, behavioral problems, confusion, decreased concentration, dysphoric mood, hallucinations, self-injury, sleep disturbance and suicidal ideas. The patient is not nervous/anxious and is not hyperactive.    All other systems reviewed and are negative.      The patient's past medical history, past surgical history, family history, and social history have been reviewed at length in the electronic medical record.    Physical Exam:   Temp 97.6 °F (36.4 °C) (Temporal)   Resp 15   Ht 165.1 cm (65\")   Wt 51.7 kg (114 lb)   BMI 18.97 kg/m²   MUSCULOSKELETAL:  Straight leg raising is negative.  Kirby's Sign is negative.  ROM in back normal.  Tenderness in the back to palpation is not observed.  Well-healed midline lumbar incision is noted.  Older paraspinal incisions are noted.  NEUROLOGICAL:  Strength is intact in the lower extremities to direct testing.  Muscle tone is normal throughout.  Station and gait are normal.  Sensation is intact to light touch testing throughout.  Deep tendon reflexes are 1+ and symmetrical.  Coordination is intact.      Medical Decision Making    Data Review:   MRI study from 9/18/2019 is compared to a study from 2/25/2020.  On " initial study there was an annular tear at L4-5.  Her left-sided L5-S1 cage is quite proud and this was there even before her surgery.  There may be a very subtle listhesis of L3 on L4 with some mild to moderate stenosis.    Diagnosis:   Lumbar radiculopathy.    Treatment Options:   I am not sure that I have a solution for the patient.  However, I am going to set up a lumbar CT myelogram as well as electrodiagnostic studies of her left lower extremity.  I would also like her to who obtain her operative report and records from Dr. Gerardo in the interim as well.       Diagnosis Plan   1. Lumbar radiculopathy  Obtain Informed Consent    IR Myelogram Lumbar Spine    No Lab Testing Needed    EMG & Nerve Conduction Test       Scribed for Rob Arana MD by Lian Broussard CMA on 3/13/2020 14:05       I, Dr. Arana, personally performed the services described in the documentation, as scribed in my presence, and it is both accurate and complete.

## 2020-04-03 ENCOUNTER — TELEPHONE (OUTPATIENT)
Dept: NEUROSURGERY | Facility: CLINIC | Age: 60
End: 2020-04-03

## 2020-04-03 NOTE — TELEPHONE ENCOUNTER
I spoke with the patient and Dr. Arana  - we would like to move forward with her lumbar CT/Myelogram given the progressive weakness of her LLE  - she does need to come off her plavix    Please schedule myelogram for next week

## 2020-04-03 NOTE — TELEPHONE ENCOUNTER
Provider:  Sumit  Caller: self  Time of call:  10:07A  Phone #:  [phone]  Surgery:  n/a  Surgery Date:  n/a  Last visit:   n/a  Next visit: MAY NELSON:         Reason for call:       Patient c/o knee pain and leg pain. She also has numbness and weakness in the LLE.   Pt states that recently her leg has been giving out on her to where she need to take a seat and wait before she resumes activity.  Pt states that some records were to be sent to out office, looks like they were received.  I advised patient that I would see if any further steps had been discussed with Dr. Arana.

## 2020-04-20 ENCOUNTER — TELEPHONE (OUTPATIENT)
Dept: INFUSION THERAPY | Facility: HOSPITAL | Age: 60
End: 2020-04-20

## 2020-04-21 ENCOUNTER — TELEPHONE (OUTPATIENT)
Dept: NEUROSURGERY | Facility: CLINIC | Age: 60
End: 2020-04-21

## 2020-05-13 ENCOUNTER — PREP FOR SURGERY (OUTPATIENT)
Dept: OTHER | Facility: HOSPITAL | Age: 60
End: 2020-05-13

## 2020-05-13 ENCOUNTER — DOCUMENTATION (OUTPATIENT)
Dept: NEUROSURGERY | Facility: CLINIC | Age: 60
End: 2020-05-13

## 2020-05-13 DIAGNOSIS — I70.219 ATHEROSCLEROSIS OF LOWER EXTREMITY WITH CLAUDICATION (HCC): ICD-10-CM

## 2020-05-13 DIAGNOSIS — I73.9 CLAUDICATION OF LOWER EXTREMITY WITH HISTORY OF REVASCULARIZATION (HCC): Primary | ICD-10-CM

## 2020-05-13 DIAGNOSIS — Z98.890 CLAUDICATION OF LOWER EXTREMITY WITH HISTORY OF REVASCULARIZATION (HCC): Primary | ICD-10-CM

## 2020-05-13 DIAGNOSIS — I73.9 CLAUDICATION IN PERIPHERAL VASCULAR DISEASE (HCC): ICD-10-CM

## 2020-05-13 RX ORDER — SODIUM CHLORIDE 9 MG/ML
100 INJECTION, SOLUTION INTRAVENOUS CONTINUOUS
Status: CANCELLED | OUTPATIENT
Start: 2020-05-13

## 2020-05-13 RX ORDER — SODIUM CHLORIDE 0.9 % (FLUSH) 0.9 %
3 SYRINGE (ML) INJECTION EVERY 12 HOURS SCHEDULED
Status: CANCELLED | OUTPATIENT
Start: 2020-05-13

## 2020-05-13 RX ORDER — SODIUM CHLORIDE 0.9 % (FLUSH) 0.9 %
1-10 SYRINGE (ML) INJECTION AS NEEDED
Status: CANCELLED | OUTPATIENT
Start: 2020-05-13

## 2020-05-13 NOTE — PROGRESS NOTES
"NAME: STAR MAYORGA   DOS: 2020  : 1960  PCP: Provider, No Known    Chief Complaint: Left lower extremity pain/claudication    You have chosen to receive care through a telephone visit. Do you consent to use a telephone visit for your medical care today? Yes    History of Present Illness:  59 y.o. female with extensive history of peripheral vascular disease, having undergone left common iliac- common femoral artery bypass, as well as left femoral-popliteal bypass in 2017 by Dr. Artis.  Ms. Choe has been experiencing increasing claudication symptoms over the past several months, which have now progressed to fairly significant \"rest pain\" in her left calf and foot.  She denies any areas of ulceration, and states that her foot is \"warm to touch\".  She is on baseline Plavix/aspirin a regimen.  She does continue to smoke.  She had recent vascular ultrasound in the [facility] which demonstrates evidence of a left common iliac occlusion as well as high-grade anastomotic stenosis involving her proximal fem-pop bypass, with tardus waveforms distally.  Dr. Artis has since retired, his office asked me to review her studies and contact her to see if there was anything I could offer for her symptomatic left lower extremity vascular claudication symptoms.    Past Medical History:  Past Medical History:   Diagnosis Date   • Charcot foot due to diabetes mellitus (CMS/HCC)     RIGHT   • Chronic pain    • COPD (chronic obstructive pulmonary disease) (CMS/HCC)    • COPD mixed type (CMS/HCC)    • Diabetes mellitus (CMS/HCC)    • Disease of thyroid gland    • Fibromyalgia    • Hyperlipidemia    • Hypertension    • PAD (peripheral artery disease) (CMS/HCC)    • Restless leg    • Rheumatoid arthritis (CMS/HCC)    • Tobacco abuse        Past Surgical History:  Past Surgical History:   Procedure Laterality Date   • BACK SURGERY  10/10/2019    L4-5 PLIF, laminectomy, foraminectomy -Dr. Chai Gerardo    • " CARDIAC CATHETERIZATION N/A 5/30/2017    Procedure: Angioplasty-peripheral;  Surgeon: Mayur Artis MD;  Location:  [facility];  Service:    • CARPAL TUNNEL RELEASE      X 4   • FOOT SURGERY Bilateral     X5   • INGUINAL HERNIA REPAIR Right 12/17/2019    Procedure: INGUINAL HERNIA REPAIR RIGHT WITH MESH;  Surgeon: Ramakrishna Al MD;  Location:  [facility];  Service: General   • INTERVENTIONAL RADIOLOGY PROCEDURE N/A 5/30/2017    Procedure: Abdominal Aortagram with Runoff;  Surgeon: Mayur Artis MD;  Location:  [facility];  Service:    • KNEE SURGERY Left    • LUMBAR FUSION  02/22/2010    L5/S1 fusion Dr. Chai Galvan    • OR RT/LT HEART CATHETERS N/A 5/30/2017    Procedure: Percutaneous Coronary Intervention;  Surgeon: Mayur Artis MD;  Location:  [facility];  Service: Cardiovascular   • OR VEIN IN SITU BYPASS GRAFT,FEM-POP Left 7/10/2017    Procedure: LEFT ILIAC STENT, FEMORAL ENDARECTOMY, LEFT FEMORAL POPLITEAL BYPASS, POSS ILIAC FEMORAL BYPASS;  Surgeon: Mayur Artis MD;  Location:  [facility];  Service: Vascular   • THYROIDECTOMY     • TUBAL ABDOMINAL LIGATION         Review of Systems:        Review of Systems   Musculoskeletal: Positive for back pain, gait problem and myalgias.   All other systems reviewed and are negative.       Medications    Current Outpatient Medications:   •  aspirin 81 MG EC tablet, Take 81 mg by mouth Every Morning., Disp: , Rfl:   •  clopidogrel (PLAVIX) 75 MG tablet, Take 75 mg by mouth Every Morning., Disp: , Rfl:   •  lisinopril-hydrochlorothiazide (PRINZIDE,ZESTORETIC) 10-12.5 MG per tablet, Take 1 tablet by mouth Every Morning., Disp: , Rfl:   •  metFORMIN (GLUCOPHAGE) 500 MG tablet, Take 500 mg by mouth Daily With Breakfast., Disp: , Rfl:   •  Pirbuterol Acetate (MAXAIR AUTOHALER IN), Inhale 1 inhaler As Needed., Disp: , Rfl:   •  Thyroid (ARMOUR THYROID) 60 MG PO tablet, Take 90 mg by mouth Daily., Disp:  , Rfl:   •  umeclidinium-vilanterol (ANORO ELLIPTA) 62.5-25 MCG/INH aerosol powder  inhaler, Inhale 1 puff Daily., Disp: , Rfl:     Allergies:  Allergies   Allergen Reactions   • Bee Venom Anaphylaxis   • Codeine Nausea Only   • Proventil [Albuterol] Other (See Comments)     palpitations       Social Hx:  Social History     Tobacco Use   • Smoking status: Current Every Day Smoker     Packs/day: 1.50     Types: Cigarettes   • Smokeless tobacco: Never Used   • Tobacco comment: STATES STARTED SMOKING AT AGE 16   Substance Use Topics   • Alcohol use: No   • Drug use: No       Family Hx:  Family History   Problem Relation Age of Onset   • COPD Mother    • Alzheimer's disease Father    • Brain cancer Brother    • Valvular heart disease Maternal Uncle        Review of Imaging:  CT angiogram of the abdomen and pelvis dated 12/16/2019 from [facility], as well as a vascular ultrasound from the [facility] dated 5/12/2020 were reviewed along with her corresponding radiologic reports.  The studies demonstrate atherosclerotic occlusion of the left common iliac artery.  There are internal iliac collaterals which reconstitute the left common iliac bifurcation and external iliac bypass graft which are patent.  The left common femoral bifurcation is patent, as well as the proximal/visualized portions of the femoral-popliteal graft, but with a focal area of high-grade stenosis at the proximal anastomosis (peak systolic velocities 409 cm/s)..  The left LEE ANN is 0.28, the right LEE ANN is 0.56.  The distal femoral-popliteal graft demonstrates tardus waveforms with very low velocities, indicative of critical proximal arterial stenosis, and these findings have significantly progressed compared to prior study from 2018.    Physical Examination:  Given the current coronavirus pandemic, I am discussing these findings with Ms. Choe via telephone.  I discussed her claudication symptoms with her, and she states that  "these have clearly progressed over the past several months, and she has \"excruciating\" pain in the left lower extremity at rest.  She again denies any ulceration, and states that her foot is \"warm\".  She feels like she is starting to lose some sensation in the left lower extremity.        Diagnoses/Plan:    Ms. Du is a 59 y.o. female progressive vascular claudication symptoms involving the left lower extremity, which have now progressed to a fairly significant rest pain and some subjective loss of sensation.  Vascular imaging studies demonstrate occlusion of her left common iliac artery and a high-grade stenosis at her left femoral-popliteal anastomosis.  I discussed these findings with her, and I do think it prudent for her to come in rather urgently to have a diagnostic study and intervention (if feasible).  I did give her the option of coming to see me in the office to discuss this further, but she wishes to just be scheduled for the procedure, which I think is reasonable.  She will continue on her dual antiplatelet therapy.    Bobbi Du  reports that she has been smoking cigarettes. She has been smoking about 1.50 packs per day. She has never used smokeless tobacco.. I have educated her on the risk of diseases from using tobacco products such as cancer, COPD, heart diease and stroke, and progressive limb threatening ischemia and need for additional future surgeries (including amputation).     I advised her to quit and she is willing to quit. We have discussed the following method/s for tobacco cessation:                          "

## 2020-05-13 NOTE — H&P (VIEW-ONLY)
"NAME: STAR MAYORGA   DOS: 2020  : 1960  PCP: Provider, No Known    Chief Complaint: Left lower extremity pain/claudication    You have chosen to receive care through a telephone visit. Do you consent to use a telephone visit for your medical care today? Yes    History of Present Illness:  59 y.o. female with extensive history of peripheral vascular disease, having undergone left common iliac- common femoral artery bypass, as well as left femoral-popliteal bypass in 2017 by Dr. Artis.  Ms. Choe has been experiencing increasing claudication symptoms over the past several months, which have now progressed to fairly significant \"rest pain\" in her left calf and foot.  She denies any areas of ulceration, and states that her foot is \"warm to touch\".  She is on baseline Plavix/aspirin a regimen.  She does continue to smoke.  She had recent vascular ultrasound in the [facility] which demonstrates evidence of a left common iliac occlusion as well as high-grade anastomotic stenosis involving her proximal fem-pop bypass, with tardus waveforms distally.  Dr. Artis has since retired, his office asked me to review her studies and contact her to see if there was anything I could offer for her symptomatic left lower extremity vascular claudication symptoms.    Past Medical History:  Past Medical History:   Diagnosis Date   • Charcot foot due to diabetes mellitus (CMS/HCC)     RIGHT   • Chronic pain    • COPD (chronic obstructive pulmonary disease) (CMS/HCC)    • COPD mixed type (CMS/HCC)    • Diabetes mellitus (CMS/HCC)    • Disease of thyroid gland    • Fibromyalgia    • Hyperlipidemia    • Hypertension    • PAD (peripheral artery disease) (CMS/HCC)    • Restless leg    • Rheumatoid arthritis (CMS/HCC)    • Tobacco abuse        Past Surgical History:  Past Surgical History:   Procedure Laterality Date   • BACK SURGERY  10/10/2019    L4-5 PLIF, laminectomy, foraminectomy -Dr. Chai Gerardo    • " CARDIAC CATHETERIZATION N/A 5/30/2017    Procedure: Angioplasty-peripheral;  Surgeon: Mayur Artis MD;  Location:  [facility];  Service:    • CARPAL TUNNEL RELEASE      X 4   • FOOT SURGERY Bilateral     X5   • INGUINAL HERNIA REPAIR Right 12/17/2019    Procedure: INGUINAL HERNIA REPAIR RIGHT WITH MESH;  Surgeon: Ramakrishna Al MD;  Location:  [facility];  Service: General   • INTERVENTIONAL RADIOLOGY PROCEDURE N/A 5/30/2017    Procedure: Abdominal Aortagram with Runoff;  Surgeon: Mayur Artis MD;  Location:  [facility];  Service:    • KNEE SURGERY Left    • LUMBAR FUSION  02/22/2010    L5/S1 fusion Dr. Chai Galvan    • ND RT/LT HEART CATHETERS N/A 5/30/2017    Procedure: Percutaneous Coronary Intervention;  Surgeon: Mayur Artis MD;  Location:  [facility];  Service: Cardiovascular   • ND VEIN IN SITU BYPASS GRAFT,FEM-POP Left 7/10/2017    Procedure: LEFT ILIAC STENT, FEMORAL ENDARECTOMY, LEFT FEMORAL POPLITEAL BYPASS, POSS ILIAC FEMORAL BYPASS;  Surgeon: Mayur Artis MD;  Location:  [facility];  Service: Vascular   • THYROIDECTOMY     • TUBAL ABDOMINAL LIGATION         Review of Systems:        Review of Systems   Musculoskeletal: Positive for back pain, gait problem and myalgias.   All other systems reviewed and are negative.       Medications    Current Outpatient Medications:   •  aspirin 81 MG EC tablet, Take 81 mg by mouth Every Morning., Disp: , Rfl:   •  clopidogrel (PLAVIX) 75 MG tablet, Take 75 mg by mouth Every Morning., Disp: , Rfl:   •  lisinopril-hydrochlorothiazide (PRINZIDE,ZESTORETIC) 10-12.5 MG per tablet, Take 1 tablet by mouth Every Morning., Disp: , Rfl:   •  metFORMIN (GLUCOPHAGE) 500 MG tablet, Take 500 mg by mouth Daily With Breakfast., Disp: , Rfl:   •  Pirbuterol Acetate (MAXAIR AUTOHALER IN), Inhale 1 inhaler As Needed., Disp: , Rfl:   •  Thyroid (ARMOUR THYROID) 60 MG PO tablet, Take 90 mg by mouth Daily., Disp:  , Rfl:   •  umeclidinium-vilanterol (ANORO ELLIPTA) 62.5-25 MCG/INH aerosol powder  inhaler, Inhale 1 puff Daily., Disp: , Rfl:     Allergies:  Allergies   Allergen Reactions   • Bee Venom Anaphylaxis   • Codeine Nausea Only   • Proventil [Albuterol] Other (See Comments)     palpitations       Social Hx:  Social History     Tobacco Use   • Smoking status: Current Every Day Smoker     Packs/day: 1.50     Types: Cigarettes   • Smokeless tobacco: Never Used   • Tobacco comment: STATES STARTED SMOKING AT AGE 16   Substance Use Topics   • Alcohol use: No   • Drug use: No       Family Hx:  Family History   Problem Relation Age of Onset   • COPD Mother    • Alzheimer's disease Father    • Brain cancer Brother    • Valvular heart disease Maternal Uncle        Review of Imaging:  CT angiogram of the abdomen and pelvis dated 12/16/2019 from [facility], as well as a vascular ultrasound from the [facility] dated 5/12/2020 were reviewed along with her corresponding radiologic reports.  The studies demonstrate atherosclerotic occlusion of the left common iliac artery.  There are internal iliac collaterals which reconstitute the left common iliac bifurcation and external iliac bypass graft which are patent.  The left common femoral bifurcation is patent, as well as the proximal/visualized portions of the femoral-popliteal graft, but with a focal area of high-grade stenosis at the proximal anastomosis (peak systolic velocities 409 cm/s)..  The left LEE ANN is 0.28, the right LEE ANN is 0.56.  The distal femoral-popliteal graft demonstrates tardus waveforms with very low velocities, indicative of critical proximal arterial stenosis, and these findings have significantly progressed compared to prior study from 2018.    Physical Examination:  Given the current coronavirus pandemic, I am discussing these findings with Ms. Choe via telephone.  I discussed her claudication symptoms with her, and she states that  "these have clearly progressed over the past several months, and she has \"excruciating\" pain in the left lower extremity at rest.  She again denies any ulceration, and states that her foot is \"warm\".  She feels like she is starting to lose some sensation in the left lower extremity.        Diagnoses/Plan:    Ms. Du is a 59 y.o. female progressive vascular claudication symptoms involving the left lower extremity, which have now progressed to a fairly significant rest pain and some subjective loss of sensation.  Vascular imaging studies demonstrate occlusion of her left common iliac artery and a high-grade stenosis at her left femoral-popliteal anastomosis.  I discussed these findings with her, and I do think it prudent for her to come in rather urgently to have a diagnostic study and intervention (if feasible).  I did give her the option of coming to see me in the office to discuss this further, but she wishes to just be scheduled for the procedure, which I think is reasonable.  She will continue on her dual antiplatelet therapy.    Bobbi Du  reports that she has been smoking cigarettes. She has been smoking about 1.50 packs per day. She has never used smokeless tobacco.. I have educated her on the risk of diseases from using tobacco products such as cancer, COPD, heart diease and stroke, and progressive limb threatening ischemia and need for additional future surgeries (including amputation).     I advised her to quit and she is willing to quit. We have discussed the following method/s for tobacco cessation:                          "

## 2020-05-14 ENCOUNTER — OFFICE VISIT (OUTPATIENT)
Dept: PREADMISSION TESTING | Facility: HOSPITAL | Age: 60
End: 2020-05-14

## 2020-05-14 LAB
REF LAB TEST METHOD: NORMAL
SARS-COV-2 RNA RESP QL NAA+PROBE: NOT DETECTED

## 2020-05-14 PROCEDURE — U0004 COV-19 TEST NON-CDC HGH THRU: HCPCS | Performed by: INTERNAL MEDICINE

## 2020-05-14 PROCEDURE — C9803 HOPD COVID-19 SPEC COLLECT: HCPCS

## 2020-05-14 PROCEDURE — U0002 COVID-19 LAB TEST NON-CDC: HCPCS | Performed by: INTERNAL MEDICINE

## 2020-05-15 ENCOUNTER — HOSPITAL ENCOUNTER (OUTPATIENT)
Facility: HOSPITAL | Age: 60
Setting detail: HOSPITAL OUTPATIENT SURGERY
Discharge: HOME | End: 2020-05-15
Attending: RADIOLOGY | Admitting: RADIOLOGY

## 2020-05-15 VITALS
DIASTOLIC BLOOD PRESSURE: 103 MMHG | BODY MASS INDEX: 18.58 KG/M2 | HEIGHT: 65 IN | TEMPERATURE: 98.3 F | WEIGHT: 111.5 LBS | OXYGEN SATURATION: 94 % | SYSTOLIC BLOOD PRESSURE: 153 MMHG | RESPIRATION RATE: 16 BRPM | HEART RATE: 100 BPM

## 2020-05-15 DIAGNOSIS — I73.9 CLAUDICATION OF LOWER EXTREMITY WITH HISTORY OF REVASCULARIZATION (HCC): ICD-10-CM

## 2020-05-15 DIAGNOSIS — Z98.890 CLAUDICATION OF LOWER EXTREMITY WITH HISTORY OF REVASCULARIZATION (HCC): ICD-10-CM

## 2020-05-15 DIAGNOSIS — M54.16 LUMBAR RADICULOPATHY: ICD-10-CM

## 2020-05-15 LAB
ANION GAP SERPL CALCULATED.3IONS-SCNC: 14 MMOL/L (ref 5–15)
BUN BLD-MCNC: 7 MG/DL (ref 6–20)
BUN/CREAT SERPL: 11.7 (ref 7–25)
CALCIUM SPEC-SCNC: 9 MG/DL (ref 8.6–10.5)
CHLORIDE SERPL-SCNC: 103 MMOL/L (ref 98–107)
CO2 SERPL-SCNC: 20 MMOL/L (ref 22–29)
CREAT BLD-MCNC: 0.6 MG/DL (ref 0.57–1)
DEPRECATED RDW RBC AUTO: 46.9 FL (ref 37–54)
ERYTHROCYTE [DISTWIDTH] IN BLOOD BY AUTOMATED COUNT: 17.8 % (ref 12.3–15.4)
GFR SERPL CREATININE-BSD FRML MDRD: 103 ML/MIN/1.73
GLUCOSE BLD-MCNC: 126 MG/DL (ref 65–99)
HCT VFR BLD AUTO: 26.9 % (ref 34–46.6)
HGB BLD-MCNC: 7.8 G/DL (ref 12–15.9)
MCH RBC QN AUTO: 21.4 PG (ref 26.6–33)
MCHC RBC AUTO-ENTMCNC: 29 G/DL (ref 31.5–35.7)
MCV RBC AUTO: 73.9 FL (ref 79–97)
PLATELET # BLD AUTO: 379 10*3/MM3 (ref 140–450)
PMV BLD AUTO: 10.3 FL (ref 6–12)
POTASSIUM BLD-SCNC: 4.1 MMOL/L (ref 3.5–5.2)
RBC # BLD AUTO: 3.64 10*6/MM3 (ref 3.77–5.28)
SODIUM BLD-SCNC: 137 MMOL/L (ref 136–145)
WBC NRBC COR # BLD: 11.41 10*3/MM3 (ref 3.4–10.8)

## 2020-05-15 PROCEDURE — C1887 CATHETER, GUIDING: HCPCS | Performed by: RADIOLOGY

## 2020-05-15 PROCEDURE — 80048 BASIC METABOLIC PNL TOTAL CA: CPT | Performed by: RADIOLOGY

## 2020-05-15 PROCEDURE — 25010000002 FENTANYL CITRATE (PF) 100 MCG/2ML SOLUTION: Performed by: RADIOLOGY

## 2020-05-15 PROCEDURE — 25010000002 HEPARIN (PORCINE) PER 1000 UNITS: Performed by: RADIOLOGY

## 2020-05-15 PROCEDURE — C1769 GUIDE WIRE: HCPCS | Performed by: RADIOLOGY

## 2020-05-15 PROCEDURE — 0 IODIXANOL PER 1 ML: Performed by: RADIOLOGY

## 2020-05-15 PROCEDURE — 36245 INS CATH ABD/L-EXT ART 1ST: CPT | Performed by: RADIOLOGY

## 2020-05-15 PROCEDURE — 25010000002 MIDAZOLAM PER 1 MG: Performed by: RADIOLOGY

## 2020-05-15 PROCEDURE — 75625 CONTRAST EXAM ABDOMINL AORTA: CPT | Performed by: RADIOLOGY

## 2020-05-15 PROCEDURE — C1894 INTRO/SHEATH, NON-LASER: HCPCS | Performed by: RADIOLOGY

## 2020-05-15 PROCEDURE — 85027 COMPLETE CBC AUTOMATED: CPT | Performed by: RADIOLOGY

## 2020-05-15 PROCEDURE — 75716 ARTERY X-RAYS ARMS/LEGS: CPT | Performed by: RADIOLOGY

## 2020-05-15 PROCEDURE — 36415 COLL VENOUS BLD VENIPUNCTURE: CPT

## 2020-05-15 PROCEDURE — 75630 X-RAY AORTA LEG ARTERIES: CPT | Performed by: RADIOLOGY

## 2020-05-15 RX ORDER — FENTANYL CITRATE 50 UG/ML
INJECTION, SOLUTION INTRAMUSCULAR; INTRAVENOUS AS NEEDED
Status: DISCONTINUED | OUTPATIENT
Start: 2020-05-15 | End: 2020-05-15 | Stop reason: HOSPADM

## 2020-05-15 RX ORDER — MIDAZOLAM HYDROCHLORIDE 1 MG/ML
INJECTION INTRAMUSCULAR; INTRAVENOUS AS NEEDED
Status: DISCONTINUED | OUTPATIENT
Start: 2020-05-15 | End: 2020-05-15 | Stop reason: HOSPADM

## 2020-05-15 RX ORDER — SODIUM CHLORIDE 9 MG/ML
100 INJECTION, SOLUTION INTRAVENOUS CONTINUOUS
Status: DISCONTINUED | OUTPATIENT
Start: 2020-05-15 | End: 2020-05-15 | Stop reason: HOSPADM

## 2020-05-15 RX ORDER — SODIUM CHLORIDE 0.9 % (FLUSH) 0.9 %
1-10 SYRINGE (ML) INJECTION AS NEEDED
Status: DISCONTINUED | OUTPATIENT
Start: 2020-05-15 | End: 2020-05-15 | Stop reason: HOSPADM

## 2020-05-15 RX ORDER — GABAPENTIN 600 MG/1
600 TABLET ORAL 3 TIMES DAILY
COMMUNITY
End: 2022-09-08 | Stop reason: ALTCHOICE

## 2020-05-15 RX ORDER — LEVOTHYROXINE AND LIOTHYRONINE 57; 13.5 UG/1; UG/1
90 TABLET ORAL EVERY MORNING
COMMUNITY
End: 2022-09-09 | Stop reason: SDUPTHER

## 2020-05-15 RX ORDER — SODIUM CHLORIDE 0.9 % (FLUSH) 0.9 %
3 SYRINGE (ML) INJECTION EVERY 12 HOURS SCHEDULED
Status: DISCONTINUED | OUTPATIENT
Start: 2020-05-15 | End: 2020-05-15 | Stop reason: HOSPADM

## 2020-05-15 RX ORDER — HYDROCODONE BITARTRATE AND ACETAMINOPHEN 7.5; 325 MG/1; MG/1
1 TABLET ORAL EVERY 6 HOURS PRN
Qty: 12 TABLET | Refills: 0 | Status: SHIPPED | OUTPATIENT
Start: 2020-05-15 | End: 2020-05-18

## 2020-05-15 RX ORDER — LIDOCAINE HYDROCHLORIDE 10 MG/ML
INJECTION, SOLUTION EPIDURAL; INFILTRATION; INTRACAUDAL; PERINEURAL AS NEEDED
Status: DISCONTINUED | OUTPATIENT
Start: 2020-05-15 | End: 2020-05-15 | Stop reason: HOSPADM

## 2020-05-15 RX ORDER — SODIUM CHLORIDE 0.9 % (FLUSH) 0.9 %
10 SYRINGE (ML) INJECTION AS NEEDED
Status: DISCONTINUED | OUTPATIENT
Start: 2020-05-15 | End: 2020-05-15 | Stop reason: HOSPADM

## 2020-05-15 RX ORDER — SODIUM CHLORIDE 9 MG/ML
75 INJECTION, SOLUTION INTRAVENOUS CONTINUOUS
Status: DISCONTINUED | OUTPATIENT
Start: 2020-05-15 | End: 2020-05-15 | Stop reason: HOSPADM

## 2020-05-15 RX ORDER — IODIXANOL 320 MG/ML
INJECTION, SOLUTION INTRAVASCULAR AS NEEDED
Status: DISCONTINUED | OUTPATIENT
Start: 2020-05-15 | End: 2020-05-15 | Stop reason: HOSPADM

## 2020-05-15 RX ORDER — LEVOTHYROXINE AND LIOTHYRONINE 9.5; 2.25 UG/1; UG/1
15 TABLET ORAL DAILY
COMMUNITY
End: 2022-09-09 | Stop reason: SDUPTHER

## 2020-05-15 NOTE — NURSING NOTE
"Nursing went into room at 1545 to see why patient's blood pressure had not been taking to find that patient had unhooked herself from the monitor and was getting dressed. Patient states at this time that she is planning on leaving and asks for the MD to be called about this. She asks nurse to take her IV out so she can go. Nursing asked patient why she is wanting to leave and she states that \"I have had a bad day and I'm leaving.\" Patient still has TR band on left wrist at this time. Air was due to be removed starting at 1400 and per cath lab, had 15ml in band. Nurse had previously informed patient that it would take around 1.5-2hrs to completely remove air and for patient to be ready for discharge. Once patient stated that she was leaving, nurse explained that the process of removing band had not been started and the dangers of leaving with the TR band on. Patient states that she would \"figure it out when she left.\" Patient states that she will \"just go to local hospital in [place] to have band removed in a little while.\" Nurse attempted to explain why that would not work and patient does not seem to care at this time. MD was then contacted to come to patient's room to speak with her. MD arrived, explained issues with patient leaving early and patient still states that she is leaving. Nurse and MD both explain that patient will have to sign out AMA since recovery is not finished. Patient is agreeable to this. Paper signed by patient, nurse and MD. IV was removed by nursing and patient walked out of unit at 1408. Patient refused any teaching, refused to take discharge papers and CD with images with her.     **MD had written paper prescription for Norco. Copy of this prescription was made and put in medical records box. Actual prescription was shredded by staff.   "

## 2020-05-15 NOTE — PLAN OF CARE
Ms Du has a history of chronic anemia (previously worked up per patient), has been off her iron pills for several months.  Her H/H today is 7.8/26.9.  She denies any SOB, near syncope, and is currently asymptomatic.  I gave her the option of rescheduling procedure until her blood counts can be corrected, but she states that her rest pain/numbness in LLE and wishes to proceed.  Her recent arterial ultrasound certainly suggests impending graft failure/thrombosis, and thus I think it is reasonable to proceed with at least a diagnostic angiogram plus minus intervention.  I gave her the option of being admitted after the procedure for her anemia, but she declined and would rather start her iron pills and follow-up with her PCP next week.

## 2020-05-15 NOTE — SIGNIFICANT NOTE
"I had discussed the findings and results with Ms. Du following her procedure while she was in the initial phases of her recovery in the CVICU.  She expressed an understanding, and we discussed having her follow-up with Dr. Srikanth Amaya of vascular surgery for further attempts at revascularization.  She was agreeable to this, and we were in the process of making her a follow-up for early next week.      However, about an hour into her recovery, I was called by nursing stating that she was leaving AMA.  I came over to discuss this with her, and she had already gotten dressed.  She still has her radial \"TR band\" on.  She seems very agitated, and states that she is just very disappointed that her occlusions had progressed and that this is just \"too much for her to handle\".  I encouraged her to stay for duration of her recovery, and emphasized that she would be d/c home in a few hours, and the potential for limb threatening injury to her left hand without proper postprocedural care.  Nonetheless, she expressed an understanding, but still insisted upon leaving AMA.    I also discussed with her that it is very difficult to refer her for an additional procedure/surgery, if she is unwilling to comply with any of the postprocedural care, she agreed and does not wish to follow-up with vascular surgery, and thus we are going to cancel her vascular surgery appointment.  I reiterated the critical nature of the blood flow to her left leg, and my concerns for limb threatening ischemia and potential amputation, and she again expressed an understanding but still insisted upon leaving AMA.  "

## 2022-09-08 ENCOUNTER — TELEPHONE (OUTPATIENT)
Dept: INTERNAL MEDICINE | Facility: CLINIC | Age: 62
End: 2022-09-08

## 2022-09-08 ENCOUNTER — LAB (OUTPATIENT)
Dept: LAB | Facility: HOSPITAL | Age: 62
End: 2022-09-08

## 2022-09-08 ENCOUNTER — OFFICE VISIT (OUTPATIENT)
Dept: INTERNAL MEDICINE | Facility: CLINIC | Age: 62
End: 2022-09-08

## 2022-09-08 VITALS
BODY MASS INDEX: 17.86 KG/M2 | HEIGHT: 65 IN | DIASTOLIC BLOOD PRESSURE: 72 MMHG | OXYGEN SATURATION: 98 % | WEIGHT: 107.2 LBS | HEART RATE: 80 BPM | TEMPERATURE: 98.2 F | SYSTOLIC BLOOD PRESSURE: 130 MMHG | RESPIRATION RATE: 18 BRPM

## 2022-09-08 DIAGNOSIS — T87.9 BKA STUMP COMPLICATION: ICD-10-CM

## 2022-09-08 DIAGNOSIS — M14.671 CHARCOT'S JOINT OF RIGHT FOOT: ICD-10-CM

## 2022-09-08 DIAGNOSIS — Z13.9 ENCOUNTER FOR SCREENING: ICD-10-CM

## 2022-09-08 DIAGNOSIS — F17.200 TOBACCO USE DISORDER: ICD-10-CM

## 2022-09-08 DIAGNOSIS — E78.00 PURE HYPERCHOLESTEROLEMIA: ICD-10-CM

## 2022-09-08 DIAGNOSIS — E63.9 NUTRITIONAL DEFICIENCY: ICD-10-CM

## 2022-09-08 DIAGNOSIS — D64.9 ANEMIA, UNSPECIFIED TYPE: ICD-10-CM

## 2022-09-08 DIAGNOSIS — Z00.00 HEALTHCARE MAINTENANCE: ICD-10-CM

## 2022-09-08 DIAGNOSIS — G89.4 CHRONIC PAIN SYNDROME: ICD-10-CM

## 2022-09-08 DIAGNOSIS — E11.42 TYPE 2 DIABETES MELLITUS WITH DIABETIC POLYNEUROPATHY, WITHOUT LONG-TERM CURRENT USE OF INSULIN: ICD-10-CM

## 2022-09-08 DIAGNOSIS — I10 PRIMARY HYPERTENSION: ICD-10-CM

## 2022-09-08 DIAGNOSIS — Z12.31 ENCOUNTER FOR SCREENING MAMMOGRAM FOR MALIGNANT NEOPLASM OF BREAST: ICD-10-CM

## 2022-09-08 DIAGNOSIS — Z00.00 MEDICARE ANNUAL WELLNESS VISIT, SUBSEQUENT: Primary | ICD-10-CM

## 2022-09-08 DIAGNOSIS — Z12.11 ENCOUNTER FOR SCREENING FOR MALIGNANT NEOPLASM OF COLON: ICD-10-CM

## 2022-09-08 DIAGNOSIS — E89.0 POSTOPERATIVE HYPOTHYROIDISM: ICD-10-CM

## 2022-09-08 DIAGNOSIS — Z87.891 PERSONAL HISTORY OF NICOTINE DEPENDENCE: ICD-10-CM

## 2022-09-08 DIAGNOSIS — I77.9 PERIPHERAL ARTERIAL OCCLUSIVE DISEASE: ICD-10-CM

## 2022-09-08 DIAGNOSIS — R01.1 HOLOSYSTOLIC MURMUR: ICD-10-CM

## 2022-09-08 PROBLEM — I73.9 CLAUDICATION OF LOWER EXTREMITY WITH HISTORY OF REVASCULARIZATION: Status: RESOLVED | Noted: 2020-05-13 | Resolved: 2022-09-08

## 2022-09-08 PROBLEM — K40.30 INCARCERATED RIGHT INGUINAL HERNIA: Status: RESOLVED | Noted: 2019-12-17 | Resolved: 2022-09-08

## 2022-09-08 PROBLEM — M14.679 CHARCOT'S JOINT OF FOOT: Status: ACTIVE | Noted: 2021-04-07

## 2022-09-08 PROBLEM — I73.9 PAD (PERIPHERAL ARTERY DISEASE): Status: RESOLVED | Noted: 2017-07-10 | Resolved: 2022-09-08

## 2022-09-08 PROBLEM — J32.9 SINUSITIS: Status: RESOLVED | Noted: 2019-10-16 | Resolved: 2022-09-08

## 2022-09-08 PROBLEM — M25.561 BILATERAL KNEE PAIN: Status: RESOLVED | Noted: 2020-07-20 | Resolved: 2022-09-08

## 2022-09-08 PROBLEM — E87.3 ACUTE RESPIRATORY ALKALOSIS: Status: RESOLVED | Noted: 2019-10-16 | Resolved: 2022-09-08

## 2022-09-08 PROBLEM — G54.6 PHANTOM PAIN FOLLOWING AMPUTATION OF LOWER LIMB: Status: ACTIVE | Noted: 2020-10-15

## 2022-09-08 PROBLEM — R41.0 CONFUSION: Status: RESOLVED | Noted: 2019-10-15 | Resolved: 2022-09-08

## 2022-09-08 PROBLEM — G93.40 ENCEPHALOPATHY: Status: RESOLVED | Noted: 2019-10-15 | Resolved: 2022-09-08

## 2022-09-08 PROBLEM — K41.30 IRREDUCIBLE RIGHT FEMORAL HERNIA: Status: RESOLVED | Noted: 2019-12-17 | Resolved: 2022-09-08

## 2022-09-08 PROBLEM — Z95.828 S/P FEMOROPOPLITEAL BYPASS SURGERY: Status: RESOLVED | Noted: 2017-07-10 | Resolved: 2022-09-08

## 2022-09-08 PROBLEM — M25.561 BILATERAL KNEE PAIN: Status: ACTIVE | Noted: 2020-07-20

## 2022-09-08 PROBLEM — M25.562 BILATERAL KNEE PAIN: Status: RESOLVED | Noted: 2020-07-20 | Resolved: 2022-09-08

## 2022-09-08 PROBLEM — I96 GANGRENE (HCC): Status: RESOLVED | Noted: 2017-07-10 | Resolved: 2022-09-08

## 2022-09-08 PROBLEM — M25.562 BILATERAL KNEE PAIN: Status: ACTIVE | Noted: 2020-07-20

## 2022-09-08 PROBLEM — Z98.890 CLAUDICATION OF LOWER EXTREMITY WITH HISTORY OF REVASCULARIZATION (HCC): Status: RESOLVED | Noted: 2020-05-13 | Resolved: 2022-09-08

## 2022-09-08 PROBLEM — R93.7 ABNORMAL MRI, LUMBAR SPINE: Status: RESOLVED | Noted: 2019-10-16 | Resolved: 2022-09-08

## 2022-09-08 LAB
A/C: NORMAL
EXPIRATION DATE: NORMAL
EXPIRATION DATE: NORMAL
HBA1C MFR BLD: 5.8 %
Lab: NORMAL
Lab: NORMAL
POC CREATININE URINE: NORMAL
POC MICROALBUMIN URINE: NORMAL

## 2022-09-08 PROCEDURE — 1125F AMNT PAIN NOTED PAIN PRSNT: CPT | Performed by: STUDENT IN AN ORGANIZED HEALTH CARE EDUCATION/TRAINING PROGRAM

## 2022-09-08 PROCEDURE — 83036 HEMOGLOBIN GLYCOSYLATED A1C: CPT | Performed by: STUDENT IN AN ORGANIZED HEALTH CARE EDUCATION/TRAINING PROGRAM

## 2022-09-08 PROCEDURE — 82044 UR ALBUMIN SEMIQUANTITATIVE: CPT | Performed by: STUDENT IN AN ORGANIZED HEALTH CARE EDUCATION/TRAINING PROGRAM

## 2022-09-08 PROCEDURE — 1159F MED LIST DOCD IN RCRD: CPT | Performed by: STUDENT IN AN ORGANIZED HEALTH CARE EDUCATION/TRAINING PROGRAM

## 2022-09-08 PROCEDURE — 3044F HG A1C LEVEL LT 7.0%: CPT | Performed by: STUDENT IN AN ORGANIZED HEALTH CARE EDUCATION/TRAINING PROGRAM

## 2022-09-08 PROCEDURE — G0439 PPPS, SUBSEQ VISIT: HCPCS | Performed by: STUDENT IN AN ORGANIZED HEALTH CARE EDUCATION/TRAINING PROGRAM

## 2022-09-08 PROCEDURE — 96160 PT-FOCUSED HLTH RISK ASSMT: CPT | Performed by: STUDENT IN AN ORGANIZED HEALTH CARE EDUCATION/TRAINING PROGRAM

## 2022-09-08 PROCEDURE — 99406 BEHAV CHNG SMOKING 3-10 MIN: CPT | Performed by: STUDENT IN AN ORGANIZED HEALTH CARE EDUCATION/TRAINING PROGRAM

## 2022-09-08 PROCEDURE — 1170F FXNL STATUS ASSESSED: CPT | Performed by: STUDENT IN AN ORGANIZED HEALTH CARE EDUCATION/TRAINING PROGRAM

## 2022-09-08 PROCEDURE — 99396 PREV VISIT EST AGE 40-64: CPT | Performed by: STUDENT IN AN ORGANIZED HEALTH CARE EDUCATION/TRAINING PROGRAM

## 2022-09-08 PROCEDURE — 99214 OFFICE O/P EST MOD 30 MIN: CPT | Performed by: STUDENT IN AN ORGANIZED HEALTH CARE EDUCATION/TRAINING PROGRAM

## 2022-09-08 RX ORDER — GUAIFENESIN 1200 MG/1
TABLET, EXTENDED RELEASE ORAL EVERY 12 HOURS SCHEDULED
COMMUNITY
End: 2022-09-29

## 2022-09-08 RX ORDER — GABAPENTIN 800 MG/1
TABLET ORAL
COMMUNITY
End: 2022-09-09 | Stop reason: SDUPTHER

## 2022-09-08 RX ORDER — AMOXICILLIN 250 MG
CAPSULE ORAL
COMMUNITY

## 2022-09-08 RX ORDER — LISINOPRIL AND HYDROCHLOROTHIAZIDE 12.5; 1 MG/1; MG/1
1 TABLET ORAL EVERY MORNING
Status: CANCELLED | OUTPATIENT
Start: 2022-09-08

## 2022-09-08 RX ORDER — ALBUTEROL SULFATE 90 UG/1
2 AEROSOL, METERED RESPIRATORY (INHALATION) EVERY 4 HOURS PRN
COMMUNITY
End: 2022-09-09 | Stop reason: SDUPTHER

## 2022-09-08 RX ORDER — BUPROPION HYDROCHLORIDE 150 MG/1
150 TABLET ORAL DAILY
Qty: 45 TABLET | Refills: 1 | Status: SHIPPED | OUTPATIENT
Start: 2022-09-08 | End: 2023-01-26

## 2022-09-08 RX ORDER — CYCLOBENZAPRINE HCL 5 MG
TABLET ORAL
COMMUNITY
End: 2022-09-29

## 2022-09-08 RX ORDER — CYCLOBENZAPRINE HCL 5 MG
TABLET ORAL
Status: CANCELLED | OUTPATIENT
Start: 2022-09-08

## 2022-09-08 RX ORDER — LEVOTHYROXINE AND LIOTHYRONINE 57; 13.5 UG/1; UG/1
90 TABLET ORAL EVERY MORNING
Status: CANCELLED | OUTPATIENT
Start: 2022-09-08

## 2022-09-08 RX ORDER — IRON,CARBONYL/ASCORBIC ACID 100-250 MG
TABLET ORAL
COMMUNITY

## 2022-09-08 RX ORDER — LEVOTHYROXINE AND LIOTHYRONINE 9.5; 2.25 UG/1; UG/1
15 TABLET ORAL DAILY
Status: CANCELLED | OUTPATIENT
Start: 2022-09-08

## 2022-09-08 RX ORDER — LISINOPRIL AND HYDROCHLOROTHIAZIDE 12.5; 1 MG/1; MG/1
1 TABLET ORAL EVERY MORNING
Qty: 90 TABLET | Refills: 3 | Status: SHIPPED | OUTPATIENT
Start: 2022-09-08 | End: 2022-12-21 | Stop reason: SDUPTHER

## 2022-09-08 RX ORDER — ASPIRIN 81 MG/1
81 TABLET ORAL EVERY MORNING
Qty: 90 TABLET | Refills: 3 | Status: SHIPPED | OUTPATIENT
Start: 2022-09-08

## 2022-09-08 NOTE — ASSESSMENT & PLAN NOTE
-   Patient previously noted to have microcytic anemia and required transfusions prior to clinical exam on 9/8/2022  -   Unknown cause of hematologic disorder prior to current clinical exam  -   Patient with more fatigue, although evaluation limited secondary to concurrent thyroid disorder  Plan:  -   Hematologic work-up completed as noted below  -   Consider supplementation pending deficiency  -   No indication for hematology referral at this time

## 2022-09-08 NOTE — PROGRESS NOTES
The ABCs of the Annual Wellness Visit  Subsequent Medicare Wellness Visit    Chief Complaint   Patient presents with   • Thyroid Problem     Establish care      Subjective    History of Present Illness:  Bobbi Du is a 61 y.o. female who presents for a Subsequent Medicare Wellness Visit.    Nutrition Deficiency  -   Patient with chronic difficulty gaining weight for years prior to clinical exam  -   Notes no deficits in p.o. intake  -   Denies abdominal pain/nausea/vomiting  -   Notes that she attempts to eat whenever possible, but always difficulty gaining weight throughout her life and with the rest of her family    Hyperlipidemia  - already taking atorvastatin 10 mg qhs   -   Denies specific side effects associated with this current medication  -   Denies myalgias    Diabetes mellitus type 2  S/P left BKA  -   Notes compliance with current medication regimen  -   Notes that it has been years since she was tested for diabetes mellitus type 2  -   Notes worsening neuropathy throughout her right lower extremity  -   Status post associated gangrene of her left lower extremity with below-knee amputation  -   Patient notes continued phantom limb pain  -   Patient notes that her prosthetic requires adjustment and has not been established with a current prosthetists    Hypothyroidism s/p thyroid ablation  -   Patient notes that she ran out of her thyroid medication approximately 2 weeks prior to current clinical exam  -   Notes worsening fatigue  -   Denies specific hair loss or GI side effects  -   Patient would like to resume thyroid medication as soon as possible    Iron Deficiency Anemia  -   Was previously noted to be anemic and required transfusions in the past  -   Notes that specific diagnosis was never determined  -   States that she currently feels tired, but also notes that she is not taking her thyroid medication as noted above  -   Patient denies losing any blood in her stool and denies any vaginal  bleeding    Tobacco Use Disorder  - notes 1 pack per day  - patches and gum have not worked  -   States that Chantix worked, but is not able to use secondary to her antiplatelet therapy  -   States that she is interested in additional medication if available    Charcot foot  - notes that she can't stand for a long period  - notes decreased ambulation  - severe pain with any daily functioning    Hypertension  This is a chronic problem. The current episode started more than 1 year ago. The problem has been gradually improving since onset. The problem is controlled. Pertinent negatives include no anxiety, chest pain, headaches, palpitations or shortness of breath. Past treatments include ACE inhibitors and diuretics. Current antihypertension treatment includes ACE inhibitors and diuretics.       The following portions of the patient's history were reviewed and   updated as appropriate: allergies, current medications, past family history, past medical history, past social history, past surgical history and problem list.    Compared to one year ago, the patient feels her physical   health is worse.    Compared to one year ago, the patient feels her mental   health is the same.    Recent Hospitalizations:  She was not admitted to the hospital during the last year.       Current Medical Providers:  Patient Care Team:  Yon Bond MD as PCP - General (Family Medicine)  Mayur Artis MD as Consulting Physician (Vascular Surgery)    Outpatient Medications Prior to Visit   Medication Sig Dispense Refill   • albuterol sulfate  (90 Base) MCG/ACT inhaler Inhale 2 puffs Every 4 (Four) Hours As Needed for Wheezing.     • Calcium Carb-Cholecalciferol (Calcium 1000 + D) 1000-800 MG-UNIT tablet calcium     • clopidogrel (PLAVIX) 75 MG tablet Take 75 mg by mouth Every Morning.     • Cobalamin Combinations (B-12) 100-5000 MCG sublingual tablet B12     • cyclobenzaprine (FLEXERIL) 5 MG tablet cyclobenzaprine 5 mg  tablet   TAKE 1 TABLET BY MOUTH EVERY DAY AS NEEDED     • gabapentin (NEURONTIN) 800 MG tablet gabapentin 800 mg tablet   TAKE 1 TABLET BY MOUTH THREE TIMES DAILY AS DIRECTED     • guaiFENesin ER (Mucinex Maximum Strength) 1200 MG tablet sustained-release 12 hour Every 12 (Twelve) Hours.     • Iron-Vitamin C 100-250 MG tablet iron   2 tab bid     • metFORMIN (GLUCOPHAGE) 500 MG tablet Take 500 mg by mouth Daily With Breakfast.     • thyroid (ARMOUR) 15 MG tablet Take 15 mg by mouth Daily.     • Thyroid 90 MG PO tablet Take 90 mg by mouth Every Morning.     • aspirin 81 MG EC tablet Take 81 mg by mouth Every Morning.     • lisinopril-hydrochlorothiazide (PRINZIDE,ZESTORETIC) 10-12.5 MG per tablet Take 1 tablet by mouth Every Morning.     • umeclidinium-vilanterol (ANORO ELLIPTA) 62.5-25 MCG/INH aerosol powder  inhaler Inhale 1 puff Daily.     • gabapentin (NEURONTIN) 600 MG tablet Take 600 mg by mouth 3 (Three) Times a Day.     • Pirbuterol Acetate (MAXAIR AUTOHALER IN) Inhale 1 puff As Needed.       No facility-administered medications prior to visit.       No opioid medication identified on active medication list. I have reviewed chart for other potential  high risk medication/s and harmful drug interactions in the elderly.          Immunizations:  Immunization History   Administered Date(s) Administered   • COVID-19 ([facility]) 12/03/2021, 12/08/2021   • Flu Vaccine Quad PF >36MO 01/25/2019, 10/11/2019   • Fluzone Quad >6mos (Multi-dose) 10/01/2019   • Pneumococcal Conjugate 20-Valent (PCV20) 01/08/2018       Colorectal Screening:   Ordered on 9/8/2022  Last Completed Colonoscopy     This patient has no relevant Health Maintenance data.        Pap: Required, patient plans to complete during next exam  Last Completed Pap Smear     This patient has no relevant Health Maintenance data.        Mammogram: Ordered on 9/8/2022  Last Completed Mammogram     This patient has no relevant Health Maintenance data.           CT  for Smoker (Age 50-80, 20 pk yr): Ordered on 9/8/2022  Bone Density/DEXA (Age 65 or high risk): DEXA scan.  Required at age 65  Hep C (Age 18-79 once): Completed on 9/8/2022: Pending  HIV (Age 15-65 once): Pending  A1c: Pending  Lipid panel: Pending  The ASCVD Risk score (Wesly GREGORY Jr., et al., 2013) failed to calculate for the following reasons:    Cannot find a previous HDL lab    Cannot find a previous total cholesterol lab    Dermatology: plans to establish in the future  Ophthalmologist: already established  Dentist: edentulous    Tobacco Use: High Risk   • Smoking Tobacco Use: Current Every Day Smoker   • Smokeless Tobacco Use: Never Used       Alcohol Use: Not on file       Social History     Substance and Sexual Activity   Drug Use No        Diet/Physical activity: Notes limited activity secondary to pain including exercise, patient notes that she has attempted to eat a higher calorie diet, but this has not led to improved weight gain    Menopause: post-menopausal  Menstrual Cycles: Postmenopausal, last menstrual cycle: 20 years prior to clinical exam on 9/8/2022    PHQ-2 Depression Screening  PHQ-9 Total Score: 0     Intimate partner violence: (Screen on initial visit, pregnant women, women with injuries, older adult with injury or evidence of neglect): no concerns  • Violence can be a problem in many people's lives, so I now ask every patient about trauma or abuse they may have experienced in a relationship.  • Stress/Safety - Do you feel safe in your relationship?  • Afraid/Abused - Have you ever been in a relationship where you were threatened, hurt, or afraid?  • Friend/Family - Are your friends aware you have been hurt?  • Emergency Plan - Do you have a safe place to go and the resources you need in an emergency?    Osteoporosis: No concerns  • Ost menopausal women < 65 with RF (advancing age, previous fracture, glucocorticoid therapy, parental hip fracture, low body weight, current cigarette smoking,  excessive alcohol consumption, rheumatoid arthritis, secondary osteoporosis [hypogonadism/premature menopause, malabsorption, chronic liver disease, IBD]).  • All women 65 or older      Aspirin is on active medication list. Aspirin use is indicated based on review of current medical condition/s. Pros and cons of this therapy have been discussed today. Benefits of this medication outweigh potential harm.  Patient has been encouraged to continue taking this medication.  .      Patient Active Problem List   Diagnosis   • Peripheral arterial occlusive disease (HCC)   • Hyperlipidemia   • Hypertension   • COPD (chronic obstructive pulmonary disease) (HCC)   • Diabetes mellitus (HCC)   • Fibromyalgia   • Microcytic Anemia   • Hypothyroidism   • RA (rheumatoid arthritis) (HCC)   • Charcot's joint of foot   • Phantom pain following amputation of lower limb (HCC)   • Tobacco use disorder   • Nutritional deficiency   • BKA stump complication (HCC)     Advance Care Planning  Advance Directive is not on file.  ACP discussion was held with the patient during this visit. Patient has an advance directive (not in EMR), copy requested.  Daughter is POA, full code.    Review of Systems   Constitutional: Positive for fatigue. Negative for activity change, appetite change and fever.   HENT: Negative for congestion, postnasal drip and rhinorrhea.    Respiratory: Negative for cough and shortness of breath.    Cardiovascular: Negative for chest pain and palpitations.   Gastrointestinal: Negative for abdominal distention and abdominal pain.   Genitourinary: Negative for difficulty urinating, vaginal bleeding and vaginal discharge.   Musculoskeletal: Positive for arthralgias and myalgias. Negative for gait problem and joint swelling.   Skin: Negative for rash and wound.   Neurological: Negative for dizziness.        Objective    Vitals:    09/08/22 1001   BP: 130/72   BP Location: Right arm   Patient Position: Sitting   Cuff Size: Adult  "  Pulse: 80   Resp: 18   Temp: 98.2 °F (36.8 °C)   TempSrc: Temporal   SpO2: 98%   Weight: 48.6 kg (107 lb 3.2 oz)   Height: 164 cm (64.57\")   PainSc:   2     Estimated body mass index is 18.08 kg/m² as calculated from the following:    Height as of this encounter: 164 cm (64.57\").    Weight as of this encounter: 48.6 kg (107 lb 3.2 oz).    BMI is below normal parameters (malnutrition). Recommendations: referral to dietitian      Does the patient have evidence of cognitive impairment? No    Physical Exam  Vitals and nursing note reviewed.   Constitutional:       General: She is not in acute distress.     Appearance: Normal appearance. She is normal weight. She is not ill-appearing or toxic-appearing.   HENT:      Nose: No congestion or rhinorrhea.   Eyes:      General:         Right eye: No discharge.         Left eye: No discharge.      Conjunctiva/sclera: Conjunctivae normal.   Cardiovascular:      Rate and Rhythm: Normal rate and regular rhythm.      Heart sounds: Normal heart sounds. No murmur heard.    No friction rub.   Pulmonary:      Effort: Pulmonary effort is normal. No respiratory distress.      Breath sounds: Normal breath sounds. No wheezing or rhonchi.   Abdominal:      General: Abdomen is flat. Bowel sounds are normal. There is no distension.      Palpations: Abdomen is soft. There is no mass.      Tenderness: There is no abdominal tenderness. There is no guarding or rebound.   Musculoskeletal:      Cervical back: Normal range of motion.      Right lower leg: No edema.      Left lower leg: No edema.   Skin:     Findings: No lesion or rash.   Neurological:      General: No focal deficit present.      Mental Status: She is alert. Mental status is at baseline.      Coordination: Coordination normal.      Gait: Gait normal.      Comments: Diabetic foot exam:   Right: Filament test absent   Pulses minimal   Reflexes not checked   Vibratory sensation not checked   Proprioception diminished   Sharp/dull " discrimination diminished       Left: N/A, s/p BKA     Psychiatric:         Mood and Affect: Mood normal.         Behavior: Behavior normal.         Thought Content: Thought content normal.         Judgment: Judgment normal.       Lab Results   Component Value Date    HGBA1C 5.8 09/08/2022            HEALTH RISK ASSESSMENT    Smoking Status:  Social History     Tobacco Use   Smoking Status Current Every Day Smoker   • Packs/day: 1.00   • Years: 44.00   • Pack years: 44.00   • Types: Cigarettes   Smokeless Tobacco Never Used   Tobacco Comment    STATES STARTED SMOKING AT AGE 16     Alcohol Consumption:  Social History     Substance and Sexual Activity   Alcohol Use No     Fall Risk Screen:    STEADI Fall Risk Assessment was completed, and patient is at LOW risk for falls.Assessment completed on:9/8/2022    Depression Screening:  PHQ-2/PHQ-9 Depression Screening 9/8/2022   Little Interest or Pleasure in Doing Things 0-->not at all   Feeling Down, Depressed or Hopeless 0-->not at all   PHQ-9: Brief Depression Severity Measure Score 0       Health Habits and Functional and Cognitive Screening:  Functional & Cognitive Status 9/8/2022   Do you have difficulty preparing food and eating? No   Do you have difficulty bathing yourself, getting dressed or grooming yourself? No   Do you have difficulty using the toilet? No   Do you have difficulty moving around from place to place? No   Do you have trouble with steps or getting out of a bed or a chair? Yes   Current Diet Well Balanced Diet   Dental Exam Not up to date   Eye Exam Up to date   Exercise (times per week) 7 times per week   Current Exercises Include Walking   Do you need help using the phone?  No   Are you deaf or do you have serious difficulty hearing?  No   Do you need help with transportation? No   Do you need help shopping? No   Do you need help preparing meals?  No   Do you need help with housework?  No   Do you need help with laundry? No   Do you need help  taking your medications? No   Do you need help managing money? No   Do you ever drive or ride in a car without wearing a seat belt? No   Have you felt unusual stress, anger or loneliness in the last month? No   Who do you live with? Alone   If you need help, do you have trouble finding someone available to you? No   Have you been bothered in the last four weeks by sexual problems? No   Do you have difficulty concentrating, remembering or making decisions? No       Age-appropriate Screening Schedule:  Refer to the list below for future screening recommendations based on patient's age, sex and/or medical conditions. Orders for these recommended tests are listed in the plan section. The patient has been provided with a written plan.    Health Maintenance   Topic Date Due   • MAMMOGRAM  Never done   • LIPID PANEL  01/15/2022   • TDAP/TD VACCINES (1 - Tdap) 09/08/2022 (Originally [birth year]/15/1980)   • ZOSTER VACCINE (1 of 2) 09/08/2022 (Originally 11/15/2011)   • PAP SMEAR  09/19/2022 (Originally 6/1/2017)   • INFLUENZA VACCINE  10/01/2022   • HEMOGLOBIN A1C  03/08/2023   • DIABETIC EYE EXAM  06/06/2023   • DIABETIC FOOT EXAM  09/08/2023   • URINE MICROALBUMIN  09/08/2023              Assessment & Plan   CMS Preventative Services Quick Reference  Risk Factors Identified During Encounter  Chronic Pain   Inactivity/Sedentary  Tobacco Use/Dependance (use dotphrase .tobaccocessation for documentation)  The above risks/problems have been discussed with the patient.  Follow up actions/plans if indicated are seen below in the Assessment/Plan Section.  Pertinent information has been shared with the patient in the After Visit Summary.    Diagnoses and all orders for this visit:    1. Medicare annual wellness visit, subsequent (Primary)  -     Code Status and Medical Interventions:    2. Healthcare maintenance  -     Hepatitis C Antibody; Future  -     HIV-1 / O / 2 Ag / Antibody 4th Generation; Future  -     Mammo Screening Digital  Tomosynthesis Bilateral With CAD; Future  -     Cologuard - Stool, Per Rectum; Future  -      CT Chest Low Dose Cancer Screening WO; Future  -     Code Status and Medical Interventions:  -     HIV-1 / O / 2 Ag / Antibody 4th Generation  -     Hepatitis C Antibody    3. Pure hypercholesterolemia  Assessment & Plan:  Lipid abnormalities are unchanged.  Nutritional counseling was provided., The patient was referred to the dietician. and Pharmacotherapy as ordered.   -   Lipid panel rechecked on 9/8/2022: Pending  -   Continue atorvastatin 10 mg nightly until ASCVD can be calculated with updated lipid panel  Lipids will be reassessed in 1 year.    Orders:  -     Lipid Panel; Future  -     Lipid Panel    4. Type 2 diabetes mellitus with diabetic polyneuropathy, without long-term current use of insulin (HCC)  Assessment & Plan:  Diabetes is unchanged.   Continue current treatment regimen.  Dietary recommendations for ADA diet.  Regular aerobic exercise.  Discussed foot care.  Reminded to get yearly retinal exam.  Nutritionist referral.   -   A1c rechecked on 9/8/2022: Pending  -   Continue metformin 1000 mg twice daily as previously initiated  Diabetes will be reassessed in 3 months.    Orders:  -     POC Glycosylated Hemoglobin (Hb A1C)  -     POC Microalbumin  -     Compliance Drug Analysis, Ur - Urine, Clean Catch; Future  -     Compliance Drug Analysis, Ur - Urine, Clean Catch  -     EMG & Nerve Conduction Test; Future    5. Encounter for screening  -     Mammo Screening Digital Tomosynthesis Bilateral With CAD; Future  -     Cologuard - Stool, Per Rectum; Future  -      CT Chest Low Dose Cancer Screening WO; Future    6. Primary hypertension  Assessment & Plan:  Hypertension is improving with treatment.  Continue current treatment regimen.  Dietary sodium restriction.  Regular aerobic exercise.  Stop smoking.  Continue current medications.  Ambulatory blood pressure monitoring.  Blood pressure will be reassessed at  the next regular appointment.    Orders:  -     lisinopril-hydrochlorothiazide (PRINZIDE,ZESTORETIC) 10-12.5 MG per tablet; Take 1 tablet by mouth Every Morning.  Dispense: 90 tablet; Refill: 3    7. Postoperative hypothyroidism  Assessment & Plan:  -   Patient with previous ablative surgery prior to clinical exam on 9/8/2022  -   Patient has been without thyroid medication for approximately 2 weeks prior to current clinical exam  -   Patient with worsening fatigue, but without hair loss and significant GI side effects  Plan:  -   TSH rechecked on 9/8/2022: Pending  -   Continue levothyroxine 105 mcg daily until TSH is obtained and then adjust medication as needed    Orders:  -     TSH; Future  -     TSH    8. Anemia, unspecified type  Assessment & Plan:  -   Patient previously noted to have microcytic anemia and required transfusions prior to clinical exam on 9/8/2022  -   Unknown cause of hematologic disorder prior to current clinical exam  -   Patient with more fatigue, although evaluation limited secondary to concurrent thyroid disorder  Plan:  -   Hematologic work-up completed as noted below  -   Consider supplementation pending deficiency  -   No indication for hematology referral at this time    Orders:  -     Vitamin B12; Future  -     Ferritin; Future  -     CBC & Differential; Future  -     CBC & Differential  -     Ferritin  -     Vitamin B12    9. Peripheral arterial occlusive disease (HCC)  Assessment & Plan:  Information from  with [facility] 5-30-17:  Right: The right common iliac and external iliac arteries are patent. The internal iliac appears to be patent, but the origin is not well visualized. The common femoral artery is patent. A large profundus femoris artery with extensive collateralization is patent. The superficial femoral artery appears to be occluded from its origin to the adductor canal. The popliteal artery is patent with a patent trifurcation, but visualization is impaired due to poor  flow.       Left: The left common iliac and internal iliac arteries are patent. The external iliac artery appears to be occluded just distal to its origin. The common femoral artery is reconstituted at the inguinal ligament and is patent. The profunda femoris artery is very large with extensive collateral development. The superficial femoral artery is occluded from its origin to the adductor canal. The popliteal artery is reconstituted at the adductor canal via extensive collateral vessels. There is 2-vessel runoff below the knee with poor visualization of distal flow.    -   Patient's status post corrective procedures prior to clinical exam on 9/8/2022  -   Has continued with dual antiplatelet therapy per recommendations by vascular surgeons   -   Patient denies significant bleeding events  Plan:  -   Continue aspirin 81 mg and Plavix 75 mg daily as previously administered        Orders:  -     aspirin (aspirin) 81 MG EC tablet; Take 1 tablet by mouth Every Morning.  Dispense: 90 tablet; Refill: 3    10. Tobacco use disorder  Assessment & Plan:  Bobbi Du  reports that she has been smoking cigarettes. She has a 44.00 pack-year smoking history. She has never used smokeless tobacco.. I have educated her on the risk of diseases from using tobacco products such as cancer, COPD and heart disease.     I advised her to quit and she is willing to quit. We have discussed the following method/s for tobacco cessation:  Education Material Counseling Prescription Medicaiton.  Together we have set a quit date for 1 month from today.  She will follow up with me in 1 month or sooner to check on her progress.    I spent 8 minutes counseling the patient.           Orders:  -     buPROPion XL (WELLBUTRIN XL) 150 MG 24 hr tablet; Take 1 tablet by mouth Daily.  Dispense: 45 tablet; Refill: 1    11. Encounter for screening mammogram for malignant neoplasm of breast   -     Mammo Screening Digital Tomosynthesis Bilateral With CAD;  Future    12. Chronic pain syndrome   -     Compliance Drug Analysis, Ur - Urine, Clean Catch; Future  -     Compliance Drug Analysis, Ur - Urine, Clean Catch    13. Encounter for screening for malignant neoplasm of colon   -     Cologuard - Stool, Per Rectum; Future    14. Personal history of nicotine dependence   -      CT Chest Low Dose Cancer Screening WO; Future    15. Nutritional deficiency  Assessment & Plan:  -   Patient notes chronic difficulty with weight gain despite BMI of 18 prior to clinical exam on 9/8/2022  -   Notes previous high density diets without significant improvement of symptoms  -   Patient high risk for osteoporosis in the future  Plan:  -   Referral to nutritionist ordered on 9/8/2022 for additional recommendations and management    Orders:  -     Ambulatory Referral to Nutrition Services    16. BKA stump complication (HCC)  Assessment & Plan:  -   Below-knee amputation occurred years prior to clinical exam on 9/8/2022 secondary to left-sided foot gangrene associated with presumed diabetes mellitus type 2  -   Patient notes malfunction of left-sided prosthetic and inappropriate fit  -   Limited ambulation and daily functioning  Plan:  -   Ambulatory referral to prosthetists ordered on 9/8/2022 for additional management    Orders:  -     Ambulatory Referral to Prosthetist    17. Charcot's joint of right foot  Assessment & Plan:  -   Pain associated with Charcot foot as previously diagnosed limiting ambulation prior to clinical exam on 9/8/2022 for several years  -   Patient without recent falls, although notes daily functioning is impaired  -   Patient notes that she is we wheelchair-bound routinely throughout the day and has difficulty with ambulation secondary to left-sided below-knee amputation in addition to Charcot foot as noted above  -   Worsening polyneuropathy is also impacting ambulation and daily functioning  Plan:  -   EMG ordered on 9/8/2022 to evaluate extent of  polyneuropathy  -   Podiatry referral ordered for nonsurgical treatment of Charcot foot and to improve pain long-term  -   Orthopedic surgery foot referral ordered on 9/8/2022 for additional recommendations regarding surgical management  -   Continue gabapentin 800 mg 3 times daily for improvement of polyneuropathy    Orders:  -     Ambulatory Referral to Podiatry  -     Ambulatory Referral to Orthopedic Surgery  -     EMG & Nerve Conduction Test; Future    18. Holosystolic murmur  -     Adult Transthoracic Echo Complete W/ Cont if Necessary Per Protocol; Future      Follow Up:   Return in about 1 month (around 10/8/2022) for Pap smear and chronic care follow up.     An After Visit Summary and PPPS were made available to the patient.    Yon Bond MD      Return in about 1 month (around 10/8/2022) for Pap smear and chronic care follow up.

## 2022-09-08 NOTE — ASSESSMENT & PLAN NOTE
Information from  with [facility] 5-30-17:  Right: The right common iliac and external iliac arteries are patent. The internal iliac appears to be patent, but the origin is not well visualized. The common femoral artery is patent. A large profundus femoris artery with extensive collateralization is patent. The superficial femoral artery appears to be occluded from its origin to the adductor canal. The popliteal artery is patent with a patent trifurcation, but visualization is impaired due to poor flow.       Left: The left common iliac and internal iliac arteries are patent. The external iliac artery appears to be occluded just distal to its origin. The common femoral artery is reconstituted at the inguinal ligament and is patent. The profunda femoris artery is very large with extensive collateral development. The superficial femoral artery is occluded from its origin to the adductor canal. The popliteal artery is reconstituted at the adductor canal via extensive collateral vessels. There is 2-vessel runoff below the knee with poor visualization of distal flow.    -   Patient's status post corrective procedures prior to clinical exam on 9/8/2022  -   Has continued with dual antiplatelet therapy per recommendations by vascular surgeons   -   Patient denies significant bleeding events  Plan:  -   Continue aspirin 81 mg and Plavix 75 mg daily as previously administered

## 2022-09-08 NOTE — ASSESSMENT & PLAN NOTE
Hypertension is improving with treatment.  Continue current treatment regimen.  Dietary sodium restriction.  Regular aerobic exercise.  Stop smoking.  Continue current medications.  Ambulatory blood pressure monitoring.  Blood pressure will be reassessed at the next regular appointment.

## 2022-09-08 NOTE — ASSESSMENT & PLAN NOTE
-   Pain associated with Charcot foot as previously diagnosed limiting ambulation prior to clinical exam on 9/8/2022 for several years  -   Patient without recent falls, although notes daily functioning is impaired  -   Patient notes that she is we wheelchair-bound routinely throughout the day and has difficulty with ambulation secondary to left-sided below-knee amputation in addition to Charcot foot as noted above  -   Worsening polyneuropathy is also impacting ambulation and daily functioning  Plan:  -   EMG ordered on 9/8/2022 to evaluate extent of polyneuropathy  -   Podiatry referral ordered for nonsurgical treatment of Charcot foot and to improve pain long-term  -   Orthopedic surgery foot referral ordered on 9/8/2022 for additional recommendations regarding surgical management  -   Continue gabapentin 800 mg 3 times daily for improvement of polyneuropathy

## 2022-09-08 NOTE — ASSESSMENT & PLAN NOTE
-   Patient with previous ablative surgery prior to clinical exam on 9/8/2022  -   Patient has been without thyroid medication for approximately 2 weeks prior to current clinical exam  -   Patient with worsening fatigue, but without hair loss and significant GI side effects  Plan:  -   TSH rechecked on 9/8/2022: Pending  -   Continue levothyroxine 105 mcg daily until TSH is obtained and then adjust medication as needed

## 2022-09-08 NOTE — ASSESSMENT & PLAN NOTE
Lipid abnormalities are unchanged.  Nutritional counseling was provided., The patient was referred to the dietician. and Pharmacotherapy as ordered.   -   Lipid panel rechecked on 9/8/2022: Pending  -   Continue atorvastatin 10 mg nightly until ASCVD can be calculated with updated lipid panel  Lipids will be reassessed in 1 year.

## 2022-09-08 NOTE — PATIENT INSTRUCTIONS
Health Maintenance for Postmenopausal Women  Menopause is a normal process in which your reproductive ability comes to an end. This process happens gradually over a span of months to years, usually between the ages of 48 and 55. Menopause is complete when you have missed 12 consecutive menstrual periods.  It is important to talk with your health care provider about some of the most common conditions that affect postmenopausal women, such as heart disease, cancer, and bone loss (osteoporosis). Adopting a healthy lifestyle and getting preventive care can help to promote your health and wellness. Those actions can also lower your chances of developing some of these common conditions.  What should I know about menopause?  During menopause, you may experience a number of symptoms, such as:  Moderate-to-severe hot flashes.  Night sweats.  Decrease in sex drive.  Mood swings.  Headaches.  Tiredness.  Irritability.  Memory problems.  Insomnia.     Choosing to treat or not to treat menopausal changes is an individual decision that you make with your health care provider.  What should I know about hormone replacement therapy and supplements?  Hormone therapy products are effective for treating symptoms that are associated with menopause, such as hot flashes and night sweats. Hormone replacement carries certain risks, especially as you become older. If you are thinking about using estrogen or estrogen with progestin treatments, discuss the benefits and risks with your health care provider.  What should I know about heart disease and stroke?  Heart disease, heart attack, and stroke become more likely as you age. This may be due, in part, to the hormonal changes that your body experiences during menopause. These can affect how your body processes dietary fats, triglycerides, and cholesterol. Heart attack and stroke are both medical emergencies.    There are many things that you can do to help prevent heart disease and  stroke:  Have your blood pressure checked at least every 1-2 years. High blood pressure causes heart disease and increases the risk of stroke.  If you are 55-79 years old, ask your health care provider if you should take aspirin to prevent a heart attack or a stroke.  Do not use any tobacco products, including cigarettes, chewing tobacco, or electronic cigarettes. If you need help quitting, ask your health care provider.  It is important to eat a healthy diet and maintain a healthy weight.  Be sure to include plenty of vegetables, fruits, low-fat dairy products, and lean protein.  Avoid eating foods that are high in solid fats, added sugars, or salt (sodium).  Get regular exercise. This is one of the most important things that you can do for your health.  Try to exercise for at least 150 minutes each week. The type of exercise that you do should increase your heart rate and make you sweat. This is known as moderate-intensity exercise.  Try to do strengthening exercises at least twice each week. Do these in addition to the moderate-intensity exercise.  Know your numbers. Ask your health care provider to check your cholesterol and your blood glucose. Continue to have your blood tested as directed by your health care provider.     What should I know about cancer screening?  There are several types of cancer. Take the following steps to reduce your risk and to catch any cancer development as early as possible.  Breast Cancer  Practice breast self-awareness.  This means understanding how your breasts normally appear and feel.  It also means doing regular breast self-exams. Let your health care provider know about any changes, no matter how small.  If you are 40 or older, have a clinician do a breast exam (clinical breast exam or CBE) every year. Depending on your age, family history, and medical history, it may be recommended that you also have a yearly breast X-ray (mammogram).  If you have a family history of breast  cancer, talk with your health care provider about genetic screening.  If you are at high risk for breast cancer, talk with your health care provider about having an MRI and a mammogram every year.  Breast cancer (BRCA) gene test is recommended for women who have family members with BRCA-related cancers. Results of the assessment will determine the need for genetic counseling and BRCA1 and for BRCA2 testing. BRCA-related cancers include these types:  Breast. This occurs in males or females.  Ovarian.  Tubal. This may also be called fallopian tube cancer.  Cancer of the abdominal or pelvic lining (peritoneal cancer).  Prostate.  Pancreatic.     Cervical, Uterine, and Ovarian Cancer  Your health care provider may recommend that you be screened regularly for cancer of the pelvic organs. These include your ovaries, uterus, and vagina. This screening involves a pelvic exam, which includes checking for microscopic changes to the surface of your cervix (Pap test).  For women ages 21-65, health care providers may recommend a pelvic exam and a Pap test every three years. For women ages 30-65, they may recommend the Pap test and pelvic exam, combined with testing for human papilloma virus (HPV), every five years. Some types of HPV increase your risk of cervical cancer. Testing for HPV may also be done on women of any age who have unclear Pap test results.  Other health care providers may not recommend any screening for nonpregnant women who are considered low risk for pelvic cancer and have no symptoms. Ask your health care provider if a screening pelvic exam is right for you.  If you have had past treatment for cervical cancer or a condition that could lead to cancer, you need Pap tests and screening for cancer for at least 20 years after your treatment. If Pap tests have been discontinued for you, your risk factors (such as having a new sexual partner) need to be reassessed to determine if you should start having screenings  again. Some women have medical problems that increase the chance of getting cervical cancer. In these cases, your health care provider may recommend that you have screening and Pap tests more often.  If you have a family history of uterine cancer or ovarian cancer, talk with your health care provider about genetic screening.  If you have vaginal bleeding after reaching menopause, tell your health care provider.  There are currently no reliable tests available to screen for ovarian cancer.     Lung Cancer  Lung cancer screening is recommended for adults 55-80 years old who are at high risk for lung cancer because of a history of smoking. A yearly low-dose CT scan of the lungs is recommended if you:  Currently smoke.  Have a history of at least 30 pack-years of smoking and you currently smoke or have quit within the past 15 years. A pack-year is smoking an average of one pack of cigarettes per day for one year.     Yearly screening should:  Continue until it has been 15 years since you quit.  Stop if you develop a health problem that would prevent you from having lung cancer treatment.     Colorectal Cancer  This type of cancer can be detected and can often be prevented.  Routine colorectal cancer screening usually begins at age 50 and continues through age 75.  If you have risk factors for colon cancer, your health care provider may recommend that you be screened at an earlier age.  If you have a family history of colorectal cancer, talk with your health care provider about genetic screening.  Your health care provider may also recommend using home test kits to check for hidden blood in your stool.  A small camera at the end of a tube can be used to examine your colon directly (sigmoidoscopy or colonoscopy). This is done to check for the earliest forms of colorectal cancer.  Direct examination of the colon should be repeated every 5-10 years until age 75. However, if early forms of precancerous polyps or small  growths are found or if you have a family history or genetic risk for colorectal cancer, you may need to be screened more often.     Skin Cancer  Check your skin from head to toe regularly.  Monitor any moles. Be sure to tell your health care provider:  About any new moles or changes in moles, especially if there is a change in a mole's shape or color.  If you have a mole that is larger than the size of a pencil eraser.  If any of your family members has a history of skin cancer, especially at a young age, talk with your health care provider about genetic screening.  Always use sunscreen. Apply sunscreen liberally and repeatedly throughout the day.  Whenever you are outside, protect yourself by wearing long sleeves, pants, a wide-brimmed hat, and sunglasses.     What should I know about osteoporosis?  Osteoporosis is a condition in which bone destruction happens more quickly than new bone creation. After menopause, you may be at an increased risk for osteoporosis. To help prevent osteoporosis or the bone fractures that can happen because of osteoporosis, the following is recommended:  If you are 19-50 years old, get at least 1,000 mg of calcium and at least 600 mg of vitamin D per day.  If you are older than age 50 but younger than age 70, get at least 1,200 mg of calcium and at least 600 mg of vitamin D per day.  If you are older than age 70, get at least 1,200 mg of calcium and at least 800 mg of vitamin D per day.     Smoking and excessive alcohol intake increase the risk of osteoporosis. Eat foods that are rich in calcium and vitamin D, and do weight-bearing exercises several times each week as directed by your health care provider.  What should I know about how menopause affects my mental health?  Depression may occur at any age, but it is more common as you become older. Common symptoms of depression include:  Low or sad mood.  Changes in sleep patterns.  Changes in appetite or eating patterns.  Feeling an  overall lack of motivation or enjoyment of activities that you previously enjoyed.  Frequent crying spells.     Talk with your health care provider if you think that you are experiencing depression.  What should I know about immunizations?  It is important that you get and maintain your immunizations. These include:  Tetanus, diphtheria, and pertussis (Tdap) booster vaccine.  Influenza every year before the flu season begins.  Pneumonia vaccine.  Shingles vaccine.     Your health care provider may also recommend other immunizations.  This information is not intended to replace advice given to you by your health care provider. Make sure you discuss any questions you have with your health care provider.  Document Released: 02/09/2007 Document Revised: 07/07/2017 Document Reviewed: 09/20/2016  [employer] Interactive Patient Education © 2018 [employer]             Vaccines Needed:  - Shingles  - TDAP  - COVID booster

## 2022-09-08 NOTE — ASSESSMENT & PLAN NOTE
Bobbi Du  reports that she has been smoking cigarettes. She has a 44.00 pack-year smoking history. She has never used smokeless tobacco.. I have educated her on the risk of diseases from using tobacco products such as cancer, COPD and heart disease.     I advised her to quit and she is willing to quit. We have discussed the following method/s for tobacco cessation:  Education Material Counseling Prescription Medicaiton.  Together we have set a quit date for 1 month from today.  She will follow up with me in 1 month or sooner to check on her progress.    I spent 8 minutes counseling the patient.

## 2022-09-08 NOTE — ASSESSMENT & PLAN NOTE
-   Patient notes chronic difficulty with weight gain despite BMI of 18 prior to clinical exam on 9/8/2022  -   Notes previous high density diets without significant improvement of symptoms  -   Patient high risk for osteoporosis in the future  Plan:  -   Referral to nutritionist ordered on 9/8/2022 for additional recommendations and management

## 2022-09-08 NOTE — ASSESSMENT & PLAN NOTE
Diabetes is unchanged.   Continue current treatment regimen.  Dietary recommendations for ADA diet.  Regular aerobic exercise.  Discussed foot care.  Reminded to get yearly retinal exam.  Nutritionist referral.   -   A1c rechecked on 9/8/2022: Pending  -   Continue metformin 1000 mg twice daily as previously initiated  Diabetes will be reassessed in 3 months.

## 2022-09-08 NOTE — ASSESSMENT & PLAN NOTE
-   Below-knee amputation occurred years prior to clinical exam on 9/8/2022 secondary to left-sided foot gangrene associated with presumed diabetes mellitus type 2  -   Patient notes malfunction of left-sided prosthetic and inappropriate fit  -   Limited ambulation and daily functioning  Plan:  -   Ambulatory referral to prosthetists ordered on 9/8/2022 for additional management

## 2022-09-09 ENCOUNTER — TELEPHONE (OUTPATIENT)
Dept: INTERNAL MEDICINE | Facility: CLINIC | Age: 62
End: 2022-09-09

## 2022-09-09 DIAGNOSIS — E78.00 PURE HYPERCHOLESTEROLEMIA: Primary | ICD-10-CM

## 2022-09-09 DIAGNOSIS — E11.42 TYPE 2 DIABETES MELLITUS WITH DIABETIC POLYNEUROPATHY, WITHOUT LONG-TERM CURRENT USE OF INSULIN: Primary | ICD-10-CM

## 2022-09-09 DIAGNOSIS — D50.8 IRON DEFICIENCY ANEMIA SECONDARY TO INADEQUATE DIETARY IRON INTAKE: ICD-10-CM

## 2022-09-09 DIAGNOSIS — G54.6 PHANTOM PAIN FOLLOWING AMPUTATION OF LOWER LIMB: ICD-10-CM

## 2022-09-09 DIAGNOSIS — E89.0 POSTOPERATIVE HYPOTHYROIDISM: ICD-10-CM

## 2022-09-09 LAB
BASOPHILS # BLD AUTO: ABNORMAL 10*3/UL
BASOPHILS # BLD MANUAL: 0.07 10*3/MM3 (ref 0–0.2)
BASOPHILS NFR BLD MANUAL: 1.1 % (ref 0–1.5)
CHOLEST SERPL-MCNC: 240 MG/DL (ref 100–199)
DIFFERENTIAL COMMENT: NORMAL
EOSINOPHIL # BLD AUTO: ABNORMAL 10*3/UL
EOSINOPHIL # BLD MANUAL: 0.07 10*3/MM3 (ref 0–0.4)
EOSINOPHIL NFR BLD AUTO: ABNORMAL %
EOSINOPHIL NFR BLD MANUAL: 1.1 % (ref 0.3–6.2)
ERYTHROCYTE [DISTWIDTH] IN BLOOD BY AUTOMATED COUNT: 19.8 % (ref 12.3–15.4)
FERRITIN SERPL-MCNC: 8.85 NG/ML (ref 13–150)
HCT VFR BLD AUTO: 25.2 % (ref 34–46.6)
HCV AB S/CO SERPL IA: 0.8 S/CO RATIO (ref 0–0.9)
HDLC SERPL-MCNC: 74 MG/DL
HGB BLD-MCNC: 6.6 G/DL (ref 12–15.9)
HIV 1+2 AB+HIV1 P24 AG SERPL QL IA: NON REACTIVE
LDLC SERPL CALC-MCNC: 145 MG/DL (ref 0–99)
LYMPHOCYTES # BLD AUTO: ABNORMAL 10*3/UL
LYMPHOCYTES # BLD MANUAL: 2.06 10*3/MM3 (ref 0.7–3.1)
LYMPHOCYTES NFR BLD AUTO: ABNORMAL %
LYMPHOCYTES NFR BLD MANUAL: 32.6 % (ref 19.6–45.3)
MCH RBC QN AUTO: 16.8 PG (ref 26.6–33)
MCHC RBC AUTO-ENTMCNC: 26.2 G/DL (ref 31.5–35.7)
MCV RBC AUTO: 64.3 FL (ref 79–97)
MONOCYTES # BLD MANUAL: 0.55 10*3/MM3 (ref 0.1–0.9)
MONOCYTES NFR BLD AUTO: ABNORMAL %
MONOCYTES NFR BLD MANUAL: 8.7 % (ref 5–12)
NEUTROPHILS # BLD MANUAL: 3.58 10*3/MM3 (ref 1.7–7)
NEUTROPHILS NFR BLD AUTO: ABNORMAL %
NEUTROPHILS NFR BLD MANUAL: 56.5 % (ref 42.7–76)
PLATELET # BLD AUTO: 430 10*3/MM3 (ref 140–450)
PLATELET BLD QL SMEAR: NORMAL
RBC # BLD AUTO: 3.92 10*6/MM3 (ref 3.77–5.28)
RBC MORPH BLD: NORMAL
TRIGL SERPL-MCNC: 122 MG/DL (ref 0–149)
TSH SERPL DL<=0.005 MIU/L-ACNC: 8.19 UIU/ML (ref 0.45–4.5)
VIT B12 SERPL-MCNC: 244 PG/ML (ref 211–946)
VLDLC SERPL CALC-MCNC: 21 MG/DL (ref 5–40)
WBC # BLD AUTO: 6.33 10*3/MM3 (ref 3.4–10.8)

## 2022-09-09 RX ORDER — DOXYCYCLINE HYCLATE 50 MG/1
324 CAPSULE, GELATIN COATED ORAL
Qty: 90 TABLET | Refills: 1 | Status: SHIPPED | OUTPATIENT
Start: 2022-09-09

## 2022-09-09 RX ORDER — ALBUTEROL SULFATE 90 UG/1
2 AEROSOL, METERED RESPIRATORY (INHALATION) EVERY 4 HOURS PRN
Qty: 18 G | Refills: 3 | Status: SHIPPED | OUTPATIENT
Start: 2022-09-09

## 2022-09-09 RX ORDER — LEVOTHYROXINE AND LIOTHYRONINE 9.5; 2.25 UG/1; UG/1
15 TABLET ORAL DAILY
Qty: 60 TABLET | Refills: 1 | Status: SHIPPED | OUTPATIENT
Start: 2022-09-09 | End: 2023-01-30

## 2022-09-09 RX ORDER — ATORVASTATIN CALCIUM 40 MG/1
40 TABLET, FILM COATED ORAL DAILY
Qty: 90 TABLET | Refills: 3 | Status: SHIPPED | OUTPATIENT
Start: 2022-09-09 | End: 2022-12-05 | Stop reason: SDUPTHER

## 2022-09-09 RX ORDER — LEVOTHYROXINE AND LIOTHYRONINE 57; 13.5 UG/1; UG/1
90 TABLET ORAL EVERY MORNING
Qty: 60 TABLET | Refills: 1 | Status: SHIPPED | OUTPATIENT
Start: 2022-09-09 | End: 2022-09-12 | Stop reason: SDUPTHER

## 2022-09-09 RX ORDER — GABAPENTIN 800 MG/1
800 TABLET ORAL 3 TIMES DAILY
Qty: 90 TABLET | Refills: 1 | Status: SHIPPED | OUTPATIENT
Start: 2022-09-09 | End: 2022-10-04 | Stop reason: SDUPTHER

## 2022-09-09 NOTE — TELEPHONE ENCOUNTER
2nd attempt to reach patient no answer left voicemail to return call.     HUB OK TO READ: Please transfer call back to us so that we can speak to patient.

## 2022-09-09 NOTE — TELEPHONE ENCOUNTER
----- Message from Yon Bond MD sent at 9/9/2022  8:27 AM EDT -----  Regarding: Critical anemia  I called this patient today and left a voicemail, but she did not answer.  She should go to the emergency department due to a critical anemia for transfusion.

## 2022-09-09 NOTE — TELEPHONE ENCOUNTER
Patient called to go to the hospital for a transfusion with critically low hemoglobin level.  Patient was not available and will continue to call.  Atorvastatin was increased in levothyroxine was sent.  Iron supplementation also ordered.

## 2022-09-09 NOTE — TELEPHONE ENCOUNTER
On-call note: Received a call from [facility] lab at 9:45 PM regarding critical hemoglobin (6.6) with a normal hematocrit (25.2).  Per review of medical chart, the patient has a history of anemia.  Last hemoglobin 2 years ago was 7.8.    Message forwarded to PCP to address, as this seems like a chronic anemia.

## 2022-09-11 ENCOUNTER — HOSPITAL ENCOUNTER (EMERGENCY)
Facility: HOSPITAL | Age: 62
Discharge: LEFT BEFORE DISCHARGE | End: 2022-09-11
Attending: EMERGENCY MEDICINE | Admitting: EMERGENCY MEDICINE

## 2022-09-11 VITALS
SYSTOLIC BLOOD PRESSURE: 102 MMHG | OXYGEN SATURATION: 96 % | HEIGHT: 65 IN | TEMPERATURE: 98.5 F | RESPIRATION RATE: 16 BRPM | HEART RATE: 84 BPM | BODY MASS INDEX: 17.49 KG/M2 | DIASTOLIC BLOOD PRESSURE: 66 MMHG | WEIGHT: 105 LBS

## 2022-09-11 DIAGNOSIS — R19.5 OCCULT BLOOD IN STOOLS: ICD-10-CM

## 2022-09-11 DIAGNOSIS — D64.9 ANEMIA, UNSPECIFIED TYPE: Primary | ICD-10-CM

## 2022-09-11 LAB
ABO GROUP BLD: NORMAL
ALBUMIN SERPL-MCNC: 4.6 G/DL (ref 3.5–5.2)
ALBUMIN/GLOB SERPL: 1.4 G/DL
ALP SERPL-CCNC: 116 U/L (ref 39–117)
ALT SERPL W P-5'-P-CCNC: 6 U/L (ref 1–33)
ANION GAP SERPL CALCULATED.3IONS-SCNC: 16 MMOL/L (ref 5–15)
ANISOCYTOSIS BLD QL: NORMAL
APTT PPP: 29.2 SECONDS (ref 60–90)
AST SERPL-CCNC: 17 U/L (ref 1–32)
BASOPHILS # BLD AUTO: 0.17 10*3/MM3 (ref 0–0.2)
BASOPHILS NFR BLD AUTO: 1.5 % (ref 0–1.5)
BILIRUB SERPL-MCNC: <0.2 MG/DL (ref 0–1.2)
BLD GP AB SCN SERPL QL: NEGATIVE
BUN SERPL-MCNC: 9 MG/DL (ref 8–23)
BUN/CREAT SERPL: 13 (ref 7–25)
CALCIUM SPEC-SCNC: 9.2 MG/DL (ref 8.6–10.5)
CHLORIDE SERPL-SCNC: 98 MMOL/L (ref 98–107)
CO2 SERPL-SCNC: 21 MMOL/L (ref 22–29)
CREAT SERPL-MCNC: 0.69 MG/DL (ref 0.57–1)
DEPRECATED RDW RBC AUTO: 45.1 FL (ref 37–54)
DEVELOPER EXPIRATION DATE: ABNORMAL
DEVELOPER LOT NUMBER: ABNORMAL
EGFRCR SERPLBLD CKD-EPI 2021: 99.5 ML/MIN/1.73
EOSINOPHIL # BLD AUTO: 0.35 10*3/MM3 (ref 0–0.4)
EOSINOPHIL NFR BLD AUTO: 3 % (ref 0.3–6.2)
ERYTHROCYTE [DISTWIDTH] IN BLOOD BY AUTOMATED COUNT: 21.6 % (ref 12.3–15.4)
EXPIRATION DATE: ABNORMAL
FECAL OCCULT BLOOD SCREEN, POC: POSITIVE
GLOBULIN UR ELPH-MCNC: 3.2 GM/DL
GLUCOSE SERPL-MCNC: 152 MG/DL (ref 65–99)
HCT VFR BLD AUTO: 23.3 % (ref 34–46.6)
HGB BLD-MCNC: 6.5 G/DL (ref 12–15.9)
HOLD SPECIMEN: NORMAL
HYPOCHROMIA BLD QL: NORMAL
IMM GRANULOCYTES # BLD AUTO: 0.04 10*3/MM3 (ref 0–0.05)
IMM GRANULOCYTES NFR BLD AUTO: 0.3 % (ref 0–0.5)
INR PPP: 0.91 (ref 0.84–1.13)
LARGE PLATELETS: NORMAL
LYMPHOCYTES # BLD AUTO: 3.39 10*3/MM3 (ref 0.7–3.1)
LYMPHOCYTES NFR BLD AUTO: 29.3 % (ref 19.6–45.3)
Lab: ABNORMAL
MCH RBC QN AUTO: 17.3 PG (ref 26.6–33)
MCHC RBC AUTO-ENTMCNC: 27.9 G/DL (ref 31.5–35.7)
MCV RBC AUTO: 62.1 FL (ref 79–97)
MONOCYTES # BLD AUTO: 0.64 10*3/MM3 (ref 0.1–0.9)
MONOCYTES NFR BLD AUTO: 5.5 % (ref 5–12)
NEGATIVE CONTROL: NEGATIVE
NEUTROPHILS NFR BLD AUTO: 6.97 10*3/MM3 (ref 1.7–7)
NEUTROPHILS NFR BLD AUTO: 60.4 % (ref 42.7–76)
NRBC BLD AUTO-RTO: 0 /100 WBC (ref 0–0.2)
OVALOCYTES BLD QL SMEAR: NORMAL
PLATELET # BLD AUTO: 375 10*3/MM3 (ref 140–450)
PMV BLD AUTO: 10.2 FL (ref 6–12)
POSITIVE CONTROL: POSITIVE
POTASSIUM SERPL-SCNC: 3.2 MMOL/L (ref 3.5–5.2)
PROT SERPL-MCNC: 7.8 G/DL (ref 6–8.5)
PROTHROMBIN TIME: 12.2 SECONDS (ref 11.4–14.4)
RBC # BLD AUTO: 3.75 10*6/MM3 (ref 3.77–5.28)
RH BLD: NEGATIVE
SODIUM SERPL-SCNC: 135 MMOL/L (ref 136–145)
T&S EXPIRATION DATE: NORMAL
WBC MORPH BLD: NORMAL
WBC NRBC COR # BLD: 11.56 10*3/MM3 (ref 3.4–10.8)
WHOLE BLOOD HOLD COAG: NORMAL
WHOLE BLOOD HOLD SPECIMEN: NORMAL

## 2022-09-11 PROCEDURE — 82270 OCCULT BLOOD FECES: CPT | Performed by: EMERGENCY MEDICINE

## 2022-09-11 PROCEDURE — 85610 PROTHROMBIN TIME: CPT | Performed by: EMERGENCY MEDICINE

## 2022-09-11 PROCEDURE — 86900 BLOOD TYPING SEROLOGIC ABO: CPT | Performed by: EMERGENCY MEDICINE

## 2022-09-11 PROCEDURE — 80053 COMPREHEN METABOLIC PANEL: CPT | Performed by: EMERGENCY MEDICINE

## 2022-09-11 PROCEDURE — 85730 THROMBOPLASTIN TIME PARTIAL: CPT | Performed by: EMERGENCY MEDICINE

## 2022-09-11 PROCEDURE — 36415 COLL VENOUS BLD VENIPUNCTURE: CPT

## 2022-09-11 PROCEDURE — 85025 COMPLETE CBC W/AUTO DIFF WBC: CPT | Performed by: EMERGENCY MEDICINE

## 2022-09-11 PROCEDURE — 86901 BLOOD TYPING SEROLOGIC RH(D): CPT | Performed by: EMERGENCY MEDICINE

## 2022-09-11 PROCEDURE — 86850 RBC ANTIBODY SCREEN: CPT | Performed by: EMERGENCY MEDICINE

## 2022-09-11 PROCEDURE — 85007 BL SMEAR W/DIFF WBC COUNT: CPT | Performed by: EMERGENCY MEDICINE

## 2022-09-11 PROCEDURE — 99283 EMERGENCY DEPT VISIT LOW MDM: CPT

## 2022-09-11 RX ORDER — SODIUM CHLORIDE 0.9 % (FLUSH) 0.9 %
10 SYRINGE (ML) INJECTION AS NEEDED
Status: DISCONTINUED | OUTPATIENT
Start: 2022-09-11 | End: 2022-09-11 | Stop reason: HOSPADM

## 2022-09-12 ENCOUNTER — TELEPHONE (OUTPATIENT)
Dept: INTERNAL MEDICINE | Facility: CLINIC | Age: 62
End: 2022-09-12

## 2022-09-12 DIAGNOSIS — E89.0 POSTOPERATIVE HYPOTHYROIDISM: ICD-10-CM

## 2022-09-12 RX ORDER — LEVOTHYROXINE AND LIOTHYRONINE 57; 13.5 UG/1; UG/1
90 TABLET ORAL EVERY MORNING
Qty: 60 TABLET | Refills: 1 | Status: SHIPPED | OUTPATIENT
Start: 2022-09-12 | End: 2023-01-30

## 2022-09-12 NOTE — TELEPHONE ENCOUNTER
Gina Horton:    Has this patient requested, is it an option to complete outpatient blood transfusions?  I previously had been required to send him to the emergency department.  Thank so much for your help!

## 2022-09-12 NOTE — TELEPHONE ENCOUNTER
Patient is calling not happy that no one explained to her that the trip to ER for blood transfusion will take 3-4 hours ans she needed to be admitted. She is requesting blood transfusion referral so she can be scheduled for appointments and not to go to ER. Also went three times to [facility] pharmacy and her medication is not there, want Rx filled ASAP. Requested call back.

## 2022-09-12 NOTE — DISCHARGE INSTRUCTIONS
We encourage you to return to the ER for admission and blood transfusion and possible endoscopy to look for source of blood in stool.

## 2022-09-13 LAB — DRUGS UR: NORMAL

## 2022-09-13 NOTE — TELEPHONE ENCOUNTER
Pt rreceived message was seen at [facility] 9/11/2022 for low Anemia.\  Yon Bond MD  to Yana Ontiveros     AW    9:02 AM  Note   Sounds good and no worries.  I told the patient to go to the emergency department to have an expedited transfusion right now and if we need to set up something long-term in the future, which would be unlikely we can consider this as an option.  Thank so much for your help!        PCP working on getting out patient service transfusion set up for Pt.

## 2022-09-13 NOTE — TELEPHONE ENCOUNTER
I have never had a blood transfusion referral so I am not sure of the process-I do know that Dr Contreras has so he may be of help

## 2022-09-13 NOTE — TELEPHONE ENCOUNTER
Sounds good and no worries.  I told the patient to go to the emergency department to have an expedited transfusion right now and if we need to set up something long-term in the future, which would be unlikely we can consider this as an option.  Thank so much for your help!

## 2022-09-27 ENCOUNTER — TELEPHONE (OUTPATIENT)
Dept: INTERNAL MEDICINE | Facility: CLINIC | Age: 62
End: 2022-09-27

## 2022-09-27 NOTE — TELEPHONE ENCOUNTER
.  Caller: Bobbi Du    Relationship: Self    Best call back number:     What is the best time to reach you: ANYTIME    Who are you requesting to speak with (clinical staff, provider,  specific staff member): CLINICAL STAFF    Do you know the name of the person who called: BOBBI    What was the call regarding: PATIENT HASNT RECEIVED HER COLORGUARD IN THE MAIL; ALSO PATIENT WAS COMPLAINING OF HER RIGHT LEG HURTING LIKE HER LEFT LEG DID BEFORE SHE HAD THE AMPUTATION, SHE SAID IT FELT LIKE SOMETHING WAS BLOCKED; I WT HER TO THE OFFICE FOR FURTHER CLINIAL ASSISTANCE AS PATIENT WANTED A REFERRAL TO VASCULAR SURGEON.     Do you require a callback: YES

## 2022-09-27 NOTE — TELEPHONE ENCOUNTER
PATIENT CALLED REGARDING SHE IS HURTING BADLY IN HER RIGHT LEG (FEELS LIKE A BLOCKAGE). SHE SAID IT FEELS LIKE HER OTHER LEG DID BEFORE SHE GOT HER PROSTHESIS. ALSO, SHE NEVER RECEIVED HER COLORGUARD IN THE MAIL. WOULD LIKE A REFERRAL TO A VASCULAR SURGEON.

## 2022-09-28 NOTE — TELEPHONE ENCOUNTER
REACHED PT. ADVISED OF CLINICAL MESSAGE. PT VERBALIZED GOOD UNDERSTANDING. PT SCHEDULED 9/29/2022.

## 2022-09-29 ENCOUNTER — OFFICE VISIT (OUTPATIENT)
Dept: INTERNAL MEDICINE | Facility: CLINIC | Age: 62
End: 2022-09-29

## 2022-09-29 VITALS
RESPIRATION RATE: 18 BRPM | DIASTOLIC BLOOD PRESSURE: 72 MMHG | SYSTOLIC BLOOD PRESSURE: 138 MMHG | WEIGHT: 102 LBS | HEART RATE: 126 BPM | BODY MASS INDEX: 16.97 KG/M2 | TEMPERATURE: 98.2 F | OXYGEN SATURATION: 99 %

## 2022-09-29 DIAGNOSIS — M14.671 CHARCOT'S JOINT OF RIGHT FOOT: ICD-10-CM

## 2022-09-29 DIAGNOSIS — I70.219 INTERMITTENT CLAUDICATION DUE TO ATHEROSCLEROSIS OF ARTERY OF EXTREMITY: Primary | ICD-10-CM

## 2022-09-29 DIAGNOSIS — F17.200 TOBACCO USE DISORDER: ICD-10-CM

## 2022-09-29 DIAGNOSIS — I73.89 OTHER SPECIFIED PERIPHERAL VASCULAR DISEASES: ICD-10-CM

## 2022-09-29 PROCEDURE — 99406 BEHAV CHNG SMOKING 3-10 MIN: CPT | Performed by: STUDENT IN AN ORGANIZED HEALTH CARE EDUCATION/TRAINING PROGRAM

## 2022-09-29 PROCEDURE — 99214 OFFICE O/P EST MOD 30 MIN: CPT | Performed by: STUDENT IN AN ORGANIZED HEALTH CARE EDUCATION/TRAINING PROGRAM

## 2022-09-29 RX ORDER — CILOSTAZOL 100 MG/1
100 TABLET ORAL 2 TIMES DAILY
Qty: 60 TABLET | Refills: 1 | Status: SHIPPED | OUTPATIENT
Start: 2022-09-29 | End: 2022-10-10

## 2022-09-29 RX ORDER — VARENICLINE TARTRATE 1 MG/1
1 TABLET, FILM COATED ORAL 2 TIMES DAILY
Qty: 56 TABLET | Refills: 1 | Status: SHIPPED | OUTPATIENT
Start: 2022-10-27 | End: 2022-12-22

## 2022-09-29 RX ORDER — NICOTINE 21 MG/24HR
1 PATCH, TRANSDERMAL 24 HOURS TRANSDERMAL EVERY 24 HOURS
Qty: 28 EACH | Refills: 2 | Status: SHIPPED | OUTPATIENT
Start: 2022-09-29

## 2022-09-29 NOTE — ASSESSMENT & PLAN NOTE
Bobbi Du  reports that she has been smoking cigarettes. She has a 44.00 pack-year smoking history. She has never used smokeless tobacco.. I have educated her on the risk of diseases from using tobacco products such as cancer, COPD and heart disease.     I advised her to quit and she is willing to quit. We have discussed the following method/s for tobacco cessation:  Counseling Prescription Medicaiton.  Together we have set a quit date for 1 month from today.  She will follow up with me in 1 month or sooner to check on her progress.  -   Initiate 9/29/2022: Chantix starter pack  -   Continue bupropion 150 mg daily  -   Initiate 9/29/2022: Nicotine replacement therapy with transdermal patch    I spent 5 minutes counseling the patient.

## 2022-09-29 NOTE — PROGRESS NOTES
Follow Up Office Visit      Date: 2022   Patient Name: Bobbi Du  : 1960   MRN: [id number]     Chief Complaint:    Chief Complaint   Patient presents with   • Leg Pain     Rt       History of Present Illness: Bobbi Du is a 61 y.o. female who is here today for right leg pain.    Right Leg Claudication  Charcot Foot  -   notes that she is not able to walk for long distances due to foot and calf pain on her right leg  -   feels similar to her other leg prior to amputation  -   improved with rest  -   notes double bypass in her left leg  -   She is concerned about vascular issues in her right lower extremity at this time secondary to similar symptoms prior to her left below-knee amputation  -   Notes that her Charcot foot continues to hurt and worsen in overall level of pain    Nicotine Dependence  Presents for follow-up visit. Symptoms are negative for fatigue. The symptoms have been stable (Notes previous improvement on Chantix, but was discontinued secondary to previous interaction with medication). She smokes 1 pack of cigarettes per day. Compliance with prior treatments has been good.       Subjective      Review of Systems:   Review of Systems   Constitutional: Negative for activity change, appetite change, fatigue and fever.   Eyes: Negative for blurred vision, photophobia and visual disturbance.   Respiratory: Negative for cough, chest tightness and shortness of breath.    Cardiovascular: Negative for chest pain and palpitations.   Gastrointestinal: Negative for abdominal distention, abdominal pain, blood in stool, constipation, diarrhea, nausea and vomiting.   Genitourinary: Negative for dysuria and hematuria.   Musculoskeletal: Positive for arthralgias and myalgias. Negative for back pain and joint swelling.   Skin: Negative for rash and wound.   Neurological: Negative for weakness, headache and confusion.       I have reviewed the patients family history, social history, past  medical history, past surgical history and have updated it as appropriate.     Medications:     Current Outpatient Medications:   •  albuterol sulfate  (90 Base) MCG/ACT inhaler, Inhale 2 puffs Every 4 (Four) Hours As Needed for Wheezing or Shortness of Air., Disp: 18 g, Rfl: 3  •  aspirin (aspirin) 81 MG EC tablet, Take 1 tablet by mouth Every Morning., Disp: 90 tablet, Rfl: 3  •  atorvastatin (Lipitor) 40 MG tablet, Take 1 tablet by mouth Daily., Disp: 90 tablet, Rfl: 3  •  buPROPion XL (WELLBUTRIN XL) 150 MG 24 hr tablet, Take 1 tablet by mouth Daily., Disp: 45 tablet, Rfl: 1  •  Calcium Carb-Cholecalciferol (Calcium 1000 + D) 1000-800 MG-UNIT tablet, calcium, Disp: , Rfl:   •  clopidogrel (PLAVIX) 75 MG tablet, Take 75 mg by mouth Every Morning., Disp: , Rfl:   •  Cobalamin Combinations (B-12) 100-5000 MCG sublingual tablet, B12, Disp: , Rfl:   •  ferrous gluconate (FERGON) 324 MG tablet, Take 1 tablet by mouth Daily With Breakfast., Disp: 90 tablet, Rfl: 1  •  gabapentin (NEURONTIN) 800 MG tablet, Take 1 tablet by mouth 3 (Three) Times a Day., Disp: 90 tablet, Rfl: 1  •  Iron-Vitamin C 100-250 MG tablet, iron  2 tab bid, Disp: , Rfl:   •  lisinopril-hydrochlorothiazide (PRINZIDE,ZESTORETIC) 10-12.5 MG per tablet, Take 1 tablet by mouth Every Morning., Disp: 90 tablet, Rfl: 3  •  metFORMIN (GLUCOPHAGE) 500 MG tablet, Take 500 mg by mouth Daily With Breakfast., Disp: , Rfl:   •  thyroid (ARMOUR) 15 MG tablet, Take 1 tablet by mouth Daily., Disp: 60 tablet, Rfl: 1  •  Thyroid 90 MG PO tablet, Take 1 tablet by mouth Every Morning., Disp: 60 tablet, Rfl: 1  •  umeclidinium-vilanterol (ANORO ELLIPTA) 62.5-25 MCG/INH aerosol powder  inhaler, Inhale 1 puff Daily., Disp: , Rfl:   •  cilostazol (PLETAL) 100 MG tablet, Take 1 tablet by mouth 2 (Two) Times a Day., Disp: 60 tablet, Rfl: 1  •  nicotine (NICODERM CQ) 14 MG/24HR patch, Place 1 patch on the skin as directed by provider Daily., Disp: 28 each, Rfl: 2  •   [START ON 10/27/2022] varenicline (Chantix Continuing Month Lamar) 1 MG tablet, Take 1 tablet by mouth 2 (Two) Times a Day for 56 days., Disp: 56 tablet, Rfl: 1  •  varenicline (CHANTIX LAMAR) 0.5 MG X 11 & 1 MG X 42 tablet, Take 0.5 mg po daily x 3 days, then 0.5 mg po bid x 4 days, then 1 mg po bid, Disp: 53 tablet, Rfl: 0    Allergies:   Allergies   Allergen Reactions   • Bee Venom Anaphylaxis       Objective     Physical Exam: Please see above  Vital Signs:   Vitals:    09/29/22 0945   BP: 138/72   BP Location: Right arm   Patient Position: Sitting   Cuff Size: Adult   Pulse: (!) 126   Resp: 18   Temp: 98.2 °F (36.8 °C)   TempSrc: Temporal   SpO2: 99%   Weight: 46.3 kg (102 lb)   PainSc:   8     Body mass index is 16.97 kg/m².    Physical Exam  Vitals and nursing note reviewed.   Constitutional:       General: She is not in acute distress.     Appearance: Normal appearance. She is normal weight. She is not ill-appearing or toxic-appearing.   HENT:      Nose: No congestion or rhinorrhea.   Eyes:      General:         Right eye: No discharge.         Left eye: No discharge.      Conjunctiva/sclera: Conjunctivae normal.   Cardiovascular:      Pulses:           Dorsalis pedis pulses are 1+ on the right side.        Posterior tibial pulses are 1+ on the right side.   Pulmonary:      Effort: Pulmonary effort is normal. No respiratory distress.   Abdominal:      General: Abdomen is flat. There is no distension.   Musculoskeletal:         General: Tenderness (Tenderness to palpation throughout her distal right lower extremity and specifically in her right foot) present.      Cervical back: Normal range of motion.      Right foot: Charcot foot present. No foot drop.      Left Lower Extremity: Left leg is amputated below knee.   Feet:      Right foot:      Skin integrity: No erythema (Pallor present throughout her right foot).   Skin:     Coloration: Skin is not jaundiced.      Findings: No rash.   Neurological:      General:  No focal deficit present.      Mental Status: She is alert. Mental status is at baseline.      Coordination: Coordination normal.      Gait: Gait normal.   Psychiatric:         Mood and Affect: Mood normal.         Behavior: Behavior normal.         Thought Content: Thought content normal.         Judgment: Judgment normal.         Procedures    Results:   Labs:   Hemoglobin A1C   Date Value Ref Range Status   09/08/2022 5.8 % Final   10/16/2019 5.40 4.80 - 5.60 % Final     TSH   Date Value Ref Range Status   09/08/2022 8.190 (H) 0.450 - 4.500 uIU/mL Final   10/15/2019 38.810 (H) 0.270 - 4.200 uIU/mL Final        Imaging:   No valid procedures specified.     Assessment / Plan      Assessment/Plan:   Problem List Items Addressed This Visit        Musculoskeletal and Injuries    Charcot's joint of foot    Current Assessment & Plan     -   Pain associated with Charcot foot as previously diagnosed limiting ambulation prior to clinical exam on 9/8/2022 for several years and this has worsened prior to clinical exam on 9/29/2022  -   Patient without recent falls, although notes daily functioning is impaired  -   Patient notes that she is we wheelchair-bound routinely throughout the day and has difficulty with ambulation secondary to left-sided below-knee amputation in addition to Charcot foot as noted above  -   Worsening polyneuropathy is also impacting ambulation and daily functioning  Plan:  -   Podiatry referral ordered for nonsurgical treatment of Charcot foot and to improve pain long-term prior to clinical exam on 9/29/2022 and again on 9/29/2022  -   Orthopedic surgery foot referral ordered on 9/8/2022 for additional recommendations regarding surgical management and reordered on 9/29/2022  -   Continue gabapentin 800 mg 3 times daily for improvement of polyneuropathy         Relevant Orders    Ambulatory Referral to Orthopedic Surgery    Ambulatory Referral to Podiatry (Completed)       Tobacco    Tobacco use  disorder    Current Assessment & Plan     Bobbi Du  reports that she has been smoking cigarettes. She has a 44.00 pack-year smoking history. She has never used smokeless tobacco.. I have educated her on the risk of diseases from using tobacco products such as cancer, COPD and heart disease.     I advised her to quit and she is willing to quit. We have discussed the following method/s for tobacco cessation:  Counseling Prescription Medicaiton.  Together we have set a quit date for 1 month from today.  She will follow up with me in 1 month or sooner to check on her progress.  -   Initiate 9/29/2022: Chantix starter pack  -   Continue bupropion 150 mg daily  -   Initiate 9/29/2022: Nicotine replacement therapy with transdermal patch    I spent 5 minutes counseling the patient.                Relevant Medications    varenicline (CHANTIX LAMAR) 0.5 MG X 11 & 1 MG X 42 tablet    varenicline (Chantix Continuing Month Lamar) 1 MG tablet (Start on 10/27/2022)    nicotine (NICODERM CQ) 14 MG/24HR patch       Other    Intermittent claudication due to atherosclerosis of artery of extremity (HCC) - Primary    Current Assessment & Plan     -   Patient with consistent right lower extremity pain with ambulation prior to clinical exam on 9/29/2022  -   Patient required previous left lower extremity vascular bypass and below-knee amputation  -   Patient with concerning symptoms for intermittent claudication with prominent risk factor of tobacco use  Plan:  -   Ankle-brachial index ordered on 9/29/2022 for additional evaluation  -   Duplex ultrasound of right lower extremity arteries ordered due to pallor identified during physical exam and concern for arterial supply with claudication  -   Initiate 9/29/2022: Cilostazol 100 mg twice daily for improvement of pain  -   Continue dual antiplatelet therapy as previously initiated  -   Consider vascular surgery consult pending results         Relevant Medications    cilostazol (PLETAL)  100 MG tablet    Other Relevant Orders    US Ankle / Brachial Indices Extremity Complete    Duplex Lower Extremity Art / Grafts - Right CAR      Other Visit Diagnoses     Other specified peripheral vascular diseases (HCC)         Relevant Orders    US Ankle / Brachial Indices Extremity Complete    Duplex Lower Extremity Art / Grafts - Right CAR            Follow Up:   Return in about 11 days (around 10/10/2022) for Recheck and Pap.      Yon Bond MD  [facility] [place] [facility]

## 2022-09-29 NOTE — ASSESSMENT & PLAN NOTE
-   Pain associated with Charcot foot as previously diagnosed limiting ambulation prior to clinical exam on 9/8/2022 for several years and this has worsened prior to clinical exam on 9/29/2022  -   Patient without recent falls, although notes daily functioning is impaired  -   Patient notes that she is we wheelchair-bound routinely throughout the day and has difficulty with ambulation secondary to left-sided below-knee amputation in addition to Charcot foot as noted above  -   Worsening polyneuropathy is also impacting ambulation and daily functioning  Plan:  -   Podiatry referral ordered for nonsurgical treatment of Charcot foot and to improve pain long-term prior to clinical exam on 9/29/2022 and again on 9/29/2022  -   Orthopedic surgery foot referral ordered on 9/8/2022 for additional recommendations regarding surgical management and reordered on 9/29/2022  -   Continue gabapentin 800 mg 3 times daily for improvement of polyneuropathy

## 2022-09-29 NOTE — ASSESSMENT & PLAN NOTE
-   Patient with consistent right lower extremity pain with ambulation prior to clinical exam on 9/29/2022  -   Patient required previous left lower extremity vascular bypass and below-knee amputation  -   Patient with concerning symptoms for intermittent claudication with prominent risk factor of tobacco use  Plan:  -   Ankle-brachial index ordered on 9/29/2022 for additional evaluation  -   Duplex ultrasound of right lower extremity arteries ordered due to pallor identified during physical exam and concern for arterial supply with claudication  -   Initiate 9/29/2022: Cilostazol 100 mg twice daily for improvement of pain  -   Continue dual antiplatelet therapy as previously initiated  -   Consider vascular surgery consult pending results

## 2022-10-03 ENCOUNTER — TELEPHONE (OUTPATIENT)
Dept: INTERNAL MEDICINE | Facility: CLINIC | Age: 62
End: 2022-10-03

## 2022-10-03 NOTE — TELEPHONE ENCOUNTER
The 2 doses were different because 1 was a starter pack and 1 was a continuation pack.    Slo: Do know the status of her vascular surgeon referral?    Thank so much for your help!

## 2022-10-03 NOTE — TELEPHONE ENCOUNTER
Caller: Bobbi Du    Relationship: Self    Best call back number: [phone]     What was the call regarding: PATIENT CALLED STATING THAT varenicline (CHANTIX LAMAR) 0.5 MG X 11 & 1 MG X 42 tablet WAS CALLED INTO THE PHARMACY TWICE  BUT IN A DIFFERENT DOSAGE. SHE WOULD LIKE TO KNOW WHY.     PATIENT ALSO STATED THAT ALL OF HER REFERRALS THAT SHE RECEIVES SHE  WOULD RATHER GO TO SOMEONE AT [facility] OR [facility]. SHE WOULD LIKE THIS TO BE NOTED IN HER FILE.  SHE PREFERS TO NOT GO TO .  PATIENT IS WANTING TO GET IN TO BE SEEN A SOON AS POSSIBLE. PATIENT ALSO ASKED FOR A STATUS FOR THE VASCULAR SURGEON.     Do you require a callback: YES

## 2022-10-04 ENCOUNTER — HOSPITAL ENCOUNTER (OUTPATIENT)
Dept: CARDIOLOGY | Facility: HOSPITAL | Age: 62
Discharge: HOME | End: 2022-10-04

## 2022-10-04 ENCOUNTER — HOSPITAL ENCOUNTER (OUTPATIENT)
Dept: CT IMAGING | Facility: HOSPITAL | Age: 62
Discharge: HOME | End: 2022-10-04

## 2022-10-04 VITALS — BODY MASS INDEX: 16.9 KG/M2 | WEIGHT: 101.41 LBS | HEIGHT: 65 IN

## 2022-10-04 DIAGNOSIS — Z00.00 HEALTHCARE MAINTENANCE: ICD-10-CM

## 2022-10-04 DIAGNOSIS — Z13.9 ENCOUNTER FOR SCREENING: ICD-10-CM

## 2022-10-04 DIAGNOSIS — G54.6 PHANTOM PAIN FOLLOWING AMPUTATION OF LOWER LIMB: ICD-10-CM

## 2022-10-04 DIAGNOSIS — E11.42 TYPE 2 DIABETES MELLITUS WITH DIABETIC POLYNEUROPATHY, WITHOUT LONG-TERM CURRENT USE OF INSULIN: ICD-10-CM

## 2022-10-04 DIAGNOSIS — R01.1 HOLOSYSTOLIC MURMUR: ICD-10-CM

## 2022-10-04 DIAGNOSIS — Z87.891 PERSONAL HISTORY OF NICOTINE DEPENDENCE: ICD-10-CM

## 2022-10-04 LAB
BH CV ECHO MEAS - AO MAX PG: 13.2 MMHG
BH CV ECHO MEAS - AO MEAN PG: 5 MMHG
BH CV ECHO MEAS - AO ROOT DIAM: 2.9 CM
BH CV ECHO MEAS - AO V2 MAX: 182 CM/SEC
BH CV ECHO MEAS - AO V2 VTI: 30 CM
BH CV ECHO MEAS - AVA(I,D): 2.27 CM2
BH CV ECHO MEAS - EDV(CUBED): 68.9 ML
BH CV ECHO MEAS - EDV(MOD-SP2): 53 ML
BH CV ECHO MEAS - EDV(MOD-SP4): 44 ML
BH CV ECHO MEAS - EF(MOD-BP): 64 %
BH CV ECHO MEAS - EF(MOD-SP2): 67.9 %
BH CV ECHO MEAS - EF(MOD-SP4): 61.4 %
BH CV ECHO MEAS - ESV(CUBED): 18.2 ML
BH CV ECHO MEAS - ESV(MOD-SP2): 17 ML
BH CV ECHO MEAS - ESV(MOD-SP4): 17 ML
BH CV ECHO MEAS - FS: 35.9 %
BH CV ECHO MEAS - IVS/LVPW: 0.98 CM
BH CV ECHO MEAS - IVSD: 0.77 CM
BH CV ECHO MEAS - LA DIMENSION: 3.7 CM
BH CV ECHO MEAS - LAT PEAK E' VEL: 10.5 CM/SEC
BH CV ECHO MEAS - LV MASS(C)D: 93.4 GRAMS
BH CV ECHO MEAS - LV MAX PG: 5.2 MMHG
BH CV ECHO MEAS - LV MEAN PG: 2 MMHG
BH CV ECHO MEAS - LV V1 MAX: 114 CM/SEC
BH CV ECHO MEAS - LV V1 VTI: 21.7 CM
BH CV ECHO MEAS - LVIDD: 4.1 CM
BH CV ECHO MEAS - LVIDS: 2.6 CM
BH CV ECHO MEAS - LVOT AREA: 3.1 CM2
BH CV ECHO MEAS - LVOT DIAM: 2 CM
BH CV ECHO MEAS - LVPWD: 0.79 CM
BH CV ECHO MEAS - MED PEAK E' VEL: 9.5 CM/SEC
BH CV ECHO MEAS - MV A MAX VEL: 71.6 CM/SEC
BH CV ECHO MEAS - MV DEC SLOPE: 558 CM/SEC2
BH CV ECHO MEAS - MV DEC TIME: 0.2 MSEC
BH CV ECHO MEAS - MV E MAX VEL: 81.9 CM/SEC
BH CV ECHO MEAS - MV E/A: 1.14
BH CV ECHO MEAS - MV P1/2T: 71.9 MSEC
BH CV ECHO MEAS - MVA(P1/2T): 3.1 CM2
BH CV ECHO MEAS - PA ACC SLOPE: 832.5 CM/SEC2
BH CV ECHO MEAS - PA ACC TIME: 0.11 SEC
BH CV ECHO MEAS - PA PR(ACCEL): 28.6 MMHG
BH CV ECHO MEAS - RAP SYSTOLE: 3 MMHG
BH CV ECHO MEAS - RVSP: 40 MMHG
BH CV ECHO MEAS - SV(LVOT): 68.2 ML
BH CV ECHO MEAS - SV(MOD-SP2): 36 ML
BH CV ECHO MEAS - SV(MOD-SP4): 27 ML
BH CV ECHO MEAS - TAPSE (>1.6): 2.2 CM
BH CV ECHO MEAS - TR MAX PG: 37 MMHG
BH CV ECHO MEAS - TR MAX VEL: 303.5 CM/SEC
BH CV ECHO MEASUREMENTS AVERAGE E/E' RATIO: 8.19
BH CV VAS BP RIGHT ARM: NORMAL MMHG
BH CV XLRA - RV BASE: 2.4 CM
BH CV XLRA - RV LENGTH: 4 CM
BH CV XLRA - RV MID: 1.9 CM
BH CV XLRA - TDI S': 10.5 CM/SEC
IVRT: 74 MSEC
LEFT ATRIUM VOLUME INDEX: 25.5 ML/M2
MAXIMAL PREDICTED HEART RATE: 160 BPM
STRESS TARGET HR: 136 BPM

## 2022-10-04 PROCEDURE — 71271 CT THORAX LUNG CANCER SCR C-: CPT

## 2022-10-04 PROCEDURE — 93306 TTE W/DOPPLER COMPLETE: CPT | Performed by: INTERNAL MEDICINE

## 2022-10-04 PROCEDURE — 93306 TTE W/DOPPLER COMPLETE: CPT

## 2022-10-04 RX ORDER — GABAPENTIN 800 MG/1
800 TABLET ORAL 3 TIMES DAILY
Qty: 90 TABLET | Refills: 1 | Status: SHIPPED | OUTPATIENT
Start: 2022-10-04 | End: 2022-12-01 | Stop reason: SDUPTHER

## 2022-10-04 NOTE — TELEPHONE ENCOUNTER
Please let the patient know we need the tests completed that were ordered during her previous visit prior to seeing a vascular surgeon because we have to show that her current symptoms are due to vascular issues.  Thanks.

## 2022-10-04 NOTE — TELEPHONE ENCOUNTER
Caller: Bobbi Du    Relationship: Self    Best call back number: [phone]    Requested Prescriptions:   Requested Prescriptions     Pending Prescriptions Disp Refills   • gabapentin (NEURONTIN) 800 MG tablet 90 tablet 1     Sig: Take 1 tablet by mouth 3 (Three) Times a Day.        Pharmacy where request [facility] be sent: [id number] - [facility] [address] AT [facility] & [facility] [phone] [facility] [phone] FX     Additional details provided by patient:  PT STATED THAT SHE WAS TOLD THAT MORE OF THE RX CHANTIX WAS SENT OVER TO PHARMACY BUT ANOTHER MG, PT WILL LIKE TO KNOW WHICH CHANTIX PROVIDER WILL LIKE FOR TO TAKE. PT HAS PICKED UP THE 0.5 CHANTIX    Does the patient have less than a 3 day supply:  [x] Yes  [] No    Connie Veras Rep   10/04/22 10:54 EDT

## 2022-10-05 NOTE — TELEPHONE ENCOUNTER
Spoke to patient and gave providers message. Patient was given central scheduling's number to call and get scheduled.

## 2022-10-10 ENCOUNTER — TELEPHONE (OUTPATIENT)
Dept: INTERNAL MEDICINE | Facility: CLINIC | Age: 62
End: 2022-10-10

## 2022-10-10 ENCOUNTER — OFFICE VISIT (OUTPATIENT)
Dept: INTERNAL MEDICINE | Facility: CLINIC | Age: 62
End: 2022-10-10

## 2022-10-10 ENCOUNTER — HOSPITAL ENCOUNTER (OUTPATIENT)
Dept: CARDIOLOGY | Facility: HOSPITAL | Age: 62
Discharge: HOME | End: 2022-10-10
Admitting: STUDENT IN AN ORGANIZED HEALTH CARE EDUCATION/TRAINING PROGRAM

## 2022-10-10 VITALS
SYSTOLIC BLOOD PRESSURE: 124 MMHG | RESPIRATION RATE: 18 BRPM | BODY MASS INDEX: 17.31 KG/M2 | WEIGHT: 104 LBS | DIASTOLIC BLOOD PRESSURE: 54 MMHG

## 2022-10-10 VITALS — BODY MASS INDEX: 17.16 KG/M2 | WEIGHT: 103 LBS | HEIGHT: 65 IN

## 2022-10-10 DIAGNOSIS — Z23 ENCOUNTER FOR VACCINATION: ICD-10-CM

## 2022-10-10 DIAGNOSIS — I70.219 INTERMITTENT CLAUDICATION DUE TO ATHEROSCLEROSIS OF ARTERY OF EXTREMITY: ICD-10-CM

## 2022-10-10 DIAGNOSIS — F17.200 TOBACCO USE DISORDER: ICD-10-CM

## 2022-10-10 DIAGNOSIS — E03.9 HYPOTHYROIDISM, UNSPECIFIED TYPE: Primary | ICD-10-CM

## 2022-10-10 DIAGNOSIS — I77.9 PERIPHERAL ARTERIAL OCCLUSIVE DISEASE: Primary | ICD-10-CM

## 2022-10-10 DIAGNOSIS — I77.9 PERIPHERAL ARTERIAL OCCLUSIVE DISEASE: ICD-10-CM

## 2022-10-10 DIAGNOSIS — R09.89 OTHER SPECIFIED SYMPTOMS AND SIGNS INVOLVING THE CIRCULATORY AND RESPIRATORY SYSTEMS: ICD-10-CM

## 2022-10-10 DIAGNOSIS — I73.89 OTHER SPECIFIED PERIPHERAL VASCULAR DISEASES: ICD-10-CM

## 2022-10-10 DIAGNOSIS — I70.219 INTERMITTENT CLAUDICATION DUE TO ATHEROSCLEROSIS OF ARTERY OF EXTREMITY: Primary | ICD-10-CM

## 2022-10-10 LAB
BH CV GRAFT BRACHIAL PRESSURE LEFT: 96 MMHG
BH CV GRAFT BRACHIAL PRESSURE RIGHT: 137 MMHG
BH CV LEA RIGHT ANT TIBIAL A DISTAL EDV: 13.8 CM/S
BH CV LEA RIGHT ANT TIBIAL A DISTAL PSV: 38.8 CM/S
BH CV LEA RIGHT ANT TIBIAL A MID EDV: 14 CM/S
BH CV LEA RIGHT ANT TIBIAL A MID PSV: 41.2 CM/S
BH CV LEA RIGHT ANT TIBIAL A PROX EDV: 18.6 CM/S
BH CV LEA RIGHT ANT TIBIAL A PROX PSV: 47.6 CM/S
BH CV LEA RIGHT AORTA PSV: 29 CM/S
BH CV LEA RIGHT CFA DISTAL EDV: 58 CM/S
BH CV LEA RIGHT CFA DISTAL PSV: 350 CM/S
BH CV LEA RIGHT CFA PROX EDV: 94 CM/S
BH CV LEA RIGHT CFA PROX PSV: 492 CM/S
BH CV LEA RIGHT COMMON ILIAC EDV: 14 CM/S
BH CV LEA RIGHT COMMON ILIAC PSV: 110 CM/S
BH CV LEA RIGHT DFA PROX EDV: 61 CM/S
BH CV LEA RIGHT DFA PROX PSV: 330 CM/S
BH CV LEA RIGHT DPA PRESSURE: 60 MMHG
BH CV LEA RIGHT EXT ILIAC EDV: 21 CM/S
BH CV LEA RIGHT EXT ILIAC PSV: 233 CM/S
BH CV LEA RIGHT PERONEAL  MID EDV: 13.6 CM/S
BH CV LEA RIGHT PERONEAL  MID PSV: 29.9 CM/S
BH CV LEA RIGHT POPITEAL A  DISTAL EDV: 15.2 CM/S
BH CV LEA RIGHT POPITEAL A  DISTAL PSV: 41.2 CM/S
BH CV LEA RIGHT POPITEAL A  PROX EDV: 11.2 CM/S
BH CV LEA RIGHT POPITEAL A  PROX PSV: 29.5 CM/S
BH CV LEA RIGHT PTA DISTAL EDV: 3.9 CM/S
BH CV LEA RIGHT PTA DISTAL PSV: 8.6 CM/S
BH CV LEA RIGHT PTA MID EDV: 5.9 CM/S
BH CV LEA RIGHT PTA MID PSV: 17.1 CM/S
BH CV LEA RIGHT PTA PRESSURE: 0 MMHG
BH CV LEA RIGHT PTA PROX EDV: 6.5 CM/S
BH CV LEA RIGHT PTA PROX PSV: 16.7 CM/S
BH CV LEA RIGHT SFA PROX EDV: 10 CM/S
BH CV LEA RIGHT SFA PROX PSV: 87.4 CM/S
BH CV LOWER ARTERIAL RIGHT ABI RATIO: 0.43
BH CV LOWER ARTERIAL RIGHT HIGHEST VELOCITY RATIO: 2.6
Lab: 2.1 CM
Lab: 8 CM/S
MAXIMAL PREDICTED HEART RATE: 160 BPM
RIGHT GROIN CFA SYS: 393 CM/SEC
STRESS TARGET HR: 136 BPM

## 2022-10-10 PROCEDURE — G0008 ADMIN INFLUENZA VIRUS VAC: HCPCS | Performed by: STUDENT IN AN ORGANIZED HEALTH CARE EDUCATION/TRAINING PROGRAM

## 2022-10-10 PROCEDURE — 99214 OFFICE O/P EST MOD 30 MIN: CPT | Performed by: STUDENT IN AN ORGANIZED HEALTH CARE EDUCATION/TRAINING PROGRAM

## 2022-10-10 PROCEDURE — 93926 LOWER EXTREMITY STUDY: CPT

## 2022-10-10 PROCEDURE — 93926 LOWER EXTREMITY STUDY: CPT | Performed by: INTERNAL MEDICINE

## 2022-10-10 PROCEDURE — 90686 IIV4 VACC NO PRSV 0.5 ML IM: CPT | Performed by: STUDENT IN AN ORGANIZED HEALTH CARE EDUCATION/TRAINING PROGRAM

## 2022-10-10 PROCEDURE — 99406 BEHAV CHNG SMOKING 3-10 MIN: CPT | Performed by: STUDENT IN AN ORGANIZED HEALTH CARE EDUCATION/TRAINING PROGRAM

## 2022-10-10 NOTE — TELEPHONE ENCOUNTER
Caller: Bobbi Du    Relationship to patient: Self    Best call back number: [phone]    Patient is needing: PATIENT STATED THAT SHE WOULD LIKE TO SPEAK WITH PROVIDER ABOUT SOME CONCERNS ABOUT A FEW RESULTS SHE SAW ON MYCHART    PLEASE ADVISE

## 2022-10-10 NOTE — ASSESSMENT & PLAN NOTE
Bobbi Du  reports that she has been smoking cigarettes. She has a 44.00 pack-year smoking history. She has never used smokeless tobacco.. I have educated her on the risk of diseases from using tobacco products such as cancer, COPD and heart disease.     I advised her to quit and she is willing to quit. We have discussed the following method/s for tobacco cessation:  Counseling Prescription Medicaiton.  Together we have set a quit date for 1 month from today.  She will follow up with me in 1 month or sooner to check on her progress.  -   Continue Chantix starter pack  -   Continue bupropion 150 mg daily  -   Continue nicotine replacement therapy with transdermal patch    I spent 5 minutes counseling the patient.

## 2022-10-10 NOTE — ASSESSMENT & PLAN NOTE
-   Patient with previous ablative surgery prior to clinical exam on 9/8/2022  -   Patient has been without thyroid medication for approximately 2 weeks prior to current clinical exam prior to exam approximately 1 month prior to 10/10/2022  -   Patient with worsening fatigue, but without hair loss and significant GI side effects prior to exam on 10/10/2022  -   TSH still uncontrolled during most recent lab  Plan:  -   TSH to be rechecked approximately 1 month after clinical exam on 10/10/2022: Pending  -   Continue levothyroxine 105 mcg daily until TSH is obtained and then adjust medication as needed

## 2022-10-10 NOTE — TELEPHONE ENCOUNTER
Vascular surgery referral ordered on 10/10/2022 secondary to significant right lower extremity vascular stenosis as identified on arterial duplex ultrasound

## 2022-10-10 NOTE — PATIENT INSTRUCTIONS
Ortho Foot:  Already scheduled with   Dorina Sanchez   Scheduled Date: 10/13/22  Phone Number: [phone]

## 2022-10-10 NOTE — ASSESSMENT & PLAN NOTE
-   Patient with consistent right lower extremity pain with ambulation prior to clinical exam on 9/29/2022 and with continued symptoms on 10/10/2022  -   Patient previously initiated on cilostazol prior to clinical exam on 10/10/2022, but was not able to tolerate the side effects and notes no symptom improvement  -   Patient required previous left lower extremity vascular bypass and below-knee amputation  -   Patient with concerning symptoms for intermittent claudication with prominent risk factor of tobacco use  Plan:  -   Ankle-brachial index ordered on 9/29/2022 for additional evaluation: Pending  -   Duplex ultrasound of right lower extremity arteries ordered due to pallor identified during physical exam and concern for arterial supply with claudication: Pending  -   Discontinue on 10/10/2022 due to side effects: Cilostazol 100 mg twice daily for improvement of pain  -   Continue dual antiplatelet therapy as previously initiated  -   Consider vascular surgery consult pending results

## 2022-10-10 NOTE — PROGRESS NOTES
Follow Up Office Visit      Date: 10/10/2022   Patient Name: Bobbi Du  : 1960   MRN: [id number]     Chief Complaint:    Chief Complaint   Patient presents with   • Medication Reaction     Cilostazol 100 mg tab, diarrhea.       History of Present Illness: Bobbi Du is a 61 y.o. female who is here today to follow up with chronic care management.    Hypothyroidism  -   Patient notes that she has been compliant with her current medication regimen  -   Denies specific side effects associated with Synthroid  -   Denies significant worsening of fatigue or GI symptoms  -   Denies significantly worsening hair loss    Intermittent Claudication  -   side effects include dizziness and GI side effects associated with cilostazol  -   didn't feel better while taking cilostazol  -   Patient notes that she had been taking intermittently until following up  -   States that she recently completed LEE ANN and duplex ultrasound of her right lower extremity  -   Notes she is still limited with ambulation    Tobacco Use Disorder  - has started taking chantix  - noticed cigarettes are not tasting as good  - has already reduced number of cigarettes per day after taking Chantix for approximately 7 days          Subjective      Review of Systems:   Review of Systems   Constitutional: Negative for activity change, appetite change, fatigue and fever.   Eyes: Negative for blurred vision, photophobia and visual disturbance.   Respiratory: Negative for cough, chest tightness and shortness of breath.    Cardiovascular: Negative for chest pain and palpitations.   Gastrointestinal: Negative for abdominal distention, abdominal pain, blood in stool, constipation, diarrhea, nausea and vomiting.   Genitourinary: Negative for dysuria and hematuria.   Musculoskeletal: Positive for arthralgias. Negative for back pain and joint swelling.        Right lower extremity pain and right foot pain as previously noted   Skin: Negative for  rash and wound.   Neurological: Negative for weakness, headache and confusion.       I have reviewed the patients family history, social history, past medical history, past surgical history and have updated it as appropriate.     Medications:     Current Outpatient Medications:   •  albuterol sulfate  (90 Base) MCG/ACT inhaler, Inhale 2 puffs Every 4 (Four) Hours As Needed for Wheezing or Shortness of Air., Disp: 18 g, Rfl: 3  •  aspirin (aspirin) 81 MG EC tablet, Take 1 tablet by mouth Every Morning., Disp: 90 tablet, Rfl: 3  •  atorvastatin (Lipitor) 40 MG tablet, Take 1 tablet by mouth Daily., Disp: 90 tablet, Rfl: 3  •  buPROPion XL (WELLBUTRIN XL) 150 MG 24 hr tablet, Take 1 tablet by mouth Daily., Disp: 45 tablet, Rfl: 1  •  Calcium Carb-Cholecalciferol (Calcium 1000 + D) 1000-800 MG-UNIT tablet, calcium, Disp: , Rfl:   •  clopidogrel (PLAVIX) 75 MG tablet, Take 75 mg by mouth Every Morning., Disp: , Rfl:   •  Cobalamin Combinations (B-12) 100-5000 MCG sublingual tablet, B12, Disp: , Rfl:   •  ferrous gluconate (FERGON) 324 MG tablet, Take 1 tablet by mouth Daily With Breakfast., Disp: 90 tablet, Rfl: 1  •  gabapentin (NEURONTIN) 800 MG tablet, Take 1 tablet by mouth 3 (Three) Times a Day., Disp: 90 tablet, Rfl: 1  •  Iron-Vitamin C 100-250 MG tablet, iron  2 tab bid, Disp: , Rfl:   •  lisinopril-hydrochlorothiazide (PRINZIDE,ZESTORETIC) 10-12.5 MG per tablet, Take 1 tablet by mouth Every Morning., Disp: 90 tablet, Rfl: 3  •  nicotine (NICODERM CQ) 14 MG/24HR patch, Place 1 patch on the skin as directed by provider Daily., Disp: 28 each, Rfl: 2  •  thyroid (ARMOUR) 15 MG tablet, Take 1 tablet by mouth Daily., Disp: 60 tablet, Rfl: 1  •  Thyroid 90 MG PO tablet, Take 1 tablet by mouth Every Morning., Disp: 60 tablet, Rfl: 1  •  umeclidinium-vilanterol (ANORO ELLIPTA) 62.5-25 MCG/INH aerosol powder  inhaler, Inhale 1 puff Daily., Disp: , Rfl:   •  varenicline (CHANTIX LAMAR) 0.5 MG X 11 & 1 MG X 42 tablet,  Take 0.5 mg po daily x 3 days, then 0.5 mg po bid x 4 days, then 1 mg po bid, Disp: 53 tablet, Rfl: 0  •  metFORMIN (GLUCOPHAGE) 500 MG tablet, Take 500 mg by mouth Daily With Breakfast., Disp: , Rfl:   •  [START ON 10/27/2022] varenicline (Chantix Continuing Month Cole) 1 MG tablet, Take 1 tablet by mouth 2 (Two) Times a Day for 56 days., Disp: 56 tablet, Rfl: 1    Allergies:   Allergies   Allergen Reactions   • Bee Venom Anaphylaxis       Objective     Physical Exam: Please see above  Vital Signs:   Vitals:    10/10/22 1023   BP: 124/54   BP Location: Right arm   Patient Position: Sitting   Cuff Size: Adult   Resp: 18   Weight: 47.2 kg (104 lb)     Body mass index is 17.31 kg/m².  BMI is below normal parameters (malnutrition). Recommendations: treating the underlying disease process       Physical Exam  Vitals and nursing note reviewed.   Constitutional:       General: She is not in acute distress.     Appearance: Normal appearance. She is normal weight. She is not ill-appearing or toxic-appearing.   HENT:      Nose: No congestion or rhinorrhea.   Eyes:      General:         Right eye: No discharge.         Left eye: No discharge.      Conjunctiva/sclera: Conjunctivae normal.   Pulmonary:      Effort: Pulmonary effort is normal. No respiratory distress.   Abdominal:      General: Abdomen is flat. There is no distension.   Musculoskeletal:      Cervical back: Normal range of motion.   Skin:     Coloration: Skin is not jaundiced.      Findings: No rash.   Neurological:      General: No focal deficit present.      Mental Status: She is alert. Mental status is at baseline.      Coordination: Coordination normal.      Gait: Gait normal.   Psychiatric:         Mood and Affect: Mood normal.         Behavior: Behavior normal.         Thought Content: Thought content normal.         Judgment: Judgment normal.         Procedures    Results:   Labs:   Hemoglobin A1C   Date Value Ref Range Status   09/08/2022 5.8 % Final    10/16/2019 5.40 4.80 - 5.60 % Final     TSH   Date Value Ref Range Status   09/08/2022 8.190 (H) 0.450 - 4.500 uIU/mL Final   10/15/2019 38.810 (H) 0.270 - 4.200 uIU/mL Final        Imaging:   No valid procedures specified.     Assessment / Plan      Assessment/Plan:   Problem List Items Addressed This Visit        Endocrine and Metabolic    Hypothyroidism - Primary    Current Assessment & Plan     -   Patient with previous ablative surgery prior to clinical exam on 9/8/2022  -   Patient has been without thyroid medication for approximately 2 weeks prior to current clinical exam prior to exam approximately 1 month prior to 10/10/2022  -   Patient with worsening fatigue, but without hair loss and significant GI side effects prior to exam on 10/10/2022  -   TSH still uncontrolled during most recent lab  Plan:  -   TSH to be rechecked approximately 1 month after clinical exam on 10/10/2022: Pending  -   Continue levothyroxine 105 mcg daily until TSH is obtained and then adjust medication as needed         Relevant Orders    TSH       Tobacco    Tobacco use disorder    Current Assessment & Plan     Bobbi Du  reports that she has been smoking cigarettes. She has a 44.00 pack-year smoking history. She has never used smokeless tobacco.. I have educated her on the risk of diseases from using tobacco products such as cancer, COPD and heart disease.     I advised her to quit and she is willing to quit. We have discussed the following method/s for tobacco cessation:  Counseling Prescription Medicaiton.  Together we have set a quit date for 1 month from today.  She will follow up with me in 1 month or sooner to check on her progress.  -   Continue Chantix starter pack  -   Continue bupropion 150 mg daily  -   Continue nicotine replacement therapy with transdermal patch    I spent 5 minutes counseling the patient.                 Other    Intermittent claudication due to atherosclerosis of artery of extremity (HCC)     Current Assessment & Plan     -   Patient with consistent right lower extremity pain with ambulation prior to clinical exam on 9/29/2022 and with continued symptoms on 10/10/2022  -   Patient previously initiated on cilostazol prior to clinical exam on 10/10/2022, but was not able to tolerate the side effects and notes no symptom improvement  -   Patient required previous left lower extremity vascular bypass and below-knee amputation  -   Patient with concerning symptoms for intermittent claudication with prominent risk factor of tobacco use  Plan:  -   Ankle-brachial index ordered on 9/29/2022 for additional evaluation: Pending  -   Duplex ultrasound of right lower extremity arteries ordered due to pallor identified during physical exam and concern for arterial supply with claudication: Pending  -   Discontinue on 10/10/2022 due to side effects: Cilostazol 100 mg twice daily for improvement of pain  -   Continue dual antiplatelet therapy as previously initiated  -   Consider vascular surgery consult pending results        Other Visit Diagnoses     Encounter for vaccination        Relevant Orders    FluLaval/Fluarix/Fluzone >6 Months (Completed)            Follow Up:   Return in about 3 months (around 1/10/2023) for Recheck and Pap.    I spent 35 minutes caring for Bobbi on this date of service. This time includes time spent by me in the following activities: preparing for the visit, reviewing tests, obtaining and/or reviewing a separately obtained history, performing a medically appropriate examination and/or evaluation, counseling and educating the patient/family/caregiver, ordering medications, tests, or procedures, referring and communicating with other health care professionals, documenting information in the medical record, independently interpreting results and communicating that information with the patient/family/caregiver and care coordination    Yon Bond MD  [facility] YAMILEX Thomson

## 2022-10-11 ENCOUNTER — TELEPHONE (OUTPATIENT)
Dept: INTERNAL MEDICINE | Facility: CLINIC | Age: 62
End: 2022-10-11

## 2022-10-11 NOTE — TELEPHONE ENCOUNTER
----- Message from Yon Bond MD sent at 10/10/2022  1:36 PM EDT -----  Please let the patient know that because significant closing off of vessels is occurring as expected in the right lower extremity, vascular surgery has been consulted for additional evaluation and management.  Please let me know with any other questions or concerns.

## 2022-10-12 ENCOUNTER — TELEPHONE (OUTPATIENT)
Dept: INTERNAL MEDICINE | Facility: CLINIC | Age: 62
End: 2022-10-12

## 2022-10-12 NOTE — TELEPHONE ENCOUNTER
Pt called following up on her referral to a vascular surgeon. Please call with information on who the vascular surgeon is going to be.

## 2022-10-12 NOTE — TELEPHONE ENCOUNTER
PATIENT CALLED WANTING TO FOLLOW UP ABOUT HER REFERRAL FOR THE VASCULAR SURGEON. SHE WANTS TO GET THIS DONE AS SOON AS POSSIBLE. PLEASE  CALL TO FOLLOW UP ASAP. SHE WANTS THE REFERRAL THROUGH [facility].     PHONE: [phone]

## 2022-10-13 NOTE — TELEPHONE ENCOUNTER
Reached patient at [phone]  Updated her that this referral is still in review and gave her that department phone number to call to check on it

## 2022-10-20 ENCOUNTER — OFFICE VISIT (OUTPATIENT)
Dept: CARDIAC SURGERY | Facility: CLINIC | Age: 62
End: 2022-10-20

## 2022-10-20 VITALS
SYSTOLIC BLOOD PRESSURE: 144 MMHG | OXYGEN SATURATION: 92 % | WEIGHT: 104 LBS | BODY MASS INDEX: 17.33 KG/M2 | HEART RATE: 93 BPM | TEMPERATURE: 97.4 F | HEIGHT: 65 IN | DIASTOLIC BLOOD PRESSURE: 53 MMHG

## 2022-10-20 DIAGNOSIS — M79.673 ISCHEMIC FOOT PAIN AT REST: Primary | ICD-10-CM

## 2022-10-20 DIAGNOSIS — I99.8 ISCHEMIC FOOT PAIN AT REST: Primary | ICD-10-CM

## 2022-10-20 DIAGNOSIS — R53.81 MALAISE AND FATIGUE: Primary | ICD-10-CM

## 2022-10-20 DIAGNOSIS — Z01.818 PRE-OP EXAMINATION: ICD-10-CM

## 2022-10-20 DIAGNOSIS — J44.9 CHRONIC OBSTRUCTIVE PULMONARY DISEASE, UNSPECIFIED COPD TYPE: ICD-10-CM

## 2022-10-20 DIAGNOSIS — R53.83 MALAISE AND FATIGUE: Primary | ICD-10-CM

## 2022-10-20 DIAGNOSIS — I77.9 PERIPHERAL ARTERIAL OCCLUSIVE DISEASE: ICD-10-CM

## 2022-10-20 DIAGNOSIS — I10 HYPERTENSION, UNSPECIFIED TYPE: ICD-10-CM

## 2022-10-20 PROCEDURE — 99205 OFFICE O/P NEW HI 60 MIN: CPT | Performed by: STUDENT IN AN ORGANIZED HEALTH CARE EDUCATION/TRAINING PROGRAM

## 2022-10-20 NOTE — PROGRESS NOTES
10/20/2022  Patient Information  Bobbi Du                                                                                          [address] [facility] [address]   1960  'PCP/Referring Physician'  Yon Bond MD  [phone]  Yon Bond MD  [phone]  Chief Complaint   Patient presents with   • Consult     Np referred for peripheral vascular disease,complains of right leg pain with activity.       History of Present Illness: 61-year-old woman with a complex medical history of spine surgery, diabetes with neuropathy, anemia, rheumatoid arthritis, back surgery, COPD, and peripheral arterial disease with left femoropopliteal bypass using right greater saphenous vein and ultimately left below the knee amputation, active tobacco abuse with over 50-pack-year smoking history, who presents today to the outpatient surgery clinic with complaint of pain and weakness at rest and with short distance ambulation.  She underwent left common iliac to left common femoral artery bypass and left femoral-popliteal bypass in 2017 by Dr. Toro.  She is mostly wheelchair-bound due to this.  She reports pain constantly, especially in the heel.  She is insensate in the right forefoot.  She reports phantom limb pain at the left amputated leg.  She is accompanied today by young female friend.  Her medical record notes that she would prefer not to be evaluated at the [facility].  From a note dated September 8, 2022, the patient underwent aortogram with runoffs on May 30, 2017 that revealed SFA occlusion on the right side.  In a note dated May 15, 2020, Dr. Manzanares Given documented his discussions with the patient, where he expressed a concern for high risk of limb loss, and the patient leaving AMA despite understanding the risk, and refusing further evaluation by vascular surgery.  She has multiple other notes documenting leaving AMA.        Patient Active Problem List   Diagnosis   •  Peripheral arterial occlusive disease (HCC)   • Hyperlipidemia   • Hypertension   • COPD (chronic obstructive pulmonary disease) (HCC)   • Diabetes mellitus (HCC)   • Fibromyalgia   • Microcytic Anemia   • Hypothyroidism   • RA (rheumatoid arthritis) (HCC)   • Charcot's joint of foot   • Phantom pain following amputation of lower limb (HCC)   • Tobacco use disorder   • Nutritional deficiency   • BKA stump complication ([facility])   • Intermittent claudication due to atherosclerosis of artery of extremity ([facility])     Past Medical History:   Diagnosis Date   • Acute respiratory alkalosis 10/16/2019   • Bilateral knee pain 7/20/2020   • Charcot foot due to diabetes mellitus (HCC)     RIGHT   • Chronic pain    • Claudication of lower extremity with history of revascularization ([facility]) 5/13/2020    Added automatically from request for surgery [id number]   • Confusion 10/15/2019   • COPD (chronic obstructive pulmonary disease) ([facility])    • COPD mixed type ([facility])    • Diabetes mellitus (HCC)    • Disease of thyroid gland    • Encephalopathy 10/15/2019   • Fibromyalgia    • Gangrene ([facility]) 7/10/2017   • Hyperlipidemia    • Hypertension    • Incarcerated right inguinal hernia 12/17/2019   • Irreducible right femoral hernia 12/17/2019   • PAD (peripheral artery disease) ([facility])    • Restless leg    • Rheumatoid arthritis (HCC)    • S/P left iliofemoral and left femoropopliteal bypass surgery 7/10/17 7/10/2017   • Sinusitis 10/16/2019   • Tobacco abuse      Past Surgical History:   Procedure Laterality Date   • BACK SURGERY  10/10/2019    L4-5 PLIF, laminectomy, foraminectomy -Dr. Chai Gerardo    • CARDIAC CATHETERIZATION N/A 5/30/2017    Procedure: Angioplasty-peripheral;  Surgeon: Mayur Artis MD;  Location: [facility] INVASIVE LOCATION;  Service:    • CARPAL TUNNEL RELEASE      X 4   • FOOT SURGERY Bilateral     X5   • INGUINAL HERNIA REPAIR Right 12/17/2019    Procedure: INGUINAL HERNIA REPAIR RIGHT WITH MESH;  Surgeon: Ramakrishna Al  MD ZEN;  Location:  [facility] OR;  Service: General   • INTERVENTIONAL RADIOLOGY PROCEDURE N/A 5/30/2017    Procedure: Abdominal Aortagram with Runoff;  Surgeon: Mayur Artis MD;  Location:  [facility] CATH INVASIVE LOCATION;  Service:    • INTERVENTIONAL RADIOLOGY PROCEDURE N/A 5/15/2020    Procedure: LLE angiogram with atherectomy/stent;  Surgeon: Leighton Jones MD;  Location:  [facility] INVASIVE LOCATION;  Service: Interventional Radiology;  Laterality: N/A;   • KNEE SURGERY Left    • LUMBAR FUSION  02/22/2010    L5/S1 fusion Dr. Chai Galvan    • UT RT/LT HEART CATHETERS N/A 5/30/2017    Procedure: Percutaneous Coronary Intervention;  Surgeon: Mayur Artis MD;  Location:  [facility] CATH INVASIVE LOCATION;  Service: Cardiovascular   • UT VEIN IN SITU BYPASS GRAFT,FEM-POP Left 7/10/2017    Procedure: LEFT ILIAC STENT, FEMORAL ENDARECTOMY, LEFT FEMORAL POPLITEAL BYPASS, POSS ILIAC FEMORAL BYPASS;  Surgeon: Mayur Artis MD;  Location:  [facility] OR;  Service: Vascular   • THYROIDECTOMY     • TUBAL ABDOMINAL LIGATION         Current Outpatient Medications:   •  albuterol sulfate  (90 Base) MCG/ACT inhaler, Inhale 2 puffs Every 4 (Four) Hours As Needed for Wheezing or Shortness of Air., Disp: 18 g, Rfl: 3  •  aspirin (aspirin) 81 MG EC tablet, Take 1 tablet by mouth Every Morning., Disp: 90 tablet, Rfl: 3  •  atorvastatin (Lipitor) 40 MG tablet, Take 1 tablet by mouth Daily., Disp: 90 tablet, Rfl: 3  •  buPROPion XL (WELLBUTRIN XL) 150 MG 24 hr tablet, Take 1 tablet by mouth Daily., Disp: 45 tablet, Rfl: 1  •  Calcium Carb-Cholecalciferol (Calcium 1000 + D) 1000-800 MG-UNIT tablet, calcium, Disp: , Rfl:   •  clopidogrel (PLAVIX) 75 MG tablet, Take 75 mg by mouth Every Morning., Disp: , Rfl:   •  Cobalamin Combinations (B-12) 100-5000 MCG sublingual tablet, B12, Disp: , Rfl:   •  ferrous gluconate (FERGON) 324 MG tablet, Take 1 tablet by mouth Daily With Breakfast., Disp: 90 tablet, Rfl: 1  •  gabapentin  (NEURONTIN) 800 MG tablet, Take 1 tablet by mouth 3 (Three) Times a Day., Disp: 90 tablet, Rfl: 1  •  Iron-Vitamin C 100-250 MG tablet, iron  2 tab bid, Disp: , Rfl:   •  lisinopril-hydrochlorothiazide (PRINZIDE,ZESTORETIC) 10-12.5 MG per tablet, Take 1 tablet by mouth Every Morning., Disp: 90 tablet, Rfl: 3  •  metFORMIN (GLUCOPHAGE) 500 MG tablet, Take 500 mg by mouth Daily With Breakfast., Disp: , Rfl:   •  nicotine (NICODERM CQ) 14 MG/24HR patch, Place 1 patch on the skin as directed by provider Daily., Disp: 28 each, Rfl: 2  •  thyroid (ARMOUR) 15 MG tablet, Take 1 tablet by mouth Daily., Disp: 60 tablet, Rfl: 1  •  Thyroid 90 MG PO tablet, Take 1 tablet by mouth Every Morning., Disp: 60 tablet, Rfl: 1  •  umeclidinium-vilanterol (ANORO ELLIPTA) 62.5-25 MCG/INH aerosol powder  inhaler, Inhale 1 puff Daily., Disp: , Rfl:   •  [START ON 10/27/2022] varenicline (Chantix Continuing Month Lamar) 1 MG tablet, Take 1 tablet by mouth 2 (Two) Times a Day for 56 days., Disp: 56 tablet, Rfl: 1  •  varenicline (CHANTIX LAMAR) 0.5 MG X 11 & 1 MG X 42 tablet, Take 0.5 mg po daily x 3 days, then 0.5 mg po bid x 4 days, then 1 mg po bid, Disp: 53 tablet, Rfl: 0  Allergies   Allergen Reactions   • Bee Venom Anaphylaxis     Social History     Socioeconomic History   • Marital status:    Tobacco Use   • Smoking status: Every Day     Packs/day: 1.00     Years: 44.00     Pack years: 44.00     Types: Cigarettes   • Smokeless tobacco: Never   • Tobacco comments:     STATES STARTED SMOKING AT AGE 16   Substance and Sexual Activity   • Alcohol use: No   • Drug use: No   • Sexual activity: Defer     Family History   Problem Relation Age of Onset   • COPD Mother    • Alzheimer's disease Father    • Brain cancer Brother    • Valvular heart disease Maternal Uncle      Review of Systems   Constitutional: Positive for malaise/fatigue and night sweats. Negative for chills, fever and weight loss.   HENT: Negative.  Negative for hearing  "loss, odynophagia and sore throat.    Cardiovascular: Positive for claudication, dyspnea on exertion and leg swelling. Negative for chest pain, orthopnea and palpitations. Irregular heartbeat: foot and ankle.   Respiratory: Negative.  Negative for cough and hemoptysis.    Endocrine: Negative for cold intolerance, heat intolerance, polydipsia, polyphagia and polyuria.   Hematologic/Lymphatic: Bruises/bleeds easily.   Skin: Positive for color change and poor wound healing. Negative for itching and rash.   Musculoskeletal: Positive for back pain, joint pain and muscle cramps. Negative for joint swelling and myalgias.   Gastrointestinal: Negative.  Negative for abdominal pain, constipation, diarrhea, hematemesis, hematochezia, melena, nausea and vomiting.   Genitourinary: Negative.  Negative for dysuria, frequency and hematuria.   Neurological: Positive for light-headedness and loss of balance. Negative for focal weakness, headaches, numbness and seizures.   Psychiatric/Behavioral: Negative for suicidal ideas. The patient has insomnia.    All other systems reviewed and are negative.    Vitals:    10/20/22 1256   BP: 144/53   BP Location: Right arm   Patient Position: Sitting   Pulse: 93   Temp: 97.4 °F (36.3 °C)   SpO2: 92%   Weight: 47.2 kg (104 lb)   Height: 165.1 cm (65\")      Physical Exam   General anxious, hyperactive woman sitting in wheelchair  Head normocephalic, atraumatic  Eyes clear sclera  ENT no discharge, neck supple  Mouth mucous membranes moist  Cardiac tachycardic regular rhythm  Vascular right foot cool, absent right pedal pulses, palpable right femoral pulse   Pulmonary lungs clear to auscultation bilaterally  Abdomen soft nontender nondistended  Lymphatic no edema bilateral lower extremities  Neurological diminished sensation at right forefoot  Psychological as above, anxious, hyperactive  Dermatological well-healed surgical scar at the left lower quadrant, left groin.  Well-healed scar at right " groin at site of prior hernia repair with mesh  Musculoskeletal thin, status post left BKA        The ROS, past medical history, surgical history, family history, social history and vitals were reviewed by myself and corrected as needed.      Labs/Imaging:  I reviewed her arterial duplex from October 10, 2022, which is notable for multi level arterial disease and ankle-brachial index 0.4 concerning for severe peripheral arterial disease on the right side.    Assessment/Plan: 61-year-old woman with multiple medical comorbidities who presents with concern for ischemic rest pain of the right foot.  I would like to investigate her anatomy with CTA of the abdominal aorta with bilateral runoffs, and I instructed her to stop taking her oral iron supplement 1 week prior to this (stopping today).  She has a history of COPD and active smoking and I advised her to quit smoking, continue her Chantix, and we will obtain PFTs.  She should also have a hematology work-up for her anemia of unclear etiology.  Of note, she was seen in the [facility] ER on September 11, 2022 and was noted to be anemic with guaiac positive stool, however refused admission and left AMA despite being offered GI consultation and hospital admission.  I would like her to see gastroenterology in addition to hematology.  I would like to complete her preoperative evaluation with carotid ultrasound duplex and stress echocardiogram as well as routine preadmission testing once additional tests are completed.  She should continue dual antiplatelet therapy.  I counseled the patient on smoking cessation for over 10 minutes.    **She is a high risk surgical candidate. She has 3 documented AMA events in the last few years in the [facility] system alone**        Patient Active Problem List   Diagnosis   • Peripheral arterial occlusive disease (HCC)   • Hyperlipidemia   • Hypertension   • COPD (chronic obstructive pulmonary disease) (HCC)   • Diabetes mellitus  (HCC)   • Fibromyalgia   • Microcytic Anemia   • Hypothyroidism   • RA (rheumatoid arthritis) (HCC)   • Charcot's joint of foot   • Phantom pain following amputation of lower limb (HCC)   • Tobacco use disorder   • Nutritional deficiency   • BKA stump complication (HCC)   • Intermittent claudication due to atherosclerosis of artery of extremity (HCC)       Ramakrishna James M.D., R.P.V.I.  Cardiothoracic and Vascular Surgeon  [facility]

## 2022-10-21 ENCOUNTER — TELEPHONE (OUTPATIENT)
Dept: INTERNAL MEDICINE | Facility: CLINIC | Age: 62
End: 2022-10-21

## 2022-10-21 NOTE — TELEPHONE ENCOUNTER
Caller: Bobbi Du    Relationship: Self    Best call back number: [phone]    What medication are you requesting:     PAIN MEDICATION    What are your current symptoms:     RIGHT LEG PAIN      If a prescription is needed, what is your preferred pharmacy and phone number:      [id number] - [place], [place] - [address] AT [address] OF [address] [facility] - [phone] [facility] [phone]   [phone]    Additional notes:    PATIENT SAW DR VELEZ YESTERDAY AND HE WAS SUPPOSED TO SEND OVER RESULTS.  PATIENT WANTS TO KNOW IF DOCTOR RECEIVED RESULTS AND CAN SHE GET PAIN MEDICATION

## 2022-10-24 ENCOUNTER — TELEPHONE (OUTPATIENT)
Dept: INTERNAL MEDICINE | Facility: CLINIC | Age: 62
End: 2022-10-24

## 2022-10-24 DIAGNOSIS — I77.9 PERIPHERAL ARTERIAL OCCLUSIVE DISEASE: Primary | ICD-10-CM

## 2022-10-24 RX ORDER — OXYCODONE HYDROCHLORIDE 5 MG/1
5 TABLET ORAL EVERY 8 HOURS PRN
Qty: 15 TABLET | Refills: 0 | Status: SHIPPED | OUTPATIENT
Start: 2022-10-24 | End: 2022-11-09 | Stop reason: SDUPTHER

## 2022-10-24 NOTE — TELEPHONE ENCOUNTER
Following discussion with vascular surgeon, patient requires acute pain relief while continuation of peripheral arterial disease work-up is completed.  Collectively decided with patient following risk/benefit discussion on short-term opioid therapy with a bridge to a pain clinic for continued pain management while vascular work-up is completed.  Patient declined Nitropaste on 10/24/2022.  Vascular surgeon plans to continue follow-up regarding work-up with patient directly.

## 2022-10-26 ENCOUNTER — APPOINTMENT (OUTPATIENT)
Dept: NEUROLOGY | Facility: HOSPITAL | Age: 62
End: 2022-10-26

## 2022-10-26 NOTE — TELEPHONE ENCOUNTER
Called patient to see if she still had any concerns with her results and patient stated she has no further concerns.     Good verbalization and appreciation was given.

## 2022-11-04 DIAGNOSIS — I77.9 PERIPHERAL ARTERIAL OCCLUSIVE DISEASE: ICD-10-CM

## 2022-11-04 RX ORDER — OXYCODONE HYDROCHLORIDE 5 MG/1
5 TABLET ORAL EVERY 8 HOURS PRN
Qty: 15 TABLET | Refills: 0 | Status: CANCELLED | OUTPATIENT
Start: 2022-11-04

## 2022-11-04 NOTE — TELEPHONE ENCOUNTER
Caller: Bobbi Du    Relationship: Self    Best call back number: [phone]    Requested Prescriptions:   Requested Prescriptions     Pending Prescriptions Disp Refills   • oxyCODONE (Roxicodone) 5 MG immediate release tablet 15 tablet 0     Sig: Take 1 tablet by mouth Every 8 (Eight) Hours As Needed for Severe Pain.      Pharmacy where request should be sent: [id number] - [facility] [address] [place] [address] AT [facility] & [facility] [phone] [facility] [phone]      Additional details provided by patient: OUT OF MEDICATION  IN 2 DAYS     Does the patient have less than a 3 day supply:  [x] Yes  [] No    Connie Broussard Rep   11/04/22 11:34 EDT

## 2022-11-06 DIAGNOSIS — I77.9 PERIPHERAL ARTERIAL OCCLUSIVE DISEASE: ICD-10-CM

## 2022-11-06 RX ORDER — OXYCODONE HYDROCHLORIDE 5 MG/1
5 TABLET ORAL EVERY 8 HOURS PRN
Qty: 15 TABLET | Refills: 0 | Status: CANCELLED | OUTPATIENT
Start: 2022-11-06

## 2022-11-07 RX ORDER — OXYCODONE HYDROCHLORIDE 5 MG/1
5 TABLET ORAL EVERY 8 HOURS PRN
Qty: 15 TABLET | Refills: 0 | OUTPATIENT
Start: 2022-11-07

## 2022-11-07 NOTE — TELEPHONE ENCOUNTER
Patient called to check the status of this prescription refill. She stated that she is so overwhelmed with doctor appointments and she does not want to go to pain management until she has all her other issues sorted because it is too much for her to handle. She would like to know if Dr. Bond could continue to prescribe her this medication in the meantime. Please advise.

## 2022-11-07 NOTE — TELEPHONE ENCOUNTER
Yes-she was accepted and scheduled for today but she cancelled and said she would call back to reschedule

## 2022-11-09 ENCOUNTER — TELEPHONE (OUTPATIENT)
Dept: INTERNAL MEDICINE | Facility: CLINIC | Age: 62
End: 2022-11-09

## 2022-11-09 DIAGNOSIS — I77.9 PERIPHERAL ARTERIAL OCCLUSIVE DISEASE: ICD-10-CM

## 2022-11-09 RX ORDER — OXYCODONE HYDROCHLORIDE 5 MG/1
5 TABLET ORAL EVERY 8 HOURS PRN
Qty: 10 TABLET | Refills: 0 | Status: SHIPPED | OUTPATIENT
Start: 2022-11-09 | End: 2022-12-05 | Stop reason: SDUPTHER

## 2022-11-09 NOTE — TELEPHONE ENCOUNTER
Contacted patient and call was disconnected, left detailed voicemail relaying provide's message      Oxycodone refilled on a limited basis per patient request while patient can be bridged to pain clinic as patient would benefit from opioid therapy per conversation with vascular surgery team previously.  We will plan for this to be the last opioid administration through the primary care clinic and patient should follow with pain management clinic for additional prescriptions.  Please let me know with any other questions or concerns.  Thank so much.  Hub can relay and document.

## 2022-11-09 NOTE — TELEPHONE ENCOUNTER
Hub staff attempted to follow warm transfer process and was unsuccessful     Caller: Bobbi Du    Relationship to patient: Self    Best call back number: [phone]    Patient is needing: PATIENT STATES SHE IS GIVING EVELYN A CALLBACK AFTER LOSING CONNECTION WHILE DRIVING.

## 2022-11-09 NOTE — TELEPHONE ENCOUNTER
Oxycodone refilled on a limited basis per patient request while patient can be bridged to pain clinic as patient would benefit from opioid therapy per conversation with vascular surgery team previously.  We will plan for this to be the last opioid administration through the primary care clinic and patient should follow with pain management clinic for additional prescriptions.  Please let me know with any other questions or concerns.  Thank so much.

## 2022-11-09 NOTE — TELEPHONE ENCOUNTER
Spoke with pt who states her pain has been getting significantly worse in her legs. States she has upcoming diagnostics with Dr James. After unstable connection and poor audio quality, phone call was ended by pt. Will attempt to contact at later time

## 2022-11-09 NOTE — TELEPHONE ENCOUNTER
Spoke with pt regarding previous conversation. Pt has increased leg pain and has been unable to schedule with pain management due to insurance concerns and upcoming diagnostic testing. Pt was requesting short term medication to help pain until she is able to be seen in office and call and schedule with pain mngmt

## 2022-11-09 NOTE — TELEPHONE ENCOUNTER
Caller: Bobbi Du    Relationship: Self    Best call back number: [phone]    What is the best time to reach you: ANYTIME IF  NO ANSWER OK TO LEAVE MESSAGE    Who are you requesting to speak with (clinical staff, provider,  specific staff member): MD. MEDRANO    Do you know the name of the person who called: BOBBI    What was the call regarding: PATIENT HAS BEEN CALLING FOR SOME MEDICATION. PATIENT STRUGGLES TO MAKE HER APPOINTMENTS. PLEASE CALL PATIENT TO DISCUSS THIS MATTER.  SHE IS IN A LOT OF PAIN AND LOOKING FOR RELIEF      Do you require a callback: YES

## 2022-11-11 ENCOUNTER — APPOINTMENT (OUTPATIENT)
Dept: CT IMAGING | Facility: HOSPITAL | Age: 62
End: 2022-11-11

## 2022-11-11 NOTE — TELEPHONE ENCOUNTER
I will not be able to do long-term opioid therapy.  Per the preference of vascular surgery and the primary care clinic, we would like for her to continue to perform pain management through the pain clinic.  I understand this is difficult with multiple doctors appointments, but this is the safest route to prescribe this medication.  Please let me know with any other questions or concerns.  Thank so much.

## 2022-11-14 NOTE — TELEPHONE ENCOUNTER
Left voice mail message for Pt to call back for message. Office number given.    Hub please relay message  Yon Bond MD   I will not be able to do long-term opioid therapy.  Per the preference of vascular surgery and the primary care clinic, we would like for her to continue to perform pain management through the pain clinic.  I understand this is difficult with multiple doctors appointments, but this is the safest route to prescribe this medication.  Please let me know with any other questions or concerns.  Thank so much.

## 2022-11-15 ENCOUNTER — TELEPHONE (OUTPATIENT)
Dept: CARDIAC SURGERY | Facility: CLINIC | Age: 62
End: 2022-11-15

## 2022-11-15 ENCOUNTER — HOSPITAL ENCOUNTER (OUTPATIENT)
Dept: CARDIOLOGY | Facility: HOSPITAL | Age: 62
Discharge: HOME | End: 2022-11-15

## 2022-11-15 VITALS
BODY MASS INDEX: 17.77 KG/M2 | HEART RATE: 129 BPM | DIASTOLIC BLOOD PRESSURE: 57 MMHG | HEIGHT: 64 IN | SYSTOLIC BLOOD PRESSURE: 114 MMHG | WEIGHT: 104.06 LBS

## 2022-11-15 VITALS — WEIGHT: 104 LBS | HEIGHT: 63 IN | BODY MASS INDEX: 18.43 KG/M2

## 2022-11-15 DIAGNOSIS — R53.83 MALAISE AND FATIGUE: ICD-10-CM

## 2022-11-15 DIAGNOSIS — I10 HYPERTENSION, UNSPECIFIED TYPE: ICD-10-CM

## 2022-11-15 DIAGNOSIS — R53.81 MALAISE AND FATIGUE: ICD-10-CM

## 2022-11-15 LAB
BH CV ECHO MEAS - EDV(CUBED): 79.5 ML
BH CV ECHO MEAS - EDV(MOD-SP2): 112 ML
BH CV ECHO MEAS - EDV(MOD-SP4): 83.6 ML
BH CV ECHO MEAS - EF(MOD-BP): 56.1 %
BH CV ECHO MEAS - EF(MOD-SP2): 56.3 %
BH CV ECHO MEAS - EF(MOD-SP4): 56.3 %
BH CV ECHO MEAS - ESV(CUBED): 35.9 ML
BH CV ECHO MEAS - ESV(MOD-SP2): 49 ML
BH CV ECHO MEAS - ESV(MOD-SP4): 36.5 ML
BH CV ECHO MEAS - FS: 23.3 %
BH CV ECHO MEAS - IVS/LVPW: 0.88 CM
BH CV ECHO MEAS - IVSD: 0.7 CM
BH CV ECHO MEAS - LV DIASTOLIC VOL/BSA (35-75): 56.4 CM2
BH CV ECHO MEAS - LV MASS(C)D: 96.8 GRAMS
BH CV ECHO MEAS - LV SYSTOLIC VOL/BSA (12-30): 24.6 CM2
BH CV ECHO MEAS - LVIDD: 4.3 CM
BH CV ECHO MEAS - LVIDS: 3.3 CM
BH CV ECHO MEAS - LVPWD: 0.8 CM
BH CV ECHO MEAS - SI(MOD-SP2): 42.5 ML/M2
BH CV ECHO MEAS - SI(MOD-SP4): 31.8 ML/M2
BH CV ECHO MEAS - SV(MOD-SP2): 63 ML
BH CV ECHO MEAS - SV(MOD-SP4): 47.1 ML
BH CV STRESS BP STAGE 1: NORMAL
BH CV STRESS BP STAGE 2: NORMAL
BH CV STRESS BP STAGE 3: NORMAL
BH CV STRESS BP STAGE 4: NORMAL
BH CV STRESS DOSE DOBUTAMINE STAGE 1: 10
BH CV STRESS DOSE DOBUTAMINE STAGE 2: 20
BH CV STRESS DOSE DOBUTAMINE STAGE 3: 30
BH CV STRESS DOSE DOBUTAMINE STAGE 4: 40
BH CV STRESS DURATION MIN STAGE 1: 2
BH CV STRESS DURATION MIN STAGE 2: 2
BH CV STRESS DURATION MIN STAGE 3: 2
BH CV STRESS DURATION MIN STAGE 4: 2
BH CV STRESS DURATION SEC STAGE 1: 0
BH CV STRESS DURATION SEC STAGE 2: 0
BH CV STRESS DURATION SEC STAGE 3: 0
BH CV STRESS DURATION SEC STAGE 4: 26
BH CV STRESS GRADE STAGE 1: 10
BH CV STRESS HR STAGE 1: 100
BH CV STRESS HR STAGE 2: 110
BH CV STRESS HR STAGE 3: 130
BH CV STRESS HR STAGE 4: 139
BH CV STRESS METS STAGE 1: 5
BH CV STRESS O2 STAGE 1: 100
BH CV STRESS O2 STAGE 2: 98
BH CV STRESS O2 STAGE 3: 96
BH CV STRESS O2 STAGE 4: 95
BH CV STRESS PROTOCOL 1: NORMAL
BH CV STRESS RATE STAGE 1: 28
BH CV STRESS RATE STAGE 2: 56
BH CV STRESS RATE STAGE 3: 85
BH CV STRESS RATE STAGE 4: 113
BH CV STRESS RECOVERY BP: NORMAL MMHG
BH CV STRESS RECOVERY HR: 93 BPM
BH CV STRESS RECOVERY O2: 95 %
BH CV STRESS SPEED STAGE 1: 1.7
BH CV STRESS STAGE 1: 1
BH CV STRESS STAGE 2: 2
BH CV STRESS STAGE 3: 3
BH CV STRESS STAGE 4: 4
BH CV VAS BP RIGHT ARM: NORMAL MMHG
BH CV XLRA - RV BASE: 2.6 CM
BH CV XLRA - RV LENGTH: 6.4 CM
BH CV XLRA - RV MID: 2.3 CM
BH CV XLRA MEAS LEFT CAROTID BULB EDV: 37.3 CM/SEC
BH CV XLRA MEAS LEFT CAROTID BULB PSV: 151 CM/SEC
BH CV XLRA MEAS LEFT DIST CCA EDV: 28.7 CM/SEC
BH CV XLRA MEAS LEFT DIST CCA PSV: 104 CM/SEC
BH CV XLRA MEAS LEFT DIST ICA EDV: 42.3 CM/SEC
BH CV XLRA MEAS LEFT DIST ICA PSV: 141 CM/SEC
BH CV XLRA MEAS LEFT ICA/CCA RATIO: 2.3
BH CV XLRA MEAS LEFT MID CCA EDV: 33.6 CM/SEC
BH CV XLRA MEAS LEFT MID CCA PSV: 92.5 CM/SEC
BH CV XLRA MEAS LEFT MID ICA EDV: 49.4 CM/SEC
BH CV XLRA MEAS LEFT MID ICA PSV: 169 CM/SEC
BH CV XLRA MEAS LEFT PROX CCA EDV: 27.3 CM/SEC
BH CV XLRA MEAS LEFT PROX CCA PSV: 86.2 CM/SEC
BH CV XLRA MEAS LEFT PROX ECA EDV: 34.1 CM/SEC
BH CV XLRA MEAS LEFT PROX ECA PSV: 173 CM/SEC
BH CV XLRA MEAS LEFT PROX ICA EDV: 63.1 CM/SEC
BH CV XLRA MEAS LEFT PROX ICA PSV: 210 CM/SEC
BH CV XLRA MEAS LEFT PROX SCLA PSV: 315 CM/SEC
BH CV XLRA MEAS LEFT VERTEBRAL A EDV: 0 CM/SEC
BH CV XLRA MEAS LEFT VERTEBRAL A PSV: 54.1 CM/SEC
BH CV XLRA MEAS RIGHT DIST CCA EDV: 31.1 CM/SEC
BH CV XLRA MEAS RIGHT DIST CCA PSV: 117 CM/SEC
BH CV XLRA MEAS RIGHT DIST ICA EDV: 34.5 CM/SEC
BH CV XLRA MEAS RIGHT DIST ICA PSV: 122 CM/SEC
BH CV XLRA MEAS RIGHT ICA/CCA RATIO: 1.5
BH CV XLRA MEAS RIGHT MID CCA EDV: 31.7 CM/SEC
BH CV XLRA MEAS RIGHT MID CCA PSV: 113 CM/SEC
BH CV XLRA MEAS RIGHT MID ICA EDV: 42.8 CM/SEC
BH CV XLRA MEAS RIGHT MID ICA PSV: 156 CM/SEC
BH CV XLRA MEAS RIGHT PROX CCA EDV: 28 CM/SEC
BH CV XLRA MEAS RIGHT PROX CCA PSV: 122 CM/SEC
BH CV XLRA MEAS RIGHT PROX ECA EDV: 28.2 CM/SEC
BH CV XLRA MEAS RIGHT PROX ECA PSV: 123 CM/SEC
BH CV XLRA MEAS RIGHT PROX ICA EDV: 48.3 CM/SEC
BH CV XLRA MEAS RIGHT PROX ICA PSV: 169 CM/SEC
BH CV XLRA MEAS RIGHT PROX SCLA PSV: 384 CM/SEC
BH CV XLRA MEAS RIGHT VERTEBRAL A EDV: 23.5 CM/SEC
BH CV XLRA MEAS RIGHT VERTEBRAL A PSV: 104 CM/SEC
LEFT ARM BP: NORMAL MMHG
MAXIMAL PREDICTED HEART RATE: 159 BPM
MAXIMAL PREDICTED HEART RATE: 159 BPM
PERCENT MAX PREDICTED HR: 87.42 %
RIGHT ARM BP: NORMAL MMHG
STRESS BASELINE BP: NORMAL MMHG
STRESS BASELINE HR: 88 BPM
STRESS O2 SAT REST: 100 %
STRESS PERCENT HR: 103 %
STRESS POST EXERCISE DUR MIN: 8 MIN
STRESS POST EXERCISE DUR SEC: 26 SEC
STRESS POST O2 SAT PEAK: 95 %
STRESS POST PEAK BP: NORMAL MMHG
STRESS POST PEAK HR: 139 BPM
STRESS TARGET HR: 135 BPM
STRESS TARGET HR: 135 BPM

## 2022-11-15 PROCEDURE — 93350 STRESS TTE ONLY: CPT

## 2022-11-15 PROCEDURE — 25010000002 DOBUTAMINE 250 MG/20ML SOLUTION 20 ML VIAL: Performed by: NURSE PRACTITIONER

## 2022-11-15 PROCEDURE — 93350 STRESS TTE ONLY: CPT | Performed by: INTERNAL MEDICINE

## 2022-11-15 PROCEDURE — 93018 CV STRESS TEST I&R ONLY: CPT | Performed by: INTERNAL MEDICINE

## 2022-11-15 PROCEDURE — 93880 EXTRACRANIAL BILAT STUDY: CPT | Performed by: INTERNAL MEDICINE

## 2022-11-15 PROCEDURE — 93017 CV STRESS TEST TRACING ONLY: CPT

## 2022-11-15 PROCEDURE — 93880 EXTRACRANIAL BILAT STUDY: CPT

## 2022-11-15 PROCEDURE — 93325 DOPPLER ECHO COLOR FLOW MAPG: CPT

## 2022-11-15 PROCEDURE — 93320 DOPPLER ECHO COMPLETE: CPT

## 2022-11-15 RX ADMIN — DOBUTAMINE 10 MCG/KG/MIN: 12.5 INJECTION, SOLUTION, CONCENTRATE INTRAVENOUS at 08:44

## 2022-11-15 NOTE — TELEPHONE ENCOUNTER
Caller: Bobbi Du    Relationship: Self    Best call back number: [phone]    What orders are you requesting (i.e. lab or imaging):CT ANGIO AAA    In what timeframe would the patient need to come in: ASAP- PREVIOUS ORDER     Where will you receive your lab/imaging services:[facility]

## 2022-11-18 ENCOUNTER — TELEPHONE (OUTPATIENT)
Dept: INTERNAL MEDICINE | Facility: CLINIC | Age: 62
End: 2022-11-18

## 2022-11-18 DIAGNOSIS — I70.219 INTERMITTENT CLAUDICATION DUE TO ATHEROSCLEROSIS OF ARTERY OF EXTREMITY: ICD-10-CM

## 2022-11-18 DIAGNOSIS — M14.671 CHARCOT'S JOINT OF RIGHT FOOT: ICD-10-CM

## 2022-11-18 DIAGNOSIS — G54.6 PHANTOM PAIN FOLLOWING AMPUTATION OF LOWER LIMB: Primary | ICD-10-CM

## 2022-11-18 NOTE — TELEPHONE ENCOUNTER
Referral was made to an alternate pain clinic for additional medication management.  Please let me know if she needs anything else.

## 2022-11-18 NOTE — TELEPHONE ENCOUNTER
Caller: Bobbi Du    Relationship: Self    Best call back number: [phone]    What is the best time to reach you: ANYTIME    Who are you requesting to speak with (clinical staff, provider,  specific staff member): PCP/MA        What was the call regarding: PATIENT CALLED IN VERY DISCOURAGED AND UPSET . PATIENT STATED SHE HAD A REFERRAL TO [facility] [place] [facility] PAIN MANAGEMENT AND THEY WANT TO DO INJECTIONS AND SHE STATED SHE HAS DONE THOSE AND THEY DO NOT HELP. PATIENT STATED SHE IS IN CHRONIC PAIN AND NEEDS A REFERRAL TO ANOTHER PAIN MANAGEMENT. PATIENT STATED SHE WAS ADVISED TO GO TO PHYSICAL THERAPY AND SHE STATED SHE CANT EVEN WALK HOW IS SHE TO DO PHYSICAL THERAPY . PATIENT WANTS A CALLBACK AS SOON AS POSSIBLE. PATIENT STATED SHE IS VERY UPSET WITH DR. MEDRANO    Do you require a callback: YES

## 2022-11-29 ENCOUNTER — HOSPITAL ENCOUNTER (OUTPATIENT)
Dept: CT IMAGING | Facility: HOSPITAL | Age: 62
End: 2022-11-29

## 2022-12-01 DIAGNOSIS — E11.42 TYPE 2 DIABETES MELLITUS WITH DIABETIC POLYNEUROPATHY, WITHOUT LONG-TERM CURRENT USE OF INSULIN: ICD-10-CM

## 2022-12-01 DIAGNOSIS — G54.6 PHANTOM PAIN FOLLOWING AMPUTATION OF LOWER LIMB: ICD-10-CM

## 2022-12-01 RX ORDER — GABAPENTIN 800 MG/1
800 TABLET ORAL 3 TIMES DAILY
Qty: 90 TABLET | Refills: 0 | Status: SHIPPED | OUTPATIENT
Start: 2022-12-01 | End: 2022-12-21 | Stop reason: SDUPTHER

## 2022-12-02 DIAGNOSIS — I77.9 PERIPHERAL ARTERIAL OCCLUSIVE DISEASE: ICD-10-CM

## 2022-12-02 RX ORDER — OXYCODONE HYDROCHLORIDE 5 MG/1
5 TABLET ORAL EVERY 8 HOURS PRN
Qty: 10 TABLET | Refills: 0 | Status: CANCELLED | OUTPATIENT
Start: 2022-12-02

## 2022-12-02 NOTE — TELEPHONE ENCOUNTER
Caller: Bobbi Du    Relationship: Self    Best call back number: [phone]    Requested Prescriptions:   Requested Prescriptions     Pending Prescriptions Disp Refills   • oxyCODONE (Roxicodone) 5 MG immediate release tablet 10 tablet 0     Sig: Take 1 tablet by mouth Every 8 (Eight) Hours As Needed for Severe Pain.        Pharmacy where request should be sent: [id number] - [place], [place] - [address] [place] [address] AT [facility] & [facility] [phone] [facility] [phone] FX     Additional details provided by patient: THE PATIENT IS OUT OF MEDICATION SHE STATES THAT SHE WAS REFFERED TO PAIN MANAGEMENT BUT NO ONE HAS CALLED HER TO SCHEDULE THE APPOINTMENT PLEASE CALL THE PATIENT TO LET HER KNOW IF THAT CAN BE REFILLED     Does the patient have less than a 3 day supply:  [x] Yes  [] No    Would you like a call back once the refill request has been completed: [x] Yes [] No    If the office needs to give you a call back, can they leave a voicemail: [x] Yes [] No    Connie Wilson Rep   12/02/22 11:27 EST         DELETE AFTER READING TO PATIENT: “Thank you for sharing this information with me. I will send a message to the clinical team. Please allow 48 hours for the clinical staff to follow up on this request.”

## 2022-12-05 ENCOUNTER — TELEPHONE (OUTPATIENT)
Dept: GASTROENTEROLOGY | Facility: CLINIC | Age: 62
End: 2022-12-05

## 2022-12-05 ENCOUNTER — TELEMEDICINE (OUTPATIENT)
Dept: INTERNAL MEDICINE | Facility: CLINIC | Age: 62
End: 2022-12-05

## 2022-12-05 DIAGNOSIS — E78.00 PURE HYPERCHOLESTEROLEMIA: ICD-10-CM

## 2022-12-05 DIAGNOSIS — J06.9 VIRAL URI: ICD-10-CM

## 2022-12-05 DIAGNOSIS — I70.219 INTERMITTENT CLAUDICATION DUE TO ATHEROSCLEROSIS OF ARTERY OF EXTREMITY: Primary | ICD-10-CM

## 2022-12-05 DIAGNOSIS — I77.9 PERIPHERAL ARTERIAL OCCLUSIVE DISEASE: ICD-10-CM

## 2022-12-05 DIAGNOSIS — D50.0 IRON DEFICIENCY ANEMIA DUE TO CHRONIC BLOOD LOSS: ICD-10-CM

## 2022-12-05 DIAGNOSIS — F17.200 TOBACCO USE DISORDER: ICD-10-CM

## 2022-12-05 DIAGNOSIS — F33.0 MILD EPISODE OF RECURRENT MAJOR DEPRESSIVE DISORDER: ICD-10-CM

## 2022-12-05 DIAGNOSIS — J44.9 CHRONIC OBSTRUCTIVE PULMONARY DISEASE, UNSPECIFIED COPD TYPE: ICD-10-CM

## 2022-12-05 PROBLEM — F33.9 EPISODE OF RECURRENT MAJOR DEPRESSIVE DISORDER (HCC): Status: ACTIVE | Noted: 2022-12-05

## 2022-12-05 PROCEDURE — 99406 BEHAV CHNG SMOKING 3-10 MIN: CPT | Performed by: STUDENT IN AN ORGANIZED HEALTH CARE EDUCATION/TRAINING PROGRAM

## 2022-12-05 PROCEDURE — 99214 OFFICE O/P EST MOD 30 MIN: CPT | Performed by: STUDENT IN AN ORGANIZED HEALTH CARE EDUCATION/TRAINING PROGRAM

## 2022-12-05 RX ORDER — OXYCODONE HYDROCHLORIDE 5 MG/1
5 TABLET ORAL EVERY 8 HOURS PRN
Qty: 10 TABLET | Refills: 0 | Status: SHIPPED | OUTPATIENT
Start: 2022-12-05 | End: 2023-01-26

## 2022-12-05 RX ORDER — GUAIFENESIN 600 MG/1
1200 TABLET, EXTENDED RELEASE ORAL 2 TIMES DAILY
Qty: 30 TABLET | Refills: 0 | Status: SHIPPED | OUTPATIENT
Start: 2022-12-05

## 2022-12-05 RX ORDER — ECHINACEA PURPUREA EXTRACT 125 MG
1 TABLET ORAL AS NEEDED
Qty: 30 ML | Refills: 12 | Status: SHIPPED | OUTPATIENT
Start: 2022-12-05

## 2022-12-05 RX ORDER — ATORVASTATIN CALCIUM 80 MG/1
80 TABLET, FILM COATED ORAL NIGHTLY
Qty: 90 TABLET | Refills: 3 | Status: SHIPPED | OUTPATIENT
Start: 2022-12-05

## 2022-12-05 NOTE — ASSESSMENT & PLAN NOTE
I will increase the patients atorvastatin from 40 mg to 80 mg. The patient also needs to work on bringing her cholesterol down. We will develop a surgical plan and have the pain management specialist help control the patients pain levels. I have put in follow-up orders for her upcoming appointments. She is also to get updated blood work before her GI appointment to see if her anemia has improved. The patient is to have a follow-up with me in 3 months and reevaluate our plan at that time.

## 2022-12-05 NOTE — PROGRESS NOTES
Telehealth E-Visit      Patient Name: Bobbi Du  : 1960   MRN: [id number]   10:17 EST    Chief Complaint:  No chief complaint on file.      I have reviewed the E-Visit questionnaire and the patient's answers, my assessment and plan are listed below.     This provider is located at the [facility] (through [facility] [place] [facility]), [address], [address] [place] [address] using a secure Net[employer] Video Visit through [employer]. Patient is being seen remotely via telehealth at their home address in [place], and stated they are in a secure environment for this session. The patient's condition being diagnosed/treated is appropriate for telemedicine. The provider identified herself as well as her credentials. The patient, and/or patients guardian, consent to be seen remotely, and when consent is given they understand that the consent allows for patient identifiable information to be sent to a third party as needed. They may refuse to be seen remotely at any time. The electronic data is encrypted and password protected, and the patient and/or guardian has been advised of the potential risks to privacy not withstanding such measures.    You have chosen to receive care through a telehealth visit. Do you consent to use a video/audio connection for your medical care today? Yes    History of Present Illness: Bobbi Du is a 62 y.o. female who is here today to follow up visit.    Claudication   The patient states that her leg pain has gotten worse. She has been unable to drive or leave her house for 1 week. She states that her legs is beginning to have numbness on the left side. The patient had completed her physical therapy and will need another referral to continue treatment. The patient has 50%-60% vascular blockages resulting in decreased blood flow. The patient says she has not followed up with the vascular specialist yet because she has one more CT scan  to do this week of her before she can make an appointment. She states she has had the echo ultrasound with contrast and the carotid artery ultrasound. She has discontinued taking he iron supplements in preparation for the upcoming CT scan. The patient also has an appointment with a gastroenterologist on 12/08/22. We will hold off on her COPD management until all her results are in from her recent scans.     Sinus Infection   The patient states she has a sinus infection that began 4 days ago and is associated with yellow and green mucus as well as eye puffiness. She denies any fevers. She occasionally feels lightheaded when she stands. She is up to date on her influenza vaccine.     Tobacco Use  The patient is continuing working on quitting smoking. She says she has decreased the use to 2 or 3 cigarettes a day. She is taking Chantix 0.5 mg.     Depression  The patient states she is feeling depressed with all of her recent health issues and diagnoses. She says it is difficult with the holidays and not being able to get out and do things. She notes she does talk to her daughter about it and does not wish to speak to a psychologist at this time.       Subjective      Review of Systems:   Review of Systems   Constitutional: Negative for activity change, appetite change, fatigue and fever.   HENT: Positive for congestion, postnasal drip and sinus pressure.    Eyes: Negative for blurred vision, photophobia and visual disturbance.   Respiratory: Negative for cough, chest tightness and shortness of breath.    Cardiovascular: Negative for chest pain and palpitations.   Gastrointestinal: Negative for abdominal distention, abdominal pain, blood in stool, constipation, diarrhea, nausea and vomiting.   Genitourinary: Negative for dysuria and hematuria.   Musculoskeletal: Positive for arthralgias and myalgias. Negative for back pain and joint swelling.   Skin: Negative for rash and wound.   Neurological: Positive for numbness.  Negative for weakness, headache and confusion.       I have reviewed and the following portions of the patient's history were updated as appropriate: past family history, past medical history, past social history, past surgical history and problem list.    Medications:     Current Outpatient Medications:   •  atorvastatin (Lipitor) 80 MG tablet, Take 1 tablet by mouth Every Night., Disp: 90 tablet, Rfl: 3  •  oxyCODONE (Roxicodone) 5 MG immediate release tablet, Take 1 tablet by mouth Every 8 (Eight) Hours As Needed for Severe Pain., Disp: 10 tablet, Rfl: 0  •  albuterol sulfate  (90 Base) MCG/ACT inhaler, Inhale 2 puffs Every 4 (Four) Hours As Needed for Wheezing or Shortness of Air., Disp: 18 g, Rfl: 3  •  aspirin (aspirin) 81 MG EC tablet, Take 1 tablet by mouth Every Morning., Disp: 90 tablet, Rfl: 3  •  buPROPion XL (WELLBUTRIN XL) 150 MG 24 hr tablet, Take 1 tablet by mouth Daily., Disp: 45 tablet, Rfl: 1  •  Calcium Carb-Cholecalciferol (Calcium 1000 + D) 1000-800 MG-UNIT tablet, calcium, Disp: , Rfl:   •  clopidogrel (PLAVIX) 75 MG tablet, Take 75 mg by mouth Every Morning., Disp: , Rfl:   •  Cobalamin Combinations (B-12) 100-5000 MCG sublingual tablet, B12, Disp: , Rfl:   •  ferrous gluconate (FERGON) 324 MG tablet, Take 1 tablet by mouth Daily With Breakfast., Disp: 90 tablet, Rfl: 1  •  gabapentin (NEURONTIN) 800 MG tablet, Take 1 tablet by mouth 3 (Three) Times a Day., Disp: 90 tablet, Rfl: 0  •  guaiFENesin (MUCINEX) 600 MG 12 hr tablet, Take 2 tablets by mouth 2 (Two) Times a Day., Disp: 30 tablet, Rfl: 0  •  Iron-Vitamin C 100-250 MG tablet, iron  2 tab bid, Disp: , Rfl:   •  lisinopril-hydrochlorothiazide (PRINZIDE,ZESTORETIC) 10-12.5 MG per tablet, Take 1 tablet by mouth Every Morning., Disp: 90 tablet, Rfl: 3  •  metFORMIN (GLUCOPHAGE) 500 MG tablet, Take 500 mg by mouth Daily With Breakfast., Disp: , Rfl:   •  nicotine (NICODERM CQ) 14 MG/24HR patch, Place 1 patch on the skin as directed by  provider Daily., Disp: 28 each, Rfl: 2  •  sodium chloride (Ocean Nasal Spray) 0.65 % nasal spray, 1 spray into the nostril(s) as directed by provider As Needed for Congestion., Disp: 30 mL, Rfl: 12  •  thyroid (ARMOUR) 15 MG tablet, Take 1 tablet by mouth Daily., Disp: 60 tablet, Rfl: 1  •  Thyroid 90 MG PO tablet, Take 1 tablet by mouth Every Morning., Disp: 60 tablet, Rfl: 1  •  umeclidinium-vilanterol (ANORO ELLIPTA) 62.5-25 MCG/INH aerosol powder  inhaler, Inhale 1 puff Daily., Disp: , Rfl:   •  varenicline (Chantix Continuing Month Cole) 1 MG tablet, Take 1 tablet by mouth 2 (Two) Times a Day for 56 days., Disp: 56 tablet, Rfl: 1    Allergies:   Allergies   Allergen Reactions   • Bee Venom Anaphylaxis       Objective     Physical Exam:  Vital Signs: There were no vitals filed for this visit.  There is no height or weight on file to calculate BMI.    Physical Exam  Vitals and nursing note reviewed.   Constitutional:       General: She is not in acute distress.     Appearance: Normal appearance. She is not ill-appearing or toxic-appearing.   HENT:      Nose: No congestion or rhinorrhea.   Pulmonary:      Effort: Pulmonary effort is normal. No respiratory distress.   Musculoskeletal:      Cervical back: Normal range of motion.   Skin:     Coloration: Skin is not jaundiced.      Findings: No rash.   Neurological:      Mental Status: She is alert and oriented to person, place, and time. Mental status is at baseline.   Psychiatric:         Mood and Affect: Mood normal.         Behavior: Behavior normal.         Thought Content: Thought content normal.         Judgment: Judgment normal.           Assessment / Plan      Assessment/Plan:   Diagnoses and all orders for this visit:    1. Intermittent claudication due to atherosclerosis of artery of extremity (HCC) (Primary)  Assessment & Plan:  I will increase the patients atorvastatin from 40 mg to 80 mg. The patient also needs to work on bringing her cholesterol down.  We will develop a surgical plan and have the pain management specialist help control the patients pain levels. I have put in follow-up orders for her upcoming appointments. She is also to get updated blood work before her GI appointment to see if her anemia has improved. The patient is to have a follow-up with me in 3 months and reevaluate our plan at that time.     Orders:  -     Ambulatory Referral to Pain Management  -     oxyCODONE (Roxicodone) 5 MG immediate release tablet; Take 1 tablet by mouth Every 8 (Eight) Hours As Needed for Severe Pain.  Dispense: 10 tablet; Refill: 0    2. Pure hypercholesterolemia  -     atorvastatin (Lipitor) 80 MG tablet; Take 1 tablet by mouth Every Night.  Dispense: 90 tablet; Refill: 3    3. Viral URI  Comments:  I will write a prescription for Mucinex. I also advised her to use a saline solution to help break up the mucus. If they symptoms do not resolve or worsen, I wi  Orders:  -     guaiFENesin (MUCINEX) 600 MG 12 hr tablet; Take 2 tablets by mouth 2 (Two) Times a Day.  Dispense: 30 tablet; Refill: 0  -     sodium chloride (Ocean Nasal Spray) 0.65 % nasal spray; 1 spray into the nostril(s) as directed by provider As Needed for Congestion.  Dispense: 30 mL; Refill: 12    4. Iron deficiency anemia due to chronic blood loss  -     Ferritin; Future  -     CBC & Differential; Future    5. Tobacco use disorder  Assessment & Plan:  Bobbi Du  reports that she has been smoking cigarettes. She has a 44.00 pack-year smoking history. She has never used smokeless tobacco.. I have educated her on the risk of diseases from using tobacco products such as cancer, COPD and heart disease.     I advised her to quit and she is willing to quit. We have discussed the following method/s for tobacco cessation:  Counseling Prescription Medicaiton.  Together we have set a quit date for 1 month from today.  She will follow up with me in 1 month or sooner to check on her progress.  -   Continue  Chantix and bupropion as previously prescribed    I spent 5 minutes counseling the patient.           6. Mild episode of recurrent major depressive disorder (HCC)    7. Peripheral arterial occlusive disease (HCC)  -     oxyCODONE (Roxicodone) 5 MG immediate release tablet; Take 1 tablet by mouth Every 8 (Eight) Hours As Needed for Severe Pain.  Dispense: 10 tablet; Refill: 0    8. Chronic obstructive pulmonary disease, unspecified COPD type (HCC)       Follow Up:   Return in about 3 months (around 3/5/2023) for Recheck.    Any medications prescribed have been sent electronically to   [facility] #[id number] - [place], [place] - [address] AT [facility] [address] & [facility] - [phone]  - [phone] [facility] [address]  Phone: [phone] Fax: [phone]            Yon Bond MD  [facility]   12/06/22  10:17 EST    Transcribed from ambient dictation for Yon Bond MD by Olya Camilo.  12/05/22   13:14 EST    Patient or patient representative verbalized consent to the visit recording.  I have personally performed the services described in this document as transcribed by the above individual, and it is both accurate and complete.

## 2022-12-06 NOTE — ASSESSMENT & PLAN NOTE
Bobbi Du  reports that she has been smoking cigarettes. She has a 44.00 pack-year smoking history. She has never used smokeless tobacco.. I have educated her on the risk of diseases from using tobacco products such as cancer, COPD and heart disease.     I advised her to quit and she is willing to quit. We have discussed the following method/s for tobacco cessation:  Counseling Prescription Medicaiton.  Together we have set a quit date for 1 month from today.  She will follow up with me in 1 month or sooner to check on her progress.  -   Continue Chantix and bupropion as previously prescribed    I spent 5 minutes counseling the patient.

## 2022-12-08 ENCOUNTER — TELEPHONE (OUTPATIENT)
Dept: GASTROENTEROLOGY | Facility: CLINIC | Age: 62
End: 2022-12-08

## 2022-12-08 NOTE — TELEPHONE ENCOUNTER
Patient had a new patient appointment scheduled with Cesilia Bobo, patient called because her car wouldn't start and wanted to switch to telephone appointment. Cesilia stated she would need to see her in office but she has looked over her chart and she needs to be schedule an colon/egd any doctor.   Talked to patient and explained to her and she said she would call back to schedule the COLON/EGD.

## 2022-12-12 ENCOUNTER — APPOINTMENT (OUTPATIENT)
Dept: MAMMOGRAPHY | Facility: HOSPITAL | Age: 62
End: 2022-12-12

## 2022-12-13 ENCOUNTER — APPOINTMENT (OUTPATIENT)
Dept: GENERAL RADIOLOGY | Facility: HOSPITAL | Age: 62
End: 2022-12-13

## 2022-12-13 ENCOUNTER — APPOINTMENT (OUTPATIENT)
Dept: CT IMAGING | Facility: HOSPITAL | Age: 62
End: 2022-12-13

## 2022-12-13 ENCOUNTER — HOSPITAL ENCOUNTER (INPATIENT)
Facility: HOSPITAL | Age: 62
LOS: 5 days | Discharge: LEFT BEFORE DISCHARGE | End: 2022-12-18
Attending: EMERGENCY MEDICINE | Admitting: INTERNAL MEDICINE

## 2022-12-13 DIAGNOSIS — D64.9 SEVERE ANEMIA: ICD-10-CM

## 2022-12-13 DIAGNOSIS — K92.2 GASTROINTESTINAL HEMORRHAGE, UNSPECIFIED GASTROINTESTINAL HEMORRHAGE TYPE: ICD-10-CM

## 2022-12-13 DIAGNOSIS — I73.9 PERIPHERAL ARTERIAL DISEASE: ICD-10-CM

## 2022-12-13 DIAGNOSIS — R06.02 SHORTNESS OF BREATH: ICD-10-CM

## 2022-12-13 DIAGNOSIS — D64.9 SYMPTOMATIC ANEMIA: ICD-10-CM

## 2022-12-13 DIAGNOSIS — I95.9 HYPOTENSION, UNSPECIFIED HYPOTENSION TYPE: Primary | ICD-10-CM

## 2022-12-13 PROBLEM — J44.1 COPD WITH ACUTE EXACERBATION: Status: ACTIVE | Noted: 2017-06-01

## 2022-12-13 LAB
ABO GROUP BLD: NORMAL
ACANTHOCYTES BLD QL SMEAR: NORMAL
ALBUMIN SERPL-MCNC: 3.9 G/DL (ref 3.5–5.2)
ALBUMIN/GLOB SERPL: 1.5 G/DL
ALP SERPL-CCNC: 73 U/L (ref 39–117)
ALT SERPL W P-5'-P-CCNC: 9 U/L (ref 1–33)
ANION GAP SERPL CALCULATED.3IONS-SCNC: 13 MMOL/L (ref 5–15)
ANISOCYTOSIS BLD QL: NORMAL
ARTERIAL PATENCY WRIST A: ABNORMAL
AST SERPL-CCNC: 17 U/L (ref 1–32)
ATMOSPHERIC PRESS: ABNORMAL MM[HG]
BASE EXCESS BLDA CALC-SCNC: -4.6 MMOL/L (ref 0–2)
BASOPHILS # BLD AUTO: 0 10*3/MM3 (ref 0–0.2)
BASOPHILS NFR BLD AUTO: 0 % (ref 0–1.5)
BDY SITE: ABNORMAL
BILIRUB SERPL-MCNC: 0.2 MG/DL (ref 0–1.2)
BLD GP AB SCN SERPL QL: NEGATIVE
BODY TEMPERATURE: 37 C
BUN SERPL-MCNC: 8 MG/DL (ref 8–23)
BUN/CREAT SERPL: 18.2 (ref 7–25)
C3 FRG RBC-MCNC: NORMAL
CALCIUM SPEC-SCNC: 8.7 MG/DL (ref 8.6–10.5)
CHLORIDE SERPL-SCNC: 102 MMOL/L (ref 98–107)
CO2 BLDA-SCNC: 20.1 MMOL/L (ref 22–33)
CO2 SERPL-SCNC: 18 MMOL/L (ref 22–29)
COHGB MFR BLD: 3.9 % (ref 0–2)
CREAT SERPL-MCNC: 0.44 MG/DL (ref 0.57–1)
DACRYOCYTES BLD QL SMEAR: NORMAL
DEPRECATED RDW RBC AUTO: 47.1 FL (ref 37–54)
EGFRCR SERPLBLD CKD-EPI 2021: 110.2 ML/MIN/1.73
EOSINOPHIL # BLD AUTO: 0.01 10*3/MM3 (ref 0–0.4)
EOSINOPHIL NFR BLD AUTO: 0.2 % (ref 0.3–6.2)
EPAP: 0
ERYTHROCYTE [DISTWIDTH] IN BLOOD BY AUTOMATED COUNT: 22.8 % (ref 12.3–15.4)
FERRITIN SERPL-MCNC: 4.94 NG/ML (ref 13–150)
FOLATE SERPL-MCNC: 6.1 NG/ML (ref 4.78–24.2)
GLOBULIN UR ELPH-MCNC: 2.6 GM/DL
GLUCOSE SERPL-MCNC: 151 MG/DL (ref 65–99)
HCO3 BLDA-SCNC: 19.3 MMOL/L (ref 20–26)
HCT VFR BLD AUTO: 26.9 % (ref 34–46.6)
HCT VFR BLD AUTO: 27.6 % (ref 34–46.6)
HCT VFR BLD AUTO: 9.8 % (ref 34–46.6)
HCT VFR BLD CALC: 8.1 % (ref 38–51)
HGB BLD-MCNC: 2.5 G/DL (ref 12–15.9)
HGB BLD-MCNC: 8.3 G/DL (ref 12–15.9)
HGB BLD-MCNC: 8.8 G/DL (ref 12–15.9)
HGB BLDA-MCNC: ABNORMAL G/DL
HOLD SPECIMEN: NORMAL
HYPOCHROMIA BLD QL: NORMAL
IMM GRANULOCYTES # BLD AUTO: 0.03 10*3/MM3 (ref 0–0.05)
IMM GRANULOCYTES NFR BLD AUTO: 0.5 % (ref 0–0.5)
INHALED O2 CONCENTRATION: 28 %
IPAP: 0
IRON 24H UR-MRATE: 10 MCG/DL (ref 37–145)
IRON SATN MFR SERPL: 2 % (ref 20–50)
LARGE PLATELETS: NORMAL
LYMPHOCYTES # BLD AUTO: 1.43 10*3/MM3 (ref 0.7–3.1)
LYMPHOCYTES NFR BLD AUTO: 23.9 % (ref 19.6–45.3)
Lab: ABNORMAL
MCH RBC QN AUTO: 15.4 PG (ref 26.6–33)
MCHC RBC AUTO-ENTMCNC: 25.5 G/DL (ref 31.5–35.7)
MCV RBC AUTO: 60.5 FL (ref 79–97)
METHGB BLD QL: -0.1 % (ref 0–1.5)
MICROCYTES BLD QL: NORMAL
MODALITY: ABNORMAL
MONOCYTES # BLD AUTO: 0.31 10*3/MM3 (ref 0.1–0.9)
MONOCYTES NFR BLD AUTO: 5.2 % (ref 5–12)
NEUTROPHILS NFR BLD AUTO: 4.2 10*3/MM3 (ref 1.7–7)
NEUTROPHILS NFR BLD AUTO: 70.2 % (ref 42.7–76)
NOTE: ABNORMAL
NOTIFIED BY: ABNORMAL
NOTIFIED WHO: ABNORMAL
NRBC BLD AUTO-RTO: 0.8 /100 WBC (ref 0–0.2)
NT-PROBNP SERPL-MCNC: 2163 PG/ML (ref 0–900)
OXYHGB MFR BLDV: 96.4 % (ref 94–99)
PAW @ PEAK INSP FLOW SETTING VENT: 0 CMH2O
PCO2 BLDA: 26.9 MM HG (ref 35–45)
PCO2 TEMP ADJ BLD: 26.9 MM HG (ref 35–45)
PH BLDA: 7.46 PH UNITS (ref 7.35–7.45)
PH, TEMP CORRECTED: 7.46 PH UNITS
PLATELET # BLD AUTO: 318 10*3/MM3 (ref 140–450)
PMV BLD AUTO: 10 FL (ref 6–12)
PO2 BLDA: 123 MM HG (ref 83–108)
PO2 TEMP ADJ BLD: 123 MM HG (ref 83–108)
POTASSIUM SERPL-SCNC: 4 MMOL/L (ref 3.5–5.2)
PROT SERPL-MCNC: 6.5 G/DL (ref 6–8.5)
QT INTERVAL: 318 MS
QTC INTERVAL: 436 MS
RBC # BLD AUTO: 1.62 10*6/MM3 (ref 3.77–5.28)
RETICS # AUTO: 0.03 10*6/MM3 (ref 0.02–0.13)
RETICS/RBC NFR AUTO: 1.58 % (ref 0.7–1.9)
RH BLD: NEGATIVE
SODIUM SERPL-SCNC: 133 MMOL/L (ref 136–145)
T&S EXPIRATION DATE: NORMAL
TARGETS BLD QL SMEAR: NORMAL
TIBC SERPL-MCNC: 603 MCG/DL (ref 298–536)
TOTAL RATE: 0 BREATHS/MINUTE
TRANSFERRIN SERPL-MCNC: 405 MG/DL (ref 200–360)
TROPONIN T SERPL-MCNC: <0.01 NG/ML (ref 0–0.03)
VIT B12 BLD-MCNC: 268 PG/ML (ref 211–946)
WBC MORPH BLD: NORMAL
WBC NRBC COR # BLD: 5.98 10*3/MM3 (ref 3.4–10.8)
WHOLE BLOOD HOLD COAG: NORMAL
WHOLE BLOOD HOLD SPECIMEN: NORMAL

## 2022-12-13 PROCEDURE — 85018 HEMOGLOBIN: CPT | Performed by: NURSE PRACTITIONER

## 2022-12-13 PROCEDURE — 85045 AUTOMATED RETICULOCYTE COUNT: CPT | Performed by: NURSE PRACTITIONER

## 2022-12-13 PROCEDURE — 36430 TRANSFUSION BLD/BLD COMPNT: CPT

## 2022-12-13 PROCEDURE — 86923 COMPATIBILITY TEST ELECTRIC: CPT

## 2022-12-13 PROCEDURE — 85014 HEMATOCRIT: CPT | Performed by: NURSE PRACTITIONER

## 2022-12-13 PROCEDURE — 71045 X-RAY EXAM CHEST 1 VIEW: CPT

## 2022-12-13 PROCEDURE — 0 IOPAMIDOL PER 1 ML: Performed by: EMERGENCY MEDICINE

## 2022-12-13 PROCEDURE — 85007 BL SMEAR W/DIFF WBC COUNT: CPT | Performed by: EMERGENCY MEDICINE

## 2022-12-13 PROCEDURE — 93005 ELECTROCARDIOGRAM TRACING: CPT | Performed by: EMERGENCY MEDICINE

## 2022-12-13 PROCEDURE — 82375 ASSAY CARBOXYHB QUANT: CPT

## 2022-12-13 PROCEDURE — 36415 COLL VENOUS BLD VENIPUNCTURE: CPT

## 2022-12-13 PROCEDURE — P9016 RBC LEUKOCYTES REDUCED: HCPCS

## 2022-12-13 PROCEDURE — 94640 AIRWAY INHALATION TREATMENT: CPT

## 2022-12-13 PROCEDURE — 85025 COMPLETE CBC W/AUTO DIFF WBC: CPT | Performed by: EMERGENCY MEDICINE

## 2022-12-13 PROCEDURE — 86900 BLOOD TYPING SEROLOGIC ABO: CPT

## 2022-12-13 PROCEDURE — 99222 1ST HOSP IP/OBS MODERATE 55: CPT | Performed by: PHYSICIAN ASSISTANT

## 2022-12-13 PROCEDURE — 84484 ASSAY OF TROPONIN QUANT: CPT | Performed by: EMERGENCY MEDICINE

## 2022-12-13 PROCEDURE — 94664 DEMO&/EVAL PT USE INHALER: CPT

## 2022-12-13 PROCEDURE — 82805 BLOOD GASES W/O2 SATURATION: CPT

## 2022-12-13 PROCEDURE — 36600 WITHDRAWAL OF ARTERIAL BLOOD: CPT

## 2022-12-13 PROCEDURE — 25010000002 LORAZEPAM PER 2 MG: Performed by: INTERNAL MEDICINE

## 2022-12-13 PROCEDURE — 82747 ASSAY OF FOLIC ACID RBC: CPT | Performed by: NURSE PRACTITIONER

## 2022-12-13 PROCEDURE — 93005 ELECTROCARDIOGRAM TRACING: CPT

## 2022-12-13 PROCEDURE — 94799 UNLISTED PULMONARY SVC/PX: CPT

## 2022-12-13 PROCEDURE — 83880 ASSAY OF NATRIURETIC PEPTIDE: CPT | Performed by: EMERGENCY MEDICINE

## 2022-12-13 PROCEDURE — 25010000002 HYDROMORPHONE PER 4 MG: Performed by: NURSE PRACTITIONER

## 2022-12-13 PROCEDURE — 25010000002 LORAZEPAM PER 2 MG: Performed by: NURSE PRACTITIONER

## 2022-12-13 PROCEDURE — 86900 BLOOD TYPING SEROLOGIC ABO: CPT | Performed by: EMERGENCY MEDICINE

## 2022-12-13 PROCEDURE — 86850 RBC ANTIBODY SCREEN: CPT | Performed by: EMERGENCY MEDICINE

## 2022-12-13 PROCEDURE — 25010000002 METHYLPREDNISOLONE PER 125 MG: Performed by: INTERNAL MEDICINE

## 2022-12-13 PROCEDURE — 83050 HGB METHEMOGLOBIN QUAN: CPT

## 2022-12-13 PROCEDURE — 80053 COMPREHEN METABOLIC PANEL: CPT | Performed by: EMERGENCY MEDICINE

## 2022-12-13 PROCEDURE — 84466 ASSAY OF TRANSFERRIN: CPT | Performed by: NURSE PRACTITIONER

## 2022-12-13 PROCEDURE — 86901 BLOOD TYPING SEROLOGIC RH(D): CPT | Performed by: EMERGENCY MEDICINE

## 2022-12-13 PROCEDURE — 99291 CRITICAL CARE FIRST HOUR: CPT | Performed by: INTERNAL MEDICINE

## 2022-12-13 PROCEDURE — 82746 ASSAY OF FOLIC ACID SERUM: CPT | Performed by: NURSE PRACTITIONER

## 2022-12-13 PROCEDURE — 99285 EMERGENCY DEPT VISIT HI MDM: CPT

## 2022-12-13 PROCEDURE — 85018 HEMOGLOBIN: CPT | Performed by: INTERNAL MEDICINE

## 2022-12-13 PROCEDURE — 83540 ASSAY OF IRON: CPT | Performed by: NURSE PRACTITIONER

## 2022-12-13 PROCEDURE — 94761 N-INVAS EAR/PLS OXIMETRY MLT: CPT

## 2022-12-13 PROCEDURE — 25010000002 LORAZEPAM PER 2 MG

## 2022-12-13 PROCEDURE — 85014 HEMATOCRIT: CPT | Performed by: INTERNAL MEDICINE

## 2022-12-13 PROCEDURE — 82728 ASSAY OF FERRITIN: CPT | Performed by: NURSE PRACTITIONER

## 2022-12-13 PROCEDURE — 71275 CT ANGIOGRAPHY CHEST: CPT

## 2022-12-13 PROCEDURE — 87040 BLOOD CULTURE FOR BACTERIA: CPT | Performed by: NURSE PRACTITIONER

## 2022-12-13 PROCEDURE — 82607 VITAMIN B-12: CPT | Performed by: NURSE PRACTITIONER

## 2022-12-13 RX ORDER — SODIUM CHLORIDE 0.9 % (FLUSH) 0.9 %
10 SYRINGE (ML) INJECTION AS NEEDED
Status: DISCONTINUED | OUTPATIENT
Start: 2022-12-13 | End: 2022-12-18 | Stop reason: HOSPADM

## 2022-12-13 RX ORDER — BUPROPION HYDROCHLORIDE 150 MG/1
150 TABLET ORAL DAILY
Status: DISCONTINUED | OUTPATIENT
Start: 2022-12-13 | End: 2022-12-18 | Stop reason: HOSPADM

## 2022-12-13 RX ORDER — IPRATROPIUM BROMIDE AND ALBUTEROL SULFATE 2.5; .5 MG/3ML; MG/3ML
3 SOLUTION RESPIRATORY (INHALATION) ONCE
Status: COMPLETED | OUTPATIENT
Start: 2022-12-13 | End: 2022-12-13

## 2022-12-13 RX ORDER — IPRATROPIUM BROMIDE AND ALBUTEROL SULFATE 2.5; .5 MG/3ML; MG/3ML
3 SOLUTION RESPIRATORY (INHALATION) EVERY 4 HOURS PRN
Status: DISCONTINUED | OUTPATIENT
Start: 2022-12-13 | End: 2022-12-18 | Stop reason: HOSPADM

## 2022-12-13 RX ORDER — LEVOTHYROXINE AND LIOTHYRONINE 19; 4.5 UG/1; UG/1
90 TABLET ORAL EVERY MORNING
Status: DISCONTINUED | OUTPATIENT
Start: 2022-12-14 | End: 2022-12-18 | Stop reason: HOSPADM

## 2022-12-13 RX ORDER — GABAPENTIN 400 MG/1
800 CAPSULE ORAL EVERY 8 HOURS SCHEDULED
Status: DISCONTINUED | OUTPATIENT
Start: 2022-12-13 | End: 2022-12-18 | Stop reason: HOSPADM

## 2022-12-13 RX ORDER — METHYLPREDNISOLONE SODIUM SUCCINATE 125 MG/2ML
125 INJECTION, POWDER, LYOPHILIZED, FOR SOLUTION INTRAMUSCULAR; INTRAVENOUS ONCE
Status: COMPLETED | OUTPATIENT
Start: 2022-12-13 | End: 2022-12-13

## 2022-12-13 RX ORDER — DEXMEDETOMIDINE HYDROCHLORIDE 4 UG/ML
.2-1.5 INJECTION, SOLUTION INTRAVENOUS
Status: DISCONTINUED | OUTPATIENT
Start: 2022-12-13 | End: 2022-12-18 | Stop reason: HOSPADM

## 2022-12-13 RX ORDER — NICOTINE 21 MG/24HR
1 PATCH, TRANSDERMAL 24 HOURS TRANSDERMAL
Status: DISCONTINUED | OUTPATIENT
Start: 2022-12-13 | End: 2022-12-18 | Stop reason: HOSPADM

## 2022-12-13 RX ORDER — LORAZEPAM 2 MG/ML
0.5 INJECTION INTRAMUSCULAR ONCE
Status: COMPLETED | OUTPATIENT
Start: 2022-12-13 | End: 2022-12-13

## 2022-12-13 RX ORDER — LORAZEPAM 2 MG/ML
INJECTION INTRAMUSCULAR
Status: COMPLETED
Start: 2022-12-13 | End: 2022-12-13

## 2022-12-13 RX ORDER — PANTOPRAZOLE SODIUM 40 MG/10ML
40 INJECTION, POWDER, LYOPHILIZED, FOR SOLUTION INTRAVENOUS EVERY 12 HOURS SCHEDULED
Status: DISCONTINUED | OUTPATIENT
Start: 2022-12-13 | End: 2022-12-18 | Stop reason: HOSPADM

## 2022-12-13 RX ORDER — HYDROMORPHONE HYDROCHLORIDE 1 MG/ML
0.5 INJECTION, SOLUTION INTRAMUSCULAR; INTRAVENOUS; SUBCUTANEOUS ONCE
Status: COMPLETED | OUTPATIENT
Start: 2022-12-13 | End: 2022-12-13

## 2022-12-13 RX ORDER — DEXMEDETOMIDINE HYDROCHLORIDE 4 UG/ML
INJECTION, SOLUTION INTRAVENOUS
Status: DISPENSED
Start: 2022-12-13 | End: 2022-12-14

## 2022-12-13 RX ORDER — HYDROMORPHONE HYDROCHLORIDE 1 MG/ML
0.5 INJECTION, SOLUTION INTRAMUSCULAR; INTRAVENOUS; SUBCUTANEOUS
Status: DISCONTINUED | OUTPATIENT
Start: 2022-12-13 | End: 2022-12-18 | Stop reason: HOSPADM

## 2022-12-13 RX ORDER — ETOMIDATE 2 MG/ML
INJECTION INTRAVENOUS
Status: DISPENSED
Start: 2022-12-13 | End: 2022-12-14

## 2022-12-13 RX ORDER — OXYCODONE HYDROCHLORIDE 5 MG/1
5 TABLET ORAL EVERY 8 HOURS PRN
Status: DISCONTINUED | OUTPATIENT
Start: 2022-12-13 | End: 2022-12-18 | Stop reason: HOSPADM

## 2022-12-13 RX ORDER — SODIUM CHLORIDE 0.9 % (FLUSH) 0.9 %
10 SYRINGE (ML) INJECTION EVERY 12 HOURS SCHEDULED
Status: DISCONTINUED | OUTPATIENT
Start: 2022-12-13 | End: 2022-12-18 | Stop reason: HOSPADM

## 2022-12-13 RX ORDER — NOREPINEPHRINE BIT/0.9 % NACL 8 MG/250ML
.02-.3 INFUSION BOTTLE (ML) INTRAVENOUS
Status: DISCONTINUED | OUTPATIENT
Start: 2022-12-13 | End: 2022-12-18 | Stop reason: HOSPADM

## 2022-12-13 RX ORDER — SODIUM CHLORIDE 9 MG/ML
40 INJECTION, SOLUTION INTRAVENOUS AS NEEDED
Status: DISCONTINUED | OUTPATIENT
Start: 2022-12-13 | End: 2022-12-18 | Stop reason: HOSPADM

## 2022-12-13 RX ORDER — PANTOPRAZOLE SODIUM 40 MG/10ML
80 INJECTION, POWDER, LYOPHILIZED, FOR SOLUTION INTRAVENOUS ONCE
Status: COMPLETED | OUTPATIENT
Start: 2022-12-13 | End: 2022-12-13

## 2022-12-13 RX ORDER — ATORVASTATIN CALCIUM 40 MG/1
80 TABLET, FILM COATED ORAL NIGHTLY
Status: DISCONTINUED | OUTPATIENT
Start: 2022-12-13 | End: 2022-12-18 | Stop reason: HOSPADM

## 2022-12-13 RX ORDER — MIDAZOLAM HYDROCHLORIDE 1 MG/ML
INJECTION INTRAMUSCULAR; INTRAVENOUS
Status: DISPENSED
Start: 2022-12-13 | End: 2022-12-14

## 2022-12-13 RX ORDER — SODIUM CHLORIDE 9 MG/ML
100 INJECTION, SOLUTION INTRAVENOUS CONTINUOUS
Status: DISCONTINUED | OUTPATIENT
Start: 2022-12-13 | End: 2022-12-17

## 2022-12-13 RX ORDER — LORAZEPAM 2 MG/ML
0.5 INJECTION INTRAMUSCULAR
Status: DISCONTINUED | OUTPATIENT
Start: 2022-12-13 | End: 2022-12-18 | Stop reason: HOSPADM

## 2022-12-13 RX ORDER — LORAZEPAM 2 MG/ML
1 INJECTION INTRAMUSCULAR ONCE
Status: COMPLETED | OUTPATIENT
Start: 2022-12-13 | End: 2022-12-13

## 2022-12-13 RX ADMIN — HYDROMORPHONE HYDROCHLORIDE 0.5 MG: 1 INJECTION, SOLUTION INTRAMUSCULAR; INTRAVENOUS; SUBCUTANEOUS at 15:54

## 2022-12-13 RX ADMIN — NICOTINE 1 PATCH: 14 PATCH, EXTENDED RELEASE TRANSDERMAL at 14:45

## 2022-12-13 RX ADMIN — LORAZEPAM 0.5 MG: 2 INJECTION INTRAMUSCULAR at 11:48

## 2022-12-13 RX ADMIN — LORAZEPAM 0.5 MG: 2 INJECTION INTRAMUSCULAR; INTRAVENOUS at 14:17

## 2022-12-13 RX ADMIN — DEXMEDETOMIDINE HYDROCHLORIDE 1.5 MCG/KG/HR: 4 INJECTION, SOLUTION INTRAVENOUS at 17:46

## 2022-12-13 RX ADMIN — PANTOPRAZOLE SODIUM 40 MG: 40 INJECTION, POWDER, FOR SOLUTION INTRAVENOUS at 20:22

## 2022-12-13 RX ADMIN — SODIUM CHLORIDE 100 ML/HR: 9 INJECTION, SOLUTION INTRAVENOUS at 17:46

## 2022-12-13 RX ADMIN — IPRATROPIUM BROMIDE AND ALBUTEROL SULFATE 3 ML: .5; 3 SOLUTION RESPIRATORY (INHALATION) at 12:06

## 2022-12-13 RX ADMIN — LORAZEPAM 0.5 MG: 2 INJECTION INTRAMUSCULAR; INTRAVENOUS at 20:22

## 2022-12-13 RX ADMIN — IPRATROPIUM BROMIDE AND ALBUTEROL SULFATE 3 ML: .5; 3 SOLUTION RESPIRATORY (INHALATION) at 15:29

## 2022-12-13 RX ADMIN — BUPROPION HYDROCHLORIDE 150 MG: 150 TABLET, FILM COATED, EXTENDED RELEASE ORAL at 14:45

## 2022-12-13 RX ADMIN — LORAZEPAM 0.5 MG: 2 INJECTION INTRAMUSCULAR; INTRAVENOUS at 11:48

## 2022-12-13 RX ADMIN — DEXMEDETOMIDINE HYDROCHLORIDE 0.2 MCG/KG/HR: 4 INJECTION, SOLUTION INTRAVENOUS at 13:16

## 2022-12-13 RX ADMIN — GABAPENTIN 800 MG: 400 CAPSULE ORAL at 22:08

## 2022-12-13 RX ADMIN — ATORVASTATIN CALCIUM 80 MG: 40 TABLET, FILM COATED ORAL at 20:22

## 2022-12-13 RX ADMIN — IOPAMIDOL 70 ML: 755 INJECTION, SOLUTION INTRAVENOUS at 10:53

## 2022-12-13 RX ADMIN — SODIUM CHLORIDE 1000 ML: 9 INJECTION, SOLUTION INTRAVENOUS at 10:28

## 2022-12-13 RX ADMIN — PANTOPRAZOLE SODIUM 80 MG: 40 INJECTION, POWDER, FOR SOLUTION INTRAVENOUS at 12:08

## 2022-12-13 RX ADMIN — HYDROMORPHONE HYDROCHLORIDE 0.5 MG: 1 INJECTION, SOLUTION INTRAMUSCULAR; INTRAVENOUS; SUBCUTANEOUS at 13:41

## 2022-12-13 RX ADMIN — SODIUM CHLORIDE 1000 ML: 9 INJECTION, SOLUTION INTRAVENOUS at 17:45

## 2022-12-13 RX ADMIN — OXYCODONE 5 MG: 5 TABLET ORAL at 14:46

## 2022-12-13 RX ADMIN — Medication 10 ML: at 13:17

## 2022-12-13 RX ADMIN — GABAPENTIN 800 MG: 400 CAPSULE ORAL at 14:46

## 2022-12-13 RX ADMIN — METHYLPREDNISOLONE SODIUM SUCCINATE 125 MG: 125 INJECTION, POWDER, FOR SOLUTION INTRAMUSCULAR; INTRAVENOUS at 12:08

## 2022-12-13 RX ADMIN — SODIUM CHLORIDE 1000 ML: 9 INJECTION, SOLUTION INTRAVENOUS at 09:21

## 2022-12-13 RX ADMIN — Medication 10 ML: at 20:23

## 2022-12-13 RX ADMIN — Medication 0.02 MCG/KG/MIN: at 16:36

## 2022-12-13 NOTE — ED PROVIDER NOTES
"Subjective   History of Present Illness  This patient is a very pleasant 62-year-old  female who appears much older than her stated age.  She has a history of chronic claudication of the lower extremities with a history of left BKA.  She has a history of COPD, diabetes, thyroid disease, fibromyalgia, dyslipidemia, hypertension, and iliofemoral and left femoral popliteal bypass.  She comes in with complaints of shortness of breath over the last 2 to 3 weeks.  She also tells me she has had some \"burning across my chest.\"  This burning has been ongoing for 2 weeks but worse over the last 24 hours.  She tells me it radiates down the left arm.  Patient tells me she has a history of multiple \"clots.\"  She tells me she is being worked up by the CT surgery team here at [facility] and currently takes Plavix and aspirin only.  No history of Eliquis or Xarelto.  She denies history of pulmonary embolus in the past but does believe she has had a history of both arterial and venous clots.  She tells me she is generally short of breath and has a history of COPD but does not use oxygen at home.  She still smokes.  She tells me the shortness of breath episode for which she came in today is not new but has been getting worse over the last 2 weeks.  She specifically denies any fevers or chills or cough.  Initial blood pressure here was 124/85 with a heart rate of 113.  Temperature 97.8.    Past medical history  Left BKA, COPD, diabetes, thyroid disease, fibromyalgia, peripheral arterial disease, dyslipidemia, hypertension.  Negative for CVA per patient    Family history  COPD, mother        Review of Systems   Constitutional: Positive for fatigue. Negative for chills, fever and unexpected weight change.   HENT: Negative for dental problem, ear pain, hearing loss and sinus pressure.    Eyes: Negative for pain and visual disturbance.   Respiratory: Positive for cough and shortness of breath. Negative for chest tightness.  "   Cardiovascular: Negative for chest pain, palpitations and leg swelling.   Gastrointestinal: Negative for blood in stool, diarrhea, nausea and vomiting.   Genitourinary: Negative for difficulty urinating, dysuria, frequency, hematuria and urgency.   Musculoskeletal: Negative for myalgias, neck pain and neck stiffness.   Neurological: Positive for weakness, light-headedness and headaches. Negative for seizures, syncope and speech difficulty.   Psychiatric/Behavioral: Negative for confusion.   All other systems reviewed and are negative.      Past Medical History:   Diagnosis Date   • Acute respiratory alkalosis 10/16/2019   • Bilateral knee pain 7/20/2020   • Charcot foot due to diabetes mellitus ([facility])     RIGHT   • Chronic pain    • Claudication of lower extremity with history of revascularization ([facility]) 5/13/2020    Added automatically from request for surgery [id number]   • Confusion 10/15/2019   • COPD (chronic obstructive pulmonary disease) ([facility])    • COPD mixed type ([facility])    • Diabetes mellitus ([facility])    • Disease of thyroid gland    • Encephalopathy 10/15/2019   • Fibromyalgia    • Gangrene ([facility]) 7/10/2017   • Hyperlipidemia    • Hypertension    • Incarcerated right inguinal hernia 12/17/2019   • Irreducible right femoral hernia 12/17/2019   • PAD (peripheral artery disease) ([facility])    • Restless leg    • Rheumatoid arthritis ([facility])    • S/P left iliofemoral and left femoropopliteal bypass surgery 7/10/17 7/10/2017   • Sinusitis 10/16/2019   • Tobacco abuse        Allergies   Allergen Reactions   • Bee Venom Anaphylaxis       Past Surgical History:   Procedure Laterality Date   • BACK SURGERY  10/10/2019    L4-5 PLIF, laminectomy, foraminectomy -Dr. Chai Gerardo    • CARDIAC CATHETERIZATION N/A 5/30/2017    Procedure: Angioplasty-peripheral;  Surgeon: Mayur Artis MD;  Location: [facility] INVASIVE LOCATION;  Service:    • CARPAL TUNNEL RELEASE      X 4   • FOOT SURGERY Bilateral     X5   • INGUINAL HERNIA  REPAIR Right 12/17/2019    Procedure: INGUINAL HERNIA REPAIR RIGHT WITH MESH;  Surgeon: Ramakrishna Al MD;  Location:  [facility] OR;  Service: General   • INTERVENTIONAL RADIOLOGY PROCEDURE N/A 5/30/2017    Procedure: Abdominal Aortagram with Runoff;  Surgeon: Mayur Artis MD;  Location:  [facility] CATH INVASIVE LOCATION;  Service:    • INTERVENTIONAL RADIOLOGY PROCEDURE N/A 5/15/2020    Procedure: LLE angiogram with atherectomy/stent;  Surgeon: Leighton Jones MD;  Location:  [facility] CATH INVASIVE LOCATION;  Service: Interventional Radiology;  Laterality: N/A;   • KNEE SURGERY Left    • LUMBAR FUSION  02/22/2010    L5/S1 fusion Dr. Chai Galvan    • IA RT/LT HEART CATHETERS N/A 5/30/2017    Procedure: Percutaneous Coronary Intervention;  Surgeon: Mayur Artis MD;  Location:  [facility] CATH INVASIVE LOCATION;  Service: Cardiovascular   • IA VEIN IN SITU BYPASS GRAFT,FEM-POP Left 7/10/2017    Procedure: LEFT ILIAC STENT, FEMORAL ENDARECTOMY, LEFT FEMORAL POPLITEAL BYPASS, POSS ILIAC FEMORAL BYPASS;  Surgeon: Mayur Artis MD;  Location:  [facility] OR;  Service: Vascular   • THYROIDECTOMY     • TUBAL ABDOMINAL LIGATION         Family History   Problem Relation Age of Onset   • COPD Mother    • Alzheimer's disease Father    • Brain cancer Brother    • Valvular heart disease Maternal Uncle        Social History     Socioeconomic History   • Marital status:    • Number of children: 3   Tobacco Use   • Smoking status: Every Day     Packs/day: 1.00     Years: 44.00     Pack years: 44.00     Types: Cigarettes   • Smokeless tobacco: Never   • Tobacco comments:     STATES STARTED SMOKING AT AGE 16. Is using Chantix now to try to quit.   Vaping Use   • Vaping Use: Never used   Substance and Sexual Activity   • Alcohol use: No   • Drug use: No   • Sexual activity: Defer           Objective   Physical Exam  Vitals and nursing note reviewed.   Constitutional:       Appearance: Normal appearance. She is ill-appearing.  She is not toxic-appearing.   HENT:      Head: Normocephalic and atraumatic.      Right Ear: External ear normal.      Left Ear: External ear normal.      Nose: Nose normal.      Mouth/Throat:      Mouth: Mucous membranes are dry.      Pharynx: Oropharynx is clear.   Eyes:      General: No scleral icterus.        Right eye: No discharge.         Left eye: No discharge.      Extraocular Movements: Extraocular movements intact.      Conjunctiva/sclera: Conjunctivae normal.      Pupils: Pupils are equal, round, and reactive to light.   Cardiovascular:      Rate and Rhythm: Regular rhythm. Tachycardia present.      Pulses: Normal pulses.      Heart sounds: Normal heart sounds.   Pulmonary:      Effort: Pulmonary effort is normal. No respiratory distress.      Breath sounds: Normal breath sounds. No stridor. No wheezing.   Abdominal:      General: Abdomen is flat. Bowel sounds are normal. There is no distension.      Palpations: Abdomen is soft. There is no mass.      Tenderness: There is no abdominal tenderness. There is no guarding.   Musculoskeletal:         General: Deformity present. No swelling or tenderness.      Cervical back: Normal range of motion.      Comments: Positive left BKA   Skin:     General: Skin is warm and dry.      Capillary Refill: Capillary refill takes 2 to 3 seconds.      Coloration: Skin is pale.      Findings: No erythema or rash.   Neurological:      General: No focal deficit present.      Mental Status: She is alert and oriented to person, place, and time. Mental status is at baseline.   Psychiatric:         Mood and Affect: Mood normal.         Behavior: Behavior normal.         Thought Content: Thought content normal.         Critical Care  Performed by: Edward Dave MD  Authorized by: Edward Dave MD     Critical care provider statement:     Critical care time (minutes):  75    Critical care start time:  12/13/2022 9:45 AM    Critical care end time:  12/13/2022 11:00 AM     "Critical care was necessary to treat or prevent imminent or life-threatening deterioration of the following conditions:  Circulatory failure    Critical care was time spent personally by me on the following activities:  Discussions with consultants, development of treatment plan with patient or surrogate, evaluation of patient's response to treatment, examination of patient, review of old charts, re-evaluation of patient's condition, pulse oximetry, ordering and review of radiographic studies, ordering and performing treatments and interventions and ordering and review of laboratory studies    I assumed direction of critical care for this patient from another provider in my specialty: no      Care discussed with: admitting provider                 ED Course  ED Course as of 12/13/22 1405   Tue Dec 13, 2022   1015 I had a good conversation with the patient.  I am concerned about her pallor and the potential for anemia.  Patient tells me that she does take iron supplementation but that this was stopped recently secondary to \"a planned CT scan.\"  I am concerned about pulmonary embolus in the setting of prior DVT.  Despite the patient's subjective shortness of breath and initial blood pressures, she is pleasant, very talkative, speaking in complete sentences, and looks much better than expected.  She is on oxygen at 4 L via nasal cannula and I have talked about the concern for blood loss anemia, COPD exacerbation, pneumonia, pneumothorax, aortic dissection, pulmonary embolus and other etiologies.  I have ordered an ABG and basic labs.  I have ordered a type and screen.  I have given the patient IV fluid rehydration in the form of a 1 L bolus.  I do anticipate admission here to the hospital with final impression and plan following completion work-up [ANDRE]   1037 I reexamined the patient for the second time and for the third total evaluation at approximate 10:30 AM Eastern time.  She is resting comfortably.  She actually " tells me she feels much better.  I told her that we received a call from the lab that the patient's initial CBC appeared to show very low hemoglobin and that this was reflexively being rechecked but that I did anticipate she would be low and need transfusion.  We are currently awaiting this redraw.  Aggressive IV fluid hydration continues.  Type and screen pending plan for transfusion likely thereafter. [ANDRE]   1054 Awaiting type and screen but went ahead and ordered transfusion of 2 units.  Hemoglobin and hematocrit have come back at 2.5 and 9.8 respectively. [ANDRE]   1055 ABG shows a pH of 7.46 with PCO2 of 26.9 and PO2 of 123. [ANDRE]   1055 Fecal occult blood test pending.  Chest x-ray as already noted.  CTA of the chest pending.  EKG with ST depression inferiolaterally. [ANDRE]   1056 Will be discussed the case with intensivist, Dr. Mora, given severity of patient's labs and blood pressure. [ANDRE]   1105 Nursing staff currently completing FOBT.  Troponin has come back as less than 0.010.  BNP elevated at 2100.  Patient just back from CT scan and I reevaluated her.  She continues to be very , very talkative, and in no acute distress.  We will get a second liter of fluid going once the first is complete but I do anticipate the patient will begin responding nicely once transfusion commences.  Still awaiting the type and screen.  Plan to call the intensivist as previously documented, but would like results of the CT scan and occult blood testing first. [ANDRE]   1108 FOBT grossly positive as expected.  GI, Dr. Sawyer, paged. [ANDRE]   1116 I discussed case personally with Dr. Sawyer at 11:15 AM Eastern time.  I let GI know that we were resuscitating the patient and that I envisioned endoscopy/colonoscopy this admission.  I let her know the patient would be admitted to the ICU and that I was awaiting a callback from the intensivist.  Patient continues to mentate well and looks clinically much better than her objective vital  "signs and lab results would indicate.  Current heart rate is 110 with oxygen saturations of 100% and pressure 70/44.  As previously documented, transfusion ordered and pending. [ANDRE]   1123 I discussed the case with the intensivist, Dr. Mora, at 1123 and she will be admitting the patient.  Care has been transitioned to her as of this documented time. [ANDRE]   1123 She agreed with the plan to resuscitate the patient and to transfuse type-specific blood once the type and screen is back.  If the type and screen does not come back expeditiously, we may need to make a decision around nontype-specific blood. [ANDRE]   1124 She agreed with plan to hold off on additional CT imaging until the patient's blood pressure and vital signs are stabilized.  Critical care time has been documented to 75 minutes.  Patient will be brought in with an impression includes hypotension, symptomatic anemia, tachycardia, GI bleed/anesthesia. [ANDRE]   1128 We heard back from the lab that we should have a release type and screen to come back and the patient is O-.  Will be transfused the patient shortly.  Nursing staff has connected with the patient's family to fill them out on the patient's status.  Past medical documentation indicates that the patient has left AGAINST MEDICAL ADVICE several times.  Even today, patient tells me \"I need to get out of here.\"  I told the patient she is critically ill and that she needs to stay and she told me she would be willing to stay based on my request.  She continues to be short of breath and I told her that the transfusion should help considerably and we are expediting this and also explained the importance of the type and screen. [ANDRE]   1130 Patient has been transition to the ICU team/. [ANDRE]   1201 CTA of the chest has been completed.  I have reviewed images independently.  Official read has been cut/pasted below for completeness.    IMPRESSION:  No evidence of pulmonary embolism. No acute " intrathoracic findings.  Moderate centrilobular emphysema.     Incidental note of a prominent, borderline enlarged left axillary lymph  node measuring 1.0 cm in short axis. There are a few mildly prominent  bilateral axillary lymph nodes. The appearance is nonspecific. Correlate  with mammographic history.     This report was finalized on 12/13/2022 11:12 AM by Oleg Wooten MD. [ANDRE]      ED Course User Index  [ANDRE] Edward Dave MD     Recent Results (from the past 24 hour(s))   ECG 12 Lead ED Triage Standing Order; SOA    Collection Time: 12/13/22  9:21 AM   Result Value Ref Range    QT Interval 318 ms    QTC Interval 436 ms   Comprehensive Metabolic Panel    Collection Time: 12/13/22  9:22 AM    Specimen: Blood   Result Value Ref Range    Glucose 151 (H) 65 - 99 mg/dL    BUN 8 8 - 23 mg/dL    Creatinine 0.44 (L) 0.57 - 1.00 mg/dL    Sodium 133 (L) 136 - 145 mmol/L    Potassium 4.0 3.5 - 5.2 mmol/L    Chloride 102 98 - 107 mmol/L    CO2 18.0 (L) 22.0 - 29.0 mmol/L    Calcium 8.7 8.6 - 10.5 mg/dL    Total Protein 6.5 6.0 - 8.5 g/dL    Albumin 3.90 3.50 - 5.20 g/dL    ALT (SGPT) 9 1 - 33 U/L    AST (SGOT) 17 1 - 32 U/L    Alkaline Phosphatase 73 39 - 117 U/L    Total Bilirubin 0.2 0.0 - 1.2 mg/dL    Globulin 2.6 gm/dL    A/G Ratio 1.5 g/dL    BUN/Creatinine Ratio 18.2 7.0 - 25.0    Anion Gap 13.0 5.0 - 15.0 mmol/L    eGFR 110.2 >60.0 mL/min/1.73   BNP    Collection Time: 12/13/22  9:22 AM    Specimen: Blood   Result Value Ref Range    proBNP 2,163.0 (H) 0.0 - 900.0 pg/mL   Troponin    Collection Time: 12/13/22  9:22 AM    Specimen: Blood   Result Value Ref Range    Troponin T <0.010 0.000 - 0.030 ng/mL   Green Top (Gel)    Collection Time: 12/13/22  9:22 AM   Result Value Ref Range    Extra Tube Hold for add-ons.    Lavender Top    Collection Time: 12/13/22  9:22 AM   Result Value Ref Range    Extra Tube hold for add-on    Gold Top - SST    Collection Time: 12/13/22  9:22 AM   Result Value Ref Range    Extra  Tube Hold for add-ons.    Gray Top    Collection Time: 12/13/22  9:22 AM   Result Value Ref Range    Extra Tube Hold for add-ons.    Light Blue Top    Collection Time: 12/13/22  9:22 AM   Result Value Ref Range    Extra Tube Hold for add-ons.    Ferritin    Collection Time: 12/13/22  9:22 AM    Specimen: Blood   Result Value Ref Range    Ferritin 4.94 (L) 13.00 - 150.00 ng/mL   Iron Profile    Collection Time: 12/13/22  9:22 AM    Specimen: Blood   Result Value Ref Range    Iron 10 (L) 37 - 145 mcg/dL    Iron Saturation 2 (L) 20 - 50 %    Transferrin 405 (H) 200 - 360 mg/dL    TIBC 603 (H) 298 - 536 mcg/dL   Blood Gas, Arterial With Co-Ox    Collection Time: 12/13/22 10:14 AM    Specimen: Arterial Blood   Result Value Ref Range    Site Right Brachial     Arturo's Test N/A     pH, Arterial 7.463 (H) 7.350 - 7.450 pH units    pCO2, Arterial 26.9 (L) 35.0 - 45.0 mm Hg    pO2, Arterial 123.0 (H) 83.0 - 108.0 mm Hg    HCO3, Arterial 19.3 (L) 20.0 - 26.0 mmol/L    Base Excess, Arterial -4.6 (L) 0.0 - 2.0 mmol/L    Hemoglobin, Blood Gas      Hematocrit, Blood Gas 8.1 (L) 38.0 - 51.0 %    Oxyhemoglobin 96.4 94 - 99 %    Methemoglobin -0.10 (L) 0.00 - 1.50 %    Carboxyhemoglobin 3.9 (H) 0 - 2 %    CO2 Content 20.1 (L) 22 - 33 mmol/L    Temperature 37.0 C    Barometric Pressure for Blood Gas      Modality Nasal Cannula     FIO2 28 %    Rate 0 Breaths/minute    PIP 0 cmH2O    IPAP 0     EPAP 0     Note      Notified Who PATTI Dave MD     Notified By [id number]     Notified Time 12/13/2022 10:15     pH, Temp Corrected 7.463 pH Units    pCO2, Temperature Corrected 26.9 (L) 35 - 45 mm Hg    pO2, Temperature Corrected 123 (H) 83 - 108 mm Hg   CBC Auto Differential    Collection Time: 12/13/22 10:28 AM    Specimen: Blood   Result Value Ref Range    WBC 5.98 3.40 - 10.80 10*3/mm3    RBC 1.62 (L) 3.77 - 5.28 10*6/mm3    Hemoglobin 2.5 (C) 12.0 - 15.9 g/dL    Hematocrit 9.8 (C) 34.0 - 46.6 %    MCV 60.5 (L) 79.0 - 97.0 fL    MCH 15.4 (L)  26.6 - 33.0 pg    MCHC 25.5 (L) 31.5 - 35.7 g/dL    RDW 22.8 (H) 12.3 - 15.4 %    RDW-SD 47.1 37.0 - 54.0 fl    MPV 10.0 6.0 - 12.0 fL    Platelets 318 140 - 450 10*3/mm3    Neutrophil % 70.2 42.7 - 76.0 %    Lymphocyte % 23.9 19.6 - 45.3 %    Monocyte % 5.2 5.0 - 12.0 %    Eosinophil % 0.2 (L) 0.3 - 6.2 %    Basophil % 0.0 0.0 - 1.5 %    Immature Grans % 0.5 0.0 - 0.5 %    Neutrophils, Absolute 4.20 1.70 - 7.00 10*3/mm3    Lymphocytes, Absolute 1.43 0.70 - 3.10 10*3/mm3    Monocytes, Absolute 0.31 0.10 - 0.90 10*3/mm3    Eosinophils, Absolute 0.01 0.00 - 0.40 10*3/mm3    Basophils, Absolute 0.00 0.00 - 0.20 10*3/mm3    Immature Grans, Absolute 0.03 0.00 - 0.05 10*3/mm3    nRBC 0.8 (H) 0.0 - 0.2 /100 WBC   Scan Slide    Collection Time: 12/13/22 10:28 AM    Specimen: Blood   Result Value Ref Range    Acanthocytes Slight/1+ None Seen    Anisocytosis Mod/2+ None Seen    Dacrocytes Slight/1+ None Seen    Hypochromia Large/3+ None Seen    Microcytes Mod/2+ None Seen    RBC Fragments Slight/1+ None Seen    Target Cells Slight/1+ None Seen    WBC Morphology Normal Normal    Large Platelets Mod/2+ None Seen   Reticulocytes    Collection Time: 12/13/22 10:28 AM    Specimen: Blood   Result Value Ref Range    Reticulocyte % 1.58 0.70 - 1.90 %    Reticulocyte Absolute 0.0254 0.0200 - 0.1300 10*6/mm3   Type & Screen    Collection Time: 12/13/22 10:39 AM    Specimen: Blood   Result Value Ref Range    ABO Type O     RH type Negative     Antibody Screen Negative     T&S Expiration Date 12/16/2022 11:59:59 PM    Prepare RBC, 1 Units    Collection Time: 12/13/22 12:30 PM   Result Value Ref Range    Product Code C0878G59     Unit Number [id number]     UNIT  ABO O     UNIT  RH NEG     Crossmatch Interpretation Compatible     Dispense Status IS     Blood Expiration Date 202212292359     Blood Type Barcode 9500    Prepare RBC, 2 Units    Collection Time: 12/13/22  1:47 PM   Result Value Ref Range    Product Code J0694V49     Unit  Number [id number]     UNIT  ABO O     UNIT  RH NEG     Crossmatch Interpretation Compatible     Dispense Status IS     Blood Expiration Date 202212292359     Blood Type Barcode 9500     Product Code N1023X60     Unit Number [id number]     UNIT  ABO O     UNIT  RH NEG     Crossmatch Interpretation Compatible     Dispense Status IS     Blood Expiration Date 202212292359     Blood Type Barcode 9500      Note: In addition to lab results from this visit, the labs listed above may include labs taken at another facility or during a different encounter within the last 24 hours. Please correlate lab times with ED admission and discharge times for further clarification of the services performed during this visit.    CT Angiogram Chest   Final Result   No evidence of pulmonary embolism. No acute intrathoracic findings.   Moderate centrilobular emphysema.       Incidental note of a prominent, borderline enlarged left axillary lymph   node measuring 1.0 cm in short axis. There are a few mildly prominent   bilateral axillary lymph nodes. The appearance is nonspecific. Correlate   with mammographic history.       This report was finalized on 12/13/2022 11:12 AM by Oleg Wooten MD.          XR Chest 1 View   Final Result   No acute cardiopulmonary findings. Redemonstration of emphysema.       This report was finalized on 12/13/2022 9:41 AM by Oleg Wooten MD.          CT Abdomen Pelvis Without Contrast    (Results Pending)     Vitals:    12/13/22 1340 12/13/22 1345 12/13/22 1355 12/13/22 1400   BP: 137/86 120/79  119/87   BP Location:    Right arm   Patient Position:    Lying   Pulse: 107 103 101 103   Resp:  26 22 20   Temp:  93.9 °F (34.4 °C) 96.4 °F (35.8 °C) 96.4 °F (35.8 °C)   TempSrc:  Rectal Rectal Rectal   SpO2: 100% 100% (!) 87% 90%   Weight:       Height:         Medications   sodium chloride 0.9 % flush 10 mL (has no administration in time range)   sodium chloride 0.9 % flush 10 mL (10 mL Intravenous  Given 12/13/22 1317)   sodium chloride 0.9 % flush 10 mL (has no administration in time range)   sodium chloride 0.9 % infusion 40 mL (has no administration in time range)   pantoprazole (PROTONIX) injection 40 mg (has no administration in time range)   norepinephrine (LEVOPHED) 8 mg in 250 mL NS infusion (premix) ( Intravenous Canceled Entry 12/13/22 1317)   dexmedetomidine (PRECEDEX) 400 MCG/100ML infusion  - ADS Override Pull (  Canceled Entry 12/13/22 1317)   dexmedetomidine (PRECEDEX) 400 mcg in 100 mL NS infusion (1.5 mcg/kg/hr × 46.7 kg Intravenous Rate/Dose Change 12/13/22 1343)   atorvastatin (LIPITOR) tablet 80 mg (has no administration in time range)   buPROPion XL (WELLBUTRIN XL) 24 hr tablet 150 mg (has no administration in time range)   gabapentin (NEURONTIN) capsule 800 mg (has no administration in time range)   nicotine (NICODERM CQ) 14 MG/24HR patch 1 patch (has no administration in time range)   oxyCODONE (ROXICODONE) immediate release tablet 5 mg (has no administration in time range)   Thyroid (ARMOUR) tablet 90 mg (has no administration in time range)   LORazepam (ATIVAN) injection 0.5 mg (has no administration in time range)   etomidate (AMIDATE) 2 MG/ML injection  - ADS Override Pull (has no administration in time range)   midazolam (VERSED) 2 MG/2ML injection  - ADS Override Pull (has no administration in time range)   HYDROmorphone (DILAUDID) injection 0.5 mg (has no administration in time range)   iopamidol (ISOVUE-370) 76 % injection 100 mL (70 mL Intravenous Given 12/13/22 1053)   pantoprazole (PROTONIX) injection 80 mg (80 mg Intravenous Given 12/13/22 1208)   LORazepam (ATIVAN) injection 1 mg (0.5 mg Intravenous Given 12/13/22 1148)   ipratropium-albuterol (DUO-NEB) nebulizer solution 3 mL (3 mL Nebulization Given 12/13/22 1206)   methylPREDNISolone sodium succinate (SOLU-Medrol) injection 125 mg (125 mg Intravenous Given 12/13/22 1208)   HYDROmorphone (DILAUDID) injection 0.5 mg (0.5 mg  Intravenous Given 12/13/22 1341)     ECG/EMG Results (last 24 hours)     Procedure Component Value Units Date/Time    ECG 12 Lead ED Triage Standing Order; SOA [[id number]] Collected: 12/13/22 0921     Updated: 12/13/22 1057     QT Interval 318 ms      QTC Interval 436 ms     Narrative:      Test Reason : ED Triage Standing Order~  Blood Pressure :   */*   mmHG  Vent. Rate : 113 BPM     Atrial Rate : 113 BPM     P-R Int : 160 ms          QRS Dur :  86 ms      QT Int : 318 ms       P-R-T Axes :  80  77  68 degrees     QTc Int : 436 ms    Sinus tachycardia  Left ventricular hypertrophy with repolarization abnormality  Abnormal ECG  When compared with ECG of 16-DEC-2019 20:58,  Vent. rate has increased BY  46 BPM  ST now depressed in Inferior leads  ST now depressed in Lateral leads  T wave inversion less evident in Inferior leads  T wave inversion no longer evident in Anterior leads  Confirmed by JOSE MANUEL AGUIRRE MD ([id number]) on 12/13/2022 10:57:00 AM    Referred By: EDMD           Confirmed By: JOSE MANUEL AGUIRRE MD        ECG 12 Lead ED Triage Standing Order; SOA   Final Result   Test Reason : ED Triage Standing Order~   Blood Pressure :   */*   mmHG   Vent. Rate : 113 BPM     Atrial Rate : 113 BPM      P-R Int : 160 ms          QRS Dur :  86 ms       QT Int : 318 ms       P-R-T Axes :  80  77  68 degrees      QTc Int : 436 ms      Sinus tachycardia   Left ventricular hypertrophy with repolarization abnormality   Abnormal ECG   When compared with ECG of 16-DEC-2019 20:58,   Vent. rate has increased BY  46 BPM   ST now depressed in Inferior leads   ST now depressed in Lateral leads   T wave inversion less evident in Inferior leads   T wave inversion no longer evident in Anterior leads   Confirmed by JOSE MANUEL AGUIRRE MD ([id number]) on 12/13/2022 10:57:00 AM      Referred By: SHANTHI           Confirmed By: JSOE MANUEL AGUIRRE MD                                                  [facility]    Final diagnoses:   Hypotension, unspecified hypotension  type   Shortness of breath   Symptomatic anemia   Gastrointestinal hemorrhage, unspecified gastrointestinal hemorrhage type   Peripheral arterial disease (HCC)       ED Disposition  ED Disposition     ED Disposition   Decision to Admit    Condition   --    Comment   Level of Care: Critical Care [6]   Diagnosis: Severe anemia [[id number]]   Admitting Physician: VASILE HARRISON [[id number]]   Attending Physician: VASILE HARRISON [[id number]]   Isolate for COVID?: No [0]   Certification: I Certify That Inpatient Hospital Services Are Medically Necessary For Greater Than 2 Midnights               No follow-up provider specified.       Medication List      No changes were made to your prescriptions during this visit.          Edward Dave MD  12/13/22 [id number]

## 2022-12-13 NOTE — CONSULTS
[facility] Gastroenterology Consult    Referring Provider: Mary Mora MD   PCP: Yon Bond MD    Reason for Consultation: Severe microcytic anemia     Chief complaint: Shortness of breath     History of present illness:    Bobbi Du is a 62 y.o. female who is admitted with symptomatic severe microcytic anemia.   She presented to the ER with a chief complaint of dyspnea, fatigue and weakness.  She is found to have critical anemia with H&H of 2.5 and 9.8, MCV is 60.   She has history of iron deficiency.   She has peripheral arterial disease and is status post left BKA.  She is on aspirin and Plavix.   She has unfortunate history of medical noncompliance with leaving AMA or no showing appointments.       Patient is currently very anxious and in respiratory distress on a nonrebreather in the ICU.  She is unable to provide history.  History is obtained by chart review.  Had a grossly positive fecal occult blood test.     Allergies:  Bee venom    Scheduled Meds:  dexmedetomidine, , ,   pantoprazole, 40 mg, Intravenous, Q12H  sodium chloride, 10 mL, Intravenous, Q12H       Infusions:  dexmedetomidine, 0.2-1.5 mcg/kg/hr, Last Rate: 0.2 mcg/kg/hr (12/13/22 1316)  norepinephrine, 0.02-0.3 mcg/kg/min      PRN Meds:  •  sodium chloride  •  sodium chloride  •  sodium chloride    Home Meds:  Medications Prior to Admission   Medication Sig Dispense Refill Last Dose   • albuterol sulfate  (90 Base) MCG/ACT inhaler Inhale 2 puffs Every 4 (Four) Hours As Needed for Wheezing or Shortness of Air. 18 g 3    • [place] Thyroid 15 MG tablet       • aspirin (aspirin) 81 MG EC tablet Take 1 tablet by mouth Every Morning. 90 tablet 3    • atorvastatin (Lipitor) 80 MG tablet Take 1 tablet by mouth Every Night. 90 tablet 3    • Bevespi Aerosphere 9-4.8 MCG/ACT aerosol Inhale 2 puffs 2 (Two) Times a Day.      • buPROPion XL (WELLBUTRIN XL) 150 MG 24 hr tablet Take 1 tablet by mouth Daily. 45 tablet 1    • Calcium  Carb-Cholecalciferol (Calcium 1000 + D) 1000-800 MG-UNIT tablet calcium      • clopidogrel (PLAVIX) 75 MG tablet Take 75 mg by mouth Every Morning.      • Cobalamin Combinations (B-12) 100-5000 MCG sublingual tablet B12      • ferrous gluconate (FERGON) 324 MG tablet Take 1 tablet by mouth Daily With Breakfast. 90 tablet 1    • gabapentin (NEURONTIN) 800 MG tablet Take 1 tablet by mouth 3 (Three) Times a Day. 90 tablet 0    • guaiFENesin (MUCINEX) 600 MG 12 hr tablet Take 2 tablets by mouth 2 (Two) Times a Day. 30 tablet 0    • Iron-Vitamin C 100-250 MG tablet iron   2 tab bid      • lisinopril-hydrochlorothiazide (PRINZIDE,ZESTORETIC) 10-12.5 MG per tablet Take 1 tablet by mouth Every Morning. 90 tablet 3    • metFORMIN (GLUCOPHAGE) 500 MG tablet Take 500 mg by mouth Daily With Breakfast.      • nicotine (NICODERM CQ) 14 MG/24HR patch Place 1 patch on the skin as directed by provider Daily. 28 each 2    • oxyCODONE (Roxicodone) 5 MG immediate release tablet Take 1 tablet by mouth Every 8 (Eight) Hours As Needed for Severe Pain. 10 tablet 0    • sodium chloride (Ocean Nasal Spray) 0.65 % nasal spray 1 spray into the nostril(s) as directed by provider As Needed for Congestion. 30 mL 12    • thyroid (ARMOUR) 15 MG tablet Take 1 tablet by mouth Daily. 60 tablet 1    • Thyroid 90 MG PO tablet Take 1 tablet by mouth Every Morning. 60 tablet 1    • umeclidinium-vilanterol (ANORO ELLIPTA) 62.5-25 MCG/INH aerosol powder  inhaler Inhale 1 puff Daily.      • varenicline (Chantix Continuing Month Cole) 1 MG tablet Take 1 tablet by mouth 2 (Two) Times a Day for 56 days. 56 tablet 1      ROS: Review of Systems   Unable to perform ROS: Severe respiratory distress       PAST MED HX:  Past Medical History:   Diagnosis Date   • Acute respiratory alkalosis 10/16/2019   • Bilateral knee pain 7/20/2020   • Charcot foot due to diabetes mellitus (HCC)     RIGHT   • Chronic pain    • Claudication of lower extremity with history of  revascularization ([facility]) 5/13/2020    Added automatically from request for surgery [id number]   • Confusion 10/15/2019   • COPD (chronic obstructive pulmonary disease) ([facility])    • COPD mixed type ([facility])    • Diabetes mellitus ([facility])    • Disease of thyroid gland    • Encephalopathy 10/15/2019   • Fibromyalgia    • Gangrene ([facility]) 7/10/2017   • Hyperlipidemia    • Hypertension    • Incarcerated right inguinal hernia 12/17/2019   • Irreducible right femoral hernia 12/17/2019   • PAD (peripheral artery disease) ([facility])    • Restless leg    • Rheumatoid arthritis ([facility])    • S/P left iliofemoral and left femoropopliteal bypass surgery 7/10/17 7/10/2017   • Sinusitis 10/16/2019   • Tobacco abuse        PAST SURG HX:  Past Surgical History:   Procedure Laterality Date   • BACK SURGERY  10/10/2019    L4-5 PLIF, laminectomy, foraminectomy -Dr. Chai Gerardo    • CARDIAC CATHETERIZATION N/A 5/30/2017    Procedure: Angioplasty-peripheral;  Surgeon: Mayur Artis MD;  Location:  [facility]ASIVE LOC[facility];  Service:    • CARPAL TUNNEL RELEASE      X 4   • FOOT SURGERY Bilateral     X5   • INGUINAL HERNIA REPAIR Right 12/17/2019    Procedure: INGUINAL HERNIA REPAIR RIGHT WITH MESH;  Surgeon: Ramakrishna Al MD;  Location:  [facility] OR;  Service: General   • INTERVENTIONAL RADIOLOGY PROCEDURE N/A 5/30/2017    Procedure: Abdominal Aortagram with Runoff;  Surgeon: Mayur Artis MD;  Location: [facility] INVASIVE LOCATION;  Service:    • INTERVENTIONAL RADIOLOGY PROCEDURE N/A 5/15/2020    Procedure: LLE angiogram with atherectomy/stent;  Surgeon: Leighton Jones MD;  Location: [facility] C[facility] INVASIVE LOCATION;  Service: Interventional Radiology;  Laterality: N/A;   • KNEE SURGERY Left    • LUMBAR FUSION  02/22/2010    L5/S1 fusion Dr. Chai Galvan    • TX RT/LT HEART CATHETERS N/A 5/30/2017    Procedure: Percutaneous Coronary Intervention;  Surgeon: Mayur Artis MD;  Location: [facility] C[facility] INVASIVE LOCATION;  Service:  "Cardiovascular   • RI VEIN IN SITU BYPASS GRAFT,FEM-POP Left 7/10/2017    Procedure: LEFT ILIAC STENT, FEMORAL ENDARECTOMY, LEFT FEMORAL POPLITEAL BYPASS, POSS ILIAC FEMORAL BYPASS;  Surgeon: Mayur Artis MD;  Location: [facility];  Service: Vascular   • THYROIDECTOMY     • TUBAL ABDOMINAL LIGATION         FAM HX:  Family History   Problem Relation Age of Onset   • COPD Mother    • Alzheimer's disease Father    • Brain cancer Brother    • Valvular heart disease Maternal Uncle        SOC HX:  Social History     Socioeconomic History   • Marital status:    • Number of children: 3   Tobacco Use   • Smoking status: Every Day     Packs/day: 1.00     Years: 44.00     Pack years: 44.00     Types: Cigarettes   • Smokeless tobacco: Never   • Tobacco comments:     STATES STARTED SMOKING AT AGE 16. Is using Chantix now to try to quit.   Vaping Use   • Vaping Use: Never used   Substance and Sexual Activity   • Alcohol use: No   • Drug use: No   • Sexual activity: Defer       PHYSICAL EXAM  /68   Pulse 106   Temp 96 °F (35.6 °C)   Resp 22   Ht 165.1 cm (65\")   Wt 46.7 kg (103 lb)   SpO2 97%   BMI 17.14 kg/m²   Wt Readings from Last 3 Encounters:   12/13/22 46.7 kg (103 lb)   11/15/22 47.2 kg (104 lb)   11/15/22 47.2 kg (104 lb 0.9 oz)   ,body mass index is 17.14 kg/m².  Physical Exam  Constitutional:       Appearance: She is underweight. She is ill-appearing.   HENT:      Head: Normocephalic and atraumatic.   Eyes:      General: No scleral icterus.  Cardiovascular:      Rate and Rhythm: Regular rhythm. Tachycardia present.   Pulmonary:      Effort: Respiratory distress present.      Comments: Tachypnea   Abdominal:      General: There is no distension.      Comments: Abdominal exam limited secondary to respiratory distress    Musculoskeletal:      Comments: Left BKA    Skin:     Coloration: Skin is pale.   Neurological:      Mental Status: She is alert.   Psychiatric:         Mood and Affect: Mood is " anxious.         Cognition and Memory: Cognition is impaired.       Results Review:   I reviewed the patient's new clinical results.    Lab Results   Component Value Date    WBC 5.98 12/13/2022    HGB 2.5 (C) 12/13/2022    HGB 6.5 (C) 09/11/2022    HGB 6.6 (C) 09/08/2022    HCT 9.8 (C) 12/13/2022    MCV 60.5 (L) 12/13/2022     12/13/2022     Lab Results   Component Value Date    INR 0.91 09/11/2022    INR 0.96 10/15/2019     Lab Results   Component Value Date    GLUCOSE 151 (H) 12/13/2022    BUN 8 12/13/2022    CREATININE 0.44 (L) 12/13/2022    EGFRIFNONA 103 05/15/2020    BCR 18.2 12/13/2022     (L) 12/13/2022    K 4.0 12/13/2022    CO2 18.0 (L) 12/13/2022    CALCIUM 8.7 12/13/2022    ALBUMIN 3.90 12/13/2022    ALKPHOS 73 12/13/2022    BILITOT 0.2 12/13/2022    ALT 9 12/13/2022    AST 17 12/13/2022     ASSESSMENTS/PLANS    1. Critical symptomatic microcytic anemia   2. Positive fecal occult blood test   3. Peripheral arterial disease, on aspirin and Plavix   4. Hypoxia     >> Agree with transfusion of 3 units of PRBCs  >> IV Protonix 40 mg BID   >> Recommend EGD and Colonoscopy when hemodynamically stable     I discussed the patient's findings and my recommendations with patient    CINTIA Rowell  12/13/22  13:26 EST

## 2022-12-13 NOTE — H&P
ICU ADMISSION NOTE    Chief complaint     Severe anemia  Shortness of air  COPD  Peripheral artery disease  Diabetes mellitus type 2  hypothyroid      Subjective     Patient is a 62 y.o. 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  Her baseline hemoglobin runs around 7.  Unfortunately she has a history of leaving AMA and no showing for diagnostic testing.  She has not had a GI evaluation.  She presented to the emergency room very hypotensive and short of breath.  Her hemoglobin was 2.5 compared to 6.5 on September 11.   She became extremely agitated in the emergency room and was given some Ativan.  She does not require supplemental oxygen at home.  She is on multiple inhalers for COPD.  She had a recent Medicare wellness evaluation and underwent a stress echocardiogram on October 21 that revealed an EF of 51 to 55% and no EKG changes.  After receiving a unit of blood her blood pressure has improved to 124 systolic.  Her serum iron is only 10.  ABG revealed a pH of 7.46, PCO2 of 26 and PO2 of 123.  Blood sugar was 151 and creatinine 0.44.  She did have a serum bicarb of 18.  Troponin was less than 0.01.  CTA was performed and revealed no PE, thyroid surgically absent mildly enlarged left axillary lymph node secretions in the left mainstem bronchus moderate centrilobular emphysema without lung mass subacute or chronic deformity of the left eighth rib fracture.  She received Protonix, Solu-Medrol, Ativan, DuoNeb, saline in the emergency room.  She also received 2 units of packed red cells in the emergency room.    Review of Systems  Review of Systems   Constitutional: Positive for activity change and appetite  change.   HENT: Negative for congestion.    Eyes: Negative for visual disturbance.   Respiratory: Positive for shortness of breath.    Cardiovascular: Negative for chest pain, palpitations and leg swelling.   Gastrointestinal: Negative for abdominal pain.   Endocrine: Positive for cold intolerance.   Genitourinary: Negative for hematuria.   Musculoskeletal: Positive for arthralgias, back pain and gait problem.   Skin: Negative for rash.   Allergic/Immunologic: Positive for environmental allergies.   Neurological: Positive for dizziness.   Hematological: Bruises/bleeds easily.   Psychiatric/Behavioral: Positive for agitation and confusion.        Home Medications  (Not in a hospital admission)  Albuterol HFA 2 puffs every 4 hours as needed  [place] Thyroid 15 mg daily  Aspirin 81 mg daily  Lipitor 80 mg daily  Bevespri 9- 4.82 puffs twice daily  Wellbutrin  mg daily  Calcium with D daily  Plavix 75 mg daily  Fergon 324 mg daily  Gabapentin 800 mg 3 times daily  Mucinex 600 mg twice daily  Iron with vitamin C 2 tablets twice daily   lisinopril-hydrochlorothiazide 10-12.5 mg daily  Oxycodone 5 every 8 hours as needed  Metformin 500 mg daily  Chantix      History  Past Medical History:   Diagnosis Date   • Acute respiratory alkalosis 10/16/2019   • Bilateral knee pain 7/20/2020   • Charcot foot due to diabetes mellitus ([facility])     RIGHT   • Chronic pain    • Claudication of lower extremity with history of revascularization ([facility]) 5/13/2020    Added automatically from request for surgery [id number]   • Confusion 10/15/2019   • COPD (chronic obstructive pulmonary disease) ([facility])    • COPD mixed type ([facility])    • Diabetes mellitus ([facility])    • Disease of thyroid gland    • Encephalopathy 10/15/2019   • Fibromyalgia    • Gangrene ([facility]) 7/10/2017   • Hyperlipidemia    • Hypertension    • Incarcerated right inguinal hernia 12/17/2019   • Irreducible right femoral hernia 12/17/2019   • PAD (peripheral artery disease) ([facility])    •  Restless leg    • Rheumatoid arthritis (H[facility])    • S/P left iliofemoral and left femoropopliteal bypass surgery 7/10/17 7/10/2017   • Sinusitis 10/16/2019   • Tobacco abuse      Past Surgical History:   Procedure Laterality Date   • BACK SURGERY  10/10/2019    L4-5 PLIF, laminectomy, foraminectomy -Dr. Chai Gerardo    • CARDIAC CATHETERIZATION N/A 5/30/2017    Procedure: Angioplasty-peripheral;  Surgeon: Mayur Artis MD;  Location: [facility] CATH INVASIVE LOCATION;  Service:    • CARPAL TUNNEL RELEASE      X 4   • FOOT SURGERY Bilateral     X5   • INGUINAL HERNIA REPAIR Right 12/17/2019    Procedure: INGUINAL HERNIA REPAIR RIGHT WITH MESH;  Surgeon: Ramkarishna Al MD;  Location: [facility] OR;  Service: General   • INTERVENTIONAL RADIOLOGY PROCEDURE N/A 5/30/2017    Procedure: Abdominal Aortagram with Runoff;  Surgeon: Mayur Artis MD;  Location: [facility] CATH INVASIVE LOCATION;  Service:    • INTERVENTIONAL RADIOLOGY PROCEDURE N/A 5/15/2020    Procedure: LLE angiogram with atherectomy/stent;  Surgeon: Leighton Jones MD;  Location: [facility] CATH INVASIVE LOCATION;  Service: Interventional Radiology;  Laterality: N/A;   • KNEE SURGERY Left    • LUMBAR FUSION  02/22/2010    L5/S1 fusion Dr. Chai Galvan    • NE RT/LT HEART CATHETERS N/A 5/30/2017    Procedure: Percutaneous Coronary Intervention;  Surgeon: Mayur Artis MD;  Location: [facility] CATH INVASIVE LOCATION;  Service: Cardiovascular   • NE VEIN IN SITU BYPASS GRAFT,FEM-POP Left 7/10/2017    Procedure: LEFT ILIAC STENT, FEMORAL ENDARECTOMY, LEFT FEMORAL POPLITEAL BYPASS, POSS ILIAC FEMORAL BYPASS;  Surgeon: Mayur Artis MD;  Location: [facility] OR;  Service: Vascular   • THYROIDECTOMY     • TUBAL ABDOMINAL LIGATION       Family History   Problem Relation Age of Onset   • COPD Mother    • Alzheimer's disease Father    • Brain cancer Brother    • Valvular heart disease Maternal Uncle      Social History     Tobacco Use   • Smoking status: Every Day      "Packs/day: 1.00     Years: 44.00     Pack years: 44.00     Types: Cigarettes   • Smokeless tobacco: Never   • Tobacco comments:     STATES STARTED SMOKING AT AGE 16. Is using Chantix now to try to quit.   Vaping Use   • Vaping Use: Never used   Substance Use Topics   • Alcohol use: No   • Drug use: No     (Not in a hospital admission)    Allergies:  Bee venom      Objective     Vital Signs  Blood pressure 124/68, pulse 106, temperature 96 °F (35.6 °C), resp. rate 22, height 165.1 cm (65\"), weight 46.7 kg (103 lb), SpO2 97 %, not currently breastfeeding.    Physical Exam:  General Appearance:   Cachectic old appearing 61-year-old woman curled up on the stretcher lethargic post Ativan   Head:   Temporal wasting   Eyes:          Conjunctiva pale, no jaundice   Ears:     Throat:  Oral mucosa dry   Neck:  Trachea midline, thyroidectomy scar   Back:      Lungs:    Symmetric chest expansion, prolonged exhalation but no wheeze or rhonchi    Heart:   Regular rhythm, increased rate, S1, S2 auscultated   Abdomen:    Flat, bowel sounds present, nontender   Rectal:     Deferred   Extremities:  Left BKA, no pretibial edema   Pulses:  No palpable pulses in the right foot   Skin:  Cool and dry   Lymph nodes:  No cervical adenopathy   Neurologic:  Drowsy post Ativan but will arouse and intermittently answer questions.  Moving all extremities spontaneously but only intermittently following commands.       Results Review:   Lab Results (last 24 hours)     Procedure Component Value Units Date/Time    Reticulocytes [[id number]]  (Normal) Collected: 12/13/22 1028    Specimen: Blood Updated: 12/13/22 1201     Reticulocyte % 1.58 %      Reticulocyte Absolute 0.0254 10*6/mm3     Ferritin [[id number]]  (Abnormal) Collected: 12/13/22 0922    Specimen: Blood Updated: 12/13/22 1200     Ferritin 4.94 ng/mL     Narrative:      Results may be falsely decreased if patient taking Biotin.      Iron Profile [[id number]]  (Abnormal) Collected: 12/13/22 " 0922    Specimen: Blood Updated: 12/13/22 1159     Iron 10 mcg/dL      Iron Saturation 2 %      Transferrin 405 mg/dL      TIBC 603 mcg/dL     Vitamin B12 [[id number]] Collected: 12/13/22 0922    Specimen: Blood Updated: 12/13/22 1138    Folate [[id number]] Collected: 12/13/22 0922    Specimen: Blood Updated: 12/13/22 1138    CBC & Differential [[id number]]  (Abnormal) Collected: 12/13/22 1028    Specimen: Blood Updated: 12/13/22 1124    Narrative:      The following orders were created for panel order CBC & Differential.  Procedure                               Abnormality         Status                     ---------                               -----------         ------                     CBC Auto Differential[[id number]]        Abnormal            Final result               Scan Slide[[id number]]                                       Final result                 Please view results for these tests on the individual orders.    CBC Auto Differential [[id number]]  (Abnormal) Collected: 12/13/22 1028    Specimen: Blood Updated: 12/13/22 1124     WBC 5.98 10*3/mm3      RBC 1.62 10*6/mm3      Hemoglobin 2.5 g/dL      Hematocrit 9.8 %      MCV 60.5 fL      MCH 15.4 pg      MCHC 25.5 g/dL      RDW 22.8 %      RDW-SD 47.1 fl      MPV 10.0 fL      Platelets 318 10*3/mm3      Neutrophil % 70.2 %      Lymphocyte % 23.9 %      Monocyte % 5.2 %      Eosinophil % 0.2 %      Basophil % 0.0 %      Immature Grans % 0.5 %      Neutrophils, Absolute 4.20 10*3/mm3      Lymphocytes, Absolute 1.43 10*3/mm3      Monocytes, Absolute 0.31 10*3/mm3      Eosinophils, Absolute 0.01 10*3/mm3      Basophils, Absolute 0.00 10*3/mm3      Immature Grans, Absolute 0.03 10*3/mm3      nRBC 0.8 /100 WBC     Narrative:      Appended report. These results have been appended to a previously verified report.    Scan Slide [[id number]] Collected: 12/13/22 1028    Specimen: Blood Updated: 12/13/22 1124     Acanthocytes Slight/1+     Anisocytosis Mod/2+      Dacrocytes Slight/1+     Hypochromia Large/3+     Microcytes Mod/2+     RBC Fragments Slight/1+     Target Cells Slight/1+     WBC Morphology Normal     Large Platelets Mod/2+    [place] Draw [[id number]] Collected: 12/13/22 0922    Specimen: Blood Updated: 12/13/22 1030    Narrative:      The following orders were created for panel order [place] Draw.  Procedure                               Abnormality         Status                     ---------                               -----------         ------                     Green Top (Gel)[[id number]]                                  Final result               Lavender Top[[id number]]                                     Final result               Gold Top - SST[[id number]]                                   Final result               Maloney Top[[id number]]                                         In process                 Light Blue Top[[id number]]                                   Final result                 Please view results for these tests on the individual orders.    Green Top (Gel) [[id number]] Collected: 12/13/22 0922    Specimen: Blood Updated: 12/13/22 1030     Extra Tube Hold for add-ons.     Comment: Auto resulted.       Lavender Top [[id number]] Collected: 12/13/22 0922    Specimen: Blood Updated: 12/13/22 1030     Extra Tube hold for add-on     Comment: Auto resulted       Gold Top - SST [[id number]] Collected: 12/13/22 0922    Specimen: Blood Updated: 12/13/22 1030     Extra Tube Hold for add-ons.     Comment: Auto resulted.       Light Blue Top [[id number]] Collected: 12/13/22 0922    Specimen: Blood Updated: 12/13/22 1030     Extra Tube Hold for add-ons.     Comment: Auto resulted       Blood Gas, Arterial With Co-Ox [[id number]]  (Abnormal) Collected: 12/13/22 1014    Specimen: Arterial Blood Updated: 12/13/22 1015     Site Right Brachial     Arturo's Test N/A     pH, Arterial 7.463 pH units      Comment: 83 Value above reference range        pCO2, Arterial 26.9  mm Hg      Comment: 84 Value below reference range        pO2, Arterial 123.0 mm Hg      Comment: 83 Value above reference range        HCO3, Arterial 19.3 mmol/L      Base Excess, Arterial -4.6 mmol/L      Hemoglobin, Blood Gas --     Comment: 94 Value below reportable range < 5.0        Hematocrit, Blood Gas 8.1 %      Oxyhemoglobin 96.4 %      Methemoglobin -0.10 %      Comment: 84 Value below reference range        Carboxyhemoglobin 3.9 %      Comment: 83 Value above reference range        CO2 Content 20.1 mmol/L      Temperature 37.0 C      Barometric Pressure for Blood Gas --     Comment: N/A        Modality Nasal Cannula     FIO2 28 %      Rate 0 Breaths/minute      PIP 0 cmH2O      Comment: Meter: [id number]     :  [id number]        IPAP 0     EPAP 0     Note --     Notified Who PATTI Dave MD     Notified By [id number]     Notified Time 12/13/2022 10:15     pH, Temp Corrected 7.463 pH Units      pCO2, Temperature Corrected 26.9 mm Hg      pO2, Temperature Corrected 123 mm Hg     Comprehensive Metabolic Panel [[id number]]  (Abnormal) Collected: 12/13/22 0922    Specimen: Blood Updated: 12/13/22 1005     Glucose 151 mg/dL      BUN 8 mg/dL      Creatinine 0.44 mg/dL      Sodium 133 mmol/L      Potassium 4.0 mmol/L      Chloride 102 mmol/L      CO2 18.0 mmol/L      Calcium 8.7 mg/dL      Total Protein 6.5 g/dL      Albumin 3.90 g/dL      ALT (SGPT) 9 U/L      AST (SGOT) 17 U/L      Alkaline Phosphatase 73 U/L      Total Bilirubin 0.2 mg/dL      Globulin 2.6 gm/dL      Comment: Calculated Result        A/G Ratio 1.5 g/dL      BUN/Creatinine Ratio 18.2     Anion Gap 13.0 mmol/L      eGFR 110.2 mL/min/1.73      Comment: National Kidney Foundation and American Society of Nephrology (ASN) Task Force recommended calculation based on the Chronic Kidney Disease Epidemiology Collaboration (CKD-EPI) equation refit without adjustment for race.       Narrative:      GFR Normal >60  Chronic Kidney Disease <60  Kidney  Failure <15      Troponin [[id number]]  (Normal) Collected: 12/13/22 0922    Specimen: Blood Updated: 12/13/22 1005     Troponin T <0.010 ng/mL     Narrative:      Troponin T Reference Range:  <= 0.03 ng/mL-   Negative for AMI  >0.03 ng/mL-     Abnormal for myocardial necrosis.  Clinicians would have to utilize clinical acumen, EKG, Troponin and serial changes to determine if it is an Acute Myocardial Infarction or myocardial injury due to an underlying chronic condition.       Results may be falsely decreased if patient taking Biotin.      BNP [[id number]]  (Abnormal) Collected: 12/13/22 0922    Specimen: Blood Updated: 12/13/22 1004     proBNP 2,163.0 pg/mL     Narrative:      Among patients with dyspnea, NT-proBNP is highly sensitive for the detection of acute congestive heart failure. In addition NT-proBNP of <300 pg/ml effectively rules out acute congestive heart failure with 99% negative predictive value.    Results may be falsely decreased if patient taking Biotin.      [place] Top [[id number]] Collected: 12/13/22 0922    Specimen: Blood Updated: 12/13/22 0930        Imaging Results (Last 24 Hours)     Procedure Component Value Units Date/Time    CT Angiogram Chest [[id number]] Collected: 12/13/22 1059     Updated: 12/13/22 1115    Narrative:      DATE OF EXAM: 12/13/2022 10:52 AM     PROCEDURE: CT ANGIOGRAM CHEST-     INDICATIONS: soa, dvt, hypotension; I95.9-Hypotension, unspecified;  R06.02-Shortness of breath; D64.9-Anemia, unspecified     COMPARISON: CT chest 10/4/2022     TECHNIQUE: Contiguous axial imaging was obtained from the thoracic inlet  through the upper abdomen following the intravenous administration of 70  mL of Isovue 370. Reconstructed coronal and sagittal images were also  obtained. Automated exposure control and iterative reconstruction  methods were used. Rotational 3-D MIP reformat of the pulmonary arterial  vasculature was created at independent workstation.     The radiation dose reduction  device was turned on for each scan per the  ALARA (As Low as Reasonably Achievable) protocol.     FINDINGS:     No evidence of pulmonary embolism. Mild atherosclerosis of the thoracic  aorta without evidence of aneurysm. Unremarkable appearance of the  cardiac chambers. No pericardial effusion. There are scattered coronary  artery calcifications. No bulky or pathologic mediastinal or hilar  adenopathy. Unremarkable appearance of the thoracic esophagus. The  thyroid is surgically absent. The chest wall soft tissues are  unremarkable. There is a mildly enlarged left axillary lymph node  measuring 1.0 cm in short axis, nonspecific; there are mildly prominent  bilateral axillary lymph nodes. The chest wall soft tissues are normal.  There are trace dependent secretions within the left mainstem bronchus.  The trachea and mainstem bronchi are grossly patent. Smaller peripheral  airways are unremarkable. There is moderate centrilobular emphysema,  worst in the upper lobes. A few right apical blebs are noted. The lungs  are clear without focal consolidation or evidence of active infectious  or inflammatory process. No lung mass. There is some mild dependent  atelectasis in the right lung base. Stable size and appearance of a 3 mm  subpleural nodule in the medial right upper lobe. No pleural effusion or  pneumothorax. No acute or suspicious bony findings. There is a subacute  or chronic appearing fracture deformity of the lateral left eighth rib.  Unremarkable appearance of the partially imaged upper abdomen.             Impression:      No evidence of pulmonary embolism. No acute intrathoracic findings.  Moderate centrilobular emphysema.     Incidental note of a prominent, borderline enlarged left axillary lymph  node measuring 1.0 cm in short axis. There are a few mildly prominent  bilateral axillary lymph nodes. The appearance is nonspecific. Correlate  with mammographic history.     This report was finalized on 12/13/2022  11:12 AM by Oleg Wooten MD.       XR Chest 1 View [[id number]] Collected: 12/13/22 0938     Updated: 12/13/22 0944    Narrative:      DATE OF EXAM: 12/13/2022 9:20 AM     PROCEDURE: XR CHEST 1 VW-     INDICATIONS: SOA triage protocol.      COMPARISON: Chest x-ray 12/16/2019     TECHNIQUE: Single frontal view of the chest.     FINDINGS:  Normal cardiomediastinal silhouette. Similar diffuse interstitial  prominence with background emphysema. Similar mild biapical  pleuroparenchymal scarring/thickening. No focal consolidation. No  pleural effusion or pneumothorax.       Impression:      No acute cardiopulmonary findings. Redemonstration of emphysema.     This report was finalized on 12/13/2022 9:41 AM by Oleg Wooten MD.              PROBLEM LIST    Severe anemia, iron deficient  Rule out GI loss  COPD/emphysema  Peripheral artery disease  Diabetes mellitus type 2  Hypothyroidism      Assessment & Plan     Unfortunate 62-year-old woman presenting with profound hypotension and severe anemia, hemoglobin of 2.5.  She received 2 units of packed red cells in the emergency room with improvement in her blood pressure. Just a month ago she was in the ER with anemia, hemoglobin 6.5 and guaiac positive stool, but left AGAINST MEDICAL ADVICE.  She denies kelvin melena.  Clinically I suspect a combination of slow GI loss and poor dietary intake.    She has a 45-pack-year history of tobacco abuse with moderate emphysema on imaging.  Blood gas actually revealed a respiratory alkalosis with adequate oxygenation on nasal cannula.  She does not have active wheezing on examination.    She has a history of diabetes with a known history of neuropathy, peripheral artery disease and chronic pain.  She takes extremely high-dose gabapentin, and is followed at a pain center.  Blood glucose in the emergency room was 151.  She was taking metformin as an outpatient.    She has a known history of peripheral vascular disease failing  revascularization on the left and requiring a BKA.  She has rest pain in the right foot.  She had a duplex of the right lower extremity 2022 which revealed absent flow in the right mid and distal superficial femoral artery, no flow in the distal PTA, 50 to 75% stenosis of the external iliac artery, 70% stenosis of the proximal right common femoral artery with a right LEE ANN of 0.43.  At least she had a stress echocardiogram 2022 which revealed an EF of 51 to 55% and no acute EKG changes.    She has a history of thyroidectomy with hypothyroidism on replacement therapy.  In September her TSH was 8.    Unfortunately she has a history of medical noncompliance and leaving AGAINST MEDICAL ADVICE.  Clinically I feel she would have  at home if she had not come into the emergency room when she did.    -Transfuse 3 units of blood and check an H&H  -GI evaluation  -Nebulized bronchodilators  -Accu-Chek with sliding scale  -Obviously hold antihypertensives  -Continue thyroid replacement  -Protonix  -CT scan of the abdomen when her blood pressure improves  -Attempt to restart some of her home medications like gabapentin although I may lower the dose from 900 to 600, Wellbutrin  -Initiate norepinephrine if unable to support blood pressure with volume and blood products  -Precedex for agitation as long as blood pressure will allow      Mary Mora MD  22  12:48 EST    Time: Critical care 50 min    Please note that portions of this note were completed with a voice recognition program.

## 2022-12-14 ENCOUNTER — APPOINTMENT (OUTPATIENT)
Dept: CT IMAGING | Facility: HOSPITAL | Age: 62
End: 2022-12-14

## 2022-12-14 LAB
ANION GAP SERPL CALCULATED.3IONS-SCNC: 11 MMOL/L (ref 5–15)
BH BB BLOOD EXPIRATION DATE: NORMAL
BH BB BLOOD TYPE BARCODE: 9500
BH BB DISPENSE STATUS: NORMAL
BH BB PRODUCT CODE: NORMAL
BH BB UNIT NUMBER: NORMAL
BUN SERPL-MCNC: 19 MG/DL (ref 8–23)
BUN/CREAT SERPL: 36.5 (ref 7–25)
CALCIUM SPEC-SCNC: 7.7 MG/DL (ref 8.6–10.5)
CHLORIDE SERPL-SCNC: 110 MMOL/L (ref 98–107)
CO2 SERPL-SCNC: 14 MMOL/L (ref 22–29)
CREAT SERPL-MCNC: 0.52 MG/DL (ref 0.57–1)
CROSSMATCH INTERPRETATION: NORMAL
EGFRCR SERPLBLD CKD-EPI 2021: 105.9 ML/MIN/1.73
GLUCOSE SERPL-MCNC: 186 MG/DL (ref 65–99)
HCT VFR BLD AUTO: 25.5 % (ref 34–46.6)
HCT VFR BLD AUTO: 27.4 % (ref 34–46.6)
HCT VFR BLD AUTO: 27.6 % (ref 34–46.6)
HCT VFR BLD AUTO: 28 % (ref 34–46.6)
HGB BLD-MCNC: 8 G/DL (ref 12–15.9)
HGB BLD-MCNC: 8.3 G/DL (ref 12–15.9)
HGB BLD-MCNC: 8.5 G/DL (ref 12–15.9)
HGB BLD-MCNC: 8.7 G/DL (ref 12–15.9)
MAGNESIUM SERPL-MCNC: 2.1 MG/DL (ref 1.6–2.4)
PHOSPHATE SERPL-MCNC: 3.8 MG/DL (ref 2.5–4.5)
POTASSIUM SERPL-SCNC: 4.6 MMOL/L (ref 3.5–5.2)
SODIUM SERPL-SCNC: 135 MMOL/L (ref 136–145)
UNIT  ABO: NORMAL
UNIT  RH: NORMAL

## 2022-12-14 PROCEDURE — 85014 HEMATOCRIT: CPT | Performed by: NURSE PRACTITIONER

## 2022-12-14 PROCEDURE — 74176 CT ABD & PELVIS W/O CONTRAST: CPT

## 2022-12-14 PROCEDURE — 85018 HEMOGLOBIN: CPT | Performed by: NURSE PRACTITIONER

## 2022-12-14 PROCEDURE — 25010000002 LORAZEPAM PER 2 MG: Performed by: INTERNAL MEDICINE

## 2022-12-14 PROCEDURE — 83735 ASSAY OF MAGNESIUM: CPT | Performed by: NURSE PRACTITIONER

## 2022-12-14 PROCEDURE — 84100 ASSAY OF PHOSPHORUS: CPT | Performed by: NURSE PRACTITIONER

## 2022-12-14 PROCEDURE — 80048 BASIC METABOLIC PNL TOTAL CA: CPT | Performed by: NURSE PRACTITIONER

## 2022-12-14 PROCEDURE — 99233 SBSQ HOSP IP/OBS HIGH 50: CPT | Performed by: INTERNAL MEDICINE

## 2022-12-14 PROCEDURE — 85018 HEMOGLOBIN: CPT | Performed by: INTERNAL MEDICINE

## 2022-12-14 PROCEDURE — 85014 HEMATOCRIT: CPT | Performed by: INTERNAL MEDICINE

## 2022-12-14 PROCEDURE — 99232 SBSQ HOSP IP/OBS MODERATE 35: CPT | Performed by: PHYSICIAN ASSISTANT

## 2022-12-14 RX ORDER — ALBUTEROL SULFATE 90 UG/1
2 AEROSOL, METERED RESPIRATORY (INHALATION) EVERY 4 HOURS PRN
Status: DISCONTINUED | OUTPATIENT
Start: 2022-12-14 | End: 2022-12-18 | Stop reason: HOSPADM

## 2022-12-14 RX ADMIN — GABAPENTIN 800 MG: 400 CAPSULE ORAL at 05:36

## 2022-12-14 RX ADMIN — LORAZEPAM 0.5 MG: 2 INJECTION INTRAMUSCULAR; INTRAVENOUS at 10:12

## 2022-12-14 RX ADMIN — Medication 10 ML: at 20:00

## 2022-12-14 RX ADMIN — GABAPENTIN 800 MG: 400 CAPSULE ORAL at 13:01

## 2022-12-14 RX ADMIN — SODIUM CHLORIDE 100 ML/HR: 9 INJECTION, SOLUTION INTRAVENOUS at 14:43

## 2022-12-14 RX ADMIN — PANTOPRAZOLE SODIUM 40 MG: 40 INJECTION, POWDER, FOR SOLUTION INTRAVENOUS at 09:26

## 2022-12-14 RX ADMIN — NICOTINE 1 PATCH: 14 PATCH, EXTENDED RELEASE TRANSDERMAL at 09:35

## 2022-12-14 RX ADMIN — ATORVASTATIN CALCIUM 80 MG: 40 TABLET, FILM COATED ORAL at 20:00

## 2022-12-14 RX ADMIN — THYROID, PORCINE 90 MG: 30 TABLET ORAL at 09:34

## 2022-12-14 RX ADMIN — GLYCOPYRROLATE AND FORMOTEROL FUMARATE 2 SPRAY: 9; 4.8 AEROSOL, METERED RESPIRATORY (INHALATION) at 21:17

## 2022-12-14 RX ADMIN — Medication 10 ML: at 09:27

## 2022-12-14 RX ADMIN — DEXMEDETOMIDINE HYDROCHLORIDE 1.5 MCG/KG/HR: 4 INJECTION, SOLUTION INTRAVENOUS at 00:15

## 2022-12-14 RX ADMIN — GABAPENTIN 800 MG: 400 CAPSULE ORAL at 21:17

## 2022-12-14 RX ADMIN — BUPROPION HYDROCHLORIDE 150 MG: 150 TABLET, FILM COATED, EXTENDED RELEASE ORAL at 09:26

## 2022-12-14 RX ADMIN — PANTOPRAZOLE SODIUM 40 MG: 40 INJECTION, POWDER, FOR SOLUTION INTRAVENOUS at 20:00

## 2022-12-14 RX ADMIN — DEXMEDETOMIDINE HYDROCHLORIDE 1.5 MCG/KG/HR: 4 INJECTION, SOLUTION INTRAVENOUS at 13:02

## 2022-12-14 RX ADMIN — DEXMEDETOMIDINE HYDROCHLORIDE 1.5 MCG/KG/HR: 4 INJECTION, SOLUTION INTRAVENOUS at 19:55

## 2022-12-14 RX ADMIN — LORAZEPAM 0.5 MG: 2 INJECTION INTRAMUSCULAR; INTRAVENOUS at 00:51

## 2022-12-14 RX ADMIN — DEXMEDETOMIDINE HYDROCHLORIDE 1.5 MCG/KG/HR: 4 INJECTION, SOLUTION INTRAVENOUS at 06:55

## 2022-12-14 RX ADMIN — IPRATROPIUM BROMIDE AND ALBUTEROL SULFATE 3 ML: .5; 3 SOLUTION RESPIRATORY (INHALATION) at 09:58

## 2022-12-14 NOTE — PROGRESS NOTES
"GI Daily Progress Note  Subjective:    Chief Complaint:  Follow up severe microcytic anemia     Patient is calmer today.  She is on 3 L O2 via nasal cannula.   She denies any melena, hematochezia nor hematemesis.  She denies any abdominal pain, nausea, vomiting nor weight loss.       Objective:    /64   Pulse 68   Temp 98.8 °F (37.1 °C) (Bladder)   Resp 18   Ht 165.1 cm (65\")   Wt 43.5 kg (95 lb 14.4 oz)   SpO2 92%   BMI 15.96 kg/m²     Physical Exam  Constitutional:       General: She is not in acute distress.  Cardiovascular:      Rate and Rhythm: Normal rate and regular rhythm.   Pulmonary:      Effort: Pulmonary effort is normal. No respiratory distress.   Abdominal:      General: Bowel sounds are normal. There is no distension.      Palpations: Abdomen is soft.      Tenderness: There is no abdominal tenderness.   Musculoskeletal:      Comments: Left BKA   Skin:     Coloration: Skin is pale.   Neurological:      Mental Status: She is alert and oriented to person, place, and time.         Lab  Lab Results   Component Value Date    WBC 5.98 12/13/2022    HGB 8.5 (L) 12/14/2022    HGB 8.7 (L) 12/14/2022    HGB 8.8 (L) 12/13/2022    MCV 60.5 (L) 12/13/2022     12/13/2022    INR 0.91 09/11/2022    INR 0.96 10/15/2019       Lab Results   Component Value Date    GLUCOSE 186 (H) 12/14/2022    BUN 19 12/14/2022    CREATININE 0.52 (L) 12/14/2022    EGFRIFNONA 103 05/15/2020    BCR 36.5 (H) 12/14/2022     (L) 12/14/2022    K 4.6 12/14/2022    CO2 14.0 (L) 12/14/2022    CALCIUM 7.7 (L) 12/14/2022    ALBUMIN 3.90 12/13/2022    ALKPHOS 73 12/13/2022    BILITOT 0.2 12/13/2022    ALT 9 12/13/2022    AST 17 12/13/2022       Assessment:    1. Critical symptomatic microcytic anemia, iron deficiency    2. Positive fecal occult blood test   3. Peripheral arterial disease, on aspirin and Plavix   4. Hypoxia     Plan:    >> Anticipate EGD and Colonoscopy on Friday, 12/16/22 if remains stable and Levophed is " weaned.     >> Low residue diet today.   Clear liquid diet tomorrow  >> Split dose Moviprep to begin tomorrow afternoon     CINTIA Rowell  12/14/22  13:23 EST

## 2022-12-14 NOTE — PROGRESS NOTES
INTENSIVIST   PROGRESS NOTE     Hospital:  LOS: 1 day     Ms. Bobbi Du, 62 y.o. female is followed for a Chief Complaint of: Anemia      Subjective   S     Interval History:  Calmer this AM on Precedex at max dose. Required low-dose Levophed with sedation.        The patient's relevant past medical, surgical and social history were reviewed and updated in Epic as appropriate.      ROS:   Constitutional: Negative for fever.   Respiratory: Positive for dyspnea.   Cardiovascular: Negative for chest pain.   Gastrointestinal: Negative for  nausea, vomiting and diarrhea.     Objective   O     Vitals:  Temp  Min: 96.4 °F (35.8 °C)  Max: 100.6 °F (38.1 °C)  BP  Min: 68/54  Max: 118/74  Pulse  Min: 66  Max: 102  Resp  Min: 18  Max: 24  SpO2  Min: 92 %  Max: 100 % Flow (L/min)  Min: 3  Max: 15    Intake/Ouptut 24 hrs (7:00AM - 6:59 AM)  Intake & Output (last 3 days)       12/11 0701 12/12 0700 12/12 0701 12/13 0700 12/13 0701  12/14 0700 12/14 0701  12/15 0700    I.V. (mL/kg)   2245.2 (51.6) 1015 (23.3)    Blood   1075     IV Piggyback   2000     Total Intake(mL/kg)   5320.2 (122.3) 1015 (23.3)    Urine (mL/kg/hr)   1170 275 (0.9)    Total Output   1170 275    Net   +4150.2 +740                  Medications (drips):  dexmedetomidine, Last Rate: 1.5 mcg/kg/hr (12/14/22 1302)  norepinephrine, Last Rate: 0.02 mcg/kg/min (12/14/22 1311)  sodium chloride, Last Rate: 100 mL/hr (12/13/22 1746)          Physical Examination  Telemetry:  Normal sinus rhythm.   Constitutional:  No acute distress.  Resting in bed on nasal cannula.    Eyes: No scleral icterus.   PERRL, EOM intact.    Neck:  Supple, FROM   Cardiovascular: Normal rate, regular and rhythm. Normal heart sounds.  No murmurs, gallop or rub.   Respiratory: No respiratory distress. Normal respiratory effort.  Diminished bilaterally. No wheezing.     Abdominal:  Soft. No masses. Nontender. No distension. No HSM.   Extremities: No digital cyanosis. No clubbing.  Left  BKA.    Skin: No rashes, lesions or ulcers   Neurological:   Awakens to stimulation and will answer questions.               Results from last 7 days   Lab Units 12/14/22  1229 12/14/22  0419 12/14/22  0056 12/13/22  1819 12/13/22  1028   WBC 10*3/mm3  --   --   --   --  5.98   HEMOGLOBIN g/dL 8.3* 8.5* 8.7*   < > 2.5*   MCV fL  --   --   --   --  60.5*   PLATELETS 10*3/mm3  --   --   --   --  318    < > = values in this interval not displayed.     Results from last 7 days   Lab Units 12/14/22  0419 12/13/22  0922   SODIUM mmol/L 135* 133*   POTASSIUM mmol/L 4.6 4.0   CO2 mmol/L 14.0* 18.0*   CREATININE mg/dL 0.52* 0.44*   GLUCOSE mg/dL 186* 151*   MAGNESIUM mg/dL 2.1  --    PHOSPHORUS mg/dL 3.8  --      Estimated Creatinine Clearance: 78 mL/min (A) (by C-G formula based on SCr of 0.52 mg/dL (L)).  Results from last 7 days   Lab Units 12/13/22  0922   ALK PHOS U/L 73   BILIRUBIN mg/dL 0.2   ALT (SGPT) U/L 9   AST (SGOT) U/L 17       Results from last 7 days   Lab Units 12/13/22  1014   PH, ARTERIAL pH units 7.463*   PCO2, ARTERIAL mm Hg 26.9*   PO2 ART mm Hg 123.0*   FIO2 % 28       Images:  Imaging Results (Last 24 Hours)     Procedure Component Value Units Date/Time    CT Abdomen Pelvis Without Contrast [[id number]] Collected: 12/14/22 0335     Updated: 12/14/22 0541    Narrative:      CT OF THE ABDOMEN AND PELVIS WITHOUT CONTRAST    Clinical indication: Anemia.    Comparison: 12/16/2019.    Procedure: Noncontrast CT images of the abdomen and pelvis were obtained.  CT dose lowering techniques were used, to include: automated exposure control, adjustment for patient size, and or use of iterative reconstruction.    Findings: Motion degrades image quality.    Lung Bases: Layering small to moderate pleural effusions are present. Passive atelectasis is seen in both lung bases. There is a hypodense appearance of the intravascular blood pool. Heart size is normal without pericardial effusion.    Liver and biliary system:  Periportal edema is noted. Vicarious excretion of contrast in the gallbladder.    Pancreas: The pancreas is unremarkable.     Spleen: The spleen is normal.    Adrenals: The adrenal glands are within normal limits.     Kidneys: The kidneys are symmetric in size and without hydronephrosis.    Gastrointestinal: No obstruction.    Mesentery and retroperitoneum: No mesenteric or retroperitoneal adenopathy. No free air. Small to moderate volume peritoneal ascites in the lower abdomen. Atherosclerosis.    Pelvis: Rueda catheter is present in the urinary bladder. Rectum is normal. No free fluid. No deep pelvic or inguinal adenopathy.     Reproductive: No acute abnormality.    Body wall: Normal.    Bones: No acute osseous abnormality.      Impression:      Impression:   1. Motion degraded images.  2. Small to moderate peritoneal ascites and small to moderate bilateral pleural effusions.  3. Periportal edema in the liver is nonspecific in the setting of volume overload.  4. Hypodense appearance of the intravascular blood pool could reflect anemia.    Electronically signed by:  Ramakrishna Soto M.D.    12/14/2022 3:40 AM Mountain Time            Results: Reviewed.  I reviewed the patient's new laboratory and imaging results.  I independently reviewed the patient's new images.    Medications: Reviewed.    Assessment & Plan   A / P     Ms. 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 She has been followed for iron deficiency anemia but has left AMA on multiple occasions and failed to show up for diagnostic testing.     In the ED, she was noted to have a hemoglobin of 2.5 (normally around 7). She was transfused 2 units of pRBCs and admitted to the ICU.     After arrival to the ICU, she was  very agitated and complaining of shortness of breath. She improved with Dilaudid and the initiation of a Precedex drip. She was given an additional unit of pRBCs.    Hemoglobin has increased to 8.5 this AM. She remains on Precedex and low-dose Levophed.     Nutrition:   Diet: Gastrointestinal Diets; Fiber-Restricted; Texture: Regular Texture (IDDSI 7); Fluid Consistency: Thin (IDDSI 0)  Advance Directives:   Code Status and Medical Interventions:   Ordered at: 12/13/22 1927     Code Status (Patient has no pulse and is not breathing):    CPR (Attempt to Resuscitate)     Medical Interventions (Patient has pulse or is breathing):    Full Support       Active Hospital Problems    Diagnosis    • **Severe anemia    • Anemia    • Hypotension, unspecified hypotension type    • Tobacco use disorder    • Hypothyroidism    • RA (rheumatoid arthritis) (HCC)    • COPD with acute exacerbation (HCC)    • Diabetes mellitus (HCC)        Assessment / Plan:    1. Continue ICU care.  2. Continue to trend H/H with transfusion for hemoglobin < 7.   3. Continue PPI.   4. GI following and planning on endoscopy on Friday 12/16.   5. Titrate Levophed.   6. Wean Precedex as able.   7. Allow her to use her home inhalers.   8. Continue thyroid replacement  9. AM labs    High risk secondary to anemia and agitation requiring sedation.     High level of risk due to:  severe exacerbation of chronic illness and illness with threat to life or bodily function.    Plan of care and goals reviewed during interdisciplinary rounds.  I discussed the patient's findings and my recommendations with patient, family and nursing staff      Prema Gil DO    Intensive Care Medicine and Pulmonary Medicine

## 2022-12-14 NOTE — PLAN OF CARE
Goal Outcome Evaluation:     Patient arrived from ED in tri pod position, wheezing, on nonrebreather, very short of breath, and increased respiratory rate in 30's. Very anxious and agitated; uncooperative. PRN neb treatment, precedex gtt initiated, prn dilaudid and ativan given. 2nd unit of blood was finishing and 3rd unit of blood started and completed. H/H recheck stable. 1,000 ml NS bolus given. NS initiated at 100 ml/hr. Rueda placed r/t acute retention.  mls. Levophed started for hypotension. Daughters Jennifer and Shannon updated at bedside. Patient resting much more comfortably this evening and no complaints of shortness of breath. Hypothermia upon arrival; cocoon warmer initiated.

## 2022-12-14 NOTE — NURSING NOTE
Pt received alert and oriented x4. Pt calm and cooperative most of the night. Pt woke up agitated a couple of times- PRN ativan given. See MAR. PT on nonrebreather at beginning of shift- transitioned to 3LNC. Tolerating well. H/H stable- 8.5 and 28.  Levo titrated down to 0.06mcg/kg/min. Precedex at 1.5mcg/kg/hr- tolerating well. Tmax 100.8. Last temp: 100. CT abdomen/pelvis complete- pending results.

## 2022-12-14 NOTE — CASE MANAGEMENT/SOCIAL WORK
Discharge Planning Assessment  [facility]     Patient Name: Bobbi Du  MRN: [id number]  Today's Date: 12/14/2022    Admit Date: 12/13/2022    Plan: Ongoing   Discharge Needs Assessment     Row Name 12/14/22 1534       Living Environment    People in Home alone    Current Living Arrangements home    Primary Care Provided by self    Provides Primary Care For no one, unable/limited ability to care for self    Family Caregiver if Needed child(jayden), adult    Quality of Family Relationships supportive       Resource/Environmental Concerns    Resource/Environmental Concerns none    Transportation Concerns none       Transition Planning    Patient/Family Anticipates Transition to home    Patient/Family Anticipated Services at Transition none    Transportation Anticipated family or friend will provide       Discharge Needs Assessment    Readmission Within the Last 30 Days no previous admission in last 30 days    Equipment Currently Used at Home walker, rolling;wheelchair;bath bench;commode;cane, straight;prosthesis    Concerns to be Addressed discharge planning    Anticipated Changes Related to Illness none               Discharge Plan     Row Name 12/14/22 1530       Plan    Plan Ongoing    Patient/Family in Agreement with Plan yes    Plan Comments Spoke with patient at bedside.  Patient resides in [facility] and lives alone.  Prior to admission patient states she was independent with ADL's and mobility.  Patient has at home a walker, bath bench, bedside commode, wheelchair, cane and prosthetic.  Patient is not current with a home health agency.  Patient confirms her PCP is Yon Bond and that she has [employer] Medicare.  Patient plans to return home upon discharge and denies any needs.  Discharge plan is ongoing at this time is pending patients medical progress.  CM will continue to follow.              Continued Care and Services - Admitted Since 12/13/2022    Coordination has not been started for this  encounter.       Expected Discharge Date and Time     Expected Discharge Date Expected Discharge Time    Dec 21, 2022          Demographic Summary     Row Name 12/14/22 1533       General Information    Admission Type inpatient    Arrived From home    Referral Source admission list    Reason for Consult discharge planning    Preferred Language English       Contact Information    Permission Granted to Share Info With                Functional Status    No documentation.                Psychosocial    No documentation.                Abuse/Neglect    No documentation.                Legal    No documentation.                Substance Abuse    No documentation.                Patient Forms    No documentation.                   Niecy Wiggins RN

## 2022-12-15 LAB
ALBUMIN SERPL-MCNC: 2.9 G/DL (ref 3.5–5.2)
ALBUMIN/GLOB SERPL: 1.3 G/DL
ALP SERPL-CCNC: 66 U/L (ref 39–117)
ALT SERPL W P-5'-P-CCNC: 24 U/L (ref 1–33)
ANION GAP SERPL CALCULATED.3IONS-SCNC: 10 MMOL/L (ref 5–15)
APTT PPP: 29.6 SECONDS (ref 22–39)
AST SERPL-CCNC: 30 U/L (ref 1–32)
BILIRUB SERPL-MCNC: 1 MG/DL (ref 0–1.2)
BUN SERPL-MCNC: 13 MG/DL (ref 8–23)
BUN/CREAT SERPL: 25.5 (ref 7–25)
CALCIUM SPEC-SCNC: 7.6 MG/DL (ref 8.6–10.5)
CHLORIDE SERPL-SCNC: 113 MMOL/L (ref 98–107)
CO2 SERPL-SCNC: 15 MMOL/L (ref 22–29)
CREAT SERPL-MCNC: 0.51 MG/DL (ref 0.57–1)
DEPRECATED RDW RBC AUTO: 77.2 FL (ref 37–54)
EGFRCR SERPLBLD CKD-EPI 2021: 106.4 ML/MIN/1.73
ERYTHROCYTE [DISTWIDTH] IN BLOOD BY AUTOMATED COUNT: 28.5 % (ref 12.3–15.4)
FOLATE BLD-MCNC: 354 NG/ML
FOLATE RBC-MCNC: 1351 NG/ML
GLOBULIN UR ELPH-MCNC: 2.3 GM/DL
GLUCOSE SERPL-MCNC: 152 MG/DL (ref 65–99)
HCT VFR BLD AUTO: 23.4 % (ref 34–46.6)
HCT VFR BLD AUTO: 26.2 % (ref 34–46.6)
HCT VFR BLD AUTO: 26.9 % (ref 34–46.6)
HCT VFR BLD AUTO: 27.1 % (ref 34–46.6)
HCT VFR BLD AUTO: 28.3 % (ref 34–46.6)
HGB BLD-MCNC: 7 G/DL (ref 12–15.9)
HGB BLD-MCNC: 8.1 G/DL (ref 12–15.9)
HGB BLD-MCNC: 8.2 G/DL (ref 12–15.9)
HGB BLD-MCNC: 8.8 G/DL (ref 12–15.9)
INR PPP: 1.15 (ref 0.84–1.13)
MAGNESIUM SERPL-MCNC: 2.1 MG/DL (ref 1.6–2.4)
MCH RBC QN AUTO: 24.4 PG (ref 26.6–33)
MCHC RBC AUTO-ENTMCNC: 31.1 G/DL (ref 31.5–35.7)
MCV RBC AUTO: 78.4 FL (ref 79–97)
PHOSPHATE SERPL-MCNC: 2.4 MG/DL (ref 2.5–4.5)
PLATELET # BLD AUTO: 150 10*3/MM3 (ref 140–450)
POTASSIUM SERPL-SCNC: 3.8 MMOL/L (ref 3.5–5.2)
PROT SERPL-MCNC: 5.2 G/DL (ref 6–8.5)
PROTHROMBIN TIME: 14.6 SECONDS (ref 11.4–14.4)
RBC # BLD AUTO: 3.61 10*6/MM3 (ref 3.77–5.28)
SODIUM SERPL-SCNC: 138 MMOL/L (ref 136–145)
WBC NRBC COR # BLD: 10.99 10*3/MM3 (ref 3.4–10.8)

## 2022-12-15 PROCEDURE — 36430 TRANSFUSION BLD/BLD COMPNT: CPT

## 2022-12-15 PROCEDURE — 85027 COMPLETE CBC AUTOMATED: CPT | Performed by: INTERNAL MEDICINE

## 2022-12-15 PROCEDURE — 85018 HEMOGLOBIN: CPT | Performed by: INTERNAL MEDICINE

## 2022-12-15 PROCEDURE — 85610 PROTHROMBIN TIME: CPT | Performed by: NURSE PRACTITIONER

## 2022-12-15 PROCEDURE — 85014 HEMATOCRIT: CPT | Performed by: INTERNAL MEDICINE

## 2022-12-15 PROCEDURE — 84100 ASSAY OF PHOSPHORUS: CPT | Performed by: INTERNAL MEDICINE

## 2022-12-15 PROCEDURE — 99232 SBSQ HOSP IP/OBS MODERATE 35: CPT | Performed by: INTERNAL MEDICINE

## 2022-12-15 PROCEDURE — 25010000002 NA FERRIC GLUC CPLX PER 12.5 MG: Performed by: NURSE PRACTITIONER

## 2022-12-15 PROCEDURE — 99233 SBSQ HOSP IP/OBS HIGH 50: CPT | Performed by: INTERNAL MEDICINE

## 2022-12-15 PROCEDURE — 85730 THROMBOPLASTIN TIME PARTIAL: CPT | Performed by: NURSE PRACTITIONER

## 2022-12-15 PROCEDURE — 83735 ASSAY OF MAGNESIUM: CPT | Performed by: INTERNAL MEDICINE

## 2022-12-15 PROCEDURE — 80053 COMPREHEN METABOLIC PANEL: CPT | Performed by: INTERNAL MEDICINE

## 2022-12-15 PROCEDURE — 86900 BLOOD TYPING SEROLOGIC ABO: CPT

## 2022-12-15 PROCEDURE — 25010000002 LORAZEPAM PER 2 MG: Performed by: INTERNAL MEDICINE

## 2022-12-15 PROCEDURE — P9016 RBC LEUKOCYTES REDUCED: HCPCS

## 2022-12-15 RX ORDER — PEG-3350, SODIUM SULFATE, SODIUM CHLORIDE, POTASSIUM CHLORIDE, SODIUM ASCORBATE AND ASCORBIC ACID 7.5-2.691G
1000 KIT ORAL
Status: COMPLETED | OUTPATIENT
Start: 2022-12-15 | End: 2022-12-15

## 2022-12-15 RX ORDER — DIPHENOXYLATE HYDROCHLORIDE AND ATROPINE SULFATE 2.5; .025 MG/1; MG/1
1 TABLET ORAL DAILY
Status: DISCONTINUED | OUTPATIENT
Start: 2022-12-15 | End: 2022-12-18 | Stop reason: HOSPADM

## 2022-12-15 RX ORDER — PEG-3350, SODIUM SULFATE, SODIUM CHLORIDE, POTASSIUM CHLORIDE, SODIUM ASCORBATE AND ASCORBIC ACID 7.5-2.691G
1000 KIT ORAL
Status: COMPLETED | OUTPATIENT
Start: 2022-12-16 | End: 2022-12-16

## 2022-12-15 RX ADMIN — Medication 10 ML: at 08:12

## 2022-12-15 RX ADMIN — PANTOPRAZOLE SODIUM 40 MG: 40 INJECTION, POWDER, FOR SOLUTION INTRAVENOUS at 08:12

## 2022-12-15 RX ADMIN — BUPROPION HYDROCHLORIDE 150 MG: 150 TABLET, FILM COATED, EXTENDED RELEASE ORAL at 08:12

## 2022-12-15 RX ADMIN — ALBUTEROL SULFATE 2 PUFF: 90 AEROSOL, METERED RESPIRATORY (INHALATION) at 12:33

## 2022-12-15 RX ADMIN — GLYCOPYRROLATE AND FORMOTEROL FUMARATE 2 SPRAY: 9; 4.8 AEROSOL, METERED RESPIRATORY (INHALATION) at 20:48

## 2022-12-15 RX ADMIN — Medication 10 ML: at 20:40

## 2022-12-15 RX ADMIN — LORAZEPAM 0.5 MG: 2 INJECTION INTRAMUSCULAR; INTRAVENOUS at 00:29

## 2022-12-15 RX ADMIN — SODIUM CHLORIDE 250 MG: 9 INJECTION, SOLUTION INTRAVENOUS at 10:48

## 2022-12-15 RX ADMIN — SODIUM CHLORIDE 100 ML/HR: 9 INJECTION, SOLUTION INTRAVENOUS at 14:08

## 2022-12-15 RX ADMIN — GABAPENTIN 800 MG: 400 CAPSULE ORAL at 14:04

## 2022-12-15 RX ADMIN — DEXMEDETOMIDINE HYDROCHLORIDE 1.2 MCG/KG/HR: 4 INJECTION, SOLUTION INTRAVENOUS at 20:43

## 2022-12-15 RX ADMIN — ALBUTEROL SULFATE 2 PUFF: 90 AEROSOL, METERED RESPIRATORY (INHALATION) at 10:58

## 2022-12-15 RX ADMIN — ALBUTEROL SULFATE 2 PUFF: 90 AEROSOL, METERED RESPIRATORY (INHALATION) at 16:42

## 2022-12-15 RX ADMIN — GABAPENTIN 800 MG: 400 CAPSULE ORAL at 21:47

## 2022-12-15 RX ADMIN — ALBUTEROL SULFATE 2 PUFF: 90 AEROSOL, METERED RESPIRATORY (INHALATION) at 22:30

## 2022-12-15 RX ADMIN — THYROID, PORCINE 90 MG: 30 TABLET ORAL at 08:10

## 2022-12-15 RX ADMIN — LORAZEPAM 0.5 MG: 2 INJECTION INTRAMUSCULAR; INTRAVENOUS at 22:18

## 2022-12-15 RX ADMIN — GLYCOPYRROLATE AND FORMOTEROL FUMARATE 2 SPRAY: 9; 4.8 AEROSOL, METERED RESPIRATORY (INHALATION) at 08:13

## 2022-12-15 RX ADMIN — ALBUTEROL SULFATE 2 PUFF: 90 AEROSOL, METERED RESPIRATORY (INHALATION) at 14:04

## 2022-12-15 RX ADMIN — PEG-3350, SODIUM SULFATE, SODIUM CHLORIDE, POTASSIUM CHLORIDE, SODIUM ASCORBATE AND ASCORBIC ACID 1000 ML: KIT at 20:52

## 2022-12-15 RX ADMIN — ATORVASTATIN CALCIUM 80 MG: 40 TABLET, FILM COATED ORAL at 20:34

## 2022-12-15 RX ADMIN — GABAPENTIN 800 MG: 400 CAPSULE ORAL at 05:42

## 2022-12-15 RX ADMIN — LORAZEPAM 0.5 MG: 2 INJECTION INTRAMUSCULAR; INTRAVENOUS at 16:40

## 2022-12-15 RX ADMIN — NICOTINE 1 PATCH: 14 PATCH, EXTENDED RELEASE TRANSDERMAL at 08:12

## 2022-12-15 RX ADMIN — LORAZEPAM 0.5 MG: 2 INJECTION INTRAMUSCULAR; INTRAVENOUS at 08:12

## 2022-12-15 RX ADMIN — PANTOPRAZOLE SODIUM 40 MG: 40 INJECTION, POWDER, FOR SOLUTION INTRAVENOUS at 20:34

## 2022-12-15 RX ADMIN — MULTIVITAMIN TABLET 1 TABLET: TABLET at 08:12

## 2022-12-15 RX ADMIN — POTASSIUM & SODIUM PHOSPHATES POWDER PACK 280-160-250 MG 2 PACKET: 280-160-250 PACK at 10:48

## 2022-12-15 RX ADMIN — DEXMEDETOMIDINE HYDROCHLORIDE 1.5 MCG/KG/HR: 4 INJECTION, SOLUTION INTRAVENOUS at 01:34

## 2022-12-15 RX ADMIN — LORAZEPAM 0.5 MG: 2 INJECTION INTRAMUSCULAR; INTRAVENOUS at 14:16

## 2022-12-15 RX ADMIN — ALBUTEROL SULFATE 2 PUFF: 90 AEROSOL, METERED RESPIRATORY (INHALATION) at 08:13

## 2022-12-15 RX ADMIN — DEXMEDETOMIDINE HYDROCHLORIDE 1.5 MCG/KG/HR: 4 INJECTION, SOLUTION INTRAVENOUS at 08:11

## 2022-12-15 RX ADMIN — DEXMEDETOMIDINE HYDROCHLORIDE 1.2 MCG/KG/HR: 4 INJECTION, SOLUTION INTRAVENOUS at 14:09

## 2022-12-15 NOTE — PROGRESS NOTES
INTENSIVIST   PROGRESS NOTE     Hospital:  LOS: 2 days     Ms. Bobbi Du, 62 y.o. female is followed for a Chief Complaint of: Anemia      Subjective   S     Interval History:  Continues on Precedex infusion.      The patient's relevant past medical, surgical and social history were reviewed and updated in Epic as appropriate.      ROS:   Constitutional: Negative for fever.   Respiratory: Positive for dyspnea.   Cardiovascular: Negative for chest pain.   Gastrointestinal: Negative for  nausea, vomiting and diarrhea.     Objective   O     Vitals:  Temp  Min: 99 °F (37.2 °C)  Max: 99.7 °F (37.6 °C)  BP  Min: 79/51  Max: 133/83  Pulse  Min: 66  Max: 92  Resp  Min: 20  Max: 24  SpO2  Min: 84 %  Max: 100 % Flow (L/min)  Min: 2  Max: 3    Intake/Ouptut 24 hrs (7:00AM - 6:59 AM)  Intake & Output (last 3 days)       12/12 0701  12/13 0700 12/13 0701 12/14 0700 12/14 0701  12/15 0700 12/15 0701  12/16 0700    P.O.   360 480    I.V. (mL/kg)  2245.2 (51.6) 2525 (58) 663 (15.2)    Blood  1075 360     IV Piggyback  2000      Total Intake(mL/kg)  5320.2 (122.3) 3245 (74.6) 1143 (26.3)    Urine (mL/kg/hr)  1170 870 (0.8) 335 (1)    Total Output  1170 870 335    Net  +4150.2 +2375 +808                  Medications (drips):  dexmedetomidine, Last Rate: 1.2 mcg/kg/hr (12/15/22 1409)  norepinephrine, Last Rate: Stopped (12/15/22 0828)  sodium chloride, Last Rate: 100 mL/hr (12/15/22 1408)          Physical Examination  Telemetry:  Normal sinus rhythm.   Constitutional:  No acute distress.  Resting in bed on nasal cannula.    Eyes: No scleral icterus.   PERRL, EOM intact.    Neck:  Supple, FROM   Cardiovascular: Normal rate, regular and rhythm. Normal heart sounds.  No murmurs, gallop or rub.   Respiratory: No respiratory distress. Normal respiratory effort.  Diminished bilaterally. No wheezing.     Abdominal:  Soft. No masses. Nontender. No distension. No HSM.   Extremities: No digital cyanosis. No clubbing.  Left BKA.     Skin: No rashes, lesions or ulcers   Neurological:   Awakens to stimulation and will answer questions. Moves all extremtities              Results from last 7 days   Lab Units 12/15/22  1220 12/15/22  0549 12/14/22  2359 12/13/22  1819 12/13/22  1028   WBC 10*3/mm3  --  10.99*  --   --  5.98   HEMOGLOBIN g/dL 8.2* 8.8* 7.0*   < > 2.5*   MCV fL  --  78.4*  --   --  60.5*   PLATELETS 10*3/mm3  --  150  --   --  318    < > = values in this interval not displayed.     Results from last 7 days   Lab Units 12/15/22  0549 12/14/22  0419 12/13/22  0922   SODIUM mmol/L 138 135* 133*   POTASSIUM mmol/L 3.8 4.6 4.0   CO2 mmol/L 15.0* 14.0* 18.0*   CREATININE mg/dL 0.51* 0.52* 0.44*   GLUCOSE mg/dL 152* 186* 151*   MAGNESIUM mg/dL 2.1 2.1  --    PHOSPHORUS mg/dL 2.4* 3.8  --      Estimated Creatinine Clearance: 79.5 mL/min (A) (by C-G formula based on SCr of 0.51 mg/dL (L)).  Results from last 7 days   Lab Units 12/15/22  0549 12/13/22  0922   ALK PHOS U/L 66 73   BILIRUBIN mg/dL 1.0 0.2   ALT (SGPT) U/L 24 9   AST (SGOT) U/L 30 17       Results from last 7 days   Lab Units 12/13/22  1014   PH, ARTERIAL pH units 7.463*   PCO2, ARTERIAL mm Hg 26.9*   PO2 ART mm Hg 123.0*   FIO2 % 28       Images:  Imaging Results (Last 24 Hours)     ** No results found for the last 24 hours. **            Results: Reviewed.  I reviewed the patient's new laboratory and imaging results.  I independently reviewed the patient's new images.    Medications: Reviewed.    Assessment & Plan   A / P     Ms. Du is a 63yo F with a history of PAD s/p failed revascularization attempts with ultimate left BKA and severe disease on the right, T2DM with neuropathy, hypothyroidism, chronic pain, COPD, and iron deficiency anemia who presented to [facility] on 12/13 with hypotension and shortness of breath. She has been followed for iron deficiency anemia but has left AMA on multiple occasions and failed to show up for diagnostic testing.     In the ED, she was noted  to have a hemoglobin of 2.5 (normally around 7). She was transfused 2 units of pRBCs and admitted to the ICU.     After arrival to the ICU, she was very agitated and complaining of shortness of breath. She improved with Dilaudid and the initiation of a Precedex drip. She was given an additional unit of pRBCs.    Hemoglobin decreased overnight to 7 and she received 1 unit of pRBCs.     Nutrition:   Diet: Liquid Diets; Clear Liquid; Texture: Regular Texture (IDDSI 7); Fluid Consistency: Thin (IDDSI 0)  Advance Directives:   Code Status and Medical Interventions:   Ordered at: 12/13/22 1927     Code Status (Patient has no pulse and is not breathing):    CPR (Attempt to Resuscitate)     Medical Interventions (Patient has pulse or is breathing):    Full Support       Active Hospital Problems    Diagnosis    • **Severe anemia    • Anemia    • Hypotension, unspecified hypotension type    • Tobacco use disorder    • Hypothyroidism    • RA (rheumatoid arthritis) (HCC)    • COPD with acute exacerbation (HCC)    • Diabetes mellitus (HCC)        Assessment / Plan:    1. Continue ICU care.  2. Continue to trend H/H with transfusion for hemoglobin < 7.   3. Continue PPI.   4. GI following and planning on endoscopy on Friday 12/16.   5. Wean Precedex as able.   6. Allow her to use her home inhalers.   7. Continue thyroid replacement  8. Replace phosphorus.   9. AM labs    High risk secondary to anemia and agitation requiring sedation.     High level of risk due to:  severe exacerbation of chronic illness and illness with threat to life or bodily function.    Plan of care and goals reviewed during interdisciplinary rounds.  I discussed the patient's findings and my recommendations with patient and nursing staff      Prema Gil DO    Intensive Care Medicine and Pulmonary Medicine

## 2022-12-15 NOTE — PROGRESS NOTES
"GI Daily Progress Note  Subjective     Bobbi Du is a 62 y.o. female who was admitted with Severe anemia.     She is feeling better today.  Had received another unit of blood.  No overt bleeding.  Has been anxious today    Chief Complaint: Severe anemia  Objective     /77   Pulse 92   Temp 99 °F (37.2 °C) (Bladder)   Resp 24   Ht 165.1 cm (65\")   Wt 43.5 kg (95 lb 14.4 oz)   SpO2 92%   BMI 15.96 kg/m²     Intake/Output last 3 shifts:  I/O last 3 completed shifts:  In: 4425 [P.O.:360; I.V.:3705; Blood:360]  Out: 1240 [Urine:1240]  Intake/Output this shift:  I/O this shift:  In: 1143 [P.O.:480; I.V.:663]  Out: 415 [Urine:415]      Physical Exam  Wt Readings from Last 3 Encounters:   12/15/22 43.5 kg (95 lb 14.4 oz)   11/15/22 47.2 kg (104 lb)   11/15/22 47.2 kg (104 lb 0.9 oz)   ,body mass index is 15.96 kg/m².,@FLOWAMB(6)@,@FLOWAMB(5)@,@FLOWAMB(8)@   CONSTITUTIONAL: Lying in bed no distress  Resp CTA; no rhonchi, rales, or wheezes.  Respiration effort normal  CV RRR; no M/R/G. No lower extremity edema  GI Abd soft, NT, ND, normal active bowel sounds.    Psych: Awake alert oriented but sleepy    DATA:    Assessment & Plan     1. Critical symptomatic microcytic anemia   2. Positive fecal occult blood test   3. Peripheral arterial disease, on aspirin and Plavix   4. Hypoxia     Have started IV iron replacement today.  Will plan EGD and colonoscopy tomorrow.          Severe anemia    COPD with acute exacerbation (HCC)    Diabetes mellitus (HCC)    Hypothyroidism    RA (rheumatoid arthritis) (HCC)    Tobacco use disorder    Anemia    Hypotension, unspecified hypotension type       LOS: 2 days     Abdulaziz Yanes MD  12/15/22  17:38 EST  "

## 2022-12-15 NOTE — NURSING NOTE
PT remains alert and oriented x4. Precedex @1.5mcg/kg/hr and levo @0.02mcg/kg/min. Tolerating well. PRN ativan given once for anxiety. Hemoglobin dropped to 7 over night- 1 unit PRBC's administered. No signs of active bleeding. Awaiting H/H and AM labs. Pt on clear liquid diet. Plan for EGD and colonoscopy tomorrow. Daughter, Jennifer, updated .

## 2022-12-15 NOTE — CASE MANAGEMENT/SOCIAL WORK
"Continued Stay Note  [facility]     Patient Name: Bobbi Du  MRN: [id number]  Today's Date: 12/15/2022    Admit Date: 12/13/2022    Plan: Ongoing   Discharge Plan     Row Name 12/15/22 1248       Plan    Plan Ongoing    Plan Comments Spoke with patient at bedside.  Phone# for patients daughter Jennifer incorrect in epic.  Patients sister had correct number of [phone],  This number was put into epic.  Also asked patient about paperwork for Jennifer being her health care surrogate.  Patient states it is sedaled in an envelope at home and that is where it is going to stay because it would \"cause too much drama among her 3 daughter if she brought it in\".  Patient stated multiple time Jennifer is to be the  and decision maker if she is unable to make decisions.  Patients brother in law was also at bedside at this time.  CM will continue to follow.               Discharge Codes    No documentation.               Expected Discharge Date and Time     Expected Discharge Date Expected Discharge Time    Dec 21, 2022             Niecy Wiggins RN    "

## 2022-12-15 NOTE — PLAN OF CARE
Goal Outcome Evaluation:               H&H remained stable throughout the day with no active bleeding. Weaned levophed down. Precedex remains on. Advanced diet to low residue today. Plan for EGD/Colonosopy Friday. Restraints off. Pt more appropriate neurologically. Family updated several times.

## 2022-12-16 LAB
ANION GAP SERPL CALCULATED.3IONS-SCNC: 15 MMOL/L (ref 5–15)
BH BB BLOOD EXPIRATION DATE: NORMAL
BH BB BLOOD TYPE BARCODE: 9500
BH BB DISPENSE STATUS: NORMAL
BH BB PRODUCT CODE: NORMAL
BH BB UNIT NUMBER: NORMAL
BUN SERPL-MCNC: 9 MG/DL (ref 8–23)
BUN/CREAT SERPL: 21.4 (ref 7–25)
CALCIUM SPEC-SCNC: 7.4 MG/DL (ref 8.6–10.5)
CHLORIDE SERPL-SCNC: 114 MMOL/L (ref 98–107)
CO2 SERPL-SCNC: 15 MMOL/L (ref 22–29)
CREAT SERPL-MCNC: 0.42 MG/DL (ref 0.57–1)
CROSSMATCH INTERPRETATION: NORMAL
DEPRECATED RDW RBC AUTO: 80 FL (ref 37–54)
EGFRCR SERPLBLD CKD-EPI 2021: 111.4 ML/MIN/1.73
ERYTHROCYTE [DISTWIDTH] IN BLOOD BY AUTOMATED COUNT: 28.4 % (ref 12.3–15.4)
GLUCOSE SERPL-MCNC: 121 MG/DL (ref 65–99)
HCT VFR BLD AUTO: 27.3 % (ref 34–46.6)
HCT VFR BLD AUTO: 28.7 % (ref 34–46.6)
HCT VFR BLD AUTO: 29.3 % (ref 34–46.6)
HGB BLD-MCNC: 8.3 G/DL (ref 12–15.9)
HGB BLD-MCNC: 8.6 G/DL (ref 12–15.9)
HGB BLD-MCNC: 9.2 G/DL (ref 12–15.9)
MAGNESIUM SERPL-MCNC: 2 MG/DL (ref 1.6–2.4)
MCH RBC QN AUTO: 24.2 PG (ref 26.6–33)
MCHC RBC AUTO-ENTMCNC: 30 G/DL (ref 31.5–35.7)
MCV RBC AUTO: 80.8 FL (ref 79–97)
PHOSPHATE SERPL-MCNC: 2.8 MG/DL (ref 2.5–4.5)
PLATELET # BLD AUTO: 84 10*3/MM3 (ref 140–450)
POTASSIUM SERPL-SCNC: 3.6 MMOL/L (ref 3.5–5.2)
RBC # BLD AUTO: 3.55 10*6/MM3 (ref 3.77–5.28)
SODIUM SERPL-SCNC: 144 MMOL/L (ref 136–145)
UNIT  ABO: NORMAL
UNIT  RH: NORMAL
WBC NRBC COR # BLD: 11.93 10*3/MM3 (ref 3.4–10.8)

## 2022-12-16 PROCEDURE — 99232 SBSQ HOSP IP/OBS MODERATE 35: CPT | Performed by: PHYSICIAN ASSISTANT

## 2022-12-16 PROCEDURE — 99233 SBSQ HOSP IP/OBS HIGH 50: CPT | Performed by: INTERNAL MEDICINE

## 2022-12-16 PROCEDURE — 85027 COMPLETE CBC AUTOMATED: CPT | Performed by: INTERNAL MEDICINE

## 2022-12-16 PROCEDURE — 80048 BASIC METABOLIC PNL TOTAL CA: CPT | Performed by: INTERNAL MEDICINE

## 2022-12-16 PROCEDURE — 25010000002 FUROSEMIDE PER 20 MG: Performed by: INTERNAL MEDICINE

## 2022-12-16 PROCEDURE — 25010000002 HYDROMORPHONE PER 4 MG: Performed by: NURSE PRACTITIONER

## 2022-12-16 PROCEDURE — 85014 HEMATOCRIT: CPT | Performed by: INTERNAL MEDICINE

## 2022-12-16 PROCEDURE — 85018 HEMOGLOBIN: CPT | Performed by: INTERNAL MEDICINE

## 2022-12-16 PROCEDURE — 83735 ASSAY OF MAGNESIUM: CPT | Performed by: INTERNAL MEDICINE

## 2022-12-16 PROCEDURE — 25010000002 METHYLPREDNISOLONE PER 125 MG

## 2022-12-16 PROCEDURE — 25010000002 LORAZEPAM PER 2 MG: Performed by: INTERNAL MEDICINE

## 2022-12-16 PROCEDURE — 84100 ASSAY OF PHOSPHORUS: CPT | Performed by: INTERNAL MEDICINE

## 2022-12-16 RX ORDER — PREDNISONE 20 MG/1
40 TABLET ORAL
Status: DISCONTINUED | OUTPATIENT
Start: 2022-12-17 | End: 2022-12-18 | Stop reason: HOSPADM

## 2022-12-16 RX ORDER — METHYLPREDNISOLONE SODIUM SUCCINATE 125 MG/2ML
INJECTION, POWDER, LYOPHILIZED, FOR SOLUTION INTRAMUSCULAR; INTRAVENOUS
Status: COMPLETED
Start: 2022-12-16 | End: 2022-12-16

## 2022-12-16 RX ORDER — METHYLPREDNISOLONE SODIUM SUCCINATE 125 MG/2ML
125 INJECTION, POWDER, LYOPHILIZED, FOR SOLUTION INTRAMUSCULAR; INTRAVENOUS ONCE
Status: COMPLETED | OUTPATIENT
Start: 2022-12-16 | End: 2022-12-16

## 2022-12-16 RX ORDER — QUETIAPINE FUMARATE 25 MG/1
50 TABLET, FILM COATED ORAL ONCE
Status: COMPLETED | OUTPATIENT
Start: 2022-12-16 | End: 2022-12-16

## 2022-12-16 RX ORDER — FUROSEMIDE 10 MG/ML
40 INJECTION INTRAMUSCULAR; INTRAVENOUS ONCE
Status: COMPLETED | OUTPATIENT
Start: 2022-12-16 | End: 2022-12-16

## 2022-12-16 RX ADMIN — METHYLPREDNISOLONE SODIUM SUCCINATE 125 MG: 125 INJECTION, POWDER, FOR SOLUTION INTRAMUSCULAR; INTRAVENOUS at 08:58

## 2022-12-16 RX ADMIN — LORAZEPAM 0.5 MG: 2 INJECTION INTRAMUSCULAR; INTRAVENOUS at 09:07

## 2022-12-16 RX ADMIN — FUROSEMIDE 40 MG: 10 INJECTION, SOLUTION INTRAMUSCULAR; INTRAVENOUS at 09:32

## 2022-12-16 RX ADMIN — DEXMEDETOMIDINE HYDROCHLORIDE 1.5 MCG/KG/HR: 4 INJECTION, SOLUTION INTRAVENOUS at 09:08

## 2022-12-16 RX ADMIN — GABAPENTIN 800 MG: 400 CAPSULE ORAL at 06:34

## 2022-12-16 RX ADMIN — GABAPENTIN 800 MG: 400 CAPSULE ORAL at 21:06

## 2022-12-16 RX ADMIN — HYDROMORPHONE HYDROCHLORIDE 0.5 MG: 1 INJECTION, SOLUTION INTRAMUSCULAR; INTRAVENOUS; SUBCUTANEOUS at 12:26

## 2022-12-16 RX ADMIN — ALBUTEROL SULFATE 2 PUFF: 90 AEROSOL, METERED RESPIRATORY (INHALATION) at 08:44

## 2022-12-16 RX ADMIN — ALBUTEROL SULFATE 2 PUFF: 90 AEROSOL, METERED RESPIRATORY (INHALATION) at 04:00

## 2022-12-16 RX ADMIN — POLYETHYLENE GLYCOL 3350, SODIUM SULFATE, SODIUM CHLORIDE, POTASSIUM CHLORIDE, SODIUM ASCORBATE, AND ASCORBIC ACID 1000 ML: KIT at 03:41

## 2022-12-16 RX ADMIN — GABAPENTIN 800 MG: 400 CAPSULE ORAL at 14:44

## 2022-12-16 RX ADMIN — PANTOPRAZOLE SODIUM 40 MG: 40 INJECTION, POWDER, FOR SOLUTION INTRAVENOUS at 08:44

## 2022-12-16 RX ADMIN — HYDROMORPHONE HYDROCHLORIDE 0.5 MG: 1 INJECTION, SOLUTION INTRAMUSCULAR; INTRAVENOUS; SUBCUTANEOUS at 04:31

## 2022-12-16 RX ADMIN — QUETIAPINE FUMARATE 50 MG: 25 TABLET ORAL at 14:44

## 2022-12-16 RX ADMIN — NICOTINE 1 PATCH: 14 PATCH, EXTENDED RELEASE TRANSDERMAL at 08:45

## 2022-12-16 RX ADMIN — Medication 10 ML: at 21:17

## 2022-12-16 RX ADMIN — LORAZEPAM 0.5 MG: 2 INJECTION INTRAMUSCULAR; INTRAVENOUS at 00:53

## 2022-12-16 RX ADMIN — GLYCOPYRROLATE AND FORMOTEROL FUMARATE 2 SPRAY: 9; 4.8 AEROSOL, METERED RESPIRATORY (INHALATION) at 21:07

## 2022-12-16 RX ADMIN — PANTOPRAZOLE SODIUM 40 MG: 40 INJECTION, POWDER, FOR SOLUTION INTRAVENOUS at 21:06

## 2022-12-16 RX ADMIN — DEXMEDETOMIDINE HYDROCHLORIDE 1.5 MCG/KG/HR: 4 INJECTION, SOLUTION INTRAVENOUS at 03:40

## 2022-12-16 RX ADMIN — GLYCOPYRROLATE AND FORMOTEROL FUMARATE 2 SPRAY: 9; 4.8 AEROSOL, METERED RESPIRATORY (INHALATION) at 08:48

## 2022-12-16 RX ADMIN — THYROID, PORCINE 90 MG: 30 TABLET ORAL at 06:34

## 2022-12-16 RX ADMIN — METHYLPREDNISOLONE SODIUM SUCCINATE 125 MG: 125 INJECTION, POWDER, LYOPHILIZED, FOR SOLUTION INTRAMUSCULAR; INTRAVENOUS at 08:58

## 2022-12-16 RX ADMIN — Medication 10 ML: at 08:44

## 2022-12-16 RX ADMIN — SODIUM CHLORIDE 100 ML/HR: 9 INJECTION, SOLUTION INTRAVENOUS at 12:19

## 2022-12-16 RX ADMIN — DEXMEDETOMIDINE HYDROCHLORIDE 1.5 MCG/KG/HR: 4 INJECTION, SOLUTION INTRAVENOUS at 14:44

## 2022-12-16 RX ADMIN — ATORVASTATIN CALCIUM 80 MG: 40 TABLET, FILM COATED ORAL at 21:06

## 2022-12-16 RX ADMIN — LORAZEPAM 0.5 MG: 2 INJECTION INTRAMUSCULAR; INTRAVENOUS at 03:53

## 2022-12-16 RX ADMIN — LORAZEPAM 0.5 MG: 2 INJECTION INTRAMUSCULAR; INTRAVENOUS at 19:31

## 2022-12-16 RX ADMIN — DEXMEDETOMIDINE HYDROCHLORIDE 1.5 MCG/KG/HR: 4 INJECTION, SOLUTION INTRAVENOUS at 21:15

## 2022-12-16 RX ADMIN — SODIUM CHLORIDE 100 ML/HR: 9 INJECTION, SOLUTION INTRAVENOUS at 21:32

## 2022-12-16 RX ADMIN — QUETIAPINE FUMARATE 50 MG: 25 TABLET ORAL at 21:36

## 2022-12-16 RX ADMIN — ALBUTEROL SULFATE 2 PUFF: 90 AEROSOL, METERED RESPIRATORY (INHALATION) at 00:00

## 2022-12-16 RX ADMIN — SODIUM CHLORIDE 100 ML/HR: 9 INJECTION, SOLUTION INTRAVENOUS at 00:55

## 2022-12-16 NOTE — CASE MANAGEMENT/SOCIAL WORK
Continued Stay Note  [facility]     Patient Name: Bobbi Du  MRN: [id number]  Today's Date: 12/16/2022    Admit Date: 12/13/2022    Plan: Ongoing   Discharge Plan     Row Name 12/16/22 1348       Plan    Plan Ongoing    Plan Comments Patient was scheduled for EGD and colonoscopy this morning.  Due to worsening respiratory status these were cancelled.  Discharge plan is ongoing.  CM will continue to follow.               Discharge Codes    No documentation.               Expected Discharge Date and Time     Expected Discharge Date Expected Discharge Time    Dec 21, 2022             Niecy Wiggins RN

## 2022-12-16 NOTE — PLAN OF CARE
Goal Outcome Evaluation:         Pt hemodynamically stable. Weaned off Levophed. H&H stable. Weaned precedex back to 1.2 mcg. Pt anxious at times requesting personal albuterol inhaler every 2 hours. Md aware of frequency of use. Updated Jennifer via telephone today. Pt had panic attack this afternoon requiring IV ativan and Precedex 1.5mcg due to conflict of family. Requested POA paperwork. Paperwork provided and copy in chart. EGD/Colonoscopy planned for tomorrow. Bowel prep to begin this evening. Pt aware.

## 2022-12-16 NOTE — NURSING NOTE
Multiple family members concerned about healthcare surrogate and paperwork associated with [facility]. Spoke with patient and verified that patient wishes for Jennifer Griffith to be the healthcare surrogate if patient should be unable to make decisions and also the point of contact.

## 2022-12-16 NOTE — PROGRESS NOTES
"GI Daily Progress Note  Subjective:    Chief Complaint:  Follow up critical iron deficiency anemia     Worsening hypoxia today.  She is on a 6 L nonrebreather.   Stool output has been nonbloody.  H&H is stable.      Objective:    /67   Pulse 74   Temp 99 °F (37.2 °C) (Bladder)   Resp 28   Ht 165.1 cm (65\")   Wt 43.5 kg (95 lb 14.4 oz)   SpO2 100%   BMI 15.96 kg/m²     Physical Exam  Constitutional:       Appearance: She is ill-appearing.      Comments: Underweight female, ill appearing.  Appears older than her stated age.  On nonrebreather mask    Cardiovascular:      Rate and Rhythm: Normal rate and regular rhythm.   Pulmonary:      Comments: Tachypnea in conversation  Expiratory wheeze   Abdominal:      General: Bowel sounds are normal. There is no distension.      Palpations: Abdomen is soft.      Tenderness: There is no abdominal tenderness.   Musculoskeletal:      Comments: Left BKA   Skin:     Coloration: Skin is pale.   Neurological:      Mental Status: She is oriented to person, place, and time.   Psychiatric:      Comments: Anxious mood        Lab  Lab Results   Component Value Date    WBC 11.93 (H) 12/16/2022    HGB 8.6 (L) 12/16/2022    HGB 8.3 (L) 12/16/2022    HGB 8.1 (L) 12/15/2022    MCV 80.8 12/16/2022    PLT 84 (L) 12/16/2022    INR 1.15 (H) 12/15/2022    INR 0.91 09/11/2022    INR 0.96 10/15/2019       Lab Results   Component Value Date    GLUCOSE 121 (H) 12/16/2022    BUN 9 12/16/2022    CREATININE 0.42 (L) 12/16/2022    EGFRIFNONA 103 05/15/2020    BCR 21.4 12/16/2022     12/16/2022    K 3.6 12/16/2022    CO2 15.0 (L) 12/16/2022    CALCIUM 7.4 (L) 12/16/2022    ALBUMIN 2.90 (L) 12/15/2022    ALKPHOS 66 12/15/2022    BILITOT 1.0 12/15/2022    ALT 24 12/15/2022    AST 30 12/15/2022       Assessment:    1. Critical symptomatic microcytic anemia   2. Positive fecal occult blood test   3. Peripheral arterial disease, on aspirin and Plavix   4. Hypoxia, worse.       Plan:    Cancel " EGD and Colonoscopy today due to worsening respiratory status.       H&H has remained stable without evidence of overt bleeding.       Will reschedule bidirectional endoscopies prior to discharge, when respiratory status is improved.       Continue BID Protonix.       Can resume 81 mg aspirin daily from a GI standpoint.       CINTIA Rowell  12/16/22  11:33 EST

## 2022-12-16 NOTE — PLAN OF CARE
Goal Outcome Evaluation:  Plan of Care Reviewed With: patient        Progress: improving  Outcome Evaluation: Pt remains on precedex gtt. Bowel prep started last night for EGD/colonoscopy on 12/16. PRN ativan given consistently per order due to patient anxiety with bowel prep and frequent stooling. Hgb improving. VSS besides episodes of panic with breif hypoxia, resolved with controlled breathing and PRN medicine. Levo remains off. UOP~300, Frequent watery stools.

## 2022-12-16 NOTE — PROGRESS NOTES
INTENSIVIST   PROGRESS NOTE     Hospital:  LOS: 3 days     Ms. Bobbi Du, 62 y.o. female is followed for a Chief Complaint of: Anemia      Subjective   S     Interval History:  Continues on Precedex infusion. Worsening oxygenation and dyspnea this AM.       The patient's relevant past medical, surgical and social history were reviewed and updated in Epic as appropriate.      ROS:   Constitutional: Negative for fever.   Respiratory: Positive for dyspnea.   Cardiovascular: Negative for chest pain.   Gastrointestinal: Negative for  nausea, vomiting and diarrhea.     Objective   O     Vitals:  Temp  Min: 99 °F (37.2 °C)  Max: 99.3 °F (37.4 °C)  BP  Min: 99/60  Max: 153/93  Pulse  Min: 67  Max: 104  Resp  Min: 20  Max: 28  SpO2  Min: 66 %  Max: 100 % Flow (L/min)  Min: 3  Max: 15    Intake/Ouptut 24 hrs (7:00AM - 6:59 AM)  Intake & Output (last 3 days)       12/13 0701  12/14 0700 12/14 0701  12/15 0700 12/15 0701  12/16 0700 12/16 0701  12/17 0700    P.O.  360 960     I.V. (mL/kg) 2245.2 (51.6) 2525 (58) 2276.2 (52.3) 808 (18.6)    Blood 1075 360      IV Piggyback 2000       Total Intake(mL/kg) 5320.2 (122.3) 3245 (74.6) 3236.2 (74.4) 808 (18.6)    Urine (mL/kg/hr) 1170 870 (0.8) 665 (0.6) 2330 (9.9)    Stool    0    Total Output 1170     Net +4150.2 +2375 +2571.2 -1522            Stool Unmeasured Occurrence    1 x          Medications (drips):  dexmedetomidine, Last Rate: 1.5 mcg/kg/hr (12/16/22 0908)  norepinephrine, Last Rate: Stopped (12/15/22 0828)  sodium chloride, Last Rate: 100 mL/hr (12/16/22 1219)          Physical Examination  Telemetry:  Normal sinus rhythm.   Constitutional:  No acute distress.  Sitting up in bed on a nonrebreather.    Eyes: No scleral icterus.   PERRL, EOM intact.    Neck:  Supple, FROM   Cardiovascular: Normal rate, regular and rhythm. Normal heart sounds.  No murmurs, gallop or rub.   Respiratory: No respiratory distress. Increased respiratory effort.  Diminished  bilaterally. Expiratory wheezing.    Abdominal:  Soft. No masses. Nontender. No distension. No HSM.   Extremities: No digital cyanosis. No clubbing.  Left BKA.    Skin: No rashes, lesions or ulcers   Neurological:   Alert and interactive.               Results from last 7 days   Lab Units 12/16/22  0505 12/16/22  0041 12/15/22  1831 12/15/22  1220 12/15/22  0549 12/13/22  1819 12/13/22  1028   WBC 10*3/mm3 11.93*  --   --   --  10.99*  --  5.98   HEMOGLOBIN g/dL 8.6* 8.3* 8.1*   < > 8.8*   < > 2.5*   MCV fL 80.8  --   --   --  78.4*  --  60.5*   PLATELETS 10*3/mm3 84*  --   --   --  150  --  318    < > = values in this interval not displayed.     Results from last 7 days   Lab Units 12/16/22  0505 12/16/22  0041 12/15/22  0549 12/14/22  0419   SODIUM mmol/L 144  --  138 135*   POTASSIUM mmol/L 3.6  --  3.8 4.6   CO2 mmol/L 15.0*  --  15.0* 14.0*   CREATININE mg/dL 0.42*  --  0.51* 0.52*   GLUCOSE mg/dL 121*  --  152* 186*   MAGNESIUM mg/dL 2.0  --  2.1 2.1   PHOSPHORUS mg/dL  --  2.8 2.4* 3.8     Estimated Creatinine Clearance: 96.6 mL/min (A) (by C-G formula based on SCr of 0.42 mg/dL (L)).  Results from last 7 days   Lab Units 12/15/22  0549 12/13/22  0922   ALK PHOS U/L 66 73   BILIRUBIN mg/dL 1.0 0.2   ALT (SGPT) U/L 24 9   AST (SGOT) U/L 30 17       Results from last 7 days   Lab Units 12/13/22  1014   PH, ARTERIAL pH units 7.463*   PCO2, ARTERIAL mm Hg 26.9*   PO2 ART mm Hg 123.0*   FIO2 % 28       Images:  Imaging Results (Last 24 Hours)     ** No results found for the last 24 hours. **            Results: Reviewed.  I reviewed the patient's new laboratory and imaging results.  I independently reviewed the patient's new images.    Medications: Reviewed.    Assessment & Plan   A / P     Ms. Du is a 63yo F with a history of PAD s/p failed revascularization attempts with ultimate left BKA and severe disease on the right, T2DM with neuropathy, hypothyroidism, chronic pain, COPD, and iron deficiency anemia who  presented to [facility] on 12/13 with hypotension and shortness of breath. She has been followed for iron deficiency anemia but has left AMA on multiple occasions and failed to show up for diagnostic testing.     In the ED, she was noted to have a hemoglobin of 2.5 (normally around 7). She was transfused 2 units of pRBCs and admitted to the ICU.     After arrival to the ICU, she was very agitated and complaining of shortness of breath. She improved with Dilaudid and the initiation of a Precedex drip. She was given an additional unit of pRBCs.    Hemoglobin decreased on the early morning of 12/15 to 7 and she received 1 unit of pRBCs.     Breathing is worse this AM with expiratory wheezing.     Nutrition:   Diet: Liquid Diets; Clear Liquid; Texture: Regular Texture (IDDSI 7); Fluid Consistency: Thin (IDDSI 0)  Advance Directives:   Code Status and Medical Interventions:   Ordered at: 12/13/22 1927     Code Status (Patient has no pulse and is not breathing):    CPR (Attempt to Resuscitate)     Medical Interventions (Patient has pulse or is breathing):    Full Support       Active Hospital Problems    Diagnosis    • **Severe anemia    • Anemia    • Hypotension, unspecified hypotension type    • Tobacco use disorder    • Hypothyroidism    • RA (rheumatoid arthritis) ([facility])    • COPD with acute exacerbation ([facility])    • Diabetes mellitus ([facility])        Assessment / Plan:    1. Continue ICU care.  2. High risk for worsening respiratory failure requiring bipap.   3. Continue to trend H/H with transfusion for hemoglobin < 7.   4. Continue PPI.   5. GI following. Endoscopy cancelled secondary to worsening respiratory status.    6. Wean Precedex as able.   7. Allow her to use her home inhalers.  8. Solumedrol 125mg x 1. Start Prednisone 40mg daily in the the AM.   9. Lasix 40mg IV x 1.    10. Continue thyroid replacement   11. AM labs and chest xray.     High level of risk due to:  severe exacerbation of chronic illness and illness with  threat to life or bodily function.    Plan of care and goals reviewed during interdisciplinary rounds.  I discussed the patient's findings and my recommendations with patient and nursing staff      Prema Gil,     Intensive Care Medicine and Pulmonary Medicine

## 2022-12-16 NOTE — PLAN OF CARE
Goal Outcome Evaluation:  Plan of Care Reviewed With: patient, durable power of         Progress: no change  Outcome Evaluation: Colonoscopy/EGD canceled today d/t pts decline in resp status this am that has been an ongoing issue throughout the shift. Pt gets extremely anxious and will begin to hyperventilate causing sats to drop and increased SOA and work of breathing. NRB placed on top of 6L nc during said episodes. Pt given ativan, diluadid, and remains on precedex gtt at max dose. Pt given one time dose of seroquel 50 that appears to have helped overall anxiety. Pt RR and status much improved when sleeping, does not require NRB at that time to maintain adequate sats. Lasix 40 given as well d/t resp distress, >3L UOP per wells. VSS at this time. Sister, Nathalie, POA and will be point of contact from here out regarding pt condition, etc. Pt also agreed to this, in order to eliminate family disagreements/further family issues that only appear to upset pt and make anxiety and resp status worse.

## 2022-12-17 ENCOUNTER — APPOINTMENT (OUTPATIENT)
Dept: GENERAL RADIOLOGY | Facility: HOSPITAL | Age: 62
End: 2022-12-17

## 2022-12-17 LAB
ABO GROUP BLD: NORMAL
ANION GAP SERPL CALCULATED.3IONS-SCNC: 15 MMOL/L (ref 5–15)
BLD GP AB SCN SERPL QL: NEGATIVE
BUN SERPL-MCNC: 11 MG/DL (ref 8–23)
BUN/CREAT SERPL: 26.2 (ref 7–25)
CALCIUM SPEC-SCNC: 7.9 MG/DL (ref 8.6–10.5)
CHLORIDE SERPL-SCNC: 114 MMOL/L (ref 98–107)
CO2 SERPL-SCNC: 12 MMOL/L (ref 22–29)
CREAT SERPL-MCNC: 0.42 MG/DL (ref 0.57–1)
DEPRECATED RDW RBC AUTO: 80.8 FL (ref 37–54)
EGFRCR SERPLBLD CKD-EPI 2021: 111.4 ML/MIN/1.73
ERYTHROCYTE [DISTWIDTH] IN BLOOD BY AUTOMATED COUNT: 29.4 % (ref 12.3–15.4)
GLUCOSE BLDC GLUCOMTR-MCNC: 175 MG/DL (ref 70–130)
GLUCOSE SERPL-MCNC: 141 MG/DL (ref 65–99)
HCT VFR BLD AUTO: 28.3 % (ref 34–46.6)
HCT VFR BLD AUTO: 28.3 % (ref 34–46.6)
HCT VFR BLD AUTO: 29 % (ref 34–46.6)
HGB BLD-MCNC: 8.4 G/DL (ref 12–15.9)
HGB BLD-MCNC: 8.5 G/DL (ref 12–15.9)
HGB BLD-MCNC: 8.9 G/DL (ref 12–15.9)
MAGNESIUM SERPL-MCNC: 2.2 MG/DL (ref 1.6–2.4)
MCH RBC QN AUTO: 24.6 PG (ref 26.6–33)
MCHC RBC AUTO-ENTMCNC: 30 G/DL (ref 31.5–35.7)
MCV RBC AUTO: 82 FL (ref 79–97)
PHOSPHATE SERPL-MCNC: 3.4 MG/DL (ref 2.5–4.5)
PLATELET # BLD AUTO: 67 10*3/MM3 (ref 140–450)
POTASSIUM SERPL-SCNC: 3.4 MMOL/L (ref 3.5–5.2)
POTASSIUM SERPL-SCNC: 4.2 MMOL/L (ref 3.5–5.2)
RBC # BLD AUTO: 3.45 10*6/MM3 (ref 3.77–5.28)
RH BLD: NEGATIVE
SODIUM SERPL-SCNC: 141 MMOL/L (ref 136–145)
T&S EXPIRATION DATE: NORMAL
WBC NRBC COR # BLD: 10.05 10*3/MM3 (ref 3.4–10.8)

## 2022-12-17 PROCEDURE — 84100 ASSAY OF PHOSPHORUS: CPT | Performed by: INTERNAL MEDICINE

## 2022-12-17 PROCEDURE — 85027 COMPLETE CBC AUTOMATED: CPT | Performed by: INTERNAL MEDICINE

## 2022-12-17 PROCEDURE — 86900 BLOOD TYPING SEROLOGIC ABO: CPT | Performed by: INTERNAL MEDICINE

## 2022-12-17 PROCEDURE — 85018 HEMOGLOBIN: CPT | Performed by: INTERNAL MEDICINE

## 2022-12-17 PROCEDURE — 85014 HEMATOCRIT: CPT | Performed by: INTERNAL MEDICINE

## 2022-12-17 PROCEDURE — 82962 GLUCOSE BLOOD TEST: CPT

## 2022-12-17 PROCEDURE — 94799 UNLISTED PULMONARY SVC/PX: CPT

## 2022-12-17 PROCEDURE — 86850 RBC ANTIBODY SCREEN: CPT | Performed by: INTERNAL MEDICINE

## 2022-12-17 PROCEDURE — 25010000002 FUROSEMIDE PER 20 MG: Performed by: INTERNAL MEDICINE

## 2022-12-17 PROCEDURE — 71045 X-RAY EXAM CHEST 1 VIEW: CPT

## 2022-12-17 PROCEDURE — 25010000002 NA FERRIC GLUC CPLX PER 12.5 MG: Performed by: NURSE PRACTITIONER

## 2022-12-17 PROCEDURE — 80048 BASIC METABOLIC PNL TOTAL CA: CPT | Performed by: INTERNAL MEDICINE

## 2022-12-17 PROCEDURE — 63710000001 PREDNISONE PER 1 MG: Performed by: INTERNAL MEDICINE

## 2022-12-17 PROCEDURE — 83735 ASSAY OF MAGNESIUM: CPT | Performed by: INTERNAL MEDICINE

## 2022-12-17 PROCEDURE — 25010000002 HYDROMORPHONE 1 MG/ML SOLUTION: Performed by: NURSE PRACTITIONER

## 2022-12-17 PROCEDURE — 84132 ASSAY OF SERUM POTASSIUM: CPT | Performed by: INTERNAL MEDICINE

## 2022-12-17 PROCEDURE — 25010000002 HYDROMORPHONE PER 4 MG: Performed by: NURSE PRACTITIONER

## 2022-12-17 PROCEDURE — 25010000002 LORAZEPAM PER 2 MG: Performed by: INTERNAL MEDICINE

## 2022-12-17 PROCEDURE — 86901 BLOOD TYPING SEROLOGIC RH(D): CPT | Performed by: INTERNAL MEDICINE

## 2022-12-17 PROCEDURE — 99233 SBSQ HOSP IP/OBS HIGH 50: CPT | Performed by: INTERNAL MEDICINE

## 2022-12-17 RX ORDER — POTASSIUM CHLORIDE 1.5 G/1.77G
40 POWDER, FOR SOLUTION ORAL AS NEEDED
Status: DISCONTINUED | OUTPATIENT
Start: 2022-12-17 | End: 2022-12-18 | Stop reason: HOSPADM

## 2022-12-17 RX ORDER — FUROSEMIDE 10 MG/ML
40 INJECTION INTRAMUSCULAR; INTRAVENOUS ONCE
Status: COMPLETED | OUTPATIENT
Start: 2022-12-17 | End: 2022-12-17

## 2022-12-17 RX ORDER — POTASSIUM CHLORIDE 7.45 MG/ML
10 INJECTION INTRAVENOUS
Status: DISCONTINUED | OUTPATIENT
Start: 2022-12-17 | End: 2022-12-18 | Stop reason: HOSPADM

## 2022-12-17 RX ORDER — POTASSIUM CHLORIDE 750 MG/1
40 CAPSULE, EXTENDED RELEASE ORAL AS NEEDED
Status: DISCONTINUED | OUTPATIENT
Start: 2022-12-17 | End: 2022-12-18 | Stop reason: HOSPADM

## 2022-12-17 RX ORDER — QUETIAPINE FUMARATE 25 MG/1
50 TABLET, FILM COATED ORAL EVERY 12 HOURS SCHEDULED
Status: DISCONTINUED | OUTPATIENT
Start: 2022-12-17 | End: 2022-12-18 | Stop reason: HOSPADM

## 2022-12-17 RX ADMIN — NICOTINE 1 PATCH: 14 PATCH, EXTENDED RELEASE TRANSDERMAL at 09:14

## 2022-12-17 RX ADMIN — PANTOPRAZOLE SODIUM 40 MG: 40 INJECTION, POWDER, FOR SOLUTION INTRAVENOUS at 20:58

## 2022-12-17 RX ADMIN — QUETIAPINE FUMARATE 50 MG: 25 TABLET ORAL at 13:45

## 2022-12-17 RX ADMIN — SODIUM CHLORIDE 250 MG: 9 INJECTION, SOLUTION INTRAVENOUS at 09:13

## 2022-12-17 RX ADMIN — PANTOPRAZOLE SODIUM 40 MG: 40 INJECTION, POWDER, FOR SOLUTION INTRAVENOUS at 09:14

## 2022-12-17 RX ADMIN — GLYCOPYRROLATE AND FORMOTEROL FUMARATE 2 SPRAY: 9; 4.8 AEROSOL, METERED RESPIRATORY (INHALATION) at 20:58

## 2022-12-17 RX ADMIN — BUPROPION HYDROCHLORIDE 150 MG: 150 TABLET, FILM COATED, EXTENDED RELEASE ORAL at 09:13

## 2022-12-17 RX ADMIN — LORAZEPAM 0.5 MG: 2 INJECTION INTRAMUSCULAR; INTRAVENOUS at 03:58

## 2022-12-17 RX ADMIN — PREDNISONE 40 MG: 20 TABLET ORAL at 09:13

## 2022-12-17 RX ADMIN — POTASSIUM CHLORIDE 40 MEQ: 1.5 POWDER, FOR SOLUTION ORAL at 12:22

## 2022-12-17 RX ADMIN — GABAPENTIN 800 MG: 400 CAPSULE ORAL at 13:45

## 2022-12-17 RX ADMIN — DEXMEDETOMIDINE HYDROCHLORIDE 1.5 MCG/KG/HR: 4 INJECTION, SOLUTION INTRAVENOUS at 19:08

## 2022-12-17 RX ADMIN — POTASSIUM CHLORIDE 40 MEQ: 1.5 POWDER, FOR SOLUTION ORAL at 09:13

## 2022-12-17 RX ADMIN — FUROSEMIDE 40 MG: 10 INJECTION, SOLUTION INTRAMUSCULAR; INTRAVENOUS at 09:18

## 2022-12-17 RX ADMIN — DEXMEDETOMIDINE HYDROCHLORIDE 1.5 MCG/KG/HR: 4 INJECTION, SOLUTION INTRAVENOUS at 14:05

## 2022-12-17 RX ADMIN — SODIUM CHLORIDE 100 ML/HR: 9 INJECTION, SOLUTION INTRAVENOUS at 06:43

## 2022-12-17 RX ADMIN — DEXMEDETOMIDINE HYDROCHLORIDE 1.5 MCG/KG/HR: 4 INJECTION, SOLUTION INTRAVENOUS at 09:13

## 2022-12-17 RX ADMIN — MULTIVITAMIN TABLET 1 TABLET: TABLET at 09:13

## 2022-12-17 RX ADMIN — Medication 10 ML: at 09:13

## 2022-12-17 RX ADMIN — Medication 10 ML: at 20:59

## 2022-12-17 RX ADMIN — ATORVASTATIN CALCIUM 80 MG: 40 TABLET, FILM COATED ORAL at 20:58

## 2022-12-17 RX ADMIN — HYDROMORPHONE HYDROCHLORIDE 0.5 MG: 1 INJECTION, SOLUTION INTRAMUSCULAR; INTRAVENOUS; SUBCUTANEOUS at 17:14

## 2022-12-17 RX ADMIN — ALBUTEROL SULFATE 2 PUFF: 90 AEROSOL, METERED RESPIRATORY (INHALATION) at 04:05

## 2022-12-17 RX ADMIN — GABAPENTIN 800 MG: 400 CAPSULE ORAL at 06:34

## 2022-12-17 RX ADMIN — LORAZEPAM 0.5 MG: 2 INJECTION INTRAMUSCULAR; INTRAVENOUS at 09:18

## 2022-12-17 RX ADMIN — HYDROMORPHONE HYDROCHLORIDE 0.5 MG: 1 INJECTION, SOLUTION INTRAMUSCULAR; INTRAVENOUS; SUBCUTANEOUS at 04:34

## 2022-12-17 RX ADMIN — GABAPENTIN 800 MG: 400 CAPSULE ORAL at 21:00

## 2022-12-17 RX ADMIN — THYROID, PORCINE 90 MG: 30 TABLET ORAL at 06:34

## 2022-12-17 RX ADMIN — IPRATROPIUM BROMIDE AND ALBUTEROL SULFATE 3 ML: .5; 3 SOLUTION RESPIRATORY (INHALATION) at 04:16

## 2022-12-17 RX ADMIN — GLYCOPYRROLATE AND FORMOTEROL FUMARATE 2 SPRAY: 9; 4.8 AEROSOL, METERED RESPIRATORY (INHALATION) at 09:14

## 2022-12-17 RX ADMIN — DEXMEDETOMIDINE HYDROCHLORIDE 1.5 MCG/KG/HR: 4 INJECTION, SOLUTION INTRAVENOUS at 02:28

## 2022-12-17 RX ADMIN — QUETIAPINE FUMARATE 50 MG: 25 TABLET ORAL at 20:58

## 2022-12-17 NOTE — PROGRESS NOTES
Clinical Nutrition     Nutrition Support Assessment  Reason for Visit: MDR      Patient Name: Bobbi Du  YOB: 1960  MRN: [id number]  Date of Encounter: 12/16/22 21:08 EST  Admission date: 12/13/2022    Comments: Interview for wt/intake hx and consider malnutrition severity assessment when pt able to participate. See interval hx below.    Nutrition Assessment   Admission Diagnosis:  Severe anemia [D64.9]  Hypotension, unspecified hypotension type [I95.9]    Problem List:    Severe anemia    COPD with acute exacerbation ([facility])    Diabetes mellitus ([facility])    Hypothyroidism    RA (rheumatoid arthritis) ([facility])    Tobacco use disorder    Anemia    Hypotension, unspecified hypotension type    GIB     PMH:   She  has a past medical history of Acute respiratory alkalosis (10/16/2019), Bilateral knee pain (7/20/2020), Charcot foot due to diabetes mellitus ([facility]), Chronic pain, Claudication of lower extremity with history of revascularization ([facility]) (5/13/2020), Confusion (10/15/2019), COPD (chronic obstructive pulmonary disease) ([facility]), COPD mixed type ([facility]), Diabetes mellitus ([facility]), Disease of thyroid gland, Encephalopathy (10/15/2019), Fibromyalgia, Gangrene ([facility]) (7/10/2017), Hyperlipidemia, Hypertension, Incarcerated right inguinal hernia (12/17/2019), Irreducible right femoral hernia (12/17/2019), PAD (peripheral artery disease) ([facility]), Restless leg, Rheumatoid arthritis ([facility]), S/P left iliofemoral and left femoropopliteal bypass surgery 7/10/17 (7/10/2017), Sinusitis (10/16/2019), and Tobacco abuse.      PSH:  She  has a past surgical history that includes Tubal ligation; Thyroidectomy; Carpal tunnel release; Foot surgery (Bilateral); Knee surgery (Left); pr rt/lt heart catheters (N/A, 5/30/2017); Cardiac catheterization (N/A, 5/30/2017); Interventional radiology procedure (N/A, 5/30/2017); pr vein in situ bypass graft,fem-pop (Left, 7/10/2017); Inguinal Hernia Repair (Right,  "12/17/2019); Lumbar fusion (02/22/2010); Back surgery (10/10/2019); and Interventional radiology procedure (N/A, 5/15/2020).    Applicable Nutrition Concerns:   Skin:  Oral:  GI:    Applicable Interval History:   12/16 cancelled EGD and Colonoscopy 2/2 respiratory status      Reported/Observed/Food/Nutrition Related History:     12/16  Cancelled EGD and Colonoscopy 2/2 respiratory status. Pt sleeping at time of attempted visit.     Labs    Labs Reviewed: Yes   Elevated pBNP  Results from last 7 days   Lab Units 12/16/22  0505 12/16/22  0041 12/15/22  0549 12/14/22  0419 12/13/22  0922   GLUCOSE mg/dL 121*  --  152* 186* 151*   BUN mg/dL 9  --  13 19 8   CREATININE mg/dL 0.42*  --  0.51* 0.52* 0.44*   SODIUM mmol/L 144  --  138 135* 133*   CHLORIDE mmol/L 114*  --  113* 110* 102   POTASSIUM mmol/L 3.6  --  3.8 4.6 4.0   PHOSPHORUS mg/dL  --  2.8 2.4* 3.8  --    MAGNESIUM mg/dL 2.0  --  2.1 2.1  --    ALT (SGPT) U/L  --   --  24  --  9       Results from last 7 days   Lab Units 12/15/22  0549 12/13/22  0922   ALBUMIN g/dL 2.90* 3.90           Lab Results   Lab Value Date/Time    HGBA1C 5.8 09/08/2022 1137    HGBA1C 5.40 10/16/2019 0728    HGBA1C 6.50 (H) 07/10/2017 0635             Results from last 7 days   Lab Units 12/13/22  0922   PROBNP pg/mL 2,163.0*       Medications    Medications Reviewed: Yes  Ferric Gluconate, Multi vit min, Protonix   Precedex, NS    Intake/Ouptut 24 hrs (7:00AM - 6:59 AM)   I&O's Reviewed: Yes   Intake & Output (last day)       12/16 0701  12/17 0700    P.O. 360    I.V. (mL/kg) 1298 (29.8)    Total Intake(mL/kg) 1658 (38.1)    Urine (mL/kg/hr) 2980 (4.8)    Stool 0    Total Output 2980    Net -1322         Stool Unmeasured Occurrence 1 x          Anthropometrics     Admission Height 165.1 cm (65\") Documented at 12/13/2022 0924   Admission Weight 46.7 kg (103 lb) Documented at 12/13/2022 0924       Height: Height: 165.1 cm (65\")  Last Filed Weight: Weight: 43.5 kg (95 lb 14.4 oz) (12/15/22 " 0408)  Method: Weight Method: Stated  IBW:  116.25 lbs w BKA    UBW: Per  lbs on 10/10, 102 lbs on 9/29, 111 lbs standing scale in 2020  Note date of BKA unk at this time.  Weight change:     Nutrition Focused Physical Exam     Date: 12/16    Unable to perform exam due to: Pt unable to participate at time of visit    Current Nutrition Prescription     PO: Diet: Liquid Diets; Clear Liquid; Texture: Regular Texture (IDDSI 7); Fluid Consistency: Thin (IDDSI 0)  Oral Nutrition Supplement: Boost Breeze 3x/da added per RD  Intake: 2 Days: 0% x 3 meals recorded       Nutrition Diagnosis   Date: 12/16 Updated:   Problem Potential suboptimal intake   Etiology Alt GI Fx   Signs/Symptoms Clr liq diet    Status:       Goal:   General: Nutrition support treatment  PO: Establish PO, Advace diet as medically feasible/appropriate  EN/PN: N/A at this time    Nutrition Intervention   Follow treatment progress, Care plan reviewed, Supplement provided      Monitoring/Evaluation:   Per protocol, I&O, PO intake, Supplement intake, Pertinent labs, Weight, Skin status, GI status, Symptoms      Domi Hayes RD  Time Spent: 25 min

## 2022-12-17 NOTE — PLAN OF CARE
Goal Outcome Evaluation:  Plan of Care Reviewed With: patient        Progress: improving  Outcome Evaluation: VSS overnight besides one episode of panic and hyperventilation requring PRN meds around 0400. Seroquel given at bedtime, which seemed to help. Precedex remains max'd the whole shift and remained on 6L nasal cannula. H&H remaining around 8.5. Sister Nathalie updated this morning as well.

## 2022-12-17 NOTE — PROGRESS NOTES
INTENSIVIST   PROGRESS NOTE     Hospital:  LOS: 4 days     Ms. Bobbi Du, 62 y.o. female is followed for a Chief Complaint of: Anemia      Subjective   S     Interval History:  Continues on Precedex infusion. Becomes anxious with weaning.      The patient's relevant past medical, surgical and social history were reviewed and updated in Epic as appropriate.      ROS:   Constitutional: Negative for fever.   Respiratory: Positive for dyspnea.   Cardiovascular: Negative for chest pain.   Gastrointestinal: Negative for  nausea, vomiting and diarrhea.     Objective   O     Vitals:  Temp  Min: 98.1 °F (36.7 °C)  Max: 99.1 °F (37.3 °C)  BP  Min: 112/69  Max: 160/113  Pulse  Min: 63  Max: 114  Resp  Min: 16  Max: 28  SpO2  Min: 78 %  Max: 100 % Flow (L/min)  Min: 6  Max: 15    Intake/Ouptut 24 hrs (7:00AM - 6:59 AM)  Intake & Output (last 3 days)       12/14 0701  12/15 0700 12/15 0701  12/16 0700 12/16 0701  12/17 0700 12/17 0701  12/18 0700    P.O. 360 960 360 360    I.V. (mL/kg) 2525 (58) 2276.2 (52.3) 2682.3 (61.7) 609 (14)    Blood 360       IV Piggyback        Total Intake(mL/kg) 3245 (74.6) 3236.2 (74.4) 3042.3 (69.9) 969 (22.3)    Urine (mL/kg/hr) 870 (0.8) 665 (0.6) 3605 (3.5) 150 (0.7)    Stool   0 0    Total Output  150    Net +2375 +2571.2 -562.7 +819            Stool Unmeasured Occurrence   1 x 1 x          Medications (drips):  dexmedetomidine, Last Rate: 1.5 mcg/kg/hr (12/17/22 0913)  norepinephrine, Last Rate: Stopped (12/15/22 0828)          Physical Examination  Telemetry:  Normal sinus rhythm.   Constitutional:  No acute distress.  Sitting up in bed on nasal cannula.    Eyes: No scleral icterus.   PERRL, EOM intact.    Neck:  Supple, FROM   Cardiovascular: Normal rate, regular and rhythm. Normal heart sounds.  No murmurs, gallop or rub.   Respiratory: No respiratory distress. Increased respiratory effort.  Diminished bilaterally. No wheezing.     Abdominal:  Soft. No masses. Nontender.  No distension. No HSM.   Extremities: No digital cyanosis. No clubbing.  Left BKA.    Skin: No rashes, lesions or ulcers   Neurological:   Awakens to stimulation.               Results from last 7 days   Lab Units 12/17/22  0425 12/17/22  0014 12/16/22  1207 12/16/22  0505 12/15/22  1220 12/15/22  0549   WBC 10*3/mm3 10.05  --   --  11.93*  --  10.99*   HEMOGLOBIN g/dL 8.5* 8.4* 9.2* 8.6*   < > 8.8*   MCV fL 82.0  --   --  80.8  --  78.4*   PLATELETS 10*3/mm3 67*  --   --  84*  --  150    < > = values in this interval not displayed.     Results from last 7 days   Lab Units 12/17/22  0425 12/16/22  0505 12/16/22  0041 12/15/22  0549   SODIUM mmol/L 141 144  --  138   POTASSIUM mmol/L 3.4* 3.6  --  3.8   CO2 mmol/L 12.0* 15.0*  --  15.0*   CREATININE mg/dL 0.42* 0.42*  --  0.51*   GLUCOSE mg/dL 141* 121*  --  152*   MAGNESIUM mg/dL 2.2 2.0  --  2.1   PHOSPHORUS mg/dL 3.4  --  2.8 2.4*     Estimated Creatinine Clearance: 96.6 mL/min (A) (by C-G formula based on SCr of 0.42 mg/dL (L)).  Results from last 7 days   Lab Units 12/15/22  0549 12/13/22  0922   ALK PHOS U/L 66 73   BILIRUBIN mg/dL 1.0 0.2   ALT (SGPT) U/L 24 9   AST (SGOT) U/L 30 17       Results from last 7 days   Lab Units 12/13/22  1014   PH, ARTERIAL pH units 7.463*   PCO2, ARTERIAL mm Hg 26.9*   PO2 ART mm Hg 123.0*   FIO2 % 28       Images:  Imaging Results (Last 24 Hours)     Procedure Component Value Units Date/Time    XR Chest 1 View [[id number]] Collected: 12/17/22 0743     Updated: 12/17/22 0747    Narrative:      DATE OF EXAM:  12/17/2022 4:38 AM     PROCEDURE:  XR CHEST 1 VW-     INDICATIONS:  Respiratory failure, hypoxia; I95.9-Hypotension, unspecified;  R06.02-Shortness of breath; D64.9-Anemia, unspecified;  K92.2-Gastrointestinal hemorrhage, unspecified; I73.9-Peripheral  vascular disease, unspecified; D64.9-Anemia, unspecified     COMPARISON:  Chest x-ray 12/13/2022     TECHNIQUE:   Single radiographic view of the chest was obtained.      FINDINGS:  There is been development of diffuse septal thickening and interstitial  prominence. There are bibasilar airspace opacities with moderate  bilateral pleural effusions. No pneumothorax.       Impression:      Significant change from previous study with pulmonary edema moderate  bilateral pleural effusions and bibasilar opacities, atelectasis versus  pneumonia.     This report was finalized on 12/17/2022 7:44 AM by Bhavani Acevedo MD.               Results: Reviewed.  I reviewed the patient's new laboratory and imaging results.  I independently reviewed the patient's new images.    Medications: Reviewed.    Assessment & Plan   A / P     Ms. Du is a 61yo F with a history of PAD s/p failed revascularization attempts with ultimate left BKA and severe disease on the right, T2DM with neuropathy, hypothyroidism, chronic pain, COPD, and iron deficiency anemia who presented to [facility] on 12/13 with hypotension and shortness of breath. She has been followed for iron deficiency anemia but has left AMA on multiple occasions and failed to show up for diagnostic testing.     In the ED, she was noted to have a hemoglobin of 2.5 (normally around 7). She was transfused 2 units of pRBCs and admitted to the ICU.     After arrival to the ICU, she was very agitated and complaining of shortness of breath. She improved with Dilaudid and the initiation of a Precedex drip. She was given an additional unit of pRBCs.    Hemoglobin decreased on the early morning of 12/15 to 7 and she received 1 unit of pRBCs.     Endoscopy was cancelled on 12/16 secondary to worsening respiratory status. Steroids were added in addition to bronchodilators.     Nutrition:   Diet: Liquid Diets; Clear Liquid; Texture: Regular Texture (IDDSI 7); Fluid Consistency: Thin (IDDSI 0)  Advance Directives:   Code Status and Medical Interventions:   Ordered at: 12/13/22 1927     Code Status (Patient has no pulse and is not breathing):    CPR (Attempt to  Resuscitate)     Medical Interventions (Patient has pulse or is breathing):    Full Support       Active Hospital Problems    Diagnosis    • **Severe anemia    • Anemia    • Hypotension, unspecified hypotension type    • Tobacco use disorder    • Hypothyroidism    • RA (rheumatoid arthritis) ([facility])    • COPD with acute exacerbation ([facility])    • Diabetes mellitus ([facility])        Assessment / Plan:    1. Continue ICU care.  2. High risk for worsening respiratory failure requiring bipap.   3. Continue to trend H/H with transfusion for hemoglobin < 7.   4. Continue PPI.   5. GI following.   6. Start Seroquel 50mg BID.    7. Wean Precedex as able.   8. Allow her to use her home inhalers.  9. Prednisone 40mg daily    10. Lasix 40mg IV x 1.    11. D/c IV fluids.   12. Continue thyroid replacement   13. AM labs and chest xray.     High level of risk due to:  severe exacerbation of chronic illness and illness with threat to life or bodily function.    Plan of care and goals reviewed during interdisciplinary rounds.  I discussed the patient's findings and my recommendations with patient, family and nursing staff      Prema Gil, DO    Intensive Care Medicine and Pulmonary Medicine

## 2022-12-17 NOTE — PLAN OF CARE
"Goal Outcome Evaluation:  Plan of Care Reviewed With: patient, sibling        Progress: no change  Outcome Evaluation: Pt cont to require NRB at times to help with oxygenation. Pt does cont to get anxious at times and hyperventilate. Sched seroquel added to MAR. Remains on precedex at 1.5. Diuresed with 40mg of Lasix again this shift with 3.5L UOP per wells. This has overall improved lung sounds and work of breathing. Pt is oriented but forgetful at times and has to be reminded of plan of care regularly. She is adamant that she go home soon and when educated on need for her O2 demands to be at baseline (or near) by d/c, pt states that \"they can give me an oxygen machine and breathalizer to take home.\" Overall, stable, K was replaced today, awaiting lab recheck at this time. afebrie. VALARIE Zelaya and sister, updated at bedside as well.  "

## 2022-12-17 NOTE — PROGRESS NOTES
Respiratory status remains tenuous.  We will continue to follow for timing of colonoscopy and EGD to evaluate microcytic anemia.

## 2022-12-18 ENCOUNTER — READMISSION MANAGEMENT (OUTPATIENT)
Dept: CALL CENTER | Facility: HOSPITAL | Age: 62
End: 2022-12-18

## 2022-12-18 ENCOUNTER — APPOINTMENT (OUTPATIENT)
Dept: GENERAL RADIOLOGY | Facility: HOSPITAL | Age: 62
End: 2022-12-18

## 2022-12-18 VITALS
TEMPERATURE: 98.6 F | BODY MASS INDEX: 16.75 KG/M2 | OXYGEN SATURATION: 66 % | SYSTOLIC BLOOD PRESSURE: 86 MMHG | WEIGHT: 100.53 LBS | HEART RATE: 108 BPM | RESPIRATION RATE: 22 BRPM | DIASTOLIC BLOOD PRESSURE: 53 MMHG | HEIGHT: 65 IN

## 2022-12-18 LAB
ANION GAP SERPL CALCULATED.3IONS-SCNC: 11 MMOL/L (ref 5–15)
BACTERIA SPEC AEROBE CULT: NORMAL
BACTERIA SPEC AEROBE CULT: NORMAL
BUN SERPL-MCNC: 10 MG/DL (ref 8–23)
BUN/CREAT SERPL: 23.8 (ref 7–25)
CALCIUM SPEC-SCNC: 8.3 MG/DL (ref 8.6–10.5)
CHLORIDE SERPL-SCNC: 110 MMOL/L (ref 98–107)
CO2 SERPL-SCNC: 19 MMOL/L (ref 22–29)
CREAT SERPL-MCNC: 0.42 MG/DL (ref 0.57–1)
DEPRECATED RDW RBC AUTO: 81 FL (ref 37–54)
EGFRCR SERPLBLD CKD-EPI 2021: 111.4 ML/MIN/1.73
ERYTHROCYTE [DISTWIDTH] IN BLOOD BY AUTOMATED COUNT: 30.8 % (ref 12.3–15.4)
GLUCOSE SERPL-MCNC: 146 MG/DL (ref 65–99)
HCT VFR BLD AUTO: 30.5 % (ref 34–46.6)
HCT VFR BLD AUTO: 30.6 % (ref 34–46.6)
HGB BLD-MCNC: 9.2 G/DL (ref 12–15.9)
HGB BLD-MCNC: 9.3 G/DL (ref 12–15.9)
MAGNESIUM SERPL-MCNC: 1.8 MG/DL (ref 1.6–2.4)
MCH RBC QN AUTO: 24.8 PG (ref 26.6–33)
MCHC RBC AUTO-ENTMCNC: 30.2 G/DL (ref 31.5–35.7)
MCV RBC AUTO: 82.2 FL (ref 79–97)
PHOSPHATE SERPL-MCNC: 2.5 MG/DL (ref 2.5–4.5)
PLATELET # BLD AUTO: 63 10*3/MM3 (ref 140–450)
POTASSIUM SERPL-SCNC: 3.7 MMOL/L (ref 3.5–5.2)
RBC # BLD AUTO: 3.71 10*6/MM3 (ref 3.77–5.28)
SODIUM SERPL-SCNC: 140 MMOL/L (ref 136–145)
WBC NRBC COR # BLD: 10.16 10*3/MM3 (ref 3.4–10.8)

## 2022-12-18 PROCEDURE — 99233 SBSQ HOSP IP/OBS HIGH 50: CPT | Performed by: INTERNAL MEDICINE

## 2022-12-18 PROCEDURE — 80048 BASIC METABOLIC PNL TOTAL CA: CPT | Performed by: INTERNAL MEDICINE

## 2022-12-18 PROCEDURE — 83735 ASSAY OF MAGNESIUM: CPT | Performed by: INTERNAL MEDICINE

## 2022-12-18 PROCEDURE — 71045 X-RAY EXAM CHEST 1 VIEW: CPT

## 2022-12-18 PROCEDURE — 25010000002 FUROSEMIDE PER 20 MG: Performed by: INTERNAL MEDICINE

## 2022-12-18 PROCEDURE — 0 MAGNESIUM SULFATE 4 GM/100ML SOLUTION

## 2022-12-18 PROCEDURE — 94799 UNLISTED PULMONARY SVC/PX: CPT

## 2022-12-18 PROCEDURE — 85018 HEMOGLOBIN: CPT | Performed by: INTERNAL MEDICINE

## 2022-12-18 PROCEDURE — 85027 COMPLETE CBC AUTOMATED: CPT | Performed by: INTERNAL MEDICINE

## 2022-12-18 PROCEDURE — 85014 HEMATOCRIT: CPT | Performed by: INTERNAL MEDICINE

## 2022-12-18 PROCEDURE — 84100 ASSAY OF PHOSPHORUS: CPT | Performed by: INTERNAL MEDICINE

## 2022-12-18 PROCEDURE — 63710000001 PREDNISONE PER 1 MG: Performed by: INTERNAL MEDICINE

## 2022-12-18 RX ORDER — FUROSEMIDE 10 MG/ML
40 INJECTION INTRAMUSCULAR; INTRAVENOUS EVERY 12 HOURS
Status: DISCONTINUED | OUTPATIENT
Start: 2022-12-18 | End: 2022-12-18 | Stop reason: HOSPADM

## 2022-12-18 RX ORDER — MAGNESIUM SULFATE HEPTAHYDRATE 40 MG/ML
4 INJECTION, SOLUTION INTRAVENOUS AS NEEDED
Status: DISCONTINUED | OUTPATIENT
Start: 2022-12-18 | End: 2022-12-18 | Stop reason: HOSPADM

## 2022-12-18 RX ORDER — MAGNESIUM SULFATE HEPTAHYDRATE 40 MG/ML
2 INJECTION, SOLUTION INTRAVENOUS AS NEEDED
Status: DISCONTINUED | OUTPATIENT
Start: 2022-12-18 | End: 2022-12-18 | Stop reason: HOSPADM

## 2022-12-18 RX ADMIN — GLYCOPYRROLATE AND FORMOTEROL FUMARATE 2 SPRAY: 9; 4.8 AEROSOL, METERED RESPIRATORY (INHALATION) at 08:36

## 2022-12-18 RX ADMIN — DEXMEDETOMIDINE HYDROCHLORIDE 1.5 MCG/KG/HR: 4 INJECTION, SOLUTION INTRAVENOUS at 02:32

## 2022-12-18 RX ADMIN — BUPROPION HYDROCHLORIDE 150 MG: 150 TABLET, FILM COATED, EXTENDED RELEASE ORAL at 08:37

## 2022-12-18 RX ADMIN — NICOTINE 1 PATCH: 14 PATCH, EXTENDED RELEASE TRANSDERMAL at 08:41

## 2022-12-18 RX ADMIN — QUETIAPINE FUMARATE 50 MG: 25 TABLET ORAL at 08:37

## 2022-12-18 RX ADMIN — Medication 10 ML: at 08:36

## 2022-12-18 RX ADMIN — PANTOPRAZOLE SODIUM 40 MG: 40 INJECTION, POWDER, FOR SOLUTION INTRAVENOUS at 08:36

## 2022-12-18 RX ADMIN — IPRATROPIUM BROMIDE AND ALBUTEROL SULFATE 3 ML: .5; 3 SOLUTION RESPIRATORY (INHALATION) at 04:19

## 2022-12-18 RX ADMIN — THYROID, PORCINE 90 MG: 30 TABLET ORAL at 08:45

## 2022-12-18 RX ADMIN — FUROSEMIDE 40 MG: 10 INJECTION, SOLUTION INTRAMUSCULAR; INTRAVENOUS at 09:43

## 2022-12-18 RX ADMIN — MULTIVITAMIN TABLET 1 TABLET: TABLET at 08:37

## 2022-12-18 RX ADMIN — DEXMEDETOMIDINE HYDROCHLORIDE 1.3 MCG/KG/HR: 4 INJECTION, SOLUTION INTRAVENOUS at 08:45

## 2022-12-18 RX ADMIN — POTASSIUM & SODIUM PHOSPHATES POWDER PACK 280-160-250 MG 2 PACKET: 280-160-250 PACK at 05:25

## 2022-12-18 RX ADMIN — GABAPENTIN 800 MG: 400 CAPSULE ORAL at 05:24

## 2022-12-18 RX ADMIN — MAGNESIUM SULFATE HEPTAHYDRATE 4 G: 40 INJECTION, SOLUTION INTRAVENOUS at 05:24

## 2022-12-18 RX ADMIN — PREDNISONE 40 MG: 20 TABLET ORAL at 08:37

## 2022-12-18 NOTE — OUTREACH NOTE
Prep Survey    Flowsheet Row Responses   [facility] patient discharged from? [place]   Is LACE score < 7 ? No   Eligibility [facility]   Date of Admission 12/13/22   Date of Discharge 12/18/22   Discharge Disposition Home or Self Care   Discharge diagnosis severe anemia (hgb 2.5), COPD with acute exacerbation, hypotnesion, Rheumatoid arthritis, DM   Does the patient have one of the following disease processes/diagnoses(primary or secondary)? COPD   Does the patient have Home health ordered? No   Is there a DME ordered? No   Comments regarding appointments has not been keeping F/U appts, admit for same s/s 1 month ago but signed out AMA   General alerts for this patient patient left AMA   Prep survey completed? Yes          LIVAN ANDERSON - Registered Nurse

## 2022-12-18 NOTE — SIGNIFICANT NOTE
Dr. Gil informed of patients final decision to leave AMA. Charge Nurse Anna and security transporting patient out via wheelchair

## 2022-12-18 NOTE — PROGRESS NOTES
INTENSIVIST   PROGRESS NOTE     Hospital:  LOS: 5 days     Ms. Bobbi Du, 62 y.o. female is followed for a Chief Complaint of: Anemia      Subjective   S     Interval History:  Off Precedex this AM. Insisting that she is leaving AMA today. She is still on high amounts of supplemental oxygen.      The patient's relevant past medical, surgical and social history were reviewed and updated in Epic as appropriate.      ROS:   Constitutional: Negative for fever.   Respiratory: Positive for dyspnea.   Cardiovascular: Negative for chest pain.   Gastrointestinal: Negative for  nausea, vomiting and diarrhea.     Objective   O     Vitals:  Temp  Min: 98.4 °F (36.9 °C)  Max: 99.9 °F (37.7 °C)  BP  Min: 86/53  Max: 160/108  Pulse  Min: 64  Max: 111  Resp  Min: 20  Max: 26  SpO2  Min: 66 %  Max: 100 % Flow (L/min)  Min: 6  Max: 15    Intake/Ouptut 24 hrs (7:00AM - 6:59 AM)  Intake & Output (last 3 days)       12/15 0701  12/16 0700 12/16 0701  12/17 0700 12/17 0701  12/18 0700 12/18 0701  12/19 0700    P.O. 960 360 760     I.V. (mL/kg) 2276.2 (52.3) 2682.3 (61.7) 972.5 (21.3) 143 (3.1)    Blood        IV Piggyback   118     Total Intake(mL/kg) 3236.2 (74.4) 3042.3 (69.9) 1850.5 (40.6) 143 (3.1)    Urine (mL/kg/hr) 665 (0.6) 3605 (3.5) 3850 (3.5) 325 (1.5)    Stool  0 0 0    Total Output 665 3605 3850 325    Net +2571.2 -562.7 -1999.5 -182            Stool Unmeasured Occurrence  1 x 1 x 1 x          Medications (drips):  dexmedetomidine, Last Rate: Stopped (12/18/22 1000)  norepinephrine, Last Rate: Stopped (12/15/22 0828)          Physical Examination  Telemetry:  Normal sinus rhythm.   Constitutional:  Agitated.  Sitting up in bed on nasal cannula.    Eyes: No scleral icterus.   PERRL, EOM intact.    Neck:  Supple, FROM   Cardiovascular: Normal rate, regular and rhythm. Normal heart sounds.  No murmurs, gallop or rub.   Respiratory: No respiratory distress. Increased respiratory effort.  Diminished bilaterally. No  wheezing.     Abdominal:  Soft. No masses. Nontender. No distension. No HSM.   Extremities: No digital cyanosis. No clubbing.  Left BKA.    Skin: No rashes, lesions or ulcers   Neurological:   Alert and oriented.               Results from last 7 days   Lab Units 12/18/22  1004 12/18/22  0403 12/17/22  1957 12/17/22  0425 12/16/22  1207 12/16/22  0505   WBC 10*3/mm3  --  10.16  --  10.05  --  11.93*   HEMOGLOBIN g/dL 9.3* 9.2* 8.9* 8.5*   < > 8.6*   MCV fL  --  82.2  --  82.0  --  80.8   PLATELETS 10*3/mm3  --  63*  --  67*  --  84*    < > = values in this interval not displayed.     Results from last 7 days   Lab Units 12/18/22  0403 12/17/22  1810 12/17/22  0425 12/16/22  0505 12/16/22  0041   SODIUM mmol/L 140  --  141 144  --    POTASSIUM mmol/L 3.7 4.2 3.4* 3.6  --    CO2 mmol/L 19.0*  --  12.0* 15.0*  --    CREATININE mg/dL 0.42*  --  0.42* 0.42*  --    GLUCOSE mg/dL 146*  --  141* 121*  --    MAGNESIUM mg/dL 1.8  --  2.2 2.0  --    PHOSPHORUS mg/dL 2.5  --  3.4  --  2.8     Estimated Creatinine Clearance: 101.3 mL/min (A) (by C-G formula based on SCr of 0.42 mg/dL (L)).  Results from last 7 days   Lab Units 12/15/22  0549 12/13/22  0922   ALK PHOS U/L 66 73   BILIRUBIN mg/dL 1.0 0.2   ALT (SGPT) U/L 24 9   AST (SGOT) U/L 30 17       Results from last 7 days   Lab Units 12/13/22  1014   PH, ARTERIAL pH units 7.463*   PCO2, ARTERIAL mm Hg 26.9*   PO2 ART mm Hg 123.0*   FIO2 % 28       Images:  Imaging Results (Last 24 Hours)     Procedure Component Value Units Date/Time    XR Chest 1 View [[id number]] Collected: 12/18/22 0755     Updated: 12/18/22 0759    Narrative:      DATE OF EXAM: 12/18/2022 4:08 AM     PROCEDURE: XR CHEST 1 VW-     INDICATIONS: Hypoxia, follow up pulmonary edema; I95.9-Hypotension,  unspecified; R06.02-Shortness of breath; D64.9-Anemia, unspecified;  K92.2-Gastrointestinal hemorrhage, unspecified; I73.9-Peripheral  vascular disease, unspecified; D64.9-Anemia, unspecified     COMPARISON:  12/17/2022     TECHNIQUE: Single radiographic AP view of the chest was obtained.     FINDINGS:  Cardiac size is stable. Diffuse bilateral probably interstitial  infiltrates persist. These have increased slightly since the last study.  There are dependent bilateral pleural effusions.        Impression:         1. Extensive bilateral pulmonary infiltrates presumably representing  pulmonary edema which have increased slightly since the last study.  2. Bilateral dependent pleural effusions, unchanged.     This report was finalized on 12/18/2022 7:56 AM by Rudi Dumont MD.               Results: Reviewed.  I reviewed the patient's new laboratory and imaging results.  I independently reviewed the patient's new images.    Medications: Reviewed.    Assessment & Plan   A / P     Ms. Du is a 63yo F with a history of PAD s/p failed revascularization attempts with ultimate left BKA and severe disease on the right, T2DM with neuropathy, hypothyroidism, chronic pain, COPD, and iron deficiency anemia who presented to [facility] on 12/13 with hypotension and shortness of breath. She has been followed for iron deficiency anemia but has left AMA on multiple occasions and failed to show up for diagnostic testing.     In the ED, she was noted to have a hemoglobin of 2.5 (normally around 7). She was transfused 2 units of pRBCs and admitted to the ICU.     After arrival to the ICU, she was very agitated and complaining of shortness of breath. She improved with Dilaudid and the initiation of a Precedex drip. She was given an additional unit of pRBCs.    Hemoglobin decreased on the early morning of 12/15 to 7 and she received 1 unit of pRBCs.     Endoscopy was cancelled on 12/16 secondary to worsening respiratory status. Steroids were added in addition to bronchodilators. She has been receiving diuretics.     Review of previous TTE shows normal EF and diastolic function with elevated RVSP indicating that she may have pulmonary hypertension.      Nutrition:   Diet: Liquid Diets; Clear Liquid; Texture: Regular Texture (IDDSI 7); Fluid Consistency: Thin (IDDSI 0)  Advance Directives:   Code Status and Medical Interventions:   Ordered at: 12/13/22 1927     Code Status (Patient has no pulse and is not breathing):    CPR (Attempt to Resuscitate)     Medical Interventions (Patient has pulse or is breathing):    Full Support       Active Hospital Problems    Diagnosis    • **Severe anemia    • Anemia    • Hypotension, unspecified hypotension type    • Tobacco use disorder    • Hypothyroidism    • RA (rheumatoid arthritis) ([facility])    • COPD with acute exacerbation ([facility])    • Diabetes mellitus ([facility])        Assessment / Plan:    1. The patient is determined to sign out AMA today. I have explained the risks of leaving AMA including death as she is currently on high levels of supplemental oxygen. She understands that she may die but wishes to leave anyway.   2. I have taken off her supplemental oxygen and her saturation is in the 60s. She does not have supplemental oxygen at home.   3. She needs further diuretics for volume overload in the setting of COPD but she is unwilling to stay.   4. She also needs endoscopy for continued iron deficiency anemia which she has never been able to have worked up as she has previously left AMA.   5. She is insistent that she will be fine with her inhalers at home.     I have explained the risks of leaving AMA. I have asked the patient if there is anything that we may do to help facilitate her staying in the hospital but she still wishes to leave AMA despite the risk of death.     Her sister is at bedside and also cannot convince her to stay. Her sister will not facilitate her leaving and refuses to drive her home and the patient has called a taxi.     She is high risk for respiratory failure with death.     High level of risk due to:  severe exacerbation of chronic illness and illness with threat to life or bodily function.    Plan  of care and goals reviewed during interdisciplinary rounds.  I discussed the patient's findings and my recommendations with patient, family and nursing staff      Prema Gil DO    Intensive Care Medicine and Pulmonary Medicine

## 2022-12-18 NOTE — DISCHARGE SUMMARY
AMA STATEMENT       Patient name: Bobbi Du  CSN: [id number]  MRN: [id number]  : 1960    Date of Admission: 2022  Date Patient left AMA:  2022    Admitting Physician: Mary Mora MD  Primary Care Provider: Yon Bond MD  Consults:   Lucy Alexander MD Gastroenterology     Admission Diagnosis: GI Bleed     Procedures:  None     History of Present Illness (copied from H&P note on 22):  Patient is a 62 y.o. 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  Her baseline hemoglobin runs around 7.  Unfortunately she has a history of leaving AMA and no showing for diagnostic testing.  She has not had a GI evaluation.  She presented to the emergency room very hypotensive and short of breath.  Her hemoglobin was 2.5 compared to 6.5 on .   She became extremely agitated in the emergency room and was given some Ativan.  She does not require supplemental oxygen at home.  She is on multiple inhalers for COPD.  She had a recent Medicare wellness evaluation and underwent a stress echocardiogram on  that revealed an EF of 51 to 55% and no EKG changes.  After receiving a unit of blood her blood pressure has improved to 124 systolic.  Her serum iron is only 10.  ABG revealed a pH of 7.46, PCO2 of 26 and PO2 of 123.  Blood sugar was 151 and creatinine 0.44.  She did have a serum bicarb of 18.  Troponin was less than 0.01.  CTA was performed and revealed no PE, thyroid surgically absent mildly enlarged left axillary lymph node secretions in the left mainstem bronchus moderate centrilobular emphysema without lung mass subacute or chronic deformity of the left eighth rib  fracture.  She received Protonix, Solu-Medrol, Ativan, DuoNeb, saline in the emergency room.  She also received 2 units of packed red cells in the emergency room (end of copied text).    Hospital Course:  Patient was admitted to ICU 2* issues discussed in the above HPI and continued on BID PPI and resuscitation with PRBC transfusions (received a total of 4 units). Due to continued hypotension she was initiated on Levophed. She additionally became increasingly agitated requiring Precedex infusion. H&H stabilized and she was evaluated by GI with plans to proceed with bidirectional endoscopy that Friday (12/16), however the evening prior to this she developed increasing FiO2 requirements / worsening respiratory failure requiring use of 6L NC with NRB mask over this (she refused to wear HFNC). Procedure was postponed pending improvement in respiratory status, and she was initiated on diuresis as she was felt to be likely volume overloaded 2* PRBC transfusions.     Unfortunately before she could undergo further evaluation and treatment, on the morning of 12/18/22, patient became adamant that she was going to leave the hospital AGAINST MEDICAL ADVICE. This is despite multiple discussions amongst differing members of the care team (see last progress note from Dr. Gil) trying to convince her to stay. She was deemed to have a normal mental status and full decisional capacity.  The patient understands their condition and the risks of leaving AMA, including but not limited to permanent disability and/or death. The patient's sister is also aware of her condition and desire to leave AMA.    The patient has been informed that she may return for care at any time, and was instructed to follow-up with their PCP as soon as possible.    Time spent: 15 minutes     Yoselyn Garrett DNP, APRN, AGACNP-BC  Pulmonary and Critical Care Medicine    CC: Yon Bond MD    Please note that portions of this note were completed with a voice  recognition program.

## 2022-12-18 NOTE — PLAN OF CARE
Goal Outcome Evaluation:  Plan of Care Reviewed With: patient           Outcome Evaluation: PT alert and oriented x4. PT received on 6LNC along with nonrebreather. PT able to wear just 6LNC most of the night until 0400, then nonrebreather was needed for oxygenation. PT also had an increase in work of breathing and required a neb treatment for wheezes. PT remains on precedex at 1.5-tolerating well. Pt had 350mL of UOP. PT still adamant about going home today, because she can only have clear liquids. VSS. AM labs drawn. Mag/phos replaced per protocol. See MAR.

## 2022-12-18 NOTE — SIGNIFICANT NOTE
"At 0800 patient stated  she \"is going home today\". RN informed patient that she is not well enough to go home. Education given on her current status and want would happen if she attempted to sign out AMA. OZE Garrett made aware.  round and made aware as well. Dr Gil at bedside. Lengthy discussion on her status and leaving hospital. Sister Nathalie at bedside as well. Patient upset with sister. Minerva conditions  explained. Dr. Gil instructed to remove patient from oxygen and if she geoff downs then we will proceed with other interventions. RN informed patient multiple times that if she would like her oxygen back on that RN would do that for her. RN informed patient she may change her mind at any time. RN asked with another RN Georges present that if the patient went unrepsonsive would she want us to perform CPR or intubate her. She said \"I don't want you to touch me, I dont want any of that\".     Charge nurse Anna and House Raegan Campbell talked to patient as well. Patient still wanting to go home. AMA paper signed.     Daughter and sister present.   "

## 2022-12-19 ENCOUNTER — TRANSITIONAL CARE MANAGEMENT TELEPHONE ENCOUNTER (OUTPATIENT)
Dept: CALL CENTER | Facility: HOSPITAL | Age: 62
End: 2022-12-19

## 2022-12-19 NOTE — OUTREACH NOTE
Call Center TCM Note    Flowsheet Row Responses   [facility] patient discharged from? [place]   Does the patient have one of the following disease processes/diagnoses(primary or secondary)? COPD   TCM attempt successful? Yes   Call start time 1234   Call end time 1245   General alerts for this patient patient left AMA   Discharge diagnosis severe anemia , COPD with acute exacerbation, hypotension, Rheumatoid arthritis, DM   Person spoke with today (if not patient) and relationship patient   Meds reviewed with patient/caregiver? Yes  [No new meds ordered at discharge, patient left AMA]   Does the patient have all medications ordered at discharge? N/A   Is the patient taking all medications as directed (includes completed medication regime)? No   What is preventing the patient from taking all medications as directed? Other  [Patient reports that she is out of her gabapentin and lisinopril/HCTZ]   Nursing Interventions --  [Message routed to office. ]   Medication comments Patient reports using her rescue inhaler.    Comments PCP Dr Bond. Patient declined to schedule PCP HFU appt with call today.    Does the patient have an appointment with their PCP within 7 days of discharge? No   Nursing Interventions Patient declined scheduling/rescheduling appointment at this time   Has home health visited the patient within 72 hours of discharge? N/A   Pulse Ox monitoring None   Did the patient receive a copy of their discharge instructions? No   What is the patient's perception of their health status since discharge? Same   Nursing Interventions Nurse provided patient education   If the patient is a current smoker, are they able to teach back resources for cessation? --  [current smoker]   Is the patient/caregiver able to teach back the hierarchy of who to call/visit for symptoms/problems? PCP, Specialist, Home health nurse, Urgent Care, ED, 911 No  [Advised of need to return to ER with increased shortness of breath,  increased weakness. Patient reports that she will come back to hospital in a couple of days. ]   TCM call completed? Yes   Call end time 1245   Would this patient benefit from a Referral to [facility] Social Work? No   Is the patient interested in additional calls from an ambulatory ?  NOTE:  applies to high risk patients requiring additional follow-up. No          Kate Solano RN    12/19/2022, 12:47 EST

## 2022-12-21 DIAGNOSIS — E11.42 TYPE 2 DIABETES MELLITUS WITH DIABETIC POLYNEUROPATHY, WITHOUT LONG-TERM CURRENT USE OF INSULIN: ICD-10-CM

## 2022-12-21 DIAGNOSIS — G54.6 PHANTOM PAIN FOLLOWING AMPUTATION OF LOWER LIMB: ICD-10-CM

## 2022-12-21 DIAGNOSIS — I10 PRIMARY HYPERTENSION: ICD-10-CM

## 2022-12-21 RX ORDER — LISINOPRIL AND HYDROCHLOROTHIAZIDE 12.5; 1 MG/1; MG/1
1 TABLET ORAL EVERY MORNING
Qty: 90 TABLET | Refills: 3 | Status: SHIPPED | OUTPATIENT
Start: 2022-12-21 | End: 2022-12-31 | Stop reason: SDUPTHER

## 2022-12-21 RX ORDER — GABAPENTIN 800 MG/1
800 TABLET ORAL 3 TIMES DAILY
Qty: 90 TABLET | Refills: 0 | Status: SHIPPED | OUTPATIENT
Start: 2022-12-21 | End: 2022-12-31 | Stop reason: SDUPTHER

## 2022-12-28 ENCOUNTER — READMISSION MANAGEMENT (OUTPATIENT)
Dept: CALL CENTER | Facility: HOSPITAL | Age: 62
End: 2022-12-28

## 2022-12-28 NOTE — OUTREACH NOTE
COPD/PN Week 2 Survey    Flowsheet Row Responses   [facility] patient discharged from? [place]   Does the patient have one of the following disease processes/diagnoses(primary or secondary)? COPD   Week 2 attempt successful? No   Unsuccessful attempts Attempt 1          JUAN ALBERTO STEINBERG - Registered Nurse

## 2022-12-31 ENCOUNTER — TELEPHONE (OUTPATIENT)
Dept: INTERNAL MEDICINE | Facility: CLINIC | Age: 62
End: 2022-12-31

## 2022-12-31 DIAGNOSIS — E11.42 TYPE 2 DIABETES MELLITUS WITH DIABETIC POLYNEUROPATHY, WITHOUT LONG-TERM CURRENT USE OF INSULIN: ICD-10-CM

## 2022-12-31 DIAGNOSIS — I10 PRIMARY HYPERTENSION: ICD-10-CM

## 2022-12-31 DIAGNOSIS — G54.6 PHANTOM PAIN FOLLOWING AMPUTATION OF LOWER LIMB: ICD-10-CM

## 2022-12-31 RX ORDER — GABAPENTIN 800 MG/1
800 TABLET ORAL 3 TIMES DAILY
Qty: 90 TABLET | Refills: 0 | Status: SHIPPED | OUTPATIENT
Start: 2022-12-31

## 2022-12-31 RX ORDER — LISINOPRIL AND HYDROCHLOROTHIAZIDE 12.5; 1 MG/1; MG/1
1 TABLET ORAL EVERY MORNING
Qty: 90 TABLET | Refills: 3 | Status: SHIPPED | OUTPATIENT
Start: 2022-12-31

## 2022-12-31 NOTE — TELEPHONE ENCOUNTER
On-call note: The patient called stating the pharmacy has not received her gabapentin and lisinopril/HCTZ refills.  Per review of her chart, these were faxed to the [facility] pharmacy on record on 12/21/2022, receipt confirmed by pharmacy.  The patient states she was told by the pharmacy that they do not have these prescriptions.  I have re-faxed them.

## 2023-01-04 ENCOUNTER — LAB (OUTPATIENT)
Dept: INTERNAL MEDICINE | Facility: CLINIC | Age: 63
End: 2023-01-04
Payer: MEDICARE

## 2023-01-04 ENCOUNTER — TELEMEDICINE (OUTPATIENT)
Dept: INTERNAL MEDICINE | Facility: CLINIC | Age: 63
End: 2023-01-04
Payer: MEDICARE

## 2023-01-04 ENCOUNTER — TELEPHONE (OUTPATIENT)
Dept: GASTROENTEROLOGY | Facility: CLINIC | Age: 63
End: 2023-01-04
Payer: MEDICARE

## 2023-01-04 DIAGNOSIS — D62 ANEMIA ASSOCIATED WITH ACUTE BLOOD LOSS: ICD-10-CM

## 2023-01-04 DIAGNOSIS — K11.20 PAROTIDITIS: ICD-10-CM

## 2023-01-04 DIAGNOSIS — D50.0 IRON DEFICIENCY ANEMIA DUE TO CHRONIC BLOOD LOSS: ICD-10-CM

## 2023-01-04 DIAGNOSIS — Z12.11 SCREENING FOR COLON CANCER: Primary | ICD-10-CM

## 2023-01-04 DIAGNOSIS — D50.0 IRON DEFICIENCY ANEMIA DUE TO CHRONIC BLOOD LOSS: Primary | ICD-10-CM

## 2023-01-04 LAB — INR PPP: 1 (ref 0.9–1.1)

## 2023-01-04 PROCEDURE — 85610 PROTHROMBIN TIME: CPT | Performed by: STUDENT IN AN ORGANIZED HEALTH CARE EDUCATION/TRAINING PROGRAM

## 2023-01-04 PROCEDURE — 99214 OFFICE O/P EST MOD 30 MIN: CPT | Performed by: STUDENT IN AN ORGANIZED HEALTH CARE EDUCATION/TRAINING PROGRAM

## 2023-01-04 PROCEDURE — 36416 COLLJ CAPILLARY BLOOD SPEC: CPT | Performed by: STUDENT IN AN ORGANIZED HEALTH CARE EDUCATION/TRAINING PROGRAM

## 2023-01-04 NOTE — ASSESSMENT & PLAN NOTE
- Ambulatory referral to ENT.   - Ordered lab work for CBC Auto Differential.  - Follow up in 2 weeks.

## 2023-01-04 NOTE — TELEPHONE ENCOUNTER
Bobbi Du  [address]  [place] [address]  1960        Patient will be having a EGD by Dr. Russ Murcia on January 9, 2023. The patient is needing cardiac clearance due to being on blood thinners. Please complete the following and fax back to the office.     Patient's was last seen in the office on:_____________________    Procedure/Test Performed:    _____ Stress Test       _____ Echocardiogram    _____ EKG     _____ Heart Cath    Based on the above test results and/or clinical evaluation it is my recommendation:    ____ Patient may proceed with the scheduled surgical procedure with an acceptable cardiac risk.    ____ Patient CAN NOT stop Plavix, Effient, Ticlid, Aspirin, Coumadin, Pradaxa, Xarelto, Eliquis, Savaysa, or Brilinta prior to procedure.     ____ Patient CAN stop Plavix, Effient, Ticlid, Aspirin, Coumadin, Pradaxa, Xarelto, Eliquis, Savaysa, or Brilinta  ______ days prior to procedure.    Please sign and date below.    Signature________________________________________   Date:_________________     Thank You    Mark I. Brunner, M.D.  ANA Nunez M.D. Mark A. Spurlin, M.D. Gregory M. Woolfolk, M.D. Laura Travis, ZOE Gallego, CINTIA Moss

## 2023-01-04 NOTE — TELEPHONE ENCOUNTER
Have the patient's stop clopidogrel 5 days prior to the colonoscopy and restart clopidogrel 1 day after the procedure.  Please let me know with any other questions.

## 2023-01-04 NOTE — ASSESSMENT & PLAN NOTE
- Ambulatory referral to Gastroenterology.  - Ambulatory referral for screening colonoscopy.  - Ordered lab work for CBC Auto Differential and Comprehensive Metabolic Panel.

## 2023-01-05 ENCOUNTER — READMISSION MANAGEMENT (OUTPATIENT)
Dept: CALL CENTER | Facility: HOSPITAL | Age: 63
End: 2023-01-05
Payer: MEDICARE

## 2023-01-05 NOTE — TELEPHONE ENCOUNTER
Pt reached advised of clinical message. Pt verbalized good understanding. Pt stated she was told by colonoscopy unit that they will reach her to let her know if colonoscopy will be inpatient or out patient.   Patient given number to gastro [phone] to call to confirm schedule. Office number given.

## 2023-01-05 NOTE — OUTREACH NOTE
COPD/PN Week 3 Survey    Flowsheet Row Responses   [facility] [place] patient discharged from? [place]   Does the patient have one of the following disease processes/diagnoses(primary or secondary)? COPD   Week 3 attempt successful? No   Unsuccessful attempts Attempt 1   General alerts for this patient patient left AMA   Discharge diagnosis severe anemia , COPD with acute exacerbation, hypotension, Rheumatoid arthritis, DM          STAR H - Registered Nurse

## 2023-01-06 LAB
ALBUMIN SERPL-MCNC: 3.6 G/DL (ref 3.8–4.8)
ALBUMIN/GLOB SERPL: 1.4 {RATIO} (ref 1.2–2.2)
ALP SERPL-CCNC: 111 IU/L (ref 44–121)
ALT SERPL-CCNC: 8 IU/L (ref 0–32)
APTT PPP: NORMAL S
AST SERPL-CCNC: 10 IU/L (ref 0–40)
BASOPHILS # BLD AUTO: NORMAL 10*3/UL
BILIRUB SERPL-MCNC: 0.2 MG/DL (ref 0–1.2)
BUN SERPL-MCNC: 7 MG/DL (ref 8–27)
BUN/CREAT SERPL: 15 (ref 12–28)
CALCIUM SERPL-MCNC: 9.4 MG/DL (ref 8.7–10.3)
CHLORIDE SERPL-SCNC: 101 MMOL/L (ref 96–106)
CO2 SERPL-SCNC: 25 MMOL/L (ref 20–29)
CREAT SERPL-MCNC: 0.47 MG/DL (ref 0.57–1)
EGFRCR SERPLBLD CKD-EPI 2021: 108 ML/MIN/1.73
EOSINOPHIL # BLD AUTO: NORMAL 10*3/UL
EOSINOPHIL NFR BLD AUTO: NORMAL %
FERRITIN SERPL-MCNC: 280 NG/ML (ref 15–150)
GLOBULIN SER CALC-MCNC: 2.6 G/DL (ref 1.5–4.5)
GLUCOSE SERPL-MCNC: 93 MG/DL (ref 70–99)
HAPTOGLOB SERPL-MCNC: 336 MG/DL (ref 37–355)
HCT VFR BLD AUTO: NORMAL %
HGB BLD-MCNC: NORMAL G/DL
INR PPP: NORMAL
LDH SERPL L TO P-CCNC: 220 IU/L (ref 119–226)
LYMPHOCYTES # BLD AUTO: NORMAL 10*3/UL
LYMPHOCYTES NFR BLD AUTO: NORMAL %
MONOCYTES NFR BLD AUTO: NORMAL %
NEUTROPHILS NFR BLD AUTO: NORMAL %
PLATELET # BLD AUTO: NORMAL 10*3/UL
POTASSIUM SERPL-SCNC: 5.5 MMOL/L (ref 3.5–5.2)
PROT SERPL-MCNC: 6.2 G/DL (ref 6–8.5)
PROTHROMBIN TIME: NORMAL S
RBC # BLD AUTO: NORMAL 10*6/UL
REQUEST PROBLEM: NORMAL
SODIUM SERPL-SCNC: 137 MMOL/L (ref 134–144)
SPECIMEN STATUS: NORMAL
WBC # BLD AUTO: NORMAL X10E3/UL

## 2023-01-08 ENCOUNTER — READMISSION MANAGEMENT (OUTPATIENT)
Dept: CALL CENTER | Facility: HOSPITAL | Age: 63
End: 2023-01-08
Payer: MEDICARE

## 2023-01-08 ENCOUNTER — TELEPHONE (OUTPATIENT)
Dept: GASTROENTEROLOGY | Facility: OTHER | Age: 63
End: 2023-01-08
Payer: MEDICARE

## 2023-01-08 RX ORDER — ONDANSETRON 4 MG/1
4 TABLET, FILM COATED ORAL EVERY 8 HOURS PRN
Qty: 20 TABLET | Refills: 0 | Status: SHIPPED | OUTPATIENT
Start: 2023-01-08

## 2023-01-08 NOTE — OUTREACH NOTE
COPD/PN Week 3 Survey    Flowsheet Row Responses   [facility] patient discharged from? [place]   Does the patient have one of the following disease processes/diagnoses(primary or secondary)? COPD   Week 3 attempt successful? Yes   Call start time 0841   Call end time 0852   Medication alerts for this patient recent antibiotic for her throat.   Medication comments Inhaler prn.   Comments regarding appointments Pt reports following up with ENT recently.    What is the patient's perception of their health status since discharge? Improving   Week 3 call completed? Yes   Wrap up additional comments Pt reports improvement. Pt states has followed up with ENT due to a throat infection. Pt also states has had repeat blood work showing no anemia at this time. Pt reports no sob at this time. Pt does monitor 02 sats with sats 94% on this day.          KULWINDER PERSON - Registered Nurse

## 2023-01-08 NOTE — TELEPHONE ENCOUNTER
Patient called this morning complaining of nausea.  She is requesting a prescription for Zofran.  This was sent to her pharmacy.

## 2023-01-09 ENCOUNTER — OUTSIDE FACILITY SERVICE (OUTPATIENT)
Dept: GASTROENTEROLOGY | Facility: CLINIC | Age: 63
End: 2023-01-09
Payer: MEDICARE

## 2023-01-09 ENCOUNTER — TELEPHONE (OUTPATIENT)
Dept: INTERNAL MEDICINE | Facility: CLINIC | Age: 63
End: 2023-01-09
Payer: MEDICARE

## 2023-01-09 DIAGNOSIS — D50.0 IRON DEFICIENCY ANEMIA DUE TO CHRONIC BLOOD LOSS: Primary | ICD-10-CM

## 2023-01-09 PROCEDURE — 45385 COLONOSCOPY W/LESION REMOVAL: CPT | Performed by: INTERNAL MEDICINE

## 2023-01-09 PROCEDURE — 88305 TISSUE EXAM BY PATHOLOGIST: CPT

## 2023-01-09 NOTE — TELEPHONE ENCOUNTER
Pt reached and advised of clinical message from PCP. Pt verbalized good understanding, stated she will come in on Wednesday to have labs drawn.

## 2023-01-09 NOTE — TELEPHONE ENCOUNTER
I am sorry for the confusion, however despite multiple labs being ordered, only 1 lab was sent and therefore we still need to evaluate the patient's hemoglobin level, which was the reason for her returning to the lab for additional evaluation.

## 2023-01-09 NOTE — TELEPHONE ENCOUNTER
Tried to reach Patient no answer left voicemail to return call.    HUB OK TO READ: Please tell patient that she needs to come in office to have some pending labs done.

## 2023-01-09 NOTE — TELEPHONE ENCOUNTER
Spoke to patient and notified of message. Patient stated she was just here recently for lab work, and wants to know if she has to come in again.

## 2023-01-09 NOTE — TELEPHONE ENCOUNTER
----- Message from Yon Bond MD sent at 1/9/2023 10:50 AM EST -----  That would be great, thanks so much.  We really need to know if her hemoglobin is continuing to drop to ensure that she doesn't need another transfusion.  Thanks!  ----- Message -----  From: Marga De La Fuente CMA  Sent: 1/9/2023  10:43 AM EST  To: MD Gina Santos, It looks like [facility] collected an INR in house not a send out. Would you like for me to have them come back in for it to be drawn.   ----- Message -----  From: Yon Bond MD  Sent: 1/8/2023   2:26 PM EST  To: CHLOÉ Steele:    Do you know why this wasn't processed in the lab?  Thanks!  ----- Message -----  From: Milagro Brooks MA  Sent: 1/4/2023  12:30 PM EST  To: Yon Bond MD

## 2023-01-10 ENCOUNTER — LAB REQUISITION (OUTPATIENT)
Dept: LAB | Facility: HOSPITAL | Age: 63
End: 2023-01-10
Payer: MEDICARE

## 2023-01-10 DIAGNOSIS — D50.0 IRON DEFICIENCY ANEMIA SECONDARY TO BLOOD LOSS (CHRONIC): ICD-10-CM

## 2023-01-10 DIAGNOSIS — K64.8 OTHER HEMORRHOIDS: ICD-10-CM

## 2023-01-10 DIAGNOSIS — D12.5 BENIGN NEOPLASM OF SIGMOID COLON: ICD-10-CM

## 2023-01-10 DIAGNOSIS — Z12.11 ENCOUNTER FOR SCREENING FOR MALIGNANT NEOPLASM OF COLON: ICD-10-CM

## 2023-01-10 DIAGNOSIS — D50.9 IRON DEFICIENCY ANEMIA, UNSPECIFIED: ICD-10-CM

## 2023-01-11 ENCOUNTER — LAB (OUTPATIENT)
Dept: INTERNAL MEDICINE | Facility: CLINIC | Age: 63
End: 2023-01-11
Payer: MEDICARE

## 2023-01-11 DIAGNOSIS — D50.0 IRON DEFICIENCY ANEMIA DUE TO CHRONIC BLOOD LOSS: ICD-10-CM

## 2023-01-11 DIAGNOSIS — D62 ANEMIA ASSOCIATED WITH ACUTE BLOOD LOSS: Primary | ICD-10-CM

## 2023-01-11 LAB — REF LAB TEST METHOD: NORMAL

## 2023-01-12 LAB
BASOPHILS # BLD AUTO: 0.2 X10E3/UL (ref 0–0.2)
BASOPHILS NFR BLD AUTO: 2 %
EOSINOPHIL # BLD AUTO: 0.3 X10E3/UL (ref 0–0.4)
EOSINOPHIL NFR BLD AUTO: 3 %
ERYTHROCYTE [DISTWIDTH] IN BLOOD BY AUTOMATED COUNT: 25.9 % (ref 11.7–15.4)
FERRITIN SERPL-MCNC: 198 NG/ML (ref 15–150)
FOLATE SERPL-MCNC: 4.1 NG/ML
HCT VFR BLD AUTO: 34 % (ref 34–46.6)
HGB BLD-MCNC: 10.6 G/DL (ref 11.1–15.9)
IMM GRANULOCYTES # BLD AUTO: 0 X10E3/UL (ref 0–0.1)
IMM GRANULOCYTES NFR BLD AUTO: 1 %
IRON SATN MFR SERPL: 22 % (ref 15–55)
IRON SERPL-MCNC: 57 UG/DL (ref 27–139)
LYMPHOCYTES # BLD AUTO: 2.2 X10E3/UL (ref 0.7–3.1)
LYMPHOCYTES NFR BLD AUTO: 27 %
MCH RBC QN AUTO: 26.2 PG (ref 26.6–33)
MCHC RBC AUTO-ENTMCNC: 31.2 G/DL (ref 31.5–35.7)
MCV RBC AUTO: 84 FL (ref 79–97)
MONOCYTES # BLD AUTO: 0.4 X10E3/UL (ref 0.1–0.9)
MONOCYTES NFR BLD AUTO: 5 %
MORPHOLOGY BLD-IMP: ABNORMAL
NEUTROPHILS # BLD AUTO: 4.9 X10E3/UL (ref 1.4–7)
NEUTROPHILS NFR BLD AUTO: 62 %
PLATELET # BLD AUTO: 348 X10E3/UL (ref 150–450)
RBC # BLD AUTO: 4.05 X10E6/UL (ref 3.77–5.28)
RETICS/RBC NFR AUTO: 2.8 % (ref 0.6–2.6)
TIBC SERPL-MCNC: 258 UG/DL (ref 250–450)
TRANSFERRIN SERPL-MCNC: 218 MG/DL (ref 192–364)
UIBC SERPL-MCNC: 201 UG/DL (ref 118–369)
WBC # BLD AUTO: 7.9 X10E3/UL (ref 3.4–10.8)

## 2023-01-13 ENCOUNTER — TELEPHONE (OUTPATIENT)
Dept: INTERNAL MEDICINE | Facility: CLINIC | Age: 63
End: 2023-01-13

## 2023-01-13 NOTE — TELEPHONE ENCOUNTER
Left voice mail message for Pt to call office for message. Office number given.     Hub please relay message  Since we had previously referred this patient to an interventional pain specialist, they planned on continuing the long-term and short-term medications for improvement of her pain.  Please have her discuss with the specialist.  If she has not established with them, please let me know and we can set up that appointment in addition to bridging her with additional medications.  Thanks.

## 2023-01-13 NOTE — TELEPHONE ENCOUNTER
Caller: Bobbi Du    Relationship: Self    Best call back number: [phone]    What medication are you requesting: PAIN MEDICATION     What are your current symptoms: FACE IS SWOLLEN AND THROAT IS INFLAMED     How long have you been experiencing symptoms: A MONTH     Have you had these symptoms before:    [x] Yes  [] No    Have you been treated for these symptoms before:   [x] Yes  [] No    If a prescription is needed, what is your preferred pharmacy and phone number: [id number] - [place], [place] - [address] [facility] AT [facility] & [facility] [phone] [facility] [phone] FX     Additional notes:PATIENT STATES THAT SHE WAS SEEN BY THE ENT AND THEY DID NOT PRESCRIBE HER ANY MEDICATION TO HELP WITH THE PAIN. SHE WAS ONLY GIVEN AN ANTIBIOTIC. SHE WILL NOT BE ABLE TO SEE THE ENT UNTIL AFTER THE EGD SHE IS HAVING. SHE WOULD LIKE TO KNOW IF HER PCP CAN CALL THIS MEDICATION IN.

## 2023-01-13 NOTE — TELEPHONE ENCOUNTER
Since we had previously referred this patient to an interventional pain specialist, they planned on continuing the long-term and short-term medications for improvement of her pain.  Please have her discuss with the specialist.  If she has not established with them, please let me know and we can set up that appointment in addition to bridging her with additional medications.  Thanks.

## 2023-01-13 NOTE — TELEPHONE ENCOUNTER
Caller: Bobbi Du    Relationship: Self    Best call back number: [phone]    What test was performed: CT FROM THE NECK DOWN     When was the test performed: 12/13/2022    Where was the test performed: [facility]     Additional notes: PATIENT STATES THAT SHE HAS NOT SEEN THE RESULTS ON MYCHART

## 2023-01-16 ENCOUNTER — TELEPHONE (OUTPATIENT)
Dept: INTERNAL MEDICINE | Facility: CLINIC | Age: 63
End: 2023-01-16
Payer: MEDICARE

## 2023-01-16 NOTE — TELEPHONE ENCOUNTER
----- Message from Yon Bond MD sent at 1/15/2023  3:50 PM EST -----  Please relay the following message to the patient:    Your attached results shows that your anemia has significantly improved along with your iron storage and other vitamins that allow for your body to make new red blood cells, all of which is great news.  No additional interventions are needed based on these results.  I will continue to send you results as I receive them.  If you have any additional questions or concerns, please let us know.  We look forward to seeing you soon!

## 2023-01-16 NOTE — TELEPHONE ENCOUNTER
Tried to reach patient no answer left voicemail to return call.    HUB OK TO READ: Your attached results shows that your anemia has significantly improved along with your iron storage and other vitamins that allow for your body to make new red blood cells, all of which is great news.  No additional interventions are needed based on these results.  I will continue to send you results as I receive them.  If you have any additional questions or concerns, please let us know.  We look forward to seeing you soon!

## 2023-01-17 ENCOUNTER — TELEPHONE (OUTPATIENT)
Dept: INTERNAL MEDICINE | Facility: CLINIC | Age: 63
End: 2023-01-17
Payer: MEDICARE

## 2023-01-17 NOTE — TELEPHONE ENCOUNTER
Caller: Bobbi Du    Relationship: Self    Best call back number: [phone]    What is the best time to reach you: ANY TIME    Who are you requesting to speak with (clinical staff, provider,  specific staff member): CLINICAL STAFF/NANCY    Do you know the name of the person who called: BOBBI    What was the call regarding: PATIENT WOULD LIKE A CALL BACK FROM NANCY. SHE STATES THAT SHE LOST THE PHONE NUMBER FOR THE PAIN MANAGEMENT CLINIC SHE REFERRED HER TO.    Do you require a callback: YES

## 2023-01-18 ENCOUNTER — OUTSIDE FACILITY SERVICE (OUTPATIENT)
Dept: GASTROENTEROLOGY | Facility: CLINIC | Age: 63
End: 2023-01-18
Payer: MEDICARE

## 2023-01-18 PROCEDURE — 43239 EGD BIOPSY SINGLE/MULTIPLE: CPT | Performed by: INTERNAL MEDICINE

## 2023-01-19 ENCOUNTER — TELEPHONE (OUTPATIENT)
Dept: INTERNAL MEDICINE | Facility: CLINIC | Age: 63
End: 2023-01-19
Payer: MEDICARE

## 2023-01-20 NOTE — TELEPHONE ENCOUNTER
Pt reached and advised of clinical message. Pt added she had CT scan from neck down and nidle biopsy is scheduled for 1/23/2023. Pt stated she has Dr. Leon for 1/31/2023, so she is not sure what she is to do for pain and really need something for pain.

## 2023-01-20 NOTE — TELEPHONE ENCOUNTER
Thanks for letting me know.  Please have this patient see us in clinic to initiate new pain medications to determine what the appropriate interventions are required.  Thanks.

## 2023-01-21 ENCOUNTER — TELEPHONE (OUTPATIENT)
Dept: INTERNAL MEDICINE | Facility: CLINIC | Age: 63
End: 2023-01-21
Payer: MEDICARE

## 2023-01-21 NOTE — TELEPHONE ENCOUNTER
Patient called reporting pain near her ear which has been chronic in nature. Had CT scan done recently and plans for needle biopsy in 2 days. States she has been taking tylenol without improvement of pain. Discussed with patient that it is against policy to fill controlled substances after hours and if her pain is severe that she be seen in Urgent care or ER. Patient voiced understanding.    Dr. Ennis

## 2023-01-26 ENCOUNTER — OFFICE VISIT (OUTPATIENT)
Dept: INTERNAL MEDICINE | Facility: CLINIC | Age: 63
End: 2023-01-26
Payer: MEDICARE

## 2023-01-26 VITALS
DIASTOLIC BLOOD PRESSURE: 78 MMHG | RESPIRATION RATE: 20 BRPM | TEMPERATURE: 97.8 F | OXYGEN SATURATION: 95 % | BODY MASS INDEX: 16.37 KG/M2 | WEIGHT: 98.38 LBS | HEART RATE: 92 BPM | SYSTOLIC BLOOD PRESSURE: 124 MMHG

## 2023-01-26 DIAGNOSIS — D50.0 IRON DEFICIENCY ANEMIA DUE TO CHRONIC BLOOD LOSS: Primary | ICD-10-CM

## 2023-01-26 DIAGNOSIS — I70.219 INTERMITTENT CLAUDICATION DUE TO ATHEROSCLEROSIS OF ARTERY OF EXTREMITY: ICD-10-CM

## 2023-01-26 DIAGNOSIS — T87.9 BKA STUMP COMPLICATION: ICD-10-CM

## 2023-01-26 DIAGNOSIS — F17.200 TOBACCO USE DISORDER: ICD-10-CM

## 2023-01-26 DIAGNOSIS — J44.1 COPD WITH ACUTE EXACERBATION: ICD-10-CM

## 2023-01-26 DIAGNOSIS — K11.20 PAROTIDITIS: ICD-10-CM

## 2023-01-26 PROCEDURE — 99215 OFFICE O/P EST HI 40 MIN: CPT | Performed by: STUDENT IN AN ORGANIZED HEALTH CARE EDUCATION/TRAINING PROGRAM

## 2023-01-26 PROCEDURE — 99407 BEHAV CHNG SMOKING > 10 MIN: CPT | Performed by: STUDENT IN AN ORGANIZED HEALTH CARE EDUCATION/TRAINING PROGRAM

## 2023-01-26 RX ORDER — HYDROCODONE BITARTRATE AND ACETAMINOPHEN 7.5; 325 MG/1; MG/1
TABLET ORAL
COMMUNITY
Start: 2023-01-21

## 2023-01-26 RX ORDER — ASPIRIN 81 MG/1
1 TABLET ORAL DAILY
COMMUNITY
Start: 2022-09-08 | End: 2023-01-26

## 2023-01-26 RX ORDER — NALOXONE HYDROCHLORIDE 4 MG/.1ML
SPRAY NASAL
COMMUNITY
Start: 2022-10-27

## 2023-01-26 RX ORDER — CILOSTAZOL 100 MG/1
TABLET ORAL
COMMUNITY
Start: 2023-01-19

## 2023-01-26 RX ORDER — CEFDINIR 300 MG/1
CAPSULE ORAL
COMMUNITY
Start: 2023-01-21

## 2023-01-26 RX ORDER — OXYCODONE HYDROCHLORIDE 5 MG/1
5 TABLET ORAL EVERY 8 HOURS PRN
Qty: 14 TABLET | Refills: 0 | Status: SHIPPED | OUTPATIENT
Start: 2023-01-26

## 2023-01-26 NOTE — ASSESSMENT & PLAN NOTE
Bobbi Du  reports that she has been smoking cigarettes. She has a 22.00 pack-year smoking history. She has never used smokeless tobacco.. I have educated her on the risk of diseases from using tobacco products such as cancer, COPD and heart disease.     I advised her to quit and she is willing to quit. We have discussed the following method/s for tobacco cessation:  Counseling Prescription Medicaiton.  Together we have set a quit date for 1 month from today.  She will follow up with me in 1 month or sooner to check on her progress.    I spent 10 minutes counseling the patient.

## 2023-01-26 NOTE — ASSESSMENT & PLAN NOTE
I will refer the patient to the vascular surgeon for further evaluation. I will follow up with hematology. I also offered the patient to get a peripheral smear and then follow up with pathology in the future.

## 2023-01-26 NOTE — PROGRESS NOTES
Follow Up Office Visit      Date: 2023   Patient Name: Bobbi Du  : 1960   MRN: [id number]     Chief Complaint:    Chief Complaint   Patient presents with   • Follow-up     Anemia and scans        History of Present Illness: Bobbi Du is a 62 y.o. female who is here today to follow up with her anemia.     The patient reports that went to an ear, nose, and throat physician. She reports that her parotid paretic gland was enlarged. She notes she was put on amoxicillin 875 mg. She states it helped but did not fully resolved the symptoms. She then went back to the ENT and they did a scope where she was told that her tonsils were red and irritated. She says her ear still feels numb and is very painful. She has another appointment next week.    Recent Medical History  The patient had a recent hospitalization. She had low hemoglobin and received a blood transfusion in the ICU. The patient had a recent colonoscopy and found 2 small nodules. She states her next step is to follow up with a vascular surgeon to follow up about her 4 blockages in the leg. She is concerned about her appetite suppression and states she now weighs around 94 pounds. She states her Bupropion does not help with her tobacco use. The patient has an appointment with a pain management clinic on 23. The patient also states that she needs a new prosthetist.     Anemia  Between the recent blood transfusions and iron infusions, her level have increased since her last test. She states her father had leukemia.     Subjective      Review of Systems:   Review of Systems   Constitutional: Positive for fatigue. Negative for activity change, appetite change and fever.   Eyes: Negative for blurred vision, photophobia and visual disturbance.   Respiratory: Negative for cough, chest tightness and shortness of breath.    Cardiovascular: Negative for chest pain and palpitations.   Gastrointestinal: Negative for abdominal  distention, abdominal pain, blood in stool, constipation, diarrhea, nausea and vomiting.   Genitourinary: Negative for dysuria and hematuria.   Musculoskeletal: Positive for arthralgias. Negative for back pain and joint swelling.   Skin: Negative for rash and wound.   Neurological: Positive for weakness. Negative for headache and confusion.       I have reviewed the patients family history, social history, past medical history, past surgical history and have updated it as appropriate.     Medications:     Current Outpatient Medications:   •  albuterol sulfate  (90 Base) MCG/ACT inhaler, Inhale 2 puffs Every 4 (Four) Hours As Needed for Wheezing or Shortness of Air., Disp: 18 g, Rfl: 3  •  Horatio Thyroid 15 MG tablet, , Disp: , Rfl:   •  aspirin (aspirin) 81 MG EC tablet, Take 1 tablet by mouth Every Morning., Disp: 90 tablet, Rfl: 3  •  atorvastatin (Lipitor) 80 MG tablet, Take 1 tablet by mouth Every Night., Disp: 90 tablet, Rfl: 3  •  Bevespi Aerosphere 9-4.8 MCG/ACT aerosol, Inhale 2 puffs 2 (Two) Times a Day., Disp: , Rfl:   •  Calcium Carb-Cholecalciferol (Calcium 1000 + D) 1000-800 MG-UNIT tablet, calcium, Disp: , Rfl:   •  cefdinir (OMNICEF) 300 MG capsule, , Disp: , Rfl:   •  cilostazol (PLETAL) 100 MG tablet, , Disp: , Rfl:   •  clopidogrel (PLAVIX) 75 MG tablet, Take 75 mg by mouth Every Morning., Disp: , Rfl:   •  Cobalamin Combinations (B-12) 100-5000 MCG sublingual tablet, B12, Disp: , Rfl:   •  ferrous gluconate (FERGON) 324 MG tablet, Take 1 tablet by mouth Daily With Breakfast., Disp: 90 tablet, Rfl: 1  •  gabapentin (NEURONTIN) 800 MG tablet, Take 1 tablet by mouth 3 (Three) Times a Day., Disp: 90 tablet, Rfl: 0  •  guaiFENesin (MUCINEX) 600 MG 12 hr tablet, Take 2 tablets by mouth 2 (Two) Times a Day., Disp: 30 tablet, Rfl: 0  •  HYDROcodone-acetaminophen (NORCO) 7.5-325 MG per tablet, , Disp: , Rfl:   •  Iron-Vitamin C 100-250 MG tablet, iron  2 tab bid, Disp: , Rfl:   •   lisinopril-hydrochlorothiazide (PRINZIDE,ZESTORETIC) 10-12.5 MG per tablet, Take 1 tablet by mouth Every Morning., Disp: 90 tablet, Rfl: 3  •  metFORMIN (GLUCOPHAGE) 500 MG tablet, Take 500 mg by mouth Daily With Breakfast., Disp: , Rfl:   •  mupirocin (BACTROBAN) 2 % ointment, APPLY SMALL AMOUNT TOPICALLY TO THE AFFECTED AREA THREE TIMES DAILY, Disp: , Rfl:   •  naloxone (NARCAN) 4 MG/0.1ML nasal spray, , Disp: , Rfl:   •  nicotine (NICODERM CQ) 14 MG/24HR patch, Place 1 patch on the skin as directed by provider Daily., Disp: 28 each, Rfl: 2  •  ondansetron (Zofran) 4 MG tablet, Take 1 tablet by mouth Every 8 (Eight) Hours As Needed for Nausea or Vomiting for up to 20 doses., Disp: 20 tablet, Rfl: 0  •  Sod Picosulfate-Mag Ox-Cit Acd 10-3.5-12 MG-GM -GM/160ML solution, Take 160 mL by mouth Take As Directed for 2 doses., Disp: 320 mL, Rfl: 0  •  sodium chloride (Ocean Nasal Spray) 0.65 % nasal spray, 1 spray into the nostril(s) as directed by provider As Needed for Congestion., Disp: 30 mL, Rfl: 12  •  thyroid (ARMOUR) 15 MG tablet, Take 1 tablet by mouth Daily., Disp: 60 tablet, Rfl: 1  •  Thyroid 90 MG PO tablet, Take 1 tablet by mouth Every Morning., Disp: 60 tablet, Rfl: 1  •  Umeclidinium-Vilanterol (ANORO ELLIPTA) 62.5-25 MCG/ACT aerosol powder  inhaler, Anoro Ellipta 62.5 mcg-25 mcg/actuation powder for inhalation  DC'D 6/8/22, Disp: , Rfl:   •  umeclidinium-vilanterol (ANORO ELLIPTA) 62.5-25 MCG/INH aerosol powder  inhaler, Inhale 1 puff Daily., Disp: , Rfl:   •  oxyCODONE (Roxicodone) 5 MG immediate release tablet, Take 1 tablet by mouth Every 8 (Eight) Hours As Needed for Severe Pain., Disp: 14 tablet, Rfl: 0    Allergies:   Allergies   Allergen Reactions   • Bee Venom Anaphylaxis       Objective     Physical Exam: Please see above  Vital Signs:   Vitals:    01/26/23 1448   BP: 124/78   BP Location: Right leg   Patient Position: Sitting   Cuff Size: Adult   Pulse: 92   Resp: 20   Temp: 97.8 °F (36.6 °C)    TempSrc: Temporal   SpO2: 95%   Weight: 44.6 kg (98 lb 6 oz)   PainSc:   8     Body mass index is 16.37 kg/m².       Physical Exam  Vitals and nursing note reviewed.   Constitutional:       General: She is not in acute distress.     Appearance: Normal appearance. She is normal weight. She is not ill-appearing or toxic-appearing.   HENT:      Nose: No congestion or rhinorrhea.   Eyes:      General:         Right eye: No discharge.         Left eye: No discharge.      Conjunctiva/sclera: Conjunctivae normal.   Pulmonary:      Effort: Pulmonary effort is normal. No respiratory distress.   Abdominal:      General: Abdomen is flat. There is no distension.   Musculoskeletal:      Cervical back: Normal range of motion.   Skin:     Coloration: Skin is not jaundiced.      Findings: No rash.   Neurological:      General: No focal deficit present.      Mental Status: She is alert. Mental status is at baseline.      Coordination: Coordination normal.   Psychiatric:         Mood and Affect: Mood normal.         Behavior: Behavior normal.         Thought Content: Thought content normal.         Judgment: Judgment normal.         Procedures    Results:   Labs:   Hemoglobin A1C   Date Value Ref Range Status   09/08/2022 5.8 % Final   10/16/2019 5.40 4.80 - 5.60 % Final     TSH   Date Value Ref Range Status   09/08/2022 8.190 (H) 0.450 - 4.500 uIU/mL Final   10/15/2019 38.810 (H) 0.270 - 4.200 uIU/mL Final        Imaging:   No valid procedures specified.     Assessment / Plan      Assessment/Plan:   Problem List Items Addressed This Visit        ENT    Parotiditis    Current Assessment & Plan     I will refer the patient to the vascular surgeon for further evaluation. I will follow up with hematology. I also offered the patient to get a peripheral smear and then follow up with pathology in the future.          Relevant Medications    oxyCODONE (Roxicodone) 5 MG immediate release tablet       Hematology and Neoplasia    Iron  deficiency anemia due to chronic blood loss - Primary    Relevant Orders    Ambulatory Referral to Hematology       Musculoskeletal and Injuries    BKA stump complication (HCC)    Relevant Medications    oxyCODONE (Roxicodone) 5 MG immediate release tablet    Other Relevant Orders    Ambulatory Referral to Prosthetist       Pulmonary and Pneumonias    COPD with acute exacerbation (HCC)    Relevant Medications    Umeclidinium-Vilanterol (ANORO ELLIPTA) 62.5-25 MCG/ACT aerosol powder  inhaler       Tobacco    Tobacco use disorder    Current Assessment & Plan     Bobbi Du  reports that she has been smoking cigarettes. She has a 22.00 pack-year smoking history. She has never used smokeless tobacco.. I have educated her on the risk of diseases from using tobacco products such as cancer, COPD and heart disease.     I advised her to quit and she is willing to quit. We have discussed the following method/s for tobacco cessation:  Counseling Prescription Medicaiton.  Together we have set a quit date for 1 month from today.  She will follow up with me in 1 month or sooner to check on her progress.    I spent 10 minutes counseling the patient.                 Other    Intermittent claudication due to atherosclerosis of artery of extremity (HCC)    Relevant Medications    oxyCODONE (Roxicodone) 5 MG immediate release tablet         Follow Up:   Return in about 4 weeks (around 2/23/2023) for Recheck.    I spent 40 minutes caring for Bobbi on this date of service. This time includes time spent by me in the following activities: preparing for the visit, reviewing tests, obtaining and/or reviewing a separately obtained history, performing a medically appropriate examination and/or evaluation, counseling and educating the patient/family/caregiver, ordering medications, tests, or procedures, referring and communicating with other health care professionals, documenting information in the medical record, independently  interpreting results and communicating that information with the patient/family/caregiver and care coordination.     Transcribed from ambient dictation for Yon Bond MD by Olya Camilo.  01/26/23   17:02 EST    Patient or patient representative verbalized consent to the visit recording.  I have personally performed the services described in this document as transcribed by the above individual, and it is both accurate and complete.      Yon Bond MD  [facility]

## 2023-01-30 DIAGNOSIS — G54.6 PHANTOM PAIN FOLLOWING AMPUTATION OF LOWER LIMB: ICD-10-CM

## 2023-01-30 DIAGNOSIS — E11.42 TYPE 2 DIABETES MELLITUS WITH DIABETIC POLYNEUROPATHY, WITHOUT LONG-TERM CURRENT USE OF INSULIN: ICD-10-CM

## 2023-01-30 RX ORDER — GABAPENTIN 800 MG/1
800 TABLET ORAL 3 TIMES DAILY
Qty: 90 TABLET | Refills: 0 | OUTPATIENT
Start: 2023-01-30

## 2023-01-30 RX ORDER — THYROID,PORK 15 MG
15 TABLET ORAL DAILY
Qty: 60 TABLET | Refills: 1 | Status: SHIPPED | OUTPATIENT
Start: 2023-01-30

## 2023-05-01 ENCOUNTER — TELEPHONE (OUTPATIENT)
Dept: INTERNAL MEDICINE | Facility: CLINIC | Age: 63
End: 2023-05-01
Payer: MEDICARE

## 2023-05-01 RX ORDER — THYROID,PORK 15 MG
15 TABLET ORAL DAILY
Qty: 60 TABLET | Refills: 1 | Status: SHIPPED | OUTPATIENT
Start: 2023-05-01 | End: 2023-05-08 | Stop reason: SDUPTHER

## 2023-05-01 RX ORDER — THYROID,PORK 15 MG
15 TABLET ORAL DAILY
Qty: 60 TABLET | Refills: 1 | Status: SHIPPED | OUTPATIENT
Start: 2023-05-01 | End: 2023-05-01 | Stop reason: SDUPTHER

## 2023-05-04 ENCOUNTER — OFFICE VISIT (OUTPATIENT)
Dept: INTERNAL MEDICINE | Facility: CLINIC | Age: 63
End: 2023-05-04
Payer: MEDICARE

## 2023-05-04 VITALS
BODY MASS INDEX: 17.47 KG/M2 | HEART RATE: 78 BPM | WEIGHT: 105 LBS | TEMPERATURE: 98 F | RESPIRATION RATE: 18 BRPM | SYSTOLIC BLOOD PRESSURE: 108 MMHG | DIASTOLIC BLOOD PRESSURE: 56 MMHG

## 2023-05-04 DIAGNOSIS — K11.20 PAROTIDITIS: ICD-10-CM

## 2023-05-04 DIAGNOSIS — S88.119A AMPUTATED BELOW KNEE: ICD-10-CM

## 2023-05-04 DIAGNOSIS — I70.219 INTERMITTENT CLAUDICATION DUE TO ATHEROSCLEROSIS OF ARTERY OF EXTREMITY: ICD-10-CM

## 2023-05-04 DIAGNOSIS — F51.01 PRIMARY INSOMNIA: ICD-10-CM

## 2023-05-04 DIAGNOSIS — R73.03 PREDIABETES: Primary | ICD-10-CM

## 2023-05-04 DIAGNOSIS — E89.0 POSTOPERATIVE HYPOTHYROIDISM: ICD-10-CM

## 2023-05-04 DIAGNOSIS — F17.200 TOBACCO USE DISORDER: ICD-10-CM

## 2023-05-04 DIAGNOSIS — Z12.31 ENCOUNTER FOR SCREENING MAMMOGRAM FOR MALIGNANT NEOPLASM OF BREAST: ICD-10-CM

## 2023-05-04 DIAGNOSIS — T87.9 BKA STUMP COMPLICATION: ICD-10-CM

## 2023-05-04 DIAGNOSIS — D50.0 IRON DEFICIENCY ANEMIA DUE TO CHRONIC BLOOD LOSS: ICD-10-CM

## 2023-05-04 PROBLEM — J06.9 VIRAL URI: Status: RESOLVED | Noted: 2022-12-05 | Resolved: 2023-05-04

## 2023-05-04 LAB
ALBUMIN/CREATININE RATIO, URINE: <30
EXPIRATION DATE: NORMAL
EXPIRATION DATE: NORMAL
HBA1C MFR BLD: 6 %
Lab: NORMAL
Lab: NORMAL
POC CREATININE URINE: 100
POC MICROALBUMIN URINE: 10

## 2023-05-04 RX ORDER — CLOPIDOGREL BISULFATE 75 MG/1
TABLET ORAL EVERY 24 HOURS
COMMUNITY
End: 2023-05-04

## 2023-05-04 NOTE — PROGRESS NOTES
"    Follow Up Office Visit      Date: 2023   Patient Name: Bobbi Du  : 1960   MRN: [id number]     Chief Complaint:    Chief Complaint   Patient presents with   • Med Refill       History of Present Illness: Bobbi Du is a 62 y.o. female who is here today to follow up with chronic care management.    Anemia  The patient notes that she has been taking the iron pills she was prescribed for 1 week. She is still experiencing fatigue and dizzy spells.     Insomnia   She does not sleep well at night and thinks that contributes to her fatigue as well. She notes that she cannot sleep through the whole night and will end up taking \"cat naps\".     Parotid gland infection   The patient was schedule for a needle biopsy at [facility] but missed her appointment. She wants her neck to be examined because she thinks it feels differently then it has before. She denies seeing ENT. She is seeing pain management.     Hair loss   She notes that her hair has been falling out in clumps. She takes hair loss supplements as well as daily Centrum. She denies using any new hair products.     Leg amputation   The patient states that her prosthetic leg does not fit properly and keeps sliding off. She is not seeing anyone for prosthetics.     Subjective      Review of Systems:   Review of Systems   Constitutional: Positive for fatigue. Negative for activity change, appetite change and fever.   HENT:        Hair loss   Eyes: Negative for blurred vision, photophobia and visual disturbance.   Respiratory: Negative for cough, chest tightness and shortness of breath.    Cardiovascular: Negative for chest pain and palpitations.   Gastrointestinal: Negative for abdominal distention, abdominal pain, blood in stool, constipation, diarrhea, nausea and vomiting.   Genitourinary: Negative for dysuria and hematuria.   Musculoskeletal: Negative for arthralgias, back pain and joint swelling.   Skin: Negative for rash and " wound.   Neurological: Negative for weakness, headache and confusion.       I have reviewed the patients family history, social history, past medical history, past surgical history and have updated it as appropriate.     Medications:     Current Outpatient Medications:   •  [place] Thyroid 15 MG tablet, Take 1 tablet by mouth Daily., Disp: 60 tablet, Rfl: 1  •  atorvastatin (Lipitor) 80 MG tablet, Take 1 tablet by mouth Every Night., Disp: 90 tablet, Rfl: 3  •  clopidogrel (PLAVIX) 75 MG tablet, Take 1 tablet by mouth Every Morning., Disp: , Rfl:   •  ferrous gluconate (FERGON) 324 MG tablet, Take 1 tablet by mouth Daily With Breakfast., Disp: 90 tablet, Rfl: 1  •  gabapentin (NEURONTIN) 800 MG tablet, Take 1 tablet by mouth 3 (Three) Times a Day., Disp: 90 tablet, Rfl: 0  •  guaiFENesin (MUCINEX) 600 MG 12 hr tablet, Take 2 tablets by mouth 2 (Two) Times a Day., Disp: 30 tablet, Rfl: 0  •  HYDROcodone-acetaminophen (NORCO) 7.5-325 MG per tablet, , Disp: , Rfl:   •  lisinopril-hydrochlorothiazide (PRINZIDE,ZESTORETIC) 10-12.5 MG per tablet, Take 1 tablet by mouth Every Morning., Disp: 90 tablet, Rfl: 3  •  naloxone (NARCAN) 4 MG/0.1ML nasal spray, , Disp: , Rfl:   •  Umeclidinium-Vilanterol (ANORO ELLIPTA) 62.5-25 MCG/ACT aerosol powder  inhaler, Anoro Ellipta 62.5 mcg-25 mcg/actuation powder for inhalation  DC'D 6/8/22, Disp: , Rfl:   •  albuterol sulfate  (90 Base) MCG/ACT inhaler, Inhale 2 puffs Every 4 (Four) Hours As Needed for Wheezing or Shortness of Air., Disp: 18 g, Rfl: 3  •  aspirin (aspirin) 81 MG EC tablet, Take 1 tablet by mouth Every Morning., Disp: 90 tablet, Rfl: 3  •  Bevespi Aerosphere 9-4.8 MCG/ACT aerosol, Inhale 2 puffs 2 (Two) Times a Day., Disp: , Rfl:   •  Calcium Carb-Cholecalciferol (Calcium 1000 + D) 1000-800 MG-UNIT tablet, calcium, Disp: , Rfl:   •  cilostazol (PLETAL) 100 MG tablet, , Disp: , Rfl:   •  Cobalamin Combinations (B-12) 100-5000 MCG sublingual tablet, B12, Disp: ,  Rfl:   •  Melatonin 3 MG capsule, Take 1 capsule by mouth Every Night., Disp: 30 capsule, Rfl: 3  •  metFORMIN (GLUCOPHAGE) 500 MG tablet, Take 500 mg by mouth Daily With Breakfast., Disp: , Rfl:   •  mupirocin (BACTROBAN) 2 % ointment, APPLY SMALL AMOUNT TOPICALLY TO THE AFFECTED AREA THREE TIMES DAILY, Disp: , Rfl:   •  Sod Picosulfate-Mag Ox-Cit Acd 10-3.5-12 MG-GM -GM/160ML solution, Take 160 mL by mouth Take As Directed for 2 doses., Disp: 320 mL, Rfl: 0  •  sodium chloride (Ocean Nasal Spray) 0.65 % nasal spray, 1 spray into the nostril(s) as directed by provider As Needed for Congestion., Disp: 30 mL, Rfl: 12    Allergies:   Allergies   Allergen Reactions   • Bee Venom Anaphylaxis       Objective     Physical Exam: Please see above  Vital Signs:   Vitals:    05/04/23 1441   BP: 108/56   BP Location: Right arm   Patient Position: Sitting   Cuff Size: Adult   Pulse: 78   Resp: 18   Temp: 98 °F (36.7 °C)   TempSrc: Temporal   Weight: 47.6 kg (105 lb)   PainSc: 0-No pain     Body mass index is 17.47 kg/m².       Physical Exam  Vitals and nursing note reviewed.   Constitutional:       General: She is not in acute distress.     Appearance: Normal appearance. She is not ill-appearing or toxic-appearing.   HENT:      Nose: No congestion or rhinorrhea.   Eyes:      General:         Right eye: No discharge.         Left eye: No discharge.      Conjunctiva/sclera: Conjunctivae normal.   Pulmonary:      Effort: Pulmonary effort is normal. No respiratory distress.   Abdominal:      General: Abdomen is flat. There is no distension.   Musculoskeletal:      Cervical back: Normal range of motion.   Skin:     Coloration: Skin is not jaundiced.      Findings: No rash.   Neurological:      General: No focal deficit present.      Mental Status: She is alert. Mental status is at baseline.      Coordination: Coordination normal.      Gait: Gait normal.   Psychiatric:         Mood and Affect: Mood normal.         Behavior: Behavior  normal.         Thought Content: Thought content normal.         Judgment: Judgment normal.         Procedures    Results:   Labs:   Hemoglobin A1C   Date Value Ref Range Status   05/04/2023 6.0 % Final   10/16/2019 5.40 4.80 - 5.60 % Final     TSH   Date Value Ref Range Status   09/08/2022 8.190 (H) 0.450 - 4.500 uIU/mL Final   10/15/2019 38.810 (H) 0.270 - 4.200 uIU/mL Final        Imaging:   No valid procedures specified.     Assessment / Plan      Assessment/Plan:   Problem List Items Addressed This Visit        ENT    Parotiditis    Relevant Orders    Ambulatory Referral to ENT (Otolaryngology) (Completed)       Endocrine and Metabolic    Prediabetes - Primary    Relevant Orders    POC Glycosylated Hemoglobin (Hb A1C) (Completed)    POC Microalbumin (Completed)    Hypothyroidism    Relevant Orders    TSH       Hematology and Neoplasia    Iron deficiency anemia due to chronic blood loss    Relevant Orders    CBC & Differential    Ferritin       Musculoskeletal and Injuries    BKA stump complication    Relevant Orders    Ambulatory Referral to Prosthetist    Amputated below knee    Relevant Orders    Ambulatory Referral to Prosthetist       Sleep    Primary insomnia    Relevant Medications    Melatonin 3 MG capsule       Tobacco    Tobacco use disorder       Other    Intermittent claudication due to atherosclerosis of artery of extremity    Relevant Orders    Ambulatory Referral to Vascular Surgery   Other Visit Diagnoses     Encounter for screening mammogram for malignant neoplasm of breast        Relevant Orders    Mammo Screening Digital Tomosynthesis Bilateral With CAD          1. Iron deficiency   - CBC & Differential was ordered.   - Ferritin check was ordered.     2. Postoperative hypothyroidism   - TSH was ordered.     3. Parotiditis   - Referral to Otolaryngology was sent.     4. BKA stump complication   - Referral to orthopedics was sent regarding the patient's prosthetic leg.     5. Encounter for  screening for screening mammogram for malignant neoplasm of breast   - Referral for mammogram screening was sent.     6. Insomnia   - Melatonin 3 mg was prescribed.     7. Prediabetes   - POC Glycosylated Hemoglobin (Hb A1C) was ordered.   - POC Microalbumin was ordered.     8. Intermittent claudication due to atherosclerosis of artery of extremity  - Referral to vascular surgery was sent.     Transcribed from ambient dictation for Yon Bond MD by Divine Lopez  05/04/23  17:06 EDT        Follow Up:   Return in about 4 weeks (around 6/1/2023) for Recheck.          Yon Bond MD  [facility]

## 2023-05-05 LAB
BASOPHILS # BLD AUTO: 0.1 X10E3/UL (ref 0–0.2)
BASOPHILS NFR BLD AUTO: 2 %
CONV .: NORMAL
EOSINOPHIL # BLD AUTO: 0.4 X10E3/UL (ref 0–0.4)
EOSINOPHIL NFR BLD AUTO: 4 %
ERYTHROCYTE [DISTWIDTH] IN BLOOD BY AUTOMATED COUNT: 14.5 % (ref 11.7–15.4)
HCT VFR BLD AUTO: 32.9 % (ref 34–46.6)
HGB BLD-MCNC: 10.4 G/DL (ref 11.1–15.9)
IMM GRANULOCYTES # BLD AUTO: 0 X10E3/UL (ref 0–0.1)
IMM GRANULOCYTES NFR BLD AUTO: 0 %
LYMPHOCYTES # BLD AUTO: 3.3 X10E3/UL (ref 0.7–3.1)
LYMPHOCYTES NFR BLD AUTO: 41 %
Lab: NORMAL
MCH RBC QN AUTO: 25.6 PG (ref 26.6–33)
MCHC RBC AUTO-ENTMCNC: 31.6 G/DL (ref 31.5–35.7)
MCV RBC AUTO: 81 FL (ref 79–97)
MONOCYTES # BLD AUTO: 0.6 X10E3/UL (ref 0.1–0.9)
MONOCYTES NFR BLD AUTO: 7 %
NEUTROPHILS # BLD AUTO: 3.8 X10E3/UL (ref 1.4–7)
NEUTROPHILS NFR BLD AUTO: 46 %
PLATELET # BLD AUTO: 364 X10E3/UL (ref 150–450)
RBC # BLD AUTO: 4.06 X10E6/UL (ref 3.77–5.28)
WBC # BLD AUTO: 8.2 X10E3/UL (ref 3.4–10.8)

## 2023-05-08 ENCOUNTER — TELEPHONE (OUTPATIENT)
Dept: INTERNAL MEDICINE | Facility: CLINIC | Age: 63
End: 2023-05-08
Payer: MEDICARE

## 2023-05-08 ENCOUNTER — TELEPHONE (OUTPATIENT)
Dept: INTERNAL MEDICINE | Facility: CLINIC | Age: 63
End: 2023-05-08

## 2023-05-08 DIAGNOSIS — E89.0 POSTOPERATIVE HYPOTHYROIDISM: Primary | ICD-10-CM

## 2023-05-08 RX ORDER — THYROID,PORK 15 MG
15 TABLET ORAL DAILY
Qty: 60 TABLET | Refills: 1 | Status: SHIPPED | OUTPATIENT
Start: 2023-05-08 | End: 2023-05-14 | Stop reason: SDUPTHER

## 2023-05-08 RX ORDER — CEFDINIR 300 MG/1
CAPSULE ORAL EVERY 12 HOURS
COMMUNITY

## 2023-05-08 NOTE — TELEPHONE ENCOUNTER
Caller: Bobbi Du    Relationship: Self    Best call back number: [phone]    What test was performed: LABS    When was the test performed: 05/04/23    Where was the test performed: OUR OFFICE

## 2023-05-09 DIAGNOSIS — I10 PRIMARY HYPERTENSION: ICD-10-CM

## 2023-05-09 RX ORDER — THYROID,PORK 15 MG
15 TABLET ORAL DAILY
Qty: 60 TABLET | Refills: 1 | Status: CANCELLED | OUTPATIENT
Start: 2023-05-09

## 2023-05-09 NOTE — TELEPHONE ENCOUNTER
Caller: Bobbi    Relationship: Self    Best call back number: [phone]    Requested Prescriptions:   Requested Prescriptions     Pending Prescriptions Disp Refills   • albuterol sulfate  (90 Base) MCG/ACT inhaler 18 g 3     Sig: Inhale 2 puffs Every 4 (Four) Hours As Needed for Wheezing or Shortness of Air.   • [place] Thyroid 15 MG tablet 60 tablet 1     Sig: Take 1 tablet by mouth Daily.   • lisinopril-hydrochlorothiazide (PRINZIDE,ZESTORETIC) 10-12.5 MG per tablet 90 tablet 3     Sig: Take 1 tablet by mouth Every Morning.   • clopidogrel (PLAVIX) 75 MG tablet 30 tablet      Sig: Take 1 tablet by mouth Every Morning.        Pharmacy where request should be sent: [id number] - [place] [address] [facility] AT [facility] & [facility] [phone] [facility] [phone] FX     Last office visit with prescribing clinician: 5/4/2023   Last telemedicine visit with prescribing clinician: 5/8/2023   Next office visit with prescribing clinician: 6/1/2023     Additional details provided by patient:     Does the patient have less than a 3 day supply:  [x] Yes  [] No    Would you like a call back once the refill request has been completed: [x] Yes [] No    If the office needs to give you a call back, can they leave a voicemail: [x] Yes [] No    Connie Barbosa Rep   05/09/23 08:23 EDT

## 2023-05-11 ENCOUNTER — TELEPHONE (OUTPATIENT)
Dept: INTERNAL MEDICINE | Facility: CLINIC | Age: 63
End: 2023-05-11
Payer: MEDICARE

## 2023-05-11 NOTE — TELEPHONE ENCOUNTER
Caller: Bobbi Du    Relationship: Self    Best call back number:  [phone]    Requested Prescriptions:   AMOUR THYROID 90MG    Pharmacy where request should be sent: [facility] #[id number] - [place], [place] - [address]  AT [address] OF [facility] [address] & [facility] - [phone]  - [phone] FX     Last office visit with prescribing clinician: 5/4/2023   Last telemedicine visit with prescribing clinician: 5/9/2023   Next office visit with prescribing clinician: 6/1/2023     Additional details provided by patient: PATIENT SAID THAT SHE TAKES TWO DOSES OF THIS. SHE WAS NEEDING THE 90 MG DOSE. PATIENT SAID SHE FEELS HORRIBLE WITHOUT THIS. SHE HAS BEEN OUT FOR TWO DAYS    Does the patient have less than a 3 day supply:  [x] Yes  [] No    Would you like a call back once the refill request has been completed: [] Yes [] No    If the office needs to give you a call back, can they leave a voicemail: [] Yes [] No    Connie Major Rep   05/11/23 08:42 EDT

## 2023-05-11 NOTE — TELEPHONE ENCOUNTER
I need further clarification because when I review her chart I only see her taking the Amour thyroid 1 tablet by mouth once a day which is equal to the 15 mg    I do not see where she has been on anything else but the 15 mg dosage    Need more clarification on this message

## 2023-05-11 NOTE — TELEPHONE ENCOUNTER
Reached Pt, Pt stated she is taking two doses of [place] Thyroid 15 mg and 90 mg Reached pharmacy spoke with [facility] pharmacist who stated Pt was prescribed [place] Thyroid 90 mg tablet 4/3/2023 by Dr. Bond, 30 tablets with 0 refills to take one tablet PO every morning. New prescription is needed.

## 2023-05-12 RX ORDER — THYROID 90 MG/1
90 TABLET ORAL DAILY
Qty: 3 TABLET | Refills: 0 | Status: SHIPPED | OUTPATIENT
Start: 2023-05-12 | End: 2023-05-14 | Stop reason: SDUPTHER

## 2023-05-12 NOTE — TELEPHONE ENCOUNTER
Reached Pt advised medication request was sent to covering provider Dr. Contreras but refill is not able to be completed because of no  documentation of continuation or combination of both [place] Thyroid 15 mg tabet and 90 mg tablet. Pt stated she is on a combined 105 mg per Dr. Bond.  Pharmacy confirmed last filled prescription of 90 mg tablet was sent in by Dr. Bond 4/2/2023 with 0 refills.  Pt is needing an emergency fill until Monday when Dr. Bond returns to clarify dose.   Reached [facility] Where Was it Filmed [facility] drug [facility]. Pt was supplied with emergency fill of [place] Thyroid 90 mg tablet 5/10/2023.

## 2023-05-12 NOTE — TELEPHONE ENCOUNTER
Emergency bridge dose of 3 tablets sent to pharmacy because pharmacy did confirm she is actively on dose and then Dr friedman can review case on Monday, please inform patient and send message thread onto elder to review Monday, thank you

## 2023-05-14 DIAGNOSIS — E89.0 POSTOPERATIVE HYPOTHYROIDISM: ICD-10-CM

## 2023-05-14 RX ORDER — LISINOPRIL AND HYDROCHLOROTHIAZIDE 12.5; 1 MG/1; MG/1
1 TABLET ORAL EVERY MORNING
Qty: 30 TABLET | Refills: 0 | Status: SHIPPED | OUTPATIENT
Start: 2023-05-14

## 2023-05-14 RX ORDER — CLOPIDOGREL BISULFATE 75 MG/1
75 TABLET ORAL EVERY MORNING
Qty: 30 TABLET | Refills: 0 | Status: SHIPPED | OUTPATIENT
Start: 2023-05-14

## 2023-05-14 RX ORDER — ALBUTEROL SULFATE 90 UG/1
2 AEROSOL, METERED RESPIRATORY (INHALATION) EVERY 4 HOURS PRN
Qty: 18 G | Refills: 3 | Status: SHIPPED | OUTPATIENT
Start: 2023-05-14

## 2023-05-14 RX ORDER — LEVOTHYROXINE AND LIOTHYRONINE 57; 13.5 UG/1; UG/1
90 TABLET ORAL DAILY
Qty: 60 TABLET | Refills: 1 | Status: SHIPPED | OUTPATIENT
Start: 2023-05-14

## 2023-05-14 RX ORDER — THYROID,PORK 15 MG
15 TABLET ORAL DAILY
Qty: 60 TABLET | Refills: 1 | Status: SHIPPED | OUTPATIENT
Start: 2023-05-14

## 2023-05-15 NOTE — TELEPHONE ENCOUNTER
I spoke with the patient today and informed her that Dr. Bond refilled her [place] thyroid 15mg and 90mg.  If she has any further issues she will give me a call.

## 2023-05-15 NOTE — TELEPHONE ENCOUNTER
Spoke with patient and advised her that you would like her to recheck her TSH level. Since she ran out of medication she is going to wait until her appointment on 6/1/23 to have the TSH drawn.  Advised patient that Dr. Bond has refilled her [place] thyroid 15mg and 90mg.  Patient verbalized appreciation.

## 2023-05-15 NOTE — TELEPHONE ENCOUNTER
[place] thyroid refilled per request.  Patient did not complete TSH that was previously ordered in clinic, so this was reordered so that dose can be titrated.

## 2023-05-15 NOTE — TELEPHONE ENCOUNTER
We reviewed both medication list and medication history and did not see the new dosage and therefore clarification is needed from Dr. Bond

## 2023-05-15 NOTE — TELEPHONE ENCOUNTER
No worries, the synopsis indicates that she was overlapping doses, so I will continue this until we can titrate with new TSH levels.  Thanks.

## 2023-06-12 ENCOUNTER — TELEPHONE (OUTPATIENT)
Dept: INTERNAL MEDICINE | Facility: CLINIC | Age: 63
End: 2023-06-12
Payer: MEDICARE

## 2023-06-12 NOTE — TELEPHONE ENCOUNTER
2nd attempt to reach Pt. Pt not accepting calls at this time.    Hub please relay message to Pt  Thanks for letting me know.  Until we are able to discuss her symptoms with her, we will plan on meeting with her on 6/15/2023 to determine what types of test would be appropriate.  Thanks.

## 2023-06-12 NOTE — TELEPHONE ENCOUNTER
Thanks for letting me know.  Until we are able to discuss her symptoms with her, we will plan on meeting with her on 6/15/2023 to determine what types of test would be appropriate.  Thanks.

## 2023-06-12 NOTE — TELEPHONE ENCOUNTER
Caller: Bobbi Du    Relationship: Self    Best call back number: [phone]    What orders are you requesting (i.e. lab or imaging): BLOOD WORK FOR APPOINTMENT ON 06/15/2023    In what timeframe would the patient need to come in: BEFORE APPOINTMENT         Additional notes: THE PATIENT WOULD LIKE A BLOOD WORK ORDER SHE STATES THAT SHE IS GETTING SICK AGAIN AND SHE WANTS TO HAVE BLOOD WORK AS SOON AS POSSIBLE

## 2023-06-13 NOTE — TELEPHONE ENCOUNTER
3rd attempt to reach Pt    Hub please relay message to Pt  Thanks for letting me know.  Until we are able to discuss her symptoms with her, we will plan on meeting with her on 6/15/2023 to determine what types of test would be appropriate.  Thanks.

## 2023-07-11 PROBLEM — N95.1 MENOPAUSAL VASOMOTOR SYNDROME: Status: ACTIVE | Noted: 2023-07-11

## 2023-08-17 ENCOUNTER — OFFICE VISIT (OUTPATIENT)
Dept: CARDIAC SURGERY | Facility: CLINIC | Age: 63
End: 2023-08-17
Payer: MEDICARE

## 2023-08-17 VITALS
DIASTOLIC BLOOD PRESSURE: 78 MMHG | OXYGEN SATURATION: 99 % | SYSTOLIC BLOOD PRESSURE: 112 MMHG | HEART RATE: 61 BPM | WEIGHT: 108.4 LBS | BODY MASS INDEX: 18.06 KG/M2 | TEMPERATURE: 97.1 F | HEIGHT: 65 IN

## 2023-08-17 DIAGNOSIS — I70.221 CRITICAL LIMB ISCHEMIA OF RIGHT LOWER EXTREMITY: ICD-10-CM

## 2023-08-17 DIAGNOSIS — F17.200 TOBACCO USE DISORDER: Primary | ICD-10-CM

## 2023-08-17 DIAGNOSIS — I70.219 INTERMITTENT CLAUDICATION DUE TO ATHEROSCLEROSIS OF ARTERY OF EXTREMITY: ICD-10-CM

## 2023-08-17 DIAGNOSIS — I77.9 PERIPHERAL ARTERIAL OCCLUSIVE DISEASE: ICD-10-CM

## 2023-08-17 DIAGNOSIS — I77.9 PERIPHERAL ARTERIAL OCCLUSIVE DISEASE: Primary | ICD-10-CM

## 2023-08-17 PROCEDURE — 99214 OFFICE O/P EST MOD 30 MIN: CPT | Performed by: STUDENT IN AN ORGANIZED HEALTH CARE EDUCATION/TRAINING PROGRAM

## 2023-08-17 PROCEDURE — 3074F SYST BP LT 130 MM HG: CPT | Performed by: STUDENT IN AN ORGANIZED HEALTH CARE EDUCATION/TRAINING PROGRAM

## 2023-08-17 PROCEDURE — 3078F DIAST BP <80 MM HG: CPT | Performed by: STUDENT IN AN ORGANIZED HEALTH CARE EDUCATION/TRAINING PROGRAM

## 2023-08-17 NOTE — PROGRESS NOTES
08/17/2023  Patient Information  Bobbi Du                                                                                          [address] [facility] [address]   1960  'PCP/Referring Physician'  Yon Bond MD  [phone]  Yon Bond MD  [phone]  Chief Complaint   Patient presents with    Peripheral Vascular Disease     Referred back for claudication in right leg        History of Present Illness:  62-year-old woman with a complex medical history of spine surgery, diabetes with neuropathy, anemia, rheumatoid arthritis, back surgery, COPD, and peripheral arterial disease with left femoropopliteal bypass using right greater saphenous vein and ultimately left below the knee amputation, active tobacco abuse with over 50-pack-year smoking history, who returns to clinic today to the outpatient surgery clinic with complaint of pain and weakness at rest and with short distance ambulation.  She underwent left common iliac to left common femoral artery bypass and left femoral-popliteal bypass in 2017 by Dr. Toro.  She is mostly wheelchair-bound due to this.  She reports pain constantly, especially in the heel.  She is insensate in the right forefoot.  She reports phantom limb pain at the left amputated leg.  She is accompanied today by her daughter and grandchildren.  Her medical record notes that she would prefer not to be evaluated at the [facility].  From a note dated September 8, 2022, the patient underwent aortogram with runoffs on May 30, 2017 that revealed SFA occlusion on the right side.  In a note dated May 15, 2020, Dr. Manzanares Given documented his discussions with the patient, where he expressed a concern for high risk of limb loss, and the patient leaving AMA despite understanding the risk, and refusing further evaluation by vascular surgery.  She has multiple other notes documenting leaving AMA.       Patient Active Problem List   Diagnosis    Peripheral  arterial occlusive disease    Hyperlipidemia    Hypertension    COPD with acute exacerbation    Prediabetes    Fibromyalgia    Iron deficiency anemia due to chronic blood loss    Hypothyroidism    RA (rheumatoid arthritis)    Charcot's joint of foot    Phantom pain following amputation of lower limb    Tobacco use disorder    Nutritional deficiency    BKA stump complication    Intermittent claudication due to atherosclerosis of artery of extremity    Episode of recurrent major depressive disorder    Anemia    Severe anemia    Hypotension, unspecified hypotension type    Parotiditis    Amputated below knee    Primary insomnia    Menopausal vasomotor syndrome     Past Medical History:   Diagnosis Date    Acute respiratory alkalosis 10/16/2019    Anemia     Bilateral knee pain 07/20/2020    Charcot foot due to diabetes mellitus     RIGHT    Chronic pain     Claudication of lower extremity with history of revascularization 05/13/2020    Added automatically from request for surgery [id number]    Confusion 10/15/2019    COPD (chronic obstructive pulmonary disease)     COPD mixed type     Diabetes mellitus     Disease of thyroid gland     Encephalopathy 10/15/2019    Fibromyalgia     Fibromyalgia, primary     Gangrene 07/10/2017    Heart murmur     Hyperlipidemia     Hypertension     Hypothyroidism     Incarcerated right inguinal hernia 12/17/2019    Irreducible right femoral hernia 12/17/2019    Low back pain     PAD (peripheral artery disease)     Parotiditis 1/4/2023    Restless leg     Rheumatoid arthritis     S/P left iliofemoral and left femoropopliteal bypass surgery 7/10/17 07/10/2017    Sinusitis 10/16/2019    Tobacco abuse      Past Surgical History:   Procedure Laterality Date    BACK SURGERY  10/10/2019    L4-5 PLIF, laminectomy, foraminectomy -Dr. Chai Gerardo     CARDIAC CATHETERIZATION N/A 05/30/2017    Procedure: Angioplasty-peripheral;  Surgeon: Mayur Artis MD;  Location: [facility] CATH INVASIVE LOCATION;   Service:     CARPAL TUNNEL RELEASE      X 4    FOOT SURGERY Bilateral     X5    HERNIA REPAIR      INGUINAL HERNIA REPAIR Right 12/17/2019    Procedure: INGUINAL HERNIA REPAIR RIGHT WITH MESH;  Surgeon: Ramakrishna Al MD;  Location:  [facility] OR;  Service: General    INTERVENTIONAL RADIOLOGY PROCEDURE N/A 05/30/2017    Procedure: Abdominal Aortagram with Runoff;  Surgeon: Mayur Artis MD;  Location:  [facility] CATH INVASIVE LOCATION;  Service:     INTERVENTIONAL RADIOLOGY PROCEDURE N/A 05/15/2020    Procedure: LLE angiogram with atherectomy/stent;  Surgeon: Leighton Jones MD;  Location:  [facility] CATH INVASIVE LOCATION;  Service: Interventional Radiology;  Laterality: N/A;    KNEE SURGERY Left     LUMBAR FUSION  02/22/2010    L5/S1 fusion Dr. Chai Galvan     AZ IN-SITU VEIN BYPASS FEMORAL-POPLITEAL Left 07/10/2017    Procedure: LEFT ILIAC STENT, FEMORAL ENDARECTOMY, LEFT FEMORAL POPLITEAL BYPASS, POSS ILIAC FEMORAL BYPASS;  Surgeon: Mayur Artis MD;  Location:  [facility] OR;  Service: Vascular    AZ RT/LT HEART CATHETERS N/A 05/30/2017    Procedure: Percutaneous Coronary Intervention;  Surgeon: Mayur Artis MD;  Location:  [facility] CATH INVASIVE LOCATION;  Service: Cardiovascular    SPINE SURGERY      THYROIDECTOMY      TUBAL ABDOMINAL LIGATION         Current Outpatient Medications:     albuterol sulfate  (90 Base) MCG/ACT inhaler, Inhale 2 puffs Every 4 (Four) Hours As Needed for Wheezing or Shortness of Air., Disp: 18 g, Rfl: 3    [place] Thyroid 15 MG tablet, Take 1 tablet by mouth Daily., Disp: 60 tablet, Rfl: 1    aspirin (aspirin) 81 MG EC tablet, Take 1 tablet by mouth Every Morning., Disp: 90 tablet, Rfl: 3    atorvastatin (Lipitor) 80 MG tablet, Take 1 tablet by mouth Every Night., Disp: 90 tablet, Rfl: 3    Bevespi Aerosphere 9-4.8 MCG/ACT aerosol, Inhale 2 sprays 2 (Two) Times a Day., Disp: , Rfl:     clopidogrel (PLAVIX) 75 MG tablet, Take 1 tablet by mouth Every Morning., Disp: 30 tablet,  Rfl: 0    ferrous gluconate (FERGON) 324 MG tablet, Take 1 tablet by mouth Daily With Breakfast., Disp: 90 tablet, Rfl: 1    gabapentin (NEURONTIN) 800 MG tablet, Take 1 tablet by mouth 3 (Three) Times a Day., Disp: 90 tablet, Rfl: 0    HYDROcodone-acetaminophen (NORCO) 7.5-325 MG per tablet, , Disp: , Rfl:     lisinopril-hydrochlorothiazide (PRINZIDE,ZESTORETIC) 10-12.5 MG per tablet, Take 1 tablet by mouth Daily. (Patient taking differently: Take 1 tablet by mouth Daily. 10mg without the HCTZ), Disp: 30 tablet, Rfl: 1    Thyroid (ARMOUR THYROID) 90 MG PO tablet, Take 1 tablet by mouth Daily., Disp: 60 tablet, Rfl: 1    traZODone (DESYREL) 50 MG tablet, Take 1 tablet by mouth Every Night., Disp: 30 tablet, Rfl: 1    predniSONE (DELTASONE) 10 MG tablet, 6 tab PO daily for 3 days, then 5 tab PO daily for 3 days, then 4 tab PO daily for 3 days, then 3 tab PO daily for 3 days, then 2 tab PO daily x 3 days, then 1 tab PO daily for 3 days, then stop. (Patient not taking: Reported on 8/17/2023), Disp: 63 tablet, Rfl: 0  Allergies   Allergen Reactions    Bee Venom Anaphylaxis     Social History     Socioeconomic History    Marital status:     Number of children: 3   Tobacco Use    Smoking status: Every Day     Packs/day: 0.50     Years: 44.00     Pack years: 22.00     Types: Cigarettes    Smokeless tobacco: Never    Tobacco comments:     STATES STARTED SMOKING AT AGE 16. Is using Chantix now to try to quit.   Vaping Use    Vaping Use: Never used   Substance and Sexual Activity    Alcohol use: No    Drug use: No    Sexual activity: Defer     Family History   Problem Relation Age of Onset    COPD Mother     Anxiety disorder Mother     Arthritis Mother     Hyperlipidemia Mother     Thyroid disease Mother     Alzheimer's disease Father     Brain cancer Brother     Valvular heart disease Maternal Uncle      Review of Systems   Constitutional: Negative for chills, fever, malaise/fatigue, night sweats and weight loss.  "  HENT:  Negative for hearing loss, odynophagia and sore throat.    Cardiovascular:  Positive for claudication (right leg) and leg swelling (in right ankle). Negative for chest pain, dyspnea on exertion, orthopnea and palpitations.   Respiratory:  Negative for cough and hemoptysis.    Endocrine: Negative for cold intolerance, heat intolerance, polydipsia, polyphagia and polyuria.   Hematologic/Lymphatic: Does not bruise/bleed easily.   Skin:  Negative for itching and rash.   Musculoskeletal:  Negative for joint pain, joint swelling and myalgias.   Gastrointestinal:  Negative for abdominal pain, constipation, diarrhea, hematemesis, hematochezia, melena, nausea and vomiting.   Genitourinary:  Negative for dysuria, frequency and hematuria.   Neurological:  Negative for focal weakness, headaches, numbness and seizures.   Psychiatric/Behavioral:  Negative for suicidal ideas.    All other systems reviewed and are negative.  Vitals:    08/17/23 0909   BP: 112/78   BP Location: Right arm   Patient Position: Sitting   Pulse: 61   Temp: 97.1 øF (36.2 øC)   SpO2: 99%   Weight: 49.2 kg (108 lb 6.4 oz)   Height: 165.1 cm (65\")      Physical Exam  General anxious, hyperactive woman sitting in wheelchair  Head normocephalic, atraumatic  Eyes clear sclera  ENT no discharge, neck supple  Mouth mucous membranes moist  Cardiac tachycardic regular rhythm  Vascular right foot cool, absent right pedal pulses, palpable right femoral pulse   Pulmonary lungs clear to auscultation bilaterally  Abdomen soft nontender nondistended  Lymphatic no edema bilateral lower extremities  Neurological diminished sensation at right forefoot, able to flex/ext R toes and ankle  Psychological as above, anxious, hyperactive  Dermatological well-healed surgical scar at the left lower quadrant, left groin.  Well-healed scar at right groin at site of prior hernia repair with mesh  Musculoskeletal thin, status post left BKA     The ROS, past medical history, " surgical history, family history, social history, and vitals were reviewed by myself and corrected as needed.      Labs/Imaging:  I reviewed her arterial duplex from October 10, 2022, which is notable for multi level arterial disease and ankle-brachial index 0.4 concerning for severe peripheral arterial disease on the right side.     Assessment/Plan: 62-year-old woman with multiple medical comorbidities who presents with concern for ischemic rest pain of the right foot.  The patient reports that she has completed her CTA of the abdomen and pelvis with bilateral lower extremity runoffs however this result is not available to review in our system today.  I discussed with her the possibility that she has not completed this specific CT scan despite having multiple CT chest and other scans in the recent past.  The patient indicated to me and our office staff that she would complete this test as soon as possible.  She will again need to stop taking her oral iron supplement 1 week prior to this.  She has a history of COPD and active smoking and I advised her to quit smoking.  Despite her diagnosis of COPD and active tobacco abuse, she does not require home oxygen and therefore we will defer PFTs.  She should also have a hematology work-up for her anemia of unclear etiology, which I recommended months ago and has not been completed yet due to logistical limitations.  This is extremely important for her safety in the perioperative period, given the fact that she has been hospitalized for severe anemia in the recent past. She should continue dual antiplatelet therapy.  I counseled the patient on smoking cessation for over 10 minutes.     **She is a high risk surgical candidate. She has 3 documented AMA events in the last few years in the [facility] alone**    I would like to thank you for the opportunity to participate in the care of this patient.     Ramakrishna James M.D., R.P.V.I.  Cardiothoracic and Vascular  Surgeon  [facility]      Patient Active Problem List   Diagnosis    Peripheral arterial occlusive disease    Hyperlipidemia    Hypertension    COPD with acute exacerbation    Prediabetes    Fibromyalgia    Iron deficiency anemia due to chronic blood loss    Hypothyroidism    RA (rheumatoid arthritis)    Charcot's joint of foot    Phantom pain following amputation of lower limb    Tobacco use disorder    Nutritional deficiency    BKA stump complication    Intermittent claudication due to atherosclerosis of artery of extremity    Episode of recurrent major depressive disorder    Anemia    Severe anemia    Hypotension, unspecified hypotension type    Parotiditis    Amputated below knee    Primary insomnia    Menopausal vasomotor syndrome

## 2023-09-06 ENCOUNTER — OFFICE VISIT (OUTPATIENT)
Dept: INTERNAL MEDICINE | Facility: CLINIC | Age: 63
End: 2023-09-06
Payer: MEDICARE

## 2023-09-06 VITALS
BODY MASS INDEX: 18.17 KG/M2 | SYSTOLIC BLOOD PRESSURE: 128 MMHG | WEIGHT: 109.2 LBS | RESPIRATION RATE: 16 BRPM | TEMPERATURE: 98 F | DIASTOLIC BLOOD PRESSURE: 70 MMHG | HEART RATE: 74 BPM

## 2023-09-06 DIAGNOSIS — Z12.31 ENCOUNTER FOR SCREENING MAMMOGRAM FOR MALIGNANT NEOPLASM OF BREAST: ICD-10-CM

## 2023-09-06 DIAGNOSIS — I10 PRIMARY HYPERTENSION: Primary | ICD-10-CM

## 2023-09-06 DIAGNOSIS — F51.01 PRIMARY INSOMNIA: ICD-10-CM

## 2023-09-06 DIAGNOSIS — J06.9 VIRAL URI: ICD-10-CM

## 2023-09-06 DIAGNOSIS — I77.9 PERIPHERAL ARTERIAL OCCLUSIVE DISEASE: ICD-10-CM

## 2023-09-06 DIAGNOSIS — F17.200 TOBACCO USE DISORDER: ICD-10-CM

## 2023-09-06 DIAGNOSIS — D50.0 IRON DEFICIENCY ANEMIA DUE TO CHRONIC BLOOD LOSS: ICD-10-CM

## 2023-09-06 PROBLEM — I95.9 HYPOTENSION, UNSPECIFIED HYPOTENSION TYPE: Status: RESOLVED | Noted: 2022-12-13 | Resolved: 2023-09-06

## 2023-09-06 LAB
EXPIRATION DATE: NORMAL
EXPIRATION DATE: NORMAL
FLUAV AG UPPER RESP QL IA.RAPID: NOT DETECTED
FLUBV AG UPPER RESP QL IA.RAPID: NOT DETECTED
INTERNAL CONTROL: NORMAL
Lab: NORMAL
Lab: NORMAL
RSV AG SPEC QL: NEGATIVE
SARS-COV-2 AG UPPER RESP QL IA.RAPID: NOT DETECTED

## 2023-09-06 RX ORDER — LISINOPRIL 10 MG/1
10 TABLET ORAL DAILY
COMMUNITY

## 2023-09-06 RX ORDER — FUROSEMIDE 20 MG/1
20 TABLET ORAL DAILY
Qty: 30 TABLET | Refills: 1 | Status: SHIPPED | OUTPATIENT
Start: 2023-09-06

## 2023-09-06 RX ORDER — TRAZODONE HYDROCHLORIDE 50 MG/1
50 TABLET ORAL NIGHTLY
Qty: 30 TABLET | Refills: 1 | Status: SHIPPED | OUTPATIENT
Start: 2023-09-06

## 2023-09-06 NOTE — ASSESSMENT & PLAN NOTE
Bobbi Du  reports that she has been smoking cigarettes. She has a 22.00 pack-year smoking history. She has never used smokeless tobacco.. I have educated her on the risk of diseases from using tobacco products such as cancer, COPD, and heart disease.     I advised her to quit and she is willing to quit. We have discussed the following method/s for tobacco cessation:  Counseling.  Together we have set a quit date for 1 month from today.  She will follow up with me in 1 month or sooner to check on her progress.    I spent 5 minutes counseling the patient.       - Patient has been counseled on smoking cessation. She will implement nicotine replacement via patches and gum.

## 2023-09-06 NOTE — ASSESSMENT & PLAN NOTE
- Lasix 20 mg has been prescribed for the patient.  - Refill of lisinopril 10 mg has been sent to patient's preferred pharmacy.

## 2023-09-07 ENCOUNTER — HOSPITAL ENCOUNTER (OUTPATIENT)
Dept: CT IMAGING | Facility: HOSPITAL | Age: 63
Discharge: HOME | End: 2023-09-07
Admitting: STUDENT IN AN ORGANIZED HEALTH CARE EDUCATION/TRAINING PROGRAM
Payer: MEDICARE

## 2023-09-07 DIAGNOSIS — I70.221 CRITICAL LIMB ISCHEMIA OF RIGHT LOWER EXTREMITY: ICD-10-CM

## 2023-09-07 DIAGNOSIS — I77.9 PERIPHERAL ARTERIAL OCCLUSIVE DISEASE: ICD-10-CM

## 2023-09-07 LAB — CREAT BLDA-MCNC: 0.6 MG/DL (ref 0.6–1.3)

## 2023-09-07 PROCEDURE — 82565 ASSAY OF CREATININE: CPT

## 2023-09-07 PROCEDURE — 25510000001 IOPAMIDOL PER 1 ML: Performed by: STUDENT IN AN ORGANIZED HEALTH CARE EDUCATION/TRAINING PROGRAM

## 2023-09-07 PROCEDURE — 75635 CT ANGIO ABDOMINAL ARTERIES: CPT

## 2023-09-07 RX ADMIN — IOPAMIDOL 125 ML: 755 INJECTION, SOLUTION INTRAVENOUS at 11:18

## 2023-09-18 DIAGNOSIS — I77.9 PERIPHERAL ARTERIAL OCCLUSIVE DISEASE: Primary | ICD-10-CM

## 2023-09-22 ENCOUNTER — LAB (OUTPATIENT)
Dept: LAB | Facility: HOSPITAL | Age: 63
End: 2023-09-22
Payer: MEDICARE

## 2023-09-22 ENCOUNTER — CONSULT (OUTPATIENT)
Dept: ONCOLOGY | Facility: CLINIC | Age: 63
End: 2023-09-22
Payer: MEDICARE

## 2023-09-22 VITALS
TEMPERATURE: 98.5 F | RESPIRATION RATE: 18 BRPM | DIASTOLIC BLOOD PRESSURE: 79 MMHG | SYSTOLIC BLOOD PRESSURE: 125 MMHG | BODY MASS INDEX: 17.16 KG/M2 | WEIGHT: 103 LBS | HEART RATE: 116 BPM | HEIGHT: 65 IN

## 2023-09-22 DIAGNOSIS — E53.8 B12 DEFICIENCY: ICD-10-CM

## 2023-09-22 DIAGNOSIS — D50.0 IRON DEFICIENCY ANEMIA DUE TO CHRONIC BLOOD LOSS: ICD-10-CM

## 2023-09-22 DIAGNOSIS — D50.0 IRON DEFICIENCY ANEMIA DUE TO CHRONIC BLOOD LOSS: Primary | ICD-10-CM

## 2023-09-22 LAB
BASOPHILS # BLD AUTO: 0.09 10*3/MM3 (ref 0–0.2)
BASOPHILS NFR BLD AUTO: 1.1 % (ref 0–1.5)
DEPRECATED RDW RBC AUTO: 52.1 FL (ref 37–54)
EOSINOPHIL # BLD AUTO: 0.2 10*3/MM3 (ref 0–0.4)
EOSINOPHIL NFR BLD AUTO: 2.4 % (ref 0.3–6.2)
ERYTHROCYTE [DISTWIDTH] IN BLOOD BY AUTOMATED COUNT: 17.6 % (ref 12.3–15.4)
FERRITIN SERPL-MCNC: 41.52 NG/ML (ref 13–150)
HCT VFR BLD AUTO: 38.6 % (ref 34–46.6)
HGB BLD-MCNC: 11.7 G/DL (ref 12–15.9)
IMM GRANULOCYTES # BLD AUTO: 0 10*3/MM3 (ref 0–0.05)
IMM GRANULOCYTES NFR BLD AUTO: 0 % (ref 0–0.5)
IRON 24H UR-MRATE: 78 MCG/DL (ref 37–145)
IRON SATN MFR SERPL: 17 % (ref 20–50)
LYMPHOCYTES # BLD AUTO: 2.41 10*3/MM3 (ref 0.7–3.1)
LYMPHOCYTES NFR BLD AUTO: 28.8 % (ref 19.6–45.3)
MCH RBC QN AUTO: 24.4 PG (ref 26.6–33)
MCHC RBC AUTO-ENTMCNC: 30.3 G/DL (ref 31.5–35.7)
MCV RBC AUTO: 80.4 FL (ref 79–97)
MONOCYTES # BLD AUTO: 0.66 10*3/MM3 (ref 0.1–0.9)
MONOCYTES NFR BLD AUTO: 7.9 % (ref 5–12)
NEUTROPHILS NFR BLD AUTO: 5.01 10*3/MM3 (ref 1.7–7)
NEUTROPHILS NFR BLD AUTO: 59.8 % (ref 42.7–76)
PLATELET # BLD AUTO: 296 10*3/MM3 (ref 140–450)
PMV BLD AUTO: 11 FL (ref 6–12)
RBC # BLD AUTO: 4.8 10*6/MM3 (ref 3.77–5.28)
TIBC SERPL-MCNC: 471 MCG/DL (ref 298–536)
TRANSFERRIN SERPL-MCNC: 316 MG/DL (ref 200–360)
WBC NRBC COR # BLD: 8.37 10*3/MM3 (ref 3.4–10.8)

## 2023-09-22 PROCEDURE — 36415 COLL VENOUS BLD VENIPUNCTURE: CPT

## 2023-09-22 PROCEDURE — 82728 ASSAY OF FERRITIN: CPT

## 2023-09-22 PROCEDURE — 83540 ASSAY OF IRON: CPT

## 2023-09-22 PROCEDURE — 84466 ASSAY OF TRANSFERRIN: CPT

## 2023-09-22 PROCEDURE — 85025 COMPLETE CBC W/AUTO DIFF WBC: CPT

## 2023-09-22 RX ORDER — CYANOCOBALAMIN 1000 UG/ML
1000 INJECTION, SOLUTION INTRAMUSCULAR; SUBCUTANEOUS ONCE
OUTPATIENT
Start: 2023-10-20

## 2023-09-22 RX ORDER — CYANOCOBALAMIN 1000 UG/ML
1000 INJECTION, SOLUTION INTRAMUSCULAR; SUBCUTANEOUS ONCE
OUTPATIENT
Start: 2023-10-13

## 2023-09-22 RX ORDER — CYANOCOBALAMIN 1000 UG/ML
1000 INJECTION, SOLUTION INTRAMUSCULAR; SUBCUTANEOUS ONCE
OUTPATIENT
Start: 2023-10-06

## 2023-09-22 RX ORDER — CYANOCOBALAMIN 1000 UG/ML
1000 INJECTION, SOLUTION INTRAMUSCULAR; SUBCUTANEOUS ONCE
OUTPATIENT
Start: 2023-09-29

## 2023-09-22 RX ORDER — CYANOCOBALAMIN 1000 UG/ML
1000 INJECTION, SOLUTION INTRAMUSCULAR; SUBCUTANEOUS ONCE
OUTPATIENT
Start: 2024-01-12

## 2023-09-22 RX ORDER — CYANOCOBALAMIN 1000 UG/ML
1000 INJECTION, SOLUTION INTRAMUSCULAR; SUBCUTANEOUS ONCE
OUTPATIENT
Start: 2023-11-17

## 2023-09-22 RX ORDER — CYANOCOBALAMIN 1000 UG/ML
1000 INJECTION, SOLUTION INTRAMUSCULAR; SUBCUTANEOUS ONCE
OUTPATIENT
Start: 2023-12-15

## 2023-09-22 NOTE — PROGRESS NOTES
CHIEF COMPLAINT: Fatigue    REASON FOR REFERRAL: Anemia      RECORDS OBTAINED  Records of the patients history including those obtained from Dr. James were reviewed and summarized in detail.    Oncology/Hematology History Overview Note   1.  Anemia of iron deficiency and B12 deficiency  2.  Rheumatoid arthritis  3.  Diabetes with neuropathy with Charcot joint and phantom pain post lower limb amputation with BKA stump complication  4.  Hypertension  5.  Hyperlipidemia  6.  COPD  7.  Peripheral arterial occlusion with history of left femoral-popliteal bypass using right greater saphenous vein and ultimately left BKA.  Left common iliac to the left common femoral artery bypass and left femoral-popliteal bypass 2017 Dr. Artis.  8.  Hypothyroidism  9.  History of spine surgeries  10.  Greater than 50-pack-year smoking.    Hematology history timeline:  -5/27/2017 hemoglobin 9.4 MCV 77.9  -7/12/2017 White count 21,400 hemoglobin 7.3 platelets 556,000.  Ferritin 30 with iron low 6 and saturation low 2%  -10/15/2019 hemoglobin 8.7 MCV 79.9 with unremarkable CBC otherwise.  Ferritin 79.5.  Iron low 14 saturation 4%.  Transferrin normal 209.  Total iron binding capacity 311.  -12/18/2019 hemoglobin 9.7 white count 11,580 otherwise unremarkable CBC and differential  -5/15/2020 white count 11,410 hemoglobin 7.8 MCV 73.9 normal platelets  -9/8/2022 hemoglobin 6.6 MCV 64.3 normal white count platelet count.  Ferritin 8.85.  -12/13/2022 hemoglobin 2.5 transfused to 8.8 with MCV 60.5.  Ferritin 4.94.  Iron low 10 with saturation 2% and elevated transferrin 405 and total iron binding capacity 603.  Patient was inpatient 12/13/2022 through 12/18/2022.  Plan for bidirectional endoscopy but patient left AMA before this could be performed  -1/9/2023 colonoscopy hyperplastic polyp and serrated adenoma without dysplasia sigmoid polyp  -1/11/2023 hemoglobin 10.6 MCV 84 ferritin elevated 198.  Iron normal 57 with normal transferrin 218  and total iron-binding capacity 258.  -1/18/2023 EGD Dr. Murcia duodenal biopsy negative for celiac or other pathology.  Mild chronic gastritis without activity on stomach biopsy.  -5/4/2023 hemoglobin 10.4 MCV 81  -7/11/2023 hemoglobin 9.7 MCV 78.7.  Ferritin low end of normal 16.4.  -7/16/2023 hemoglobin 9.8 MCV 83.5 platelets slightly elevated 493,000 with white count slightly elevated 11,620.  Ferritin 16.  Iron low 12 with saturation low 2% with normal total iron-binding capacity upper end of normal 514 with transferrin upper end of normal 345.  Normal folate.  B12 low 179.  Reticulocyte count inadequate 1.86%.  proBNP normal.  Glucose 133 otherwise unremarkable CMP.    -9/22/2023 [facility] hematology consult: She has iron deficiency anemia and B12 deficiency.  I will start her on B12 shots  at [facility] weekly x4 and then monthly.  I will alter her ferrous gluconate where she has been taking it daily to a regimen of twice a day every other day with vitamin C with an hour and a half window or more on either side of the dose free of any other medications.  I will see her back in 4 months with repeat B12 level and methylmalonic acid along with iron panel.  She has had thorough GI investigation save for capsule endoscopy but I would not put her through that given that her hemoglobin had been stable in the nines at least as of July and I will check her CBC today as well.     Anemia       HISTORY OF PRESENT ILLNESS:  The patient is a 62 y.o.  female, referred for microcytic anemia with B12 deficiency and iron deficiency documented with EGD and colonoscopy within the year with Dr. Murcia.    REVIEW OF SYSTEMS:  Significant peripheral vascular disease symptoms with neuropathy and BKA    Past Medical History:   Diagnosis Date    Acute respiratory alkalosis 10/16/2019    Anemia     Bilateral knee pain 07/20/2020    Charcot foot due to diabetes mellitus     RIGHT    Chronic pain     Claudication of lower extremity with  history of revascularization 05/13/2020    Added automatically from request for surgery [id number]    Confusion 10/15/2019    COPD (chronic obstructive pulmonary disease)     COPD mixed type     Diabetes mellitus     Disease of thyroid gland     Encephalopathy 10/15/2019    Fibromyalgia     Fibromyalgia, primary     Gangrene 07/10/2017    Heart murmur     Hyperlipidemia     Hypertension     Hypothyroidism     Incarcerated right inguinal hernia 12/17/2019    Irreducible right femoral hernia 12/17/2019    Low back pain     PAD (peripheral artery disease)     Parotiditis 1/4/2023    Restless leg     Rheumatoid arthritis     S/P left iliofemoral and left femoropopliteal bypass surgery 7/10/17 07/10/2017    Sinusitis 10/16/2019    Tobacco abuse      Past Surgical History:   Procedure Laterality Date    BACK SURGERY  10/10/2019    L4-5 PLIF, laminectomy, foraminectomy -Dr. Chai Gerardo     CARDIAC CATHETERIZATION N/A 05/30/2017    Procedure: Angioplasty-peripheral;  Surgeon: Mayur Artis MD;  Location:  [facility];  Service:     CARPAL TUNNEL RELEASE      X 4    FOOT SURGERY Bilateral     X5    HERNIA REPAIR      INGUINAL HERNIA REPAIR Right 12/17/2019    Procedure: INGUINAL HERNIA REPAIR RIGHT WITH MESH;  Surgeon: Ramakrishna Al MD;  Location:  [facility] OR;  Service: General    INTERVENTIONAL RADIOLOGY PROCEDURE N/A 05/30/2017    Procedure: Abdominal Aortagram with Runoff;  Surgeon: Mayur Artis MD;  Location: [facility];  Service:     INTERVENTIONAL RADIOLOGY PROCEDURE N/A 05/15/2020    Procedure: LLE angiogram with atherectomy/stent;  Surgeon: Leighton Jones MD;  Location: [facility];  Service: Interventional Radiology;  Laterality: N/A;    KNEE SURGERY Left     LUMBAR FUSION  02/22/2010    L5/S1 fusion Dr. Chai Galvan     MS IN-SITU VEIN BYPASS FEMORAL-POPLITEAL Left 07/10/2017    Procedure: LEFT ILIAC STENT, FEMORAL ENDARECTOMY, LEFT FEMORAL POPLITEAL  BYPASS, POSS ILIAC FEMORAL BYPASS;  Surgeon: Mayur Artis MD;  Location:  [facility];  Service: Vascular    AK RT/LT HEART CATHETERS N/A 05/30/2017    Procedure: Percutaneous Coronary Intervention;  Surgeon: Mayur Artis MD;  Location:  [facility];  Service: Cardiovascular    SPINE SURGERY      THYROIDECTOMY      TUBAL ABDOMINAL LIGATION         Current Outpatient Medications on File Prior to Visit   Medication Sig Dispense Refill    albuterol sulfate  (90 Base) MCG/ACT inhaler Inhale 2 puffs Every 4 (Four) Hours As Needed for Wheezing or Shortness of Air. 18 g 3    [place] Thyroid 15 MG tablet Take 1 tablet by mouth Daily. 60 tablet 1    aspirin (aspirin) 81 MG EC tablet Take 1 tablet by mouth Every Morning. 90 tablet 3    Bevespi Aerosphere 9-4.8 MCG/ACT aerosol Inhale 2 sprays 2 (Two) Times a Day.      clopidogrel (PLAVIX) 75 MG tablet Take 1 tablet by mouth Every Morning. 30 tablet 0    ferrous gluconate (FERGON) 324 MG tablet Take 1 tablet by mouth Daily With Breakfast. 90 tablet 1    gabapentin (NEURONTIN) 800 MG tablet Take 1 tablet by mouth 3 (Three) Times a Day. 90 tablet 0    HYDROcodone-acetaminophen (NORCO) 7.5-325 MG per tablet       lisinopril (PRINIVIL,ZESTRIL) 10 MG tablet Take 1 tablet by mouth Daily.      Thyroid (ARMOUR THYROID) 90 MG PO tablet Take 1 tablet by mouth Daily. 60 tablet 1    traZODone (DESYREL) 50 MG tablet TAKE 1 TABLET BY MOUTH EVERY NIGHT 30 tablet 1    atorvastatin (Lipitor) 80 MG tablet Take 1 tablet by mouth Every Night. (Patient not taking: Reported on 9/22/2023) 90 tablet 3    furosemide (Lasix) 20 MG tablet Take 1 tablet by mouth Daily. (Patient not taking: Reported on 9/22/2023) 30 tablet 1    predniSONE (DELTASONE) 10 MG tablet 6 tab PO daily for 3 days, then 5 tab PO daily for 3 days, then 4 tab PO daily for 3 days, then 3 tab PO daily for 3 days, then 2 tab PO daily x 3 days, then 1 tab PO daily for 3 days, then stop. (Patient not  "taking: Reported on 9/22/2023) 63 tablet 0     No current facility-administered medications on file prior to visit.       Allergies   Allergen Reactions    Bee Venom Anaphylaxis       Social History     Socioeconomic History    Marital status:     Number of children: 3   Tobacco Use    Smoking status: Every Day     Packs/day: 0.50     Years: 44.00     Pack years: 22.00     Types: Cigarettes    Smokeless tobacco: Never    Tobacco comments:     STATES STARTED SMOKING AT AGE 16. Is using Chantix now to try to quit.   Vaping Use    Vaping Use: Never used   Substance and Sexual Activity    Alcohol use: No    Drug use: No    Sexual activity: Defer       Family History   Problem Relation Age of Onset    COPD Mother     Anxiety disorder Mother     Arthritis Mother     Hyperlipidemia Mother     Thyroid disease Mother     Alzheimer's disease Father     Brain cancer Brother     Valvular heart disease Maternal Uncle        PHYSICAL EXAM:  Evidence of left BKA.  Decreased sensation right foot.  No pallor.  No jaundice.  No icterus.    /79   Pulse 116   Temp 98.5 °F (36.9 °C)   Resp 18   Ht 165.1 cm (65\")   Wt 46.7 kg (103 lb)   BMI 17.14 kg/m²     ECOG score: 0           ECOG: (0) Fully Active - Able to Carry On All Pre-disease Performance Without Restriction    Lab Results   Component Value Date    HGB 9.8 (L) 07/16/2023    HCT 33.3 (L) 07/16/2023    MCV 83.5 07/16/2023     (H) 07/16/2023    WBC 11.62 (H) 07/16/2023    NEUTROABS 7.02 (H) 07/16/2023    LYMPHSABS 3.43 (H) 07/16/2023    MONOSABS 0.61 07/16/2023    EOSABS 0.40 07/16/2023    BASOSABS 0.14 07/16/2023     Lab Results   Component Value Date    GLUCOSE 133 (H) 07/16/2023    BUN 7 (L) 07/16/2023    CREATININE 0.60 09/07/2023     07/16/2023    K 3.7 07/16/2023     07/16/2023    CO2 24.0 07/16/2023    CALCIUM 9.4 07/16/2023    PROTEINTOT 7.4 07/16/2023    ALBUMIN 4.3 07/16/2023    BILITOT 0.2 07/16/2023    ALKPHOS 108 07/16/2023    " AST 17 07/16/2023    ALT 7 07/16/2023         Assessment & Plan   1.  Anemia of iron deficiency and B12 deficiency  2.  Rheumatoid arthritis  3.  Diabetes with neuropathy with Charcot joint and phantom pain post lower limb amputation with BKA stump complication  4.  Hypertension  5.  Hyperlipidemia  6.  COPD  7.  Peripheral arterial occlusion with history of left femoral-popliteal bypass using right greater saphenous vein and ultimately left BKA.  Left common iliac to the left common femoral artery bypass and left femoral-popliteal bypass 2017 Dr. Artis.  8.  Hypothyroidism  9.  History of spine surgeries  10.  Greater than 50-pack-year smoking.    Hematology history timeline:  -5/27/2017 hemoglobin 9.4 MCV 77.9  -7/12/2017 White count 21,400 hemoglobin 7.3 platelets 556,000.  Ferritin 30 with iron low 6 and saturation low 2%  -10/15/2019 hemoglobin 8.7 MCV 79.9 with unremarkable CBC otherwise.  Ferritin 79.5.  Iron low 14 saturation 4%.  Transferrin normal 209.  Total iron binding capacity 311.  -12/18/2019 hemoglobin 9.7 white count 11,580 otherwise unremarkable CBC and differential  -5/15/2020 white count 11,410 hemoglobin 7.8 MCV 73.9 normal platelets  -9/8/2022 hemoglobin 6.6 MCV 64.3 normal white count platelet count.  Ferritin 8.85.  -12/13/2022 hemoglobin 2.5 transfused to 8.8 with MCV 60.5.  Ferritin 4.94.  Iron low 10 with saturation 2% and elevated transferrin 405 and total iron binding capacity 603.  Patient was inpatient 12/13/2022 through 12/18/2022.  Plan for bidirectional endoscopy but patient left AMA before this could be performed  -1/9/2023 colonoscopy hyperplastic polyp and serrated adenoma without dysplasia sigmoid polyp  -1/11/2023 hemoglobin 10.6 MCV 84 ferritin elevated 198.  Iron normal 57 with normal transferrin 218 and total iron-binding capacity 258.  -1/18/2023 EGD Dr. Murcia duodenal biopsy negative for celiac or other pathology.  Mild chronic gastritis without activity on stomach  biopsy.  -5/4/2023 hemoglobin 10.4 MCV 81  -7/11/2023 hemoglobin 9.7 MCV 78.7.  Ferritin low end of normal 16.4.  -7/16/2023 hemoglobin 9.8 MCV 83.5 platelets slightly elevated 493,000 with white count slightly elevated 11,620.  Ferritin 16.  Iron low 12 with saturation low 2% with normal total iron-binding capacity upper end of normal 514 with transferrin upper end of normal 345.  Normal folate.  B12 low 179.  Reticulocyte count inadequate 1.86%.  proBNP normal.  Glucose 133 otherwise unremarkable CMP.    -9/22/2023 [facility] hematology consult: She has iron deficiency anemia and B12 deficiency.  I will start her on B12 shots  at [facility] weekly x4 and then monthly.  I will alter her ferrous gluconate where she has been taking it daily to a regimen of twice a day every other day with vitamin C with an hour and a half window or more on either side of the dose free of any other medications.  I will see her back in 4 months with repeat B12 level and methylmalonic acid along with iron panel.  She has had thorough GI investigation save for capsule endoscopy but I would not put her through that given that her hemoglobin had been stable in the nines at least as of July and I will check her CBC today as well.    Total time of care today inclusive of time spent today prior to patient's arrival cataloging extensive past records as outlined above and during visit translating all that in interviewing her as to signs or symptoms of her anemia and potential sources thereof and signs and symptoms thereof and signs and symptoms of worsening anemia and signs and symptoms of side effects of her treatments and potential benefits thereof as well and after visit instituting this plan took 1 hour patient care time throughout the day today.      Jcarlos Aguirre MD    9/22/2023

## 2023-09-22 NOTE — LETTER
September 22, 2023     Ramakrishna James MD  [address] [facility] [address]  [facility] [address]    Patient: Bobbi Du   YOB: 1960   Date of Visit: 9/22/2023       Dear Ramakrishna James MD    Bobbi Du was in my office today. Below is a copy of my note.    If you have questions, please do not hesitate to call me. I look forward to following Bobbi along with you.         Sincerely,        Jcarlos Aguirre MD        CC: No Recipients    CHIEF COMPLAINT: Fatigue    REASON FOR REFERRAL: Anemia      RECORDS OBTAINED  Records of the patients history including those obtained from Dr. James were reviewed and summarized in detail.    Oncology/Hematology History Overview Note   1.  Anemia of iron deficiency and B12 deficiency  2.  Rheumatoid arthritis  3.  Diabetes with neuropathy with Charcot joint and phantom pain post lower limb amputation with BKA stump complication  4.  Hypertension  5.  Hyperlipidemia  6.  COPD  7.  Peripheral arterial occlusion with history of left femoral-popliteal bypass using right greater saphenous vein and ultimately left BKA.  Left common iliac to the left common femoral artery bypass and left femoral-popliteal bypass 2017 Dr. Artis.  8.  Hypothyroidism  9.  History of spine surgeries  10.  Greater than 50-pack-year smoking.    Hematology history timeline:  -5/27/2017 hemoglobin 9.4 MCV 77.9  -7/12/2017 White count 21,400 hemoglobin 7.3 platelets 556,000.  Ferritin 30 with iron low 6 and saturation low 2%  -10/15/2019 hemoglobin 8.7 MCV 79.9 with unremarkable CBC otherwise.  Ferritin 79.5.  Iron low 14 saturation 4%.  Transferrin normal 209.  Total iron binding capacity 311.  -12/18/2019 hemoglobin 9.7 white count 11,580 otherwise unremarkable CBC and differential  -5/15/2020 white count 11,410 hemoglobin 7.8 MCV 73.9 normal platelets  -9/8/2022 hemoglobin 6.6 MCV 64.3 normal white count platelet count.  Ferritin 8.85.  -12/13/2022 hemoglobin 2.5 transfused to 8.8 with MCV  60.5.  Ferritin 4.94.  Iron low 10 with saturation 2% and elevated transferrin 405 and total iron binding capacity 603.  Patient was inpatient 12/13/2022 through 12/18/2022.  Plan for bidirectional endoscopy but patient left AMA before this could be performed  -1/9/2023 colonoscopy hyperplastic polyp and serrated adenoma without dysplasia sigmoid polyp  -1/11/2023 hemoglobin 10.6 MCV 84 ferritin elevated 198.  Iron normal 57 with normal transferrin 218 and total iron-binding capacity 258.  -1/18/2023 EGD Dr. Murcia duodenal biopsy negative for celiac or other pathology.  Mild chronic gastritis without activity on stomach biopsy.  -5/4/2023 hemoglobin 10.4 MCV 81  -7/11/2023 hemoglobin 9.7 MCV 78.7.  Ferritin low end of normal 16.4.  -7/16/2023 hemoglobin 9.8 MCV 83.5 platelets slightly elevated 493,000 with white count slightly elevated 11,620.  Ferritin 16.  Iron low 12 with saturation low 2% with normal total iron-binding capacity upper end of normal 514 with transferrin upper end of normal 345.  Normal folate.  B12 low 179.  Reticulocyte count inadequate 1.86%.  proBNP normal.  Glucose 133 otherwise unremarkable CMP.    -9/22/2023 [facility] hematology consult: She has iron deficiency anemia and B12 deficiency.  I will start her on B12 shots  at [facility] weekly x4 and then monthly.  I will alter her ferrous gluconate where she has been taking it daily to a regimen of twice a day every other day with vitamin C with an hour and a half window or more on either side of the dose free of any other medications.  I will see her back in 4 months with repeat B12 level and methylmalonic acid along with iron panel.  She has had thorough GI investigation save for capsule endoscopy but I would not put her through that given that her hemoglobin had been stable in the nines at least as of July and I will check her CBC today as well.     Anemia       HISTORY OF PRESENT ILLNESS:  The patient is a 62 y.o.  female, referred for  microcytic anemia with B12 deficiency and iron deficiency documented with EGD and colonoscopy within the year with Dr. Murcia.    REVIEW OF SYSTEMS:  Significant peripheral vascular disease symptoms with neuropathy and BKA    Past Medical History:   Diagnosis Date   • Acute respiratory alkalosis 10/16/2019   • Anemia    • Bilateral knee pain 07/20/2020   • Charcot foot due to diabetes mellitus     RIGHT   • Chronic pain    • Claudication of lower extremity with history of revascularization 05/13/2020    Added automatically from request for surgery [id number]   • Confusion 10/15/2019   • COPD (chronic obstructive pulmonary disease)    • COPD mixed type    • Diabetes mellitus    • Disease of thyroid gland    • Encephalopathy 10/15/2019   • Fibromyalgia    • Fibromyalgia, primary    • Gangrene 07/10/2017   • Heart murmur    • Hyperlipidemia    • Hypertension    • Hypothyroidism    • Incarcerated right inguinal hernia 12/17/2019   • Irreducible right femoral hernia 12/17/2019   • Low back pain    • PAD (peripheral artery disease)    • Parotiditis 1/4/2023   • Restless leg    • Rheumatoid arthritis    • S/P left iliofemoral and left femoropopliteal bypass surgery 7/10/17 07/10/2017   • Sinusitis 10/16/2019   • Tobacco abuse      Past Surgical History:   Procedure Laterality Date   • BACK SURGERY  10/10/2019    L4-5 PLIF, laminectomy, foraminectomy -Dr. Chai Gerardo    • CARDIAC CATHETERIZATION N/A 05/30/2017    Procedure: Angioplasty-peripheral;  Surgeon: Mayur Artis MD;  Location:  [facility] CATH INVASIVE LOCATION;  Service:    • CARPAL TUNNEL RELEASE      X 4   • FOOT SURGERY Bilateral     X5   • HERNIA REPAIR     • INGUINAL HERNIA REPAIR Right 12/17/2019    Procedure: INGUINAL HERNIA REPAIR RIGHT WITH MESH;  Surgeon: Ramakrishna Al MD;  [facility] OR;  Service: General   • INTERVENTIONAL RADIOLOGY PROCEDURE N/A 05/30/2017    Procedure: Abdominal Aortagram with Runoff;  Surgeon: Mayur Artis MD;   Location:  [facility] CATH INVASIVE LOCATION;  Service:    • INTERVENTIONAL RADIOLOGY PROCEDURE N/A 05/15/2020    Procedure: LLE angiogram with atherectomy/stent;  Surgeon: Leighton Jones MD;  Location:  [facility] CATH INVASIVE LOCATION;  Service: Interventional Radiology;  Laterality: N/A;   • KNEE SURGERY Left    • LUMBAR FUSION  02/22/2010    L5/S1 fusion Dr. Chai Galvan    • DE IN-SITU VEIN BYPASS FEMORAL-POPLITEAL Left 07/10/2017    Procedure: LEFT ILIAC STENT, FEMORAL ENDARECTOMY, LEFT FEMORAL POPLITEAL BYPASS, POSS ILIAC FEMORAL BYPASS;  Surgeon: Mayur Artis MD;  Location:  [facility] OR;  Service: Vascular   • DE RT/LT HEART CATHETERS N/A 05/30/2017    Procedure: Percutaneous Coronary Intervention;  Surgeon: Mayur Artis MD;  Location:  [facility] CATH INVASIVE LOCATION;  Service: Cardiovascular   • SPINE SURGERY     • THYROIDECTOMY     • TUBAL ABDOMINAL LIGATION         Current Outpatient Medications on File Prior to Visit   Medication Sig Dispense Refill   • albuterol sulfate  (90 Base) MCG/ACT inhaler Inhale 2 puffs Every 4 (Four) Hours As Needed for Wheezing or Shortness of Air. 18 g 3   • [place] Thyroid 15 MG tablet Take 1 tablet by mouth Daily. 60 tablet 1   • aspirin (aspirin) 81 MG EC tablet Take 1 tablet by mouth Every Morning. 90 tablet 3   • Bevespi Aerosphere 9-4.8 MCG/ACT aerosol Inhale 2 sprays 2 (Two) Times a Day.     • clopidogrel (PLAVIX) 75 MG tablet Take 1 tablet by mouth Every Morning. 30 tablet 0   • ferrous gluconate (FERGON) 324 MG tablet Take 1 tablet by mouth Daily With Breakfast. 90 tablet 1   • gabapentin (NEURONTIN) 800 MG tablet Take 1 tablet by mouth 3 (Three) Times a Day. 90 tablet 0   • HYDROcodone-acetaminophen (NORCO) 7.5-325 MG per tablet      • lisinopril (PRINIVIL,ZESTRIL) 10 MG tablet Take 1 tablet by mouth Daily.     • Thyroid (ARMOUR THYROID) 90 MG PO tablet Take 1 tablet by mouth Daily. 60 tablet 1   • traZODone (DESYREL) 50 MG tablet TAKE 1 TABLET BY MOUTH EVERY  "NIGHT 30 tablet 1   • atorvastatin (Lipitor) 80 MG tablet Take 1 tablet by mouth Every Night. (Patient not taking: Reported on 9/22/2023) 90 tablet 3   • furosemide (Lasix) 20 MG tablet Take 1 tablet by mouth Daily. (Patient not taking: Reported on 9/22/2023) 30 tablet 1   • predniSONE (DELTASONE) 10 MG tablet 6 tab PO daily for 3 days, then 5 tab PO daily for 3 days, then 4 tab PO daily for 3 days, then 3 tab PO daily for 3 days, then 2 tab PO daily x 3 days, then 1 tab PO daily for 3 days, then stop. (Patient not taking: Reported on 9/22/2023) 63 tablet 0     No current facility-administered medications on file prior to visit.       Allergies   Allergen Reactions   • Bee Venom Anaphylaxis       Social History     Socioeconomic History   • Marital status:    • Number of children: 3   Tobacco Use   • Smoking status: Every Day     Packs/day: 0.50     Years: 44.00     Pack years: 22.00     Types: Cigarettes   • Smokeless tobacco: Never   • Tobacco comments:     STATES STARTED SMOKING AT AGE 16. Is using Chantix now to try to quit.   Vaping Use   • Vaping Use: Never used   Substance and Sexual Activity   • Alcohol use: No   • Drug use: No   • Sexual activity: Defer       Family History   Problem Relation Age of Onset   • COPD Mother    • Anxiety disorder Mother    • Arthritis Mother    • Hyperlipidemia Mother    • Thyroid disease Mother    • Alzheimer's disease Father    • Brain cancer Brother    • Valvular heart disease Maternal Uncle        PHYSICAL EXAM:  Evidence of left BKA.  Decreased sensation right foot.  No pallor.  No jaundice.  No icterus.    /79   Pulse 116   Temp 98.5 °F (36.9 °C)   Resp 18   Ht 165.1 cm (65\")   Wt 46.7 kg (103 lb)   BMI 17.14 kg/m²     ECOG score: 0           ECOG: (0) Fully Active - Able to Carry On All Pre-disease Performance Without Restriction    Lab Results   Component Value Date    HGB 9.8 (L) 07/16/2023    HCT 33.3 (L) 07/16/2023    MCV 83.5 07/16/2023    PLT " 493 (H) 07/16/2023    WBC 11.62 (H) 07/16/2023    NEUTROABS 7.02 (H) 07/16/2023    LYMPHSABS 3.43 (H) 07/16/2023    MONOSABS 0.61 07/16/2023    EOSABS 0.40 07/16/2023    BASOSABS 0.14 07/16/2023     Lab Results   Component Value Date    GLUCOSE 133 (H) 07/16/2023    BUN 7 (L) 07/16/2023    CREATININE 0.60 09/07/2023     07/16/2023    K 3.7 07/16/2023     07/16/2023    CO2 24.0 07/16/2023    CALCIUM 9.4 07/16/2023    PROTEINTOT 7.4 07/16/2023    ALBUMIN 4.3 07/16/2023    BILITOT 0.2 07/16/2023    ALKPHOS 108 07/16/2023    AST 17 07/16/2023    ALT 7 07/16/2023         Assessment & Plan   1.  Anemia of iron deficiency and B12 deficiency  2.  Rheumatoid arthritis  3.  Diabetes with neuropathy with Charcot joint and phantom pain post lower limb amputation with BKA stump complication  4.  Hypertension  5.  Hyperlipidemia  6.  COPD  7.  Peripheral arterial occlusion with history of left femoral-popliteal bypass using right greater saphenous vein and ultimately left BKA.  Left common iliac to the left common femoral artery bypass and left femoral-popliteal bypass 2017 Dr. Artis.  8.  Hypothyroidism  9.  History of spine surgeries  10.  Greater than 50-pack-year smoking.    Hematology history timeline:  -5/27/2017 hemoglobin 9.4 MCV 77.9  -7/12/2017 White count 21,400 hemoglobin 7.3 platelets 556,000.  Ferritin 30 with iron low 6 and saturation low 2%  -10/15/2019 hemoglobin 8.7 MCV 79.9 with unremarkable CBC otherwise.  Ferritin 79.5.  Iron low 14 saturation 4%.  Transferrin normal 209.  Total iron binding capacity 311.  -12/18/2019 hemoglobin 9.7 white count 11,580 otherwise unremarkable CBC and differential  -5/15/2020 white count 11,410 hemoglobin 7.8 MCV 73.9 normal platelets  -9/8/2022 hemoglobin 6.6 MCV 64.3 normal white count platelet count.  Ferritin 8.85.  -12/13/2022 hemoglobin 2.5 transfused to 8.8 with MCV 60.5.  Ferritin 4.94.  Iron low 10 with saturation 2% and elevated transferrin 405 and total  iron binding capacity 603.  Patient was inpatient 12/13/2022 through 12/18/2022.  Plan for bidirectional endoscopy but patient left AMA before this could be performed  -1/9/2023 colonoscopy hyperplastic polyp and serrated adenoma without dysplasia sigmoid polyp  -1/11/2023 hemoglobin 10.6 MCV 84 ferritin elevated 198.  Iron normal 57 with normal transferrin 218 and total iron-binding capacity 258.  -1/18/2023 EGD Dr. Murcia duodenal biopsy negative for celiac or other pathology.  Mild chronic gastritis without activity on stomach biopsy.  -5/4/2023 hemoglobin 10.4 MCV 81  -7/11/2023 hemoglobin 9.7 MCV 78.7.  Ferritin low end of normal 16.4.  -7/16/2023 hemoglobin 9.8 MCV 83.5 platelets slightly elevated 493,000 with white count slightly elevated 11,620.  Ferritin 16.  Iron low 12 with saturation low 2% with normal total iron-binding capacity upper end of normal 514 with transferrin upper end of normal 345.  Normal folate.  B12 low 179.  Reticulocyte count inadequate 1.86%.  proBNP normal.  Glucose 133 otherwise unremarkable CMP.    -9/22/2023 [facility] hematology consult: She has iron deficiency anemia and B12 deficiency.  I will start her on B12 shots  at [facility] weekly x4 and then monthly.  I will alter her ferrous gluconate where she has been taking it daily to a regimen of twice a day every other day with vitamin C with an hour and a half window or more on either side of the dose free of any other medications.  I will see her back in 4 months with repeat B12 level and methylmalonic acid along with iron panel.  She has had thorough GI investigation save for capsule endoscopy but I would not put her through that given that her hemoglobin had been stable in the nines at least as of July and I will check her CBC today as well.    Total time of care today inclusive of time spent today prior to patient's arrival cataloging extensive past records as outlined above and during visit translating all that in interviewing her  as to signs or symptoms of her anemia and potential sources thereof and signs and symptoms thereof and signs and symptoms of worsening anemia and signs and symptoms of side effects of her treatments and potential benefits thereof as well and after visit instituting this plan took 1 hour patient care time throughout the day today.      Jcarlos Aguirre MD    9/22/2023

## 2023-09-23 DIAGNOSIS — E89.0 POSTOPERATIVE HYPOTHYROIDISM: ICD-10-CM

## 2023-09-25 ENCOUNTER — TELEPHONE (OUTPATIENT)
Dept: INTERNAL MEDICINE | Facility: CLINIC | Age: 63
End: 2023-09-25

## 2023-09-25 RX ORDER — THYROID,PORK 90 MG
TABLET ORAL
Qty: 60 TABLET | Refills: 1 | Status: SHIPPED | OUTPATIENT
Start: 2023-09-25

## 2023-09-25 RX ORDER — CLOPIDOGREL BISULFATE 75 MG/1
75 TABLET ORAL EVERY MORNING
Qty: 90 TABLET | OUTPATIENT
Start: 2023-09-25

## 2023-09-25 RX ORDER — CLOPIDOGREL BISULFATE 75 MG/1
75 TABLET ORAL EVERY MORNING
Qty: 30 TABLET | Refills: 0 | Status: SHIPPED | OUTPATIENT
Start: 2023-09-25

## 2023-09-27 RX ORDER — GLYCOPYRROLATE AND FORMOTEROL FUMARATE 9; 4.8 UG/1; UG/1
AEROSOL, METERED RESPIRATORY (INHALATION)
Qty: 10.7 G | Refills: 2 | Status: SHIPPED | OUTPATIENT
Start: 2023-09-27

## 2023-09-28 ENCOUNTER — OFFICE VISIT (OUTPATIENT)
Dept: CARDIAC SURGERY | Facility: CLINIC | Age: 63
End: 2023-09-28
Payer: MEDICARE

## 2023-09-28 ENCOUNTER — PREP FOR SURGERY (OUTPATIENT)
Dept: OTHER | Facility: HOSPITAL | Age: 63
End: 2023-09-28
Payer: MEDICARE

## 2023-09-28 VITALS
BODY MASS INDEX: 17.16 KG/M2 | WEIGHT: 103 LBS | SYSTOLIC BLOOD PRESSURE: 122 MMHG | HEIGHT: 65 IN | OXYGEN SATURATION: 98 % | HEART RATE: 90 BPM | TEMPERATURE: 98.6 F | DIASTOLIC BLOOD PRESSURE: 74 MMHG

## 2023-09-28 DIAGNOSIS — I77.9 PERIPHERAL ARTERIAL OCCLUSIVE DISEASE: Primary | ICD-10-CM

## 2023-09-28 PROCEDURE — 3074F SYST BP LT 130 MM HG: CPT | Performed by: STUDENT IN AN ORGANIZED HEALTH CARE EDUCATION/TRAINING PROGRAM

## 2023-09-28 PROCEDURE — 99214 OFFICE O/P EST MOD 30 MIN: CPT | Performed by: STUDENT IN AN ORGANIZED HEALTH CARE EDUCATION/TRAINING PROGRAM

## 2023-09-28 PROCEDURE — 3078F DIAST BP <80 MM HG: CPT | Performed by: STUDENT IN AN ORGANIZED HEALTH CARE EDUCATION/TRAINING PROGRAM

## 2023-09-28 RX ORDER — CHLORHEXIDINE GLUCONATE 500 MG/1
1 CLOTH TOPICAL EVERY 12 HOURS PRN
OUTPATIENT
Start: 2023-09-28

## 2023-09-28 RX ORDER — CEFAZOLIN SODIUM 2 G/100ML
2 INJECTION, SOLUTION INTRAVENOUS ONCE
OUTPATIENT
Start: 2023-09-28 | End: 2023-09-28

## 2023-09-28 NOTE — PROGRESS NOTES
09/28/2023  Patient Information  Bobbi Du                                                                                          [address] [facility]  [facility] [address]   1960  'PCP/Referring Physician'  Yon Bond MD  [phone]  No ref. provider found    Chief Complaint   Patient presents with    Follow-up     Follow up to discuss SX - PVD right foot and leg. Pt states that she has blockages in her right leg that causes her to have pain in the back of the calf. Coldness in the right foot and numbness in the toes.       History of Present Illness:  62-year-old woman with a complex medical history of spine surgery, diabetes with neuropathy, anemia, rheumatoid arthritis, back surgery, COPD, and peripheral arterial disease with left femoropopliteal bypass using right greater saphenous vein and ultimately left below the knee amputation, active tobacco abuse with over 50-pack-year smoking history, who returns to clinic today to the outpatient surgery clinic with complaint of pain and weakness at rest and with short distance ambulation.  She underwent left common iliac to left common femoral artery bypass and left femoral-popliteal bypass in 2017 by Dr. Toro.  She is mostly wheelchair-bound due to this.  She reports pain constantly, especially in the heel.  She is insensate in the right forefoot.  She reports phantom limb pain at the left amputated leg.  She is accompanied today by her daughter.  Her medical record notes that she would prefer not to be evaluated at the [facility].  From a note dated September 8, 2022, the patient underwent aortogram with runoffs on May 30, 2017 that revealed SFA occlusion on the right side, and this was confirmed by recent CTA.  In a note dated May 15, 2020, Dr. Leighton Jones documented his discussions with the patient, where he expressed a concern for high risk of limb loss, and the patient leaving AMA despite understanding the risk, and refusing  further evaluation by vascular surgery.  She has multiple other notes documenting leaving AMA.          Patient Active Problem List   Diagnosis    Peripheral arterial occlusive disease    Hyperlipidemia    Hypertension    COPD with acute exacerbation    Prediabetes    Fibromyalgia    Iron deficiency anemia due to chronic blood loss    Hypothyroidism    RA (rheumatoid arthritis)    Charcot's joint of foot    Phantom pain following amputation of lower limb    Tobacco use disorder    Nutritional deficiency    BKA stump complication    Intermittent claudication due to atherosclerosis of artery of extremity    Episode of recurrent major depressive disorder    Anemia    Severe anemia    Parotiditis    Amputated below knee    Primary insomnia    Menopausal vasomotor syndrome    B12 deficiency     Past Medical History:   Diagnosis Date    Acute respiratory alkalosis 10/16/2019    Anemia     Bilateral knee pain 07/20/2020    Charcot foot due to diabetes mellitus     RIGHT    Chronic pain     Claudication of lower extremity with history of revascularization 05/13/2020    Added automatically from request for surgery [id number]    Confusion 10/15/2019    COPD (chronic obstructive pulmonary disease)     COPD mixed type     Diabetes mellitus     Disease of thyroid gland     Encephalopathy 10/15/2019    Fibromyalgia     Fibromyalgia, primary     Gangrene 07/10/2017    Heart murmur     Hyperlipidemia     Hypertension     Hypothyroidism     Incarcerated right inguinal hernia 12/17/2019    Irreducible right femoral hernia 12/17/2019    Low back pain     PAD (peripheral artery disease)     Parotiditis 1/4/2023    Restless leg     Rheumatoid arthritis     S/P left iliofemoral and left femoropopliteal bypass surgery 7/10/17 07/10/2017    Sinusitis 10/16/2019    Tobacco abuse      Past Surgical History:   Procedure Laterality Date    BACK SURGERY  10/10/2019    L4-5 PLIF, laminectomy, foraminectomy -Dr. Chai Gerardo     CARDIAC  CATHETERIZATION N/A 05/30/2017    Procedure: Angioplasty-peripheral;  Surgeon: Mayur Artis MD;  Location:  [facility];  Service:     CARPAL TUNNEL RELEASE      X 4    FOOT SURGERY Bilateral     X5    HERNIA REPAIR      INGUINAL HERNIA REPAIR Right 12/17/2019    Procedure: INGUINAL HERNIA REPAIR RIGHT WITH MESH;  Surgeon: Ramakrishna Al MD;  Location:  [facility];  Service: General    INTERVENTIONAL RADIOLOGY PROCEDURE N/A 05/30/2017    Procedure: Abdominal Aortagram with Runoff;  Surgeon: Mayur Artis MD;  Location:  [facility];  Service:     INTERVENTIONAL RADIOLOGY PROCEDURE N/A 05/15/2020    Procedure: LLE angiogram with atherectomy/stent;  Surgeon: Leighton Jones MD;  Location:  [facility];  Service: Interventional Radiology;  Laterality: N/A;    KNEE SURGERY Left     LUMBAR FUSION  02/22/2010    L5/S1 fusion Dr. Chai Galvan     FL IN-SITU VEIN BYPASS FEMORAL-POPLITEAL Left 07/10/2017    Procedure: LEFT ILIAC STENT, FEMORAL ENDARECTOMY, LEFT FEMORAL POPLITEAL BYPASS, POSS ILIAC FEMORAL BYPASS;  Surgeon: Mayur Artis MD;  Location:  [facility];  Service: Vascular    FL RT/LT HEART CATHETERS N/A 05/30/2017    Procedure: Percutaneous Coronary Intervention;  Surgeon: Mayur Artis MD;  Location:  [facility];  Service: Cardiovascular    SPINE SURGERY      THYROIDECTOMY      TUBAL ABDOMINAL LIGATION         Current Outpatient Medications:     albuterol sulfate  (90 Base) MCG/ACT inhaler, Inhale 2 puffs Every 4 (Four) Hours As Needed for Wheezing or Shortness of Air., Disp: 18 g, Rfl: 3    [place] Thyroid 15 MG tablet, Take 1 tablet by mouth Daily., Disp: 60 tablet, Rfl: 1    [place] Thyroid 90 MG tablet, TAKE 1 TABLET BY MOUTH EVERY DAY, Disp: 60 tablet, Rfl: 1    aspirin (aspirin) 81 MG EC tablet, Take 1 tablet by mouth Every Morning., Disp: 90 tablet, Rfl: 3    clopidogrel (PLAVIX) 75 MG tablet, TAKE 1 TABLET BY  MOUTH EVERY MORNING, Disp: 30 tablet, Rfl: 0    ferrous gluconate (FERGON) 324 MG tablet, Take 1 tablet by mouth Daily With Breakfast., Disp: 90 tablet, Rfl: 1    gabapentin (NEURONTIN) 800 MG tablet, Take 1 tablet by mouth 3 (Three) Times a Day., Disp: 90 tablet, Rfl: 0    Glycopyrrolate-Formoterol (Bevespi Aerosphere) 9-4.8 MCG/ACT aerosol, INHALE 2 PUFFS BY MOUTH TWICE DAILY, Disp: 10.7 g, Rfl: 2    HYDROcodone-acetaminophen (NORCO) 7.5-325 MG per tablet, , Disp: , Rfl:     predniSONE (DELTASONE) 10 MG tablet, 6 tab PO daily for 3 days, then 5 tab PO daily for 3 days, then 4 tab PO daily for 3 days, then 3 tab PO daily for 3 days, then 2 tab PO daily x 3 days, then 1 tab PO daily for 3 days, then stop., Disp: 63 tablet, Rfl: 0    traZODone (DESYREL) 50 MG tablet, TAKE 1 TABLET BY MOUTH EVERY NIGHT, Disp: 30 tablet, Rfl: 1    atorvastatin (Lipitor) 80 MG tablet, Take 1 tablet by mouth Every Night. (Patient not taking: Reported on 9/28/2023), Disp: 90 tablet, Rfl: 3    furosemide (Lasix) 20 MG tablet, Take 1 tablet by mouth Daily. (Patient not taking: Reported on 9/28/2023), Disp: 30 tablet, Rfl: 1    lisinopril (PRINIVIL,ZESTRIL) 10 MG tablet, Take 1 tablet by mouth Daily. (Patient not taking: Reported on 9/28/2023), Disp: , Rfl:   Allergies   Allergen Reactions    Bee Venom Anaphylaxis     Social History     Socioeconomic History    Marital status:     Number of children: 3   Tobacco Use    Smoking status: Every Day     Packs/day: 0.50     Years: 44.00     Pack years: 22.00     Types: Cigarettes    Smokeless tobacco: Never    Tobacco comments:     STATES STARTED SMOKING AT AGE 16. Is using Chantix now to try to quit. Pt states that she has cut her smoking back and is trying her best to quit..   Vaping Use    Vaping Use: Never used   Substance and Sexual Activity    Alcohol use: No    Drug use: No    Sexual activity: Defer     Family History   Problem Relation Age of Onset    COPD Mother     Anxiety  "disorder Mother     Arthritis Mother     Hyperlipidemia Mother     Thyroid disease Mother     Alzheimer's disease Father     Brain cancer Brother     Valvular heart disease Maternal Uncle      Review of Systems   Constitutional: Positive for chills, fever, malaise/fatigue (with great exertion) and night sweats. Negative for decreased appetite, diaphoresis, weight gain and weight loss.   HENT:  Positive for congestion. Negative for hoarse voice.    Eyes:  Negative for blurred vision, double vision and visual disturbance.   Cardiovascular:  Positive for leg swelling (slight ankle swelling). Negative for chest pain, claudication, dyspnea on exertion, irregular heartbeat, near-syncope, orthopnea, palpitations, paroxysmal nocturnal dyspnea and syncope.   Respiratory:  Negative for cough, hemoptysis, shortness of breath, sputum production and wheezing.    Hematologic/Lymphatic: Negative for adenopathy and bleeding problem. Does not bruise/bleed easily.   Skin:  Negative for color change, nail changes, poor wound healing and rash.   Musculoskeletal:  Positive for arthritis, back pain, joint pain, joint swelling and muscle cramps (rigth lower leg). Negative for falls.   Gastrointestinal:  Negative for abdominal pain, dysphagia and heartburn.   Genitourinary:  Negative for flank pain.   Neurological:  Positive for numbness (right foot and toes). Negative for brief paralysis, disturbances in coordination, dizziness, focal weakness, headaches, light-headedness, loss of balance, paresthesias, sensory change, vertigo and weakness.   Psychiatric/Behavioral:  Negative for depression and suicidal ideas. The patient has insomnia (pt states that she does not sleep well at night).    Allergic/Immunologic: Positive for environmental allergies. Negative for persistent infections.   Vitals:    09/28/23 0915   BP: 122/74   Pulse: 90   Temp: 98.6 °F (37 °C)   SpO2: 98%   Weight: 46.7 kg (103 lb)   Height: 165.1 cm (65\")      Physical " Exam  General no acute distress, pleasant, interactive  Head normocephalic, atraumatic  Eyes clear sclerae  ENT no discharge, neck supple  Mouth mucous membranes moist  Cardiac regular rate rhythm, no murmurs rubs gallops  Vascular cool RLE without wounds  Pulmonary lungs clear to auscultation bilaterally  Abdomen soft nontender nondistended  Lymphatic no edema bilateral lower extremities  Neurological grossly intact  Psychological appropriate  Dermatological L BKA  Musculoskeletal normal tone and bulk    The ROS, past medical history, surgical history, family history, social history, and vitals were reviewed by myself and corrected as needed.      Labs/Imaging:  See HPI     Assessment/Plan:   62-year-old woman with multiple medical comorbidities who presents with concern for ischemic rest pain of the right foot.  The patient reports that she has completed her CTA of the abdomen and pelvis with bilateral lower extremity runoffs however this result is not available to review in our system today.  I discussed with her the possibility that she has not completed this specific CT scan despite having multiple CT chest and other scans in the recent past.  The patient indicated to me and our office staff that she would complete this test as soon as possible.  She will again need to stop taking her oral iron supplement 1 week prior to this.  She has a history of COPD and active smoking and I advised her to quit smoking.  Despite her diagnosis of COPD and active tobacco abuse, she does not require home oxygen and therefore we will defer PFTs.  She has had a hematology work-up for her anemia.  I counseled the patient on smoking cessation for over 10 minutes. We will plan for R SFA revasc via L radial artery access.     **She is a high risk surgical candidate. She has 3 documented AMA events in the last few years in the [facility] alone**     I would like to thank you for the opportunity to participate in the care of  this patient.      Ramakrishna James M.D., R.P.V.I.  Cardiothoracic and Vascular Surgeon  [facility]    Patient Active Problem List   Diagnosis    Peripheral arterial occlusive disease    Hyperlipidemia    Hypertension    COPD with acute exacerbation    Prediabetes    Fibromyalgia    Iron deficiency anemia due to chronic blood loss    Hypothyroidism    RA (rheumatoid arthritis)    Charcot's joint of foot    Phantom pain following amputation of lower limb    Tobacco use disorder    Nutritional deficiency    BKA stump complication    Intermittent claudication due to atherosclerosis of artery of extremity    Episode of recurrent major depressive disorder    Anemia    Severe anemia    Parotiditis    Amputated below knee    Primary insomnia    Menopausal vasomotor syndrome    B12 deficiency

## 2023-09-28 NOTE — H&P (VIEW-ONLY)
09/28/2023  Patient Information  Bobib Du                                                                                          [address]  [place] [address]   1960  'PCP/Referring Physician'  Yon Bond MD  [phone]  No ref. provider found    Chief Complaint   Patient presents with    Follow-up     Follow up to discuss SX - PVD right foot and leg. Pt states that she has blockages in her right leg that causes her to have pain in the back of the calf. Coldness in the right foot and numbness in the toes.       History of Present Illness:  62-year-old woman with a complex medical history of spine surgery, diabetes with neuropathy, anemia, rheumatoid arthritis, back surgery, COPD, and peripheral arterial disease with left femoropopliteal bypass using right greater saphenous vein and ultimately left below the knee amputation, active tobacco abuse with over 50-pack-year smoking history, who returns to clinic today to the outpatient surgery clinic with complaint of pain and weakness at rest and with short distance ambulation.  She underwent left common iliac to left common femoral artery bypass and left femoral-popliteal bypass in 2017 by Dr. Toro.  She is mostly wheelchair-bound due to this.  She reports pain constantly, especially in the heel.  She is insensate in the right forefoot.  She reports phantom limb pain at the left amputated leg.  She is accompanied today by her daughter.  Her medical record notes that she would prefer not to be evaluated at the [facility].  From a note dated September 8, 2022, the patient underwent aortogram with runoffs on May 30, 2017 that revealed SFA occlusion on the right side, and this was confirmed by recent CTA.  In a note dated May 15, 2020, Dr. Leighton Jones documented his discussions with the patient, where he expressed a concern for high risk of limb loss, and the patient leaving AMA despite understanding the risk, and refusing  further evaluation by vascular surgery.  She has multiple other notes documenting leaving AMA.          Patient Active Problem List   Diagnosis    Peripheral arterial occlusive disease    Hyperlipidemia    Hypertension    COPD with acute exacerbation    Prediabetes    Fibromyalgia    Iron deficiency anemia due to chronic blood loss    Hypothyroidism    RA (rheumatoid arthritis)    Charcot's joint of foot    Phantom pain following amputation of lower limb    Tobacco use disorder    Nutritional deficiency    BKA stump complication    Intermittent claudication due to atherosclerosis of artery of extremity    Episode of recurrent major depressive disorder    Anemia    Severe anemia    Parotiditis    Amputated below knee    Primary insomnia    Menopausal vasomotor syndrome    B12 deficiency     Past Medical History:   Diagnosis Date    Acute respiratory alkalosis 10/16/2019    Anemia     Bilateral knee pain 07/20/2020    Charcot foot due to diabetes mellitus     RIGHT    Chronic pain     Claudication of lower extremity with history of revascularization 05/13/2020    Added automatically from request for surgery [id number]    Confusion 10/15/2019    COPD (chronic obstructive pulmonary disease)     COPD mixed type     Diabetes mellitus     Disease of thyroid gland     Encephalopathy 10/15/2019    Fibromyalgia     Fibromyalgia, primary     Gangrene 07/10/2017    Heart murmur     Hyperlipidemia     Hypertension     Hypothyroidism     Incarcerated right inguinal hernia 12/17/2019    Irreducible right femoral hernia 12/17/2019    Low back pain     PAD (peripheral artery disease)     Parotiditis 1/4/2023    Restless leg     Rheumatoid arthritis     S/P left iliofemoral and left femoropopliteal bypass surgery 7/10/17 07/10/2017    Sinusitis 10/16/2019    Tobacco abuse      Past Surgical History:   Procedure Laterality Date    BACK SURGERY  10/10/2019    L4-5 PLIF, laminectomy, foraminectomy -Dr. Chai Gerardo     CARDIAC  CATHETERIZATION N/A 05/30/2017    Procedure: Angioplasty-peripheral;  Surgeon: Mayur Artis MD;  Location:  [facility];  Service:     CARPAL TUNNEL RELEASE      X 4    FOOT SURGERY Bilateral     X5    HERNIA REPAIR      INGUINAL HERNIA REPAIR Right 12/17/2019    Procedure: INGUINAL HERNIA REPAIR RIGHT WITH MESH;  Surgeon: Ramakrishna Al MD;  Location:  [facility] OR;  Service: General    INTERVENTIONAL RADIOLOGY PROCEDURE N/A 05/30/2017    Procedure: Abdominal Aortagram with Runoff;  Surgeon: Mayur Artis MD;  Location:  [facility];  Service:     INTERVENTIONAL RADIOLOGY PROCEDURE N/A 05/15/2020    Procedure: LLE angiogram with atherectomy/stent;  Surgeon: Leighton Jones MD;  Location:  [facility];  Service: Interventional Radiology;  Laterality: N/A;    KNEE SURGERY Left     LUMBAR FUSION  02/22/2010    L5/S1 fusion Dr. Chai Galvan     GA IN-SITU VEIN BYPASS FEMORAL-POPLITEAL Left 07/10/2017    Procedure: LEFT ILIAC STENT, FEMORAL ENDARECTOMY, LEFT FEMORAL POPLITEAL BYPASS, POSS ILIAC FEMORAL BYPASS;  Surgeon: Mayur Artis MD;  Location:  [facility];  Service: Vascular    GA RT/LT HEART CATHETERS N/A 05/30/2017    Procedure: Percutaneous Coronary Intervention;  Surgeon: Mayur Artis MD;  Location:  [facility];  Service: Cardiovascular    SPINE SURGERY      THYROIDECTOMY      TUBAL ABDOMINAL LIGATION         Current Outpatient Medications:     albuterol sulfate  (90 Base) MCG/ACT inhaler, Inhale 2 puffs Every 4 (Four) Hours As Needed for Wheezing or Shortness of Air., Disp: 18 g, Rfl: 3    [place] Thyroid 15 MG tablet, Take 1 tablet by mouth Daily., Disp: 60 tablet, Rfl: 1    [place] Thyroid 90 MG tablet, TAKE 1 TABLET BY MOUTH EVERY DAY, Disp: 60 tablet, Rfl: 1    aspirin (aspirin) 81 MG EC tablet, Take 1 tablet by mouth Every Morning., Disp: 90 tablet, Rfl: 3    clopidogrel (PLAVIX) 75 MG tablet, TAKE 1 TABLET BY  MOUTH EVERY MORNING, Disp: 30 tablet, Rfl: 0    ferrous gluconate (FERGON) 324 MG tablet, Take 1 tablet by mouth Daily With Breakfast., Disp: 90 tablet, Rfl: 1    gabapentin (NEURONTIN) 800 MG tablet, Take 1 tablet by mouth 3 (Three) Times a Day., Disp: 90 tablet, Rfl: 0    Glycopyrrolate-Formoterol (Bevespi Aerosphere) 9-4.8 MCG/ACT aerosol, INHALE 2 PUFFS BY MOUTH TWICE DAILY, Disp: 10.7 g, Rfl: 2    HYDROcodone-acetaminophen (NORCO) 7.5-325 MG per tablet, , Disp: , Rfl:     predniSONE (DELTASONE) 10 MG tablet, 6 tab PO daily for 3 days, then 5 tab PO daily for 3 days, then 4 tab PO daily for 3 days, then 3 tab PO daily for 3 days, then 2 tab PO daily x 3 days, then 1 tab PO daily for 3 days, then stop., Disp: 63 tablet, Rfl: 0    traZODone (DESYREL) 50 MG tablet, TAKE 1 TABLET BY MOUTH EVERY NIGHT, Disp: 30 tablet, Rfl: 1    atorvastatin (Lipitor) 80 MG tablet, Take 1 tablet by mouth Every Night. (Patient not taking: Reported on 9/28/2023), Disp: 90 tablet, Rfl: 3    furosemide (Lasix) 20 MG tablet, Take 1 tablet by mouth Daily. (Patient not taking: Reported on 9/28/2023), Disp: 30 tablet, Rfl: 1    lisinopril (PRINIVIL,ZESTRIL) 10 MG tablet, Take 1 tablet by mouth Daily. (Patient not taking: Reported on 9/28/2023), Disp: , Rfl:   Allergies   Allergen Reactions    Bee Venom Anaphylaxis     Social History     Socioeconomic History    Marital status:     Number of children: 3   Tobacco Use    Smoking status: Every Day     Packs/day: 0.50     Years: 44.00     Pack years: 22.00     Types: Cigarettes    Smokeless tobacco: Never    Tobacco comments:     STATES STARTED SMOKING AT AGE 16. Is using Chantix now to try to quit. Pt states that she has cut her smoking back and is trying her best to quit..   Vaping Use    Vaping Use: Never used   Substance and Sexual Activity    Alcohol use: No    Drug use: No    Sexual activity: Defer     Family History   Problem Relation Age of Onset    COPD Mother     Anxiety  "disorder Mother     Arthritis Mother     Hyperlipidemia Mother     Thyroid disease Mother     Alzheimer's disease Father     Brain cancer Brother     Valvular heart disease Maternal Uncle      Review of Systems   Constitutional: Positive for chills, fever, malaise/fatigue (with great exertion) and night sweats. Negative for decreased appetite, diaphoresis, weight gain and weight loss.   HENT:  Positive for congestion. Negative for hoarse voice.    Eyes:  Negative for blurred vision, double vision and visual disturbance.   Cardiovascular:  Positive for leg swelling (slight ankle swelling). Negative for chest pain, claudication, dyspnea on exertion, irregular heartbeat, near-syncope, orthopnea, palpitations, paroxysmal nocturnal dyspnea and syncope.   Respiratory:  Negative for cough, hemoptysis, shortness of breath, sputum production and wheezing.    Hematologic/Lymphatic: Negative for adenopathy and bleeding problem. Does not bruise/bleed easily.   Skin:  Negative for color change, nail changes, poor wound healing and rash.   Musculoskeletal:  Positive for arthritis, back pain, joint pain, joint swelling and muscle cramps (rigth lower leg). Negative for falls.   Gastrointestinal:  Negative for abdominal pain, dysphagia and heartburn.   Genitourinary:  Negative for flank pain.   Neurological:  Positive for numbness (right foot and toes). Negative for brief paralysis, disturbances in coordination, dizziness, focal weakness, headaches, light-headedness, loss of balance, paresthesias, sensory change, vertigo and weakness.   Psychiatric/Behavioral:  Negative for depression and suicidal ideas. The patient has insomnia (pt states that she does not sleep well at night).    Allergic/Immunologic: Positive for environmental allergies. Negative for persistent infections.   Vitals:    09/28/23 0915   BP: 122/74   Pulse: 90   Temp: 98.6 °F (37 °C)   SpO2: 98%   Weight: 46.7 kg (103 lb)   Height: 165.1 cm (65\")      Physical " Exam  General no acute distress, pleasant, interactive  Head normocephalic, atraumatic  Eyes clear sclerae  ENT no discharge, neck supple  Mouth mucous membranes moist  Cardiac regular rate rhythm, no murmurs rubs gallops  Vascular cool RLE without wounds  Pulmonary lungs clear to auscultation bilaterally  Abdomen soft nontender nondistended  Lymphatic no edema bilateral lower extremities  Neurological grossly intact  Psychological appropriate  Dermatological L BKA  Musculoskeletal normal tone and bulk    The ROS, past medical history, surgical history, family history, social history, and vitals were reviewed by myself and corrected as needed.      Labs/Imaging:  See HPI     Assessment/Plan:   62-year-old woman with multiple medical comorbidities who presents with concern for ischemic rest pain of the right foot.  The patient reports that she has completed her CTA of the abdomen and pelvis with bilateral lower extremity runoffs however this result is not available to review in our system today.  I discussed with her the possibility that she has not completed this specific CT scan despite having multiple CT chest and other scans in the recent past.  The patient indicated to me and our office staff that she would complete this test as soon as possible.  She will again need to stop taking her oral iron supplement 1 week prior to this.  She has a history of COPD and active smoking and I advised her to quit smoking.  Despite her diagnosis of COPD and active tobacco abuse, she does not require home oxygen and therefore we will defer PFTs.  She has had a hematology work-up for her anemia.  I counseled the patient on smoking cessation for over 10 minutes. We will plan for R SFA revasc via L radial artery access.     **She is a high risk surgical candidate. She has 3 documented AMA events in the last few years in the [facility] alone**     I would like to thank you for the opportunity to participate in the care of  this patient.      Ramakrishna James M.D., R.P.V.I.  Cardiothoracic and Vascular Surgeon  [facility]    Patient Active Problem List   Diagnosis    Peripheral arterial occlusive disease    Hyperlipidemia    Hypertension    COPD with acute exacerbation    Prediabetes    Fibromyalgia    Iron deficiency anemia due to chronic blood loss    Hypothyroidism    RA (rheumatoid arthritis)    Charcot's joint of foot    Phantom pain following amputation of lower limb    Tobacco use disorder    Nutritional deficiency    BKA stump complication    Intermittent claudication due to atherosclerosis of artery of extremity    Episode of recurrent major depressive disorder    Anemia    Severe anemia    Parotiditis    Amputated below knee    Primary insomnia    Menopausal vasomotor syndrome    B12 deficiency

## 2023-09-29 ENCOUNTER — HOSPITAL ENCOUNTER (OUTPATIENT)
Dept: GENERAL RADIOLOGY | Facility: HOSPITAL | Age: 63
Discharge: HOME | End: 2023-09-29
Payer: MEDICARE

## 2023-09-29 ENCOUNTER — PRE-ADMISSION TESTING (OUTPATIENT)
Dept: PREADMISSION TESTING | Facility: HOSPITAL | Age: 63
End: 2023-09-29
Payer: MEDICARE

## 2023-09-29 VITALS — HEIGHT: 65 IN | BODY MASS INDEX: 17.56 KG/M2 | WEIGHT: 105.38 LBS

## 2023-09-29 DIAGNOSIS — I77.9 PERIPHERAL ARTERIAL OCCLUSIVE DISEASE: ICD-10-CM

## 2023-09-29 LAB
ANION GAP SERPL CALCULATED.3IONS-SCNC: 13 MMOL/L (ref 5–15)
APTT PPP: 29.2 SECONDS (ref 22–39)
BUN SERPL-MCNC: 5 MG/DL (ref 8–23)
BUN/CREAT SERPL: 9.8 (ref 7–25)
CALCIUM SPEC-SCNC: 9.5 MG/DL (ref 8.6–10.5)
CHLORIDE SERPL-SCNC: 103 MMOL/L (ref 98–107)
CO2 SERPL-SCNC: 24 MMOL/L (ref 22–29)
CREAT SERPL-MCNC: 0.51 MG/DL (ref 0.57–1)
DEPRECATED RDW RBC AUTO: 52.4 FL (ref 37–54)
EGFRCR SERPLBLD CKD-EPI 2021: 106.4 ML/MIN/1.73
ERYTHROCYTE [DISTWIDTH] IN BLOOD BY AUTOMATED COUNT: 17.7 % (ref 12.3–15.4)
GLUCOSE SERPL-MCNC: 135 MG/DL (ref 65–99)
HBA1C MFR BLD: 7.4 % (ref 4.8–5.6)
HCT VFR BLD AUTO: 35.8 % (ref 34–46.6)
HGB BLD-MCNC: 10.8 G/DL (ref 12–15.9)
INR PPP: 0.96 (ref 0.89–1.12)
MCH RBC QN AUTO: 24.4 PG (ref 26.6–33)
MCHC RBC AUTO-ENTMCNC: 30.2 G/DL (ref 31.5–35.7)
MCV RBC AUTO: 81 FL (ref 79–97)
PLATELET # BLD AUTO: 429 10*3/MM3 (ref 140–450)
PMV BLD AUTO: 10.5 FL (ref 6–12)
POTASSIUM SERPL-SCNC: 4.3 MMOL/L (ref 3.5–5.2)
PROTHROMBIN TIME: 12.9 SECONDS (ref 12.2–14.5)
RBC # BLD AUTO: 4.42 10*6/MM3 (ref 3.77–5.28)
SODIUM SERPL-SCNC: 140 MMOL/L (ref 136–145)
WBC NRBC COR # BLD: 8.73 10*3/MM3 (ref 3.4–10.8)

## 2023-09-29 PROCEDURE — 80048 BASIC METABOLIC PNL TOTAL CA: CPT

## 2023-09-29 PROCEDURE — 71046 X-RAY EXAM CHEST 2 VIEWS: CPT

## 2023-09-29 PROCEDURE — 85027 COMPLETE CBC AUTOMATED: CPT

## 2023-09-29 PROCEDURE — 85610 PROTHROMBIN TIME: CPT

## 2023-09-29 PROCEDURE — 36415 COLL VENOUS BLD VENIPUNCTURE: CPT

## 2023-09-29 PROCEDURE — 83036 HEMOGLOBIN GLYCOSYLATED A1C: CPT

## 2023-09-29 PROCEDURE — 85730 THROMBOPLASTIN TIME PARTIAL: CPT

## 2023-09-29 NOTE — PAT
Too early to draw type and screen in PAT.  Please obtain blood bank specimen in pre-op on the day of surgery.    Per Anesthesia Request, patient instructed not to take their ACE/ARB medications on the AM of surgery.    Patient directed to Radiology Department for CXR after Pre Admission Testing Appointment.     Patient to apply Chlorhexadine wipes  to surgical area (as instructed) the night before procedure and the AM of procedure. Wipes provided.    EKG on chart from July 16 23 with office note.

## 2023-10-03 ENCOUNTER — TELEPHONE (OUTPATIENT)
Dept: ONCOLOGY | Facility: CLINIC | Age: 63
End: 2023-10-03
Payer: MEDICARE

## 2023-10-03 NOTE — TELEPHONE ENCOUNTER
Called and discussed with patient missed appt yesterday, she states that she was unaware of appt. Made her aware of appt on 10/9/23 and she states she will be there and then she can be given the remaining appts after that.

## 2023-10-05 ENCOUNTER — ANESTHESIA (OUTPATIENT)
Dept: PERIOP | Facility: HOSPITAL | Age: 63
End: 2023-10-05
Payer: MEDICARE

## 2023-10-05 ENCOUNTER — TELEPHONE (OUTPATIENT)
Dept: CARDIAC SURGERY | Facility: CLINIC | Age: 63
End: 2023-10-05
Payer: MEDICARE

## 2023-10-05 ENCOUNTER — HOSPITAL ENCOUNTER (OUTPATIENT)
Facility: HOSPITAL | Age: 63
Setting detail: SURGERY ADMIT
Discharge: HOME | End: 2023-10-05
Attending: STUDENT IN AN ORGANIZED HEALTH CARE EDUCATION/TRAINING PROGRAM | Admitting: STUDENT IN AN ORGANIZED HEALTH CARE EDUCATION/TRAINING PROGRAM
Payer: MEDICARE

## 2023-10-05 ENCOUNTER — ANESTHESIA EVENT (OUTPATIENT)
Dept: PERIOP | Facility: HOSPITAL | Age: 63
End: 2023-10-05
Payer: MEDICARE

## 2023-10-05 VITALS
WEIGHT: 105 LBS | DIASTOLIC BLOOD PRESSURE: 68 MMHG | SYSTOLIC BLOOD PRESSURE: 129 MMHG | HEIGHT: 65 IN | HEART RATE: 70 BPM | OXYGEN SATURATION: 98 % | BODY MASS INDEX: 17.49 KG/M2 | TEMPERATURE: 97 F | RESPIRATION RATE: 18 BRPM

## 2023-10-05 DIAGNOSIS — I77.9 PERIPHERAL ARTERIAL OCCLUSIVE DISEASE: ICD-10-CM

## 2023-10-05 LAB
ABO GROUP BLD: NORMAL
BLD GP AB SCN SERPL QL: NEGATIVE
GLUCOSE BLDC GLUCOMTR-MCNC: 126 MG/DL (ref 70–130)
RH BLD: NEGATIVE
T&S EXPIRATION DATE: NORMAL

## 2023-10-05 PROCEDURE — 82948 REAGENT STRIP/BLOOD GLUCOSE: CPT

## 2023-10-05 PROCEDURE — 86900 BLOOD TYPING SEROLOGIC ABO: CPT | Performed by: PHYSICIAN ASSISTANT

## 2023-10-05 PROCEDURE — 86901 BLOOD TYPING SEROLOGIC RH(D): CPT | Performed by: PHYSICIAN ASSISTANT

## 2023-10-05 PROCEDURE — 86923 COMPATIBILITY TEST ELECTRIC: CPT

## 2023-10-05 PROCEDURE — G0463 HOSPITAL OUTPT CLINIC VISIT: HCPCS | Performed by: STUDENT IN AN ORGANIZED HEALTH CARE EDUCATION/TRAINING PROGRAM

## 2023-10-05 PROCEDURE — 86850 RBC ANTIBODY SCREEN: CPT | Performed by: PHYSICIAN ASSISTANT

## 2023-10-05 PROCEDURE — 25810000003 LACTATED RINGERS PER 1000 ML: Performed by: ANESTHESIOLOGY

## 2023-10-05 RX ORDER — SODIUM CHLORIDE 0.9 % (FLUSH) 0.9 %
10 SYRINGE (ML) INJECTION AS NEEDED
Status: DISCONTINUED | OUTPATIENT
Start: 2023-10-05 | End: 2023-10-05 | Stop reason: HOSPADM

## 2023-10-05 RX ORDER — CEFAZOLIN SODIUM 2 G/100ML
2 INJECTION, SOLUTION INTRAVENOUS ONCE
Status: DISCONTINUED | OUTPATIENT
Start: 2023-10-05 | End: 2023-10-05 | Stop reason: HOSPADM

## 2023-10-05 RX ORDER — CHLORHEXIDINE GLUCONATE 500 MG/1
1 CLOTH TOPICAL EVERY 12 HOURS PRN
Status: DISCONTINUED | OUTPATIENT
Start: 2023-10-05 | End: 2023-10-05 | Stop reason: HOSPADM

## 2023-10-05 RX ORDER — SODIUM CHLORIDE 0.9 % (FLUSH) 0.9 %
10 SYRINGE (ML) INJECTION EVERY 12 HOURS SCHEDULED
Status: DISCONTINUED | OUTPATIENT
Start: 2023-10-05 | End: 2023-10-05 | Stop reason: HOSPADM

## 2023-10-05 RX ORDER — SODIUM CHLORIDE 9 MG/ML
40 INJECTION, SOLUTION INTRAVENOUS AS NEEDED
Status: DISCONTINUED | OUTPATIENT
Start: 2023-10-05 | End: 2023-10-05 | Stop reason: HOSPADM

## 2023-10-05 RX ORDER — LIDOCAINE HYDROCHLORIDE 10 MG/ML
0.5 INJECTION, SOLUTION EPIDURAL; INFILTRATION; INTRACAUDAL; PERINEURAL ONCE AS NEEDED
Status: COMPLETED | OUTPATIENT
Start: 2023-10-05 | End: 2023-10-05

## 2023-10-05 RX ORDER — FAMOTIDINE 20 MG/1
20 TABLET, FILM COATED ORAL
Status: COMPLETED | OUTPATIENT
Start: 2023-10-05 | End: 2023-10-05

## 2023-10-05 RX ORDER — SODIUM CHLORIDE, SODIUM LACTATE, POTASSIUM CHLORIDE, CALCIUM CHLORIDE 600; 310; 30; 20 MG/100ML; MG/100ML; MG/100ML; MG/100ML
9 INJECTION, SOLUTION INTRAVENOUS CONTINUOUS PRN
Status: DISCONTINUED | OUTPATIENT
Start: 2023-10-05 | End: 2023-10-05 | Stop reason: HOSPADM

## 2023-10-05 RX ADMIN — SODIUM CHLORIDE, POTASSIUM CHLORIDE, SODIUM LACTATE AND CALCIUM CHLORIDE 9 ML/HR: 600; 310; 30; 20 INJECTION, SOLUTION INTRAVENOUS at 13:20

## 2023-10-05 RX ADMIN — LIDOCAINE HYDROCHLORIDE 0.5 ML: 10 INJECTION, SOLUTION EPIDURAL; INFILTRATION; INTRACAUDAL; PERINEURAL at 13:20

## 2023-10-05 RX ADMIN — FAMOTIDINE 20 MG: 20 TABLET ORAL at 13:36

## 2023-10-05 NOTE — ANESTHESIA PREPROCEDURE EVALUATION
Anesthesia Evaluation     Patient summary reviewed and Nursing notes reviewed   no history of anesthetic complications:   NPO Solid Status: > 8 hours  NPO Liquid Status: > 2 hours           Airway   Mallampati: I  TM distance: >3 FB  Neck ROM: full  No difficulty expected  Dental    (+) edentulous    Pulmonary    (+) a smoker, COPD severe,decreased breath sounds  Cardiovascular   Exercise tolerance: good (4-7 METS)    ECG reviewed  Rhythm: regular  Rate: normal    (+) hypertension well controlled less than 2 medications, valvular problems/murmurs TI, PVD, hyperlipidemia      Neuro/Psych  (+) numbness, psychiatric history  GI/Hepatic/Renal/Endo    (+) diabetes mellitus type 2 well controlled, thyroid problem hypothyroidism    Musculoskeletal     (+) myalgias  Abdominal   (-) obese    Abdomen: soft.   Substance History      OB/GYN          Other   arthritis,                   Anesthesia Plan    ASA 3     general     intravenous induction     Anesthetic plan, risks, benefits, and alternatives have been provided, discussed and informed consent has been obtained with: patient.    Plan discussed with CRNA.    CODE STATUS:

## 2023-10-05 NOTE — INTERVAL H&P NOTE
"[facility] Pre-op    Full history and physical note from office is attached.    /68 (BP Location: Right arm, Patient Position: Lying)   Pulse 70   Temp 97 °F (36.1 °C) (Temporal)   Resp 18   Ht 165.1 cm (65\")   Wt 47.6 kg (105 lb)   SpO2 98%   BMI 17.47 kg/m²     LAB Results:  Lab Results   Component Value Date    WBC 8.73 09/29/2023    HGB 10.8 (L) 09/29/2023    HCT 35.8 09/29/2023    MCV 81.0 09/29/2023     09/29/2023    NEUTROABS 5.01 09/22/2023    GLUCOSE 135 (H) 09/29/2023    BUN 5 (L) 09/29/2023    CREATININE 0.51 (L) 09/29/2023    EGFRIFNONA 103 05/15/2020     09/29/2023    K 4.3 09/29/2023     09/29/2023    CO2 24.0 09/29/2023    MG 1.8 12/18/2022    PHOS 2.5 12/18/2022    CALCIUM 9.5 09/29/2023    ALBUMIN 4.3 07/16/2023    AST 17 07/16/2023    ALT 7 07/16/2023    BILITOT 0.2 07/16/2023    PTT 29.2 09/29/2023    INR 0.96 09/29/2023 9/7/23 CTA:  Findings:  There is mild plaquing of the origin of the celiac artery and SMA. There are 2 left and a single right renal artery. The SANGEETA is patent. There is ectasia of the infrarenal abdominal aorta with a moderate amount of mural thrombus. The right common iliac   artery demonstrates mild calcific plaquing. The right internal iliac artery is occluded. The right external iliac artery demonstrates mild multifocal plaquing. The left common iliac artery, left internal and left external iliac artery are occluded. The   right common femoral artery is patent. The right SFA is occluded. The right profundofemoral artery is patent. The popliteal artery reconstitutes above the knee and is relatively free of disease. There is two-vessel runoff to the right lower extremity via   the peroneal and anterior tibial artery. The posterior tibial artery occludes in the mid calf. The left common femoral artery is occluded. The left profundofemoral artery reconstitutes as does a left femoropopliteal bypass graft which is severely   narrowed " at its origin, but is patent throughout. The patient is status post left BKA.     Limited evaluation of the lung bases are unremarkable. The liver is unremarkable. The gallbladder is normal. The spleen is normal. The pancreas is unremarkable. Mild adrenal hyperplasia. Bilateral kidneys are unremarkable. The bladder is unremarkable.   The uterus is unremarkable. The colon is unremarkable. The small bowel is unremarkable. The appendix is normal. No adenopathy is identified. No aggressive appearing lytic or sclerotic bone lesions are identified. Lower lumbar spine posterior fusion.     Impression:     1. Chronically occluded left common, internal, and external iliac artery with a occluded left common femoral artery.  2. Occlusion of the right SFA just after its origin with reconstitution of a relatively disease-free above-the-knee popliteal artery on the right. There is two-vessel runoff to the right lower extremity.    Cancer Staging (if applicable)  Cancer Patient: __ yes __no __unknown__N/A; If yes, clinical stage T:__ N:__M:__, stage group or __N/A      Impression: Peripheral arterial occlusive disease      Plan: AORTAGRAM WITH RUNOFF POSSIBLE STENT TO RIGHT SFA AND RIGHT POPLITEAL ARTERY/ POSSIBLE ANGIOPLASTY VIA LEFT RADIAL ARTERIAL ACCESS       Yoselyn Foote, APRN   10/5/2023   13:17 EDT

## 2023-10-05 NOTE — NURSING NOTE
Patient did want to wait until after Dr. James's clinical hours for surgery. Notified surgeon, to be rescheduled through office.     Rose Villalobos RN

## 2023-10-05 NOTE — TELEPHONE ENCOUNTER
10/5/23 Patient called the office upset and shouting about why she was told to come to hospital at 12 noon for a 1 pm surgery with Dr. Jamse today.  I corrected the patient and told her she was never on the schedule for 1 pm, but a 4 pm case after his office.  She was told to report at 1 pm by the surgery scheduler.  Ms. Du complained of being moved from another day which she was scheduled to be a first case.  I offered to schedule her another day, but pt. Stated that she would find another vascular surgeon and to hold onto her records.  I told the pt. That will be just fine, as she has been difficult to work with our group and others within [facility].  She will be dismissed from our practice as non-compliant pt.

## 2023-10-09 LAB
BH BB BLOOD EXPIRATION DATE: NORMAL
BH BB BLOOD EXPIRATION DATE: NORMAL
BH BB BLOOD TYPE BARCODE: 9500
BH BB BLOOD TYPE BARCODE: 9500
BH BB DISPENSE STATUS: NORMAL
BH BB DISPENSE STATUS: NORMAL
BH BB PRODUCT CODE: NORMAL
BH BB PRODUCT CODE: NORMAL
BH BB UNIT NUMBER: NORMAL
BH BB UNIT NUMBER: NORMAL
CROSSMATCH INTERPRETATION: NORMAL
CROSSMATCH INTERPRETATION: NORMAL
UNIT  ABO: NORMAL
UNIT  ABO: NORMAL
UNIT  RH: NORMAL
UNIT  RH: NORMAL

## 2023-10-10 ENCOUNTER — TELEPHONE (OUTPATIENT)
Dept: ONCOLOGY | Facility: CLINIC | Age: 63
End: 2023-10-10
Payer: MEDICARE

## 2023-10-10 NOTE — TELEPHONE ENCOUNTER
"Called patient to discuss B12 injection that was scheduled for yesterday, she states she cancelled because she called and no one was able to tell her if the B12 injection was affected by the contract negations between [facility] and [facility]. RN informed her that the only thing out of network right now with [facility] is the MD visit and she verbalized understanding. She was instructed to contact her insurance to get a continuation of care form so that she could continue to see Dr. Aguirre at an \"in network\" cost. She verbalized understanding and states she will be at her injection appt next Monday.   "

## 2023-10-16 RX ORDER — ALBUTEROL SULFATE 90 UG/1
2 AEROSOL, METERED RESPIRATORY (INHALATION) EVERY 4 HOURS PRN
Qty: 18 G | Refills: 3 | Status: SHIPPED | OUTPATIENT
Start: 2023-10-16

## 2023-10-20 RX ORDER — CLOPIDOGREL BISULFATE 75 MG/1
75 TABLET ORAL EVERY MORNING
Qty: 30 TABLET | Refills: 0 | Status: SHIPPED | OUTPATIENT
Start: 2023-10-20

## 2023-10-31 DIAGNOSIS — I77.9 PERIPHERAL ARTERIAL OCCLUSIVE DISEASE: ICD-10-CM

## 2023-10-31 DIAGNOSIS — F51.01 PRIMARY INSOMNIA: ICD-10-CM

## 2023-10-31 DIAGNOSIS — E11.42 TYPE 2 DIABETES MELLITUS WITH DIABETIC POLYNEUROPATHY, WITHOUT LONG-TERM CURRENT USE OF INSULIN: ICD-10-CM

## 2023-10-31 DIAGNOSIS — D50.8 IRON DEFICIENCY ANEMIA SECONDARY TO INADEQUATE DIETARY IRON INTAKE: ICD-10-CM

## 2023-10-31 DIAGNOSIS — E89.0 POSTOPERATIVE HYPOTHYROIDISM: ICD-10-CM

## 2023-10-31 DIAGNOSIS — G54.6 PHANTOM PAIN FOLLOWING AMPUTATION OF LOWER LIMB: ICD-10-CM

## 2023-10-31 RX ORDER — ASPIRIN 81 MG/1
81 TABLET ORAL EVERY MORNING
Qty: 90 TABLET | Refills: 3 | Status: SHIPPED | OUTPATIENT
Start: 2023-10-31

## 2023-10-31 RX ORDER — CLOPIDOGREL BISULFATE 75 MG/1
75 TABLET ORAL EVERY MORNING
Qty: 30 TABLET | Refills: 0 | Status: SHIPPED | OUTPATIENT
Start: 2023-10-31

## 2023-10-31 RX ORDER — CLOPIDOGREL BISULFATE 75 MG/1
75 TABLET ORAL EVERY MORNING
Qty: 90 TABLET | OUTPATIENT
Start: 2023-10-31

## 2023-10-31 RX ORDER — LISINOPRIL 10 MG/1
10 TABLET ORAL DAILY
Qty: 90 TABLET | Refills: 1 | Status: SHIPPED | OUTPATIENT
Start: 2023-10-31

## 2023-10-31 RX ORDER — ALBUTEROL SULFATE 90 UG/1
2 AEROSOL, METERED RESPIRATORY (INHALATION) EVERY 4 HOURS PRN
Qty: 18 G | Refills: 3 | Status: SHIPPED | OUTPATIENT
Start: 2023-10-31

## 2023-10-31 RX ORDER — GLYCOPYRROLATE AND FORMOTEROL FUMARATE 9; 4.8 UG/1; UG/1
2 AEROSOL, METERED RESPIRATORY (INHALATION) 2 TIMES DAILY
Qty: 10.7 G | Refills: 2 | Status: SHIPPED | OUTPATIENT
Start: 2023-10-31

## 2023-10-31 RX ORDER — GABAPENTIN 800 MG/1
800 TABLET ORAL 3 TIMES DAILY
Qty: 90 TABLET | Refills: 0 | OUTPATIENT
Start: 2023-10-31

## 2023-10-31 RX ORDER — THYROID 90 MG/1
90 TABLET ORAL DAILY
Qty: 60 TABLET | Refills: 1 | Status: SHIPPED | OUTPATIENT
Start: 2023-10-31

## 2023-10-31 RX ORDER — FERROUS GLUCONATE 324(38)MG
324 TABLET ORAL
Qty: 90 TABLET | Refills: 1 | Status: SHIPPED | OUTPATIENT
Start: 2023-10-31

## 2023-10-31 RX ORDER — THYROID,PORK 15 MG
15 TABLET ORAL DAILY
Qty: 60 TABLET | Refills: 1 | Status: SHIPPED | OUTPATIENT
Start: 2023-10-31

## 2023-10-31 RX ORDER — TRAZODONE HYDROCHLORIDE 50 MG/1
50 TABLET ORAL NIGHTLY
Qty: 30 TABLET | Refills: 1 | Status: SHIPPED | OUTPATIENT
Start: 2023-10-31

## 2023-10-31 NOTE — TELEPHONE ENCOUNTER
Caller: Bobbi Du    Relationship: Self    Best call back number: [phone]    Requested Prescriptions:   Requested Prescriptions     Pending Prescriptions Disp Refills    albuterol sulfate  (90 Base) MCG/ACT inhaler 18 g 3     Sig: Inhale 2 puffs Every 4 (Four) Hours As Needed for Wheezing or Shortness of Air.    Thyroid (ARMOUR THYROID) 90 MG PO tablet 60 tablet 1     Sig: Take 1 tablet by mouth Daily.    aspirin 81 MG EC tablet 90 tablet 3     Sig: Take 1 tablet by mouth Every Morning.    clopidogrel (PLAVIX) 75 MG tablet 30 tablet 0     Sig: Take 1 tablet by mouth Every Morning.    ferrous gluconate (FERGON) 324 MG tablet 90 tablet 1     Sig: Take 1 tablet by mouth Daily With Breakfast.    gabapentin (NEURONTIN) 800 MG tablet 90 tablet 0     Sig: Take 1 tablet by mouth 3 (Three) Times a Day.    Glycopyrrolate-Formoterol (Bevespi Aerosphere) 9-4.8 MCG/ACT aerosol 10.7 g 2     Sig: Inhale 2 sprays 2 (Two) Times a Day.    traZODone (DESYREL) 50 MG tablet 30 tablet 1     Sig: Take 1 tablet by mouth Every Night.    [place] Thyroid 15 MG tablet 60 tablet 1     Sig: Take 1 tablet by mouth Daily.    lisinopril (PRINIVIL,ZESTRIL) 10 MG tablet       Sig: Take 1 tablet by mouth Daily.        Pharmacy where request should be sent: [id number] - [place] [address] AT [facility] & [facility] [phone] [facility] [phone] FX     Last office visit with prescribing clinician: Visit date not found   Last telemedicine visit with prescribing clinician: Visit date not found   Next office visit with prescribing clinician: Visit date not found     Additional details provided by patient: PATIENT IS ASKING TO HAVE MEDICATIONS CALLED IN FOR A 30 DAY SUPPLY UNTIL SHE CAN GET A NEW PCP WITH HER INSURANCE. SHE IS OUT OF SOME MEDICATIONS     Does the patient have less than a 3 day supply:  [x] Yes  [] No    Would you like a call back once the refill request has been completed:  [x] Yes [] No    If the office needs to give you a call back, can they leave a voicemail: [x] Yes [] No    Connie Muñoz Rep   10/31/23 11:04 EDT

## 2023-11-26 DIAGNOSIS — E78.00 PURE HYPERCHOLESTEROLEMIA: ICD-10-CM

## 2023-11-27 NOTE — TELEPHONE ENCOUNTER
LOV 09/06/2023  NOV     Left message on machine for patient to call    Relay the following information:    Patient is due for a Follow up.  Please advise patient she needs to schedule and keep her appointment to continue to receive refills in the future.  If she is unable to keep her appointment we will not be able to provider further refills.  Once appointment has been scheduled please update message with date and time so we can process the request.  We will forward the message to the provider to review the refill request.

## 2023-11-28 RX ORDER — CLOPIDOGREL BISULFATE 75 MG/1
75 TABLET ORAL EVERY MORNING
Qty: 90 TABLET | Refills: 1 | Status: SHIPPED | OUTPATIENT
Start: 2023-11-28

## 2023-11-28 RX ORDER — ATORVASTATIN CALCIUM 80 MG/1
80 TABLET, FILM COATED ORAL NIGHTLY
Qty: 90 TABLET | Refills: 3 | OUTPATIENT
Start: 2023-11-28

## 2023-11-28 NOTE — TELEPHONE ENCOUNTER
Patient is still trying to decide on her insurance situation and is unable to come in for an appointment at this time because we don't accept her insurance. Patient is almost out of her blood thinner medication.

## 2023-12-08 DIAGNOSIS — E89.0 POSTOPERATIVE HYPOTHYROIDISM: ICD-10-CM

## 2023-12-08 NOTE — TELEPHONE ENCOUNTER
Patient is asking for refill and states that pharmacy requests prior authorization. Requested call back.

## 2023-12-12 RX ORDER — THYROID 90 MG/1
90 TABLET ORAL DAILY
Qty: 60 TABLET | Refills: 0 | Status: SHIPPED | OUTPATIENT
Start: 2023-12-12

## 2023-12-12 NOTE — TELEPHONE ENCOUNTER
Patient is unable to schedule an appointment due to insurance. Patient will be getting new pcp at the beginning of the year.

## 2024-01-11 DIAGNOSIS — F51.01 PRIMARY INSOMNIA: ICD-10-CM

## 2024-01-11 NOTE — TELEPHONE ENCOUNTER
LOV 09/06/2023  NOV     Tried to reach patient no answer left voicemail to return call    RELAY:  We recently received your message to refill your medication(s).  Our records indicate that you did not schedule a follow up appointment with your provider at your last visit on 09/06/2023. The medication that you are on requires a follow up appointment every 3-4 months. Please let us know what days/times work for you and we will be happy to set up an appointment.  You may also contact our office at [phone] to schedule your appointment.  If you are unable to keep your appointment, we will not be able to provide further refills.  Once you have scheduled your appointment, we will be happy to process your refill request.

## 2024-01-15 RX ORDER — TRAZODONE HYDROCHLORIDE 50 MG/1
50 TABLET ORAL NIGHTLY
Qty: 30 TABLET | Refills: 1 | OUTPATIENT
Start: 2024-01-15

## 2024-03-12 NOTE — TELEPHONE ENCOUNTER
LOV 09/06/2023  NOV     Tried to reach patient no answer left voicemail to return call    RELAY:  We recently received your message to refill your medication(s).  Our records indicate that you did not schedule a follow up appointment with your provider at your last visit on 09/06/2023. The medication that you are on requires a follow up appointment for additional refills. Patient was to schedule on or around 10/04/2023 for 4 week follow up  If she is unable to keep her appointment we will not be able to provider further refills.  Once appointment has been scheduled please update message with date and time so we can process the request.  We will forward the message to the provider to review the refill request.

## 2024-03-14 RX ORDER — GLYCOPYRROLATE AND FORMOTEROL FUMARATE 9; 4.8 UG/1; UG/1
2 AEROSOL, METERED RESPIRATORY (INHALATION) 2 TIMES DAILY
Qty: 10.7 G | Refills: 2 | Status: SHIPPED | OUTPATIENT
Start: 2024-03-14

## 2024-03-14 NOTE — TELEPHONE ENCOUNTER
Patient states that she has not been able to find a new provider that accepts her insurance as of yet and needs her medication refilled if possible. Please advise

## 2024-05-14 NOTE — TELEPHONE ENCOUNTER
2nd attempt     LOV 09/06/2023  NOV      Tried to reach patient no answer left voicemail to return call     RELAY:  We recently received your message to refill your medication(s).  Our records indicate that you did not schedule a follow up appointment with your provider at your last visit on 09/06/2023. The medication that you are on requires a follow up appointment for additional refills. Patient was to schedule on or around 10/04/2023 for 4 week follow up     If she is unable to keep her appointment we will not be able to provider further refills.  Once appointment has been scheduled please update message with date and time so we can process the request.  We will forward the message to the provider to review the refill request.

## 2024-05-15 RX ORDER — LISINOPRIL 10 MG/1
10 TABLET ORAL DAILY
Qty: 90 TABLET | Refills: 1 | OUTPATIENT
Start: 2024-05-15

## 2024-05-15 NOTE — TELEPHONE ENCOUNTER
3rd attempt      LOV 09/06/2023  NOV      Tried to reach patient no answer left voicemail to return call     RELAY:  We recently received your message to refill your medication(s).  Our records indicate that you did not schedule a follow up appointment with your provider at your last visit on 09/06/2023. The medication that you are on requires a follow up appointment for additional refills. Patient was to schedule on or around 10/04/2023 for 4 week follow up     If she is unable to keep her appointment we will not be able to provider further refills.  Once appointment has been scheduled please update message with date and time so we can process the request.  We will forward the message to the provider to review the refill request.

## 2024-05-23 ENCOUNTER — TRANSCRIBE ORDERS (OUTPATIENT)
Dept: ADMINISTRATIVE | Facility: HOSPITAL | Age: 64
End: 2024-05-23
Payer: MEDICARE

## 2024-05-23 DIAGNOSIS — F17.219 CIGARETTE NICOTINE DEPENDENCE WITH NICOTINE-INDUCED DISORDER: ICD-10-CM

## 2024-05-23 DIAGNOSIS — Z89.512 LEFT BELOW-KNEE AMPUTEE: ICD-10-CM

## 2024-05-23 DIAGNOSIS — I70.211 ATHEROSCLEROSIS OF NATIVE ARTERY OF RIGHT LOWER EXTREMITY WITH INTERMITTENT CLAUDICATION: Primary | ICD-10-CM

## 2024-06-13 ENCOUNTER — HOSPITAL ENCOUNTER (OUTPATIENT)
Dept: CT IMAGING | Facility: HOSPITAL | Age: 64
Discharge: HOME | End: 2024-06-13
Admitting: SURGERY
Payer: MEDICARE

## 2024-06-13 DIAGNOSIS — F17.219 CIGARETTE NICOTINE DEPENDENCE WITH NICOTINE-INDUCED DISORDER: ICD-10-CM

## 2024-06-13 DIAGNOSIS — Z89.512 LEFT BELOW-KNEE AMPUTEE: ICD-10-CM

## 2024-06-13 DIAGNOSIS — I70.211 ATHEROSCLEROSIS OF NATIVE ARTERY OF RIGHT LOWER EXTREMITY WITH INTERMITTENT CLAUDICATION: ICD-10-CM

## 2024-06-13 LAB — CREAT BLDA-MCNC: 0.7 MG/DL (ref 0.6–1.3)

## 2024-06-13 PROCEDURE — 75635 CT ANGIO ABDOMINAL ARTERIES: CPT

## 2024-06-13 PROCEDURE — 82565 ASSAY OF CREATININE: CPT

## 2024-06-13 PROCEDURE — 25510000001 IOPAMIDOL PER 1 ML: Performed by: SURGERY

## 2024-06-13 RX ADMIN — IOPAMIDOL 150 ML: 755 INJECTION, SOLUTION INTRAVENOUS at 11:38

## 2024-07-05 ENCOUNTER — LAB (OUTPATIENT)
Dept: LAB | Facility: HOSPITAL | Age: 64
End: 2024-07-05
Payer: MEDICARE

## 2024-07-05 ENCOUNTER — TRANSCRIBE ORDERS (OUTPATIENT)
Dept: LAB | Facility: HOSPITAL | Age: 64
End: 2024-07-05
Payer: MEDICARE

## 2024-07-05 DIAGNOSIS — Z91.81 AT HIGH RISK FOR FALLS: Primary | ICD-10-CM

## 2024-07-10 ENCOUNTER — TRANSCRIBE ORDERS (OUTPATIENT)
Dept: LAB | Facility: HOSPITAL | Age: 64
End: 2024-07-10
Payer: MEDICARE

## 2024-07-10 ENCOUNTER — LAB (OUTPATIENT)
Dept: LAB | Facility: HOSPITAL | Age: 64
End: 2024-07-10
Payer: MEDICARE

## 2024-07-10 DIAGNOSIS — Z89.512 LEFT BELOW-KNEE AMPUTEE: ICD-10-CM

## 2024-07-10 DIAGNOSIS — I70.221 CRITICAL LIMB ISCHEMIA OF RIGHT LOWER EXTREMITY: ICD-10-CM

## 2024-07-10 DIAGNOSIS — Z91.81 AT HIGH RISK FOR FALLS: Primary | ICD-10-CM

## 2024-07-10 DIAGNOSIS — F17.218 CIGARETTE NICOTINE DEPENDENCE WITH OTHER NICOTINE-INDUCED DISORDER: ICD-10-CM

## 2024-07-10 DIAGNOSIS — E13.69 OTHER SPECIFIED DIABETES MELLITUS WITH OTHER SPECIFIED COMPLICATION, UNSPECIFIED WHETHER LONG TERM INSULIN USE: ICD-10-CM

## 2024-07-10 LAB
BASOPHILS # BLD AUTO: 0.14 10*3/MM3 (ref 0–0.2)
BASOPHILS NFR BLD AUTO: 1.8 % (ref 0–1.5)
DEPRECATED RDW RBC AUTO: 54.9 FL (ref 37–54)
EOSINOPHIL # BLD AUTO: 0.17 10*3/MM3 (ref 0–0.4)
EOSINOPHIL NFR BLD AUTO: 2.2 % (ref 0.3–6.2)
ERYTHROCYTE [DISTWIDTH] IN BLOOD BY AUTOMATED COUNT: 17.3 % (ref 12.3–15.4)
HCT VFR BLD AUTO: 34.4 % (ref 34–46.6)
HGB BLD-MCNC: 10.5 G/DL (ref 12–15.9)
IMM GRANULOCYTES # BLD AUTO: 0.01 10*3/MM3 (ref 0–0.05)
IMM GRANULOCYTES NFR BLD AUTO: 0.1 % (ref 0–0.5)
INR PPP: 1 (ref 0.89–1.12)
LYMPHOCYTES # BLD AUTO: 2.85 10*3/MM3 (ref 0.7–3.1)
LYMPHOCYTES NFR BLD AUTO: 37.3 % (ref 19.6–45.3)
MCH RBC QN AUTO: 26.4 PG (ref 26.6–33)
MCHC RBC AUTO-ENTMCNC: 30.5 G/DL (ref 31.5–35.7)
MCV RBC AUTO: 86.6 FL (ref 79–97)
MONOCYTES # BLD AUTO: 0.54 10*3/MM3 (ref 0.1–0.9)
MONOCYTES NFR BLD AUTO: 7.1 % (ref 5–12)
NEUTROPHILS NFR BLD AUTO: 3.93 10*3/MM3 (ref 1.7–7)
NEUTROPHILS NFR BLD AUTO: 51.5 % (ref 42.7–76)
NRBC BLD AUTO-RTO: 0 /100 WBC (ref 0–0.2)
PLATELET # BLD AUTO: 413 10*3/MM3 (ref 140–450)
PMV BLD AUTO: 11.6 FL (ref 6–12)
PROTHROMBIN TIME: 13.3 SECONDS (ref 12.2–14.5)
RBC # BLD AUTO: 3.97 10*6/MM3 (ref 3.77–5.28)
WBC NRBC COR # BLD AUTO: 7.64 10*3/MM3 (ref 3.4–10.8)

## 2024-07-10 PROCEDURE — 80048 BASIC METABOLIC PNL TOTAL CA: CPT

## 2024-07-10 PROCEDURE — 36415 COLL VENOUS BLD VENIPUNCTURE: CPT

## 2024-07-10 PROCEDURE — 85610 PROTHROMBIN TIME: CPT

## 2024-07-10 PROCEDURE — 85025 COMPLETE CBC W/AUTO DIFF WBC: CPT | Performed by: PHYSICIAN ASSISTANT

## 2024-07-11 LAB
ANION GAP SERPL CALCULATED.3IONS-SCNC: 12 MMOL/L (ref 5–15)
BUN SERPL-MCNC: 7 MG/DL (ref 8–23)
BUN/CREAT SERPL: 10.9 (ref 7–25)
CALCIUM SPEC-SCNC: 9.1 MG/DL (ref 8.6–10.5)
CHLORIDE SERPL-SCNC: 106 MMOL/L (ref 98–107)
CO2 SERPL-SCNC: 22 MMOL/L (ref 22–29)
CREAT SERPL-MCNC: 0.64 MG/DL (ref 0.57–1)
EGFRCR SERPLBLD CKD-EPI 2021: 100.1 ML/MIN/1.73
GLUCOSE SERPL-MCNC: 119 MG/DL (ref 65–99)
POTASSIUM SERPL-SCNC: 4.1 MMOL/L (ref 3.5–5.2)
SODIUM SERPL-SCNC: 140 MMOL/L (ref 136–145)

## 2024-11-25 NOTE — TELEPHONE ENCOUNTER
2nd attempt     LOV 09/06/2023  NOV      Tried to reach patient no answer left voicemail to return call     RELAY:  We recently received your message to refill your medication(s).  Our records indicate that you did not schedule a follow up appointment with your provider at your last visit on 09/06/2023. The medication that you are on requires a follow up appointment for additional refills. Patient was to schedule on or around 10/04/2023 for 4 week follow up  If she is unable to keep her appointment we will not be able to provider further refills.  Once appointment has been scheduled please update message with date and time so we can process the request.  We will forward the message to the provider to review the refill request.     No

## (undated) DEVICE — RADIFOCUS GLIDEWIRE: Brand: GLIDEWIRE

## (undated) DEVICE — GOWN,REINF,POLY,ECL,PP SLV,XL: Brand: MEDLINE

## (undated) DEVICE — SUT SILK 2/0 TIES 18IN A185H

## (undated) DEVICE — WIPE THERAWASH SLV SPEC CARE 2PK

## (undated) DEVICE — SYR LUERLOK 30CC

## (undated) DEVICE — AVANTI + 4F STD W/GW: Brand: AVANTI

## (undated) DEVICE — INTENDED FOR TISSUE SEPARATION, AND OTHER PROCEDURES THAT REQUIRE A SHARP SURGICAL BLADE TO PUNCTURE OR CUT.: Brand: BARD-PARKER ® SAFETYLOCK CARBON RIB-BACK BLADES

## (undated) DEVICE — RADIFOCUS GLIDEWIRE ADVANTAGE GUIDEWIRE: Brand: GLIDEWIRE ADVANTAGE

## (undated) DEVICE — 1.5MM HYDRO LEMAITRE VALVULOTOME (98 CM): Brand: HYDRO LEMAITRE VALVULOTOME

## (undated) DEVICE — 3M™ IOBAN™ 2 ANTIMICROBIAL INCISE DRAPE 6651EZ: Brand: IOBAN™ 2

## (undated) DEVICE — CATHETER,URETHRAL,REDRUBBER,STERILE,22FR: Brand: MEDLINE

## (undated) DEVICE — KT INTRO MINISTICK MAX W/GW SS ECHO 5F 21G 4CM

## (undated) DEVICE — KITTNER SPONGE: Brand: DEROYAL

## (undated) DEVICE — CATH GUIDE SOFTVU FLUSH HT PIG .035 4F 65CM

## (undated) DEVICE — PK CATH CARD 10

## (undated) DEVICE — SAFESECURE,SECUREMENT,FOLEY CATH,STERILE: Brand: MEDLINE

## (undated) DEVICE — 3M™ STERI-DRAPE™ FLUOROSCOPE DRAPE, 10 PER CARTON / 4 CARTONS PER CASE, 1012: Brand: STERI-DRAPE™

## (undated) DEVICE — ST BLD COL SAFETYLOK LL 21GA 3/4IN 12IN

## (undated) DEVICE — 3M™ STERI-STRIP™ REINFORCED ADHESIVE SKIN CLOSURES, R1547, 1/2 IN X 4 IN (12 MM X 100 MM), 6 STRIPS/ENVELOPE: Brand: 3M™ STERI-STRIP™

## (undated) DEVICE — DRSNG SURESITE123 4X4.8IN

## (undated) DEVICE — PROVIDES A STERILE INTERFACE BETWEEN THE OPERATING ROOM SURGICAL LAMPS (NON-STERILE) AND THE SURGEON OR NURSE (STERILE).: Brand: STERION®CLAMP COVER FABRIC

## (undated) DEVICE — SNAP KOVER: Brand: UNBRANDED

## (undated) DEVICE — CANNULA,ADULT,SOFT-TOUCH,7TUBE,SC: Brand: MEDLINE

## (undated) DEVICE — DRSNG WND BORDR/ADHS NONADHR/GZ LF 4X14IN STRL

## (undated) DEVICE — Device

## (undated) DEVICE — GLV SURG SIGNATURE TOUCH PF LTX 7.5 STRL BX/50

## (undated) DEVICE — QUICK-CROSS™ SUPPORT CATHETER: Brand: QUICK-CROSS™

## (undated) DEVICE — GAUZE,SPONGE,FLUFF,6"X6.75",STRL,10/TRAY: Brand: MEDLINE

## (undated) DEVICE — SYR LL 3CC

## (undated) DEVICE — CVR HNDL LIGHT RIGID

## (undated) DEVICE — SI AVANTI+ 6F STD W/GW  NO OBT: Brand: AVANTI

## (undated) DEVICE — PINNACLE INTRODUCER SHEATH: Brand: PINNACLE

## (undated) DEVICE — STCKNT IMPERV 12IN STRL

## (undated) DEVICE — TOWEL,OR,DSP,ST,BLUE,STD,8/PK,10PK/CS: Brand: MEDLINE

## (undated) DEVICE — ANTIBACTERIAL UNDYED BRAIDED (POLYGLACTIN 910), SYNTHETIC ABSORBABLE SUTURE: Brand: COATED VICRYL

## (undated) DEVICE — DEV COMP RAD PRELUDESYNC 24CM

## (undated) DEVICE — CVR PROB ULTRASND/TRANSD W/GEL LNG 18X250CM STRL

## (undated) DEVICE — DRAPE,REIN 53X77,STERILE: Brand: MEDLINE

## (undated) DEVICE — ST ACC MICROPUNCTURE .018 TRANSLSS/SS/TP 5F/10CM 21G/7CM

## (undated) DEVICE — SOL LR 1000ML

## (undated) DEVICE — BIOPSY SHIELD-ORANGE: Brand: RADPAD

## (undated) DEVICE — GOWN,PREVENTION PLUS,XXLARGE,STERILE: Brand: MEDLINE

## (undated) DEVICE — APPL DURAPREP IODOPHOR APL 26ML

## (undated) DEVICE — INTRO SHEATH PRELUDE IDEAL SPRNG COIL 021 6F 23X80CM

## (undated) DEVICE — SUT PROLN 6/0 BV1 D/A 30IN 8709H

## (undated) DEVICE — SUT SILK 3/0 TIES 18IN A184H

## (undated) DEVICE — PK MINOR SPLT 10

## (undated) DEVICE — 2 TIP PRE-SHAPED 45 MICROCATHETER: Brand: EXCELSIOR SL-10

## (undated) DEVICE — TOTAL TRAY, 16FR 10ML SIL FOLEY, URN: Brand: MEDLINE

## (undated) DEVICE — ADHS SKIN PREMIERPRO EXOFIN TOPICAL HI/VISC .5ML

## (undated) DEVICE — SUT SILK 3/0 SH 30IN K832H

## (undated) DEVICE — DRN PENRS 1/2X18IN LTX

## (undated) DEVICE — SPNG GZ WOVN 4X4IN 12PLY 10/BX STRL

## (undated) DEVICE — ROTATING HEMOSTATIC VALVE .096": Brand: RHV

## (undated) DEVICE — DECANT BG O JET

## (undated) DEVICE — SYR LL TP 10ML STRL

## (undated) DEVICE — GLV SURG SENSICARE MICRO PF LF 7 STRL

## (undated) DEVICE — DRSNG WND BORDR/ADHS NONADHR/GZ LF 4X4IN STRL

## (undated) DEVICE — CATH GUIDE SOFTVU SELECT/V HT OMNI .035 4F 65CM

## (undated) DEVICE — FOGARTY ARTERIAL EMBOLECTOMY CATHETER 4F 80CM: Brand: FOGARTY

## (undated) DEVICE — CATH DIAG IMPRESS BERN .038 5F 125CM

## (undated) DEVICE — VIPERTRACK, 50 PACK: Brand: VIPERTRACK

## (undated) DEVICE — GW STARTER JB STR .035 15X150CM

## (undated) DEVICE — GLV SURG SENSICARE MICRO PF LF 7.5 STRL

## (undated) DEVICE — COVER,LIGHT HANDLE,FLX,1/PK: Brand: MEDLINE INDUSTRIES, INC.

## (undated) DEVICE — GUIDEWIRE WITH ICE™ HYDROPHILIC COATING: Brand: TRANSEND™ EX

## (undated) DEVICE — ENCORE® LATEX MICRO SIZE 7, STERILE LATEX POWDER-FREE SURGICAL GLOVE: Brand: ENCORE

## (undated) DEVICE — ANGIOGRAPHIC CATHETER: Brand: EXPO™

## (undated) DEVICE — PROXIMATE RH ROTATING HEAD SKIN STAPLERS (35 WIDE) CONTAINS 35 STAINLESS STEEL STAPLES: Brand: PROXIMATE

## (undated) DEVICE — PK ANGIO OR 10

## (undated) DEVICE — PK VASC 10

## (undated) DEVICE — SUT SILK 4/0 TIES 18IN A183H

## (undated) DEVICE — AIRWY 90MM NO9

## (undated) DEVICE — 3M™ RANGER™ BLOOD/FLUID WARMING STANDARD FLOW SET, 24200, 10/CASE: Brand: 3M™ RANGER™

## (undated) DEVICE — R2P DESTINATION SLENDER GUIDING SHEATH: Brand: R2P DESTINATION SLENDER

## (undated) DEVICE — MEDI-VAC NON-CONDUCTIVE SUCTION TUBING: Brand: CARDINAL HEALTH

## (undated) DEVICE — INTRAOPERATIVE COVER KIT, 10 PACK: Brand: SITE-RITE

## (undated) DEVICE — LEX NEURO ANGIOGRAPHY: Brand: MEDLINE INDUSTRIES, INC.

## (undated) DEVICE — SKIN PREP TRAY W/CHG: Brand: MEDLINE INDUSTRIES, INC.

## (undated) DEVICE — SUT NUROLON 0 MO7 CR8 18IN C541D

## (undated) DEVICE — C-ARM: Brand: UNBRANDED

## (undated) DEVICE — 3M™ IOBAN™ 2 ANTIMICROBIAL INCISE DRAPE 6650EZ: Brand: IOBAN™ 2

## (undated) DEVICE — MEDI-VAC YANKAUER SUCTION HANDLE W/BULBOUS TIP: Brand: CARDINAL HEALTH

## (undated) DEVICE — PERIPHERAL SHIELD-ORANGE: Brand: RADPAD

## (undated) DEVICE — SPNG LAP PREWASH SFTPK 18X18IN 5PK STRL